# Patient Record
Sex: FEMALE | Race: WHITE | Employment: OTHER | URBAN - METROPOLITAN AREA
[De-identification: names, ages, dates, MRNs, and addresses within clinical notes are randomized per-mention and may not be internally consistent; named-entity substitution may affect disease eponyms.]

---

## 2017-08-15 LAB
HBA1C MFR BLD HPLC: 5.9 %
MICROALBUMIN UR TEST STR-MCNC: NORMAL MG/DL

## 2020-01-06 LAB
CREATININE, EXTERNAL: 0.8
LDL-C, EXTERNAL: 86

## 2020-09-18 ENCOUNTER — TELEPHONE (OUTPATIENT)
Dept: INTERNAL MEDICINE CLINIC | Age: 80
End: 2020-09-18

## 2020-09-18 NOTE — TELEPHONE ENCOUNTER
Called patient to notify her of her doppler results being negative per Dr. Bishop Wilburn. Patient verbalizes understanding.

## 2020-10-08 ENCOUNTER — TELEPHONE (OUTPATIENT)
Dept: INTERNAL MEDICINE CLINIC | Age: 80
End: 2020-10-08

## 2020-10-08 DIAGNOSIS — F41.9 ANXIETY: ICD-10-CM

## 2020-10-08 RX ORDER — ALPRAZOLAM 0.25 MG/1
0.25 TABLET ORAL
Qty: 60 TAB | Refills: 2 | Status: SHIPPED | OUTPATIENT
Start: 2020-10-08 | End: 2021-10-18 | Stop reason: SDUPTHER

## 2020-10-08 RX ORDER — CITALOPRAM 10 MG/1
10 TABLET ORAL DAILY
Qty: 30 TAB | Refills: 2 | Status: SHIPPED | OUTPATIENT
Start: 2020-10-08 | End: 2020-12-03

## 2020-10-08 NOTE — TELEPHONE ENCOUNTER
I had a great discussion about anxiety and stress. Will fill xanax and start celexa. She is to set up an appointment with me for other health maintanance issues.

## 2020-11-16 RX ORDER — METOPROLOL TARTRATE 25 MG/1
12.5 TABLET, FILM COATED ORAL 2 TIMES DAILY
Qty: 30 TAB | Refills: 0 | Status: SHIPPED | OUTPATIENT
Start: 2020-11-16 | End: 2020-11-16

## 2020-11-16 NOTE — TELEPHONE ENCOUNTER
Patient is scheduled for an appt on Nov. 23. She is completely out. Please refill until appt.   [address]

## 2020-11-20 VITALS
WEIGHT: 177 LBS | DIASTOLIC BLOOD PRESSURE: 82 MMHG | HEIGHT: 64 IN | SYSTOLIC BLOOD PRESSURE: 142 MMHG | BODY MASS INDEX: 30.22 KG/M2

## 2020-11-20 PROBLEM — J44.9 CHRONIC OBSTRUCTIVE LUNG DISEASE (HCC): Status: ACTIVE | Noted: 2020-11-20

## 2020-11-20 PROBLEM — I10 ESSENTIAL HYPERTENSION: Status: ACTIVE | Noted: 2020-11-20

## 2020-11-20 PROBLEM — E78.5 HYPERLIPIDEMIA: Status: ACTIVE | Noted: 2020-11-20

## 2020-11-20 RX ORDER — ATORVASTATIN CALCIUM 40 MG/1
TABLET, FILM COATED ORAL DAILY
COMMUNITY
End: 2021-06-14 | Stop reason: SDUPTHER

## 2020-11-20 RX ORDER — ASPIRIN 325 MG
325 TABLET ORAL DAILY
COMMUNITY
End: 2021-01-21

## 2020-11-20 RX ORDER — LOSARTAN POTASSIUM 50 MG/1
100 TABLET ORAL DAILY
COMMUNITY
End: 2021-04-19

## 2020-11-20 RX ORDER — BUDESONIDE AND FORMOTEROL FUMARATE DIHYDRATE 80; 4.5 UG/1; UG/1
2 AEROSOL RESPIRATORY (INHALATION)
COMMUNITY
End: 2021-10-18 | Stop reason: SDUPTHER

## 2020-11-20 RX ORDER — FUROSEMIDE 20 MG/1
20 TABLET ORAL DAILY
COMMUNITY
End: 2021-01-17

## 2020-12-03 ENCOUNTER — VIRTUAL VISIT (OUTPATIENT)
Dept: INTERNAL MEDICINE CLINIC | Age: 80
End: 2020-12-03
Payer: MEDICARE

## 2020-12-03 DIAGNOSIS — I10 ESSENTIAL HYPERTENSION: ICD-10-CM

## 2020-12-03 DIAGNOSIS — R53.82 CHRONIC FATIGUE: ICD-10-CM

## 2020-12-03 DIAGNOSIS — F41.9 ANXIETY: ICD-10-CM

## 2020-12-03 DIAGNOSIS — J42 CHRONIC BRONCHITIS, UNSPECIFIED CHRONIC BRONCHITIS TYPE (HCC): ICD-10-CM

## 2020-12-03 DIAGNOSIS — Z71.89 ADVANCED CARE PLANNING/COUNSELING DISCUSSION: ICD-10-CM

## 2020-12-03 DIAGNOSIS — E78.2 MIXED HYPERLIPIDEMIA: Primary | ICD-10-CM

## 2020-12-03 PROCEDURE — 99443 PR PHYS/QHP TELEPHONE EVALUATION 21-30 MIN: CPT | Performed by: INTERNAL MEDICINE

## 2020-12-03 NOTE — PROGRESS NOTES
I was at my office in [place], [place] while conducting this encounter. The patient is at home. Consent:  Patient and/or HIS/HER healthcare decision maker is aware that this patient-initiated Telehealth encounter is a billable service, with coverage as determined by patient's insurance carrier. Patient is aware that He/She may receive a bill and has provided verbal consent to proceed: Consent has been obtained within past 12 months of the date of this encounter. This virtual visit was conducted via Telephone    1. Mixed hyperlipidemia  We will check a lipid panel continue statin  - LIPID PANEL; Future    2. Essential hypertension  I am going to check a metabolic panel she has been having some periodic dizziness which may suggest her blood pressure is a little bit low or that she may be orthostatic. I would have her come by the office tomorrow were going to check some orthostatic blood pressures on her  - METABOLIC PANEL, COMPREHENSIVE; Future    3. Advanced care planning/counseling discussion  Approximately 2 minutes were spent in advance care planning. Her power of  unofficially is Beto Wilson. She is a full code    4. Chronic bronchitis, unspecified chronic bronchitis type ([address]  This is been relatively stable. She quit smoking just over a year ago and reports that she is breathing without difficulty. I congratulated her for her success    5. Anxiety  The patient has a history of anxiety and has been on alprazolam 0.25 twice daily. She asked that this be increased but I had to decline given her advanced age and the fact that she already has dizziness. I did take the time to explain to her the risks of higher benzodiazepine dosages and she agreed to continue her current dose. Have also checked the  and found no signs of abuse    6. Chronic fatigue  She has been relating this is being just age although 1 to make sure she is not anemic we will check a CBC  - CBC WITH AUTOMATED DIFF;  Future Chief Complaint   Patient presents with    Hypertension     follow up        HPI   This is a delightful 77-year-old female with a history of COPD dyslipidemia and hypertension who presents in follow-up. Her former physician was Dr. Anahi Rendon who retired several months ago. She reports she has been doing okay and at her baseline but has noticed periodic dizziness especially when going to a stand sometimes. She reports other than that it is pretty random. She has not had her blood pressure checked in many months. She is also asking to come into the office for a blood pressure check which I completely agree with. She has no headaches or vision changes she has no chest pain palpitations or shortness of breath. She does report fatigue but she relates that this is primarily because she is 78years old and she is always tired. She denies any blood in her stool or any diarrhea. We discussed advance care planning at length and she is also a full code with Yuly Olmstead being her POA. She otherwise reports she is well but has not had labs in quite some time  Current Outpatient Medications on File Prior to Visit   Medication Sig Dispense Refill    aspirin (ASPIRIN) 325 mg tablet Take 325 mg by mouth daily.  atorvastatin (LIPITOR) 40 mg tablet Take  by mouth daily.  losartan (COZAAR) 50 mg tablet Take 50 mg by mouth daily.  budesonide-formoteroL (Symbicort) 80-4.5 mcg/actuation HFAA Take 2 Puffs by inhalation.  metoprolol tartrate (LOPRESSOR) 25 mg tablet TAKE 1/2 TABLET BY MOUTH TWICE DAILY 90 Tab 1    ALPRAZolam (XANAX) 0.25 mg tablet Take 1 Tab by mouth two (2) times daily as needed for Anxiety. Max Daily Amount: 0.5 mg. 60 Tab 2    furosemide (Lasix) 20 mg tablet Take 20 mg by mouth daily. No current facility-administered medications on file prior to visit.          Patient Active Problem List   Diagnosis Code    Chronic obstructive lung disease ([facility] J44.9    Essential hypertension I10    Hyperlipidemia E78.5             Total Time Spent on this Encounter: greater than 21 mins

## 2020-12-03 NOTE — PROGRESS NOTES
Patient states she is having some depression due to Covid. States if she could get out and go places and see people she would feel better. Patient states does not need any refills on her medications. States sometimes when she stands up she is light headed. States she needs her Xanax increased. Safia Stoner presents today for   Chief Complaint   Patient presents with    Medication Refill       Depression Screening:  3 most recent PHQ Screens 12/3/2020   Little interest or pleasure in doing things Not at all   Feeling down, depressed, irritable, or hopeless Not at all   Total Score PHQ 2 0       Learning Assessment:  No flowsheet data found. Fall Risk  No flowsheet data found. ADL  No flowsheet data found. Health Maintenance reviewed and discussed and ordered per Provider. Health Maintenance Due   Topic Date Due    DTaP/Tdap/Td series (1 - Tdap) 08/30/1959    Shingrix Vaccine Age 50> (1 of 2) 08/30/1988    GLAUCOMA SCREENING Q2Y  08/30/2003    Bone Densitometry (Dexa) Screening  08/30/2003    Pneumococcal 65+ years (1 of 1 - PPSV23) 08/30/2003    Medicare Yearly Exam  03/10/2020   . Coordination of Care:  1. Have you been to the ER, urgent care clinic since your last visit? Hospitalized since your last visit? no    2. Have you seen or consulted any other health care providers outside of the [address] [place] [address] since your last visit? Include any pap smears or colon screening.  no

## 2020-12-11 ENCOUNTER — HOSPITAL ENCOUNTER (OUTPATIENT)
Dept: LAB | Age: 80
Discharge: HOME | End: 2020-12-11
Payer: MEDICARE

## 2020-12-11 DIAGNOSIS — E78.2 MIXED HYPERLIPIDEMIA: ICD-10-CM

## 2020-12-11 DIAGNOSIS — I10 ESSENTIAL HYPERTENSION: ICD-10-CM

## 2020-12-11 DIAGNOSIS — R53.82 CHRONIC FATIGUE: ICD-10-CM

## 2020-12-11 LAB
ALBUMIN SERPL-MCNC: 4.1 G/DL (ref 3.5–4.7)
ALBUMIN/GLOB SERPL: 1.1 {RATIO}
ALP SERPL-CCNC: 79 U/L (ref 38–126)
ALT SERPL-CCNC: 21 U/L (ref 3–52)
ANION GAP SERPL CALC-SCNC: 7 MMOL/L
AST SERPL W P-5'-P-CCNC: 27 U/L (ref 14–74)
BASOPHILS # BLD: 0 K/UL (ref 0–0.1)
BASOPHILS NFR BLD: 1 % (ref 0–2)
BILIRUB SERPL-MCNC: 0.4 MG/DL (ref 0.2–1)
BUN SERPL-MCNC: 17 MG/DL (ref 9–21)
BUN/CREAT SERPL: 21
CA-I BLD-MCNC: 9.4 MG/DL (ref 8.5–10.5)
CHLORIDE SERPL-SCNC: 106 MMOL/L (ref 94–111)
CO2 SERPL-SCNC: 25 MMOL/L (ref 21–33)
CREAT SERPL-MCNC: 0.8 MG/DL (ref 0.7–1.2)
EOSINOPHIL # BLD: 0.2 K/UL (ref 0–0.4)
EOSINOPHIL NFR BLD: 2 % (ref 0–5)
ERYTHROCYTE [DISTWIDTH] IN BLOOD BY AUTOMATED COUNT: 14.7 % (ref 11.6–14.5)
GLOBULIN SER CALC-MCNC: 3.7 G/DL
GLUCOSE SERPL-MCNC: 103 MG/DL (ref 70–110)
HCT VFR BLD AUTO: 37.6 % (ref 35–45)
HGB BLD-MCNC: 11.4 G/DL (ref 12–16)
IMM GRANULOCYTES # BLD AUTO: 0 K/UL
IMM GRANULOCYTES NFR BLD AUTO: 0 %
LYMPHOCYTES # BLD: 1 K/UL (ref 0.9–3.6)
LYMPHOCYTES NFR BLD: 16 % (ref 21–52)
MCH RBC QN AUTO: 26.6 PG (ref 24–34)
MCHC RBC AUTO-ENTMCNC: 30.3 G/DL (ref 31–37)
MCV RBC AUTO: 87.9 FL (ref 74–97)
MONOCYTES # BLD: 0.8 K/UL (ref 0.05–1.2)
MONOCYTES NFR BLD: 12 % (ref 3–10)
NEUTS SEG # BLD: 4.5 K/UL (ref 1.8–8)
NEUTS SEG NFR BLD: 69 % (ref 40–73)
PLATELET # BLD AUTO: 234 K/UL (ref 135–420)
PMV BLD AUTO: 11.4 FL (ref 9.2–11.8)
POTASSIUM SERPL-SCNC: 3.9 MMOL/L (ref 3.2–5.1)
PROT SERPL-MCNC: 7.8 G/DL (ref 6.1–8.4)
RBC # BLD AUTO: 4.28 M/UL (ref 4.2–5.3)
SODIUM SERPL-SCNC: 138 MMOL/L (ref 135–145)
WBC # BLD AUTO: 6.5 K/UL (ref 4.6–13.2)

## 2020-12-11 PROCEDURE — 36415 COLL VENOUS BLD VENIPUNCTURE: CPT

## 2020-12-11 PROCEDURE — 85025 COMPLETE CBC W/AUTO DIFF WBC: CPT

## 2020-12-11 PROCEDURE — 80053 COMPREHEN METABOLIC PANEL: CPT

## 2020-12-11 PROCEDURE — 80061 LIPID PANEL: CPT

## 2020-12-12 LAB
CHOLEST SERPL-MCNC: 153 MG/DL
HDLC SERPL-MCNC: 53 MG/DL
HDLC SERPL: 2.9 {RATIO} (ref 0–5)
LDLC SERPL CALC-MCNC: 75.6 MG/DL (ref 0–100)
LIPID PROFILE,FLP: NORMAL
TRIGL SERPL-MCNC: 122 MG/DL (ref ?–150)
VLDLC SERPL CALC-MCNC: 24.4 MG/DL

## 2021-01-17 ENCOUNTER — APPOINTMENT (OUTPATIENT)
Dept: CT IMAGING | Age: 81
DRG: 812 | End: 2021-01-17
Attending: EMERGENCY MEDICINE
Payer: MEDICARE

## 2021-01-17 ENCOUNTER — APPOINTMENT (OUTPATIENT)
Dept: GENERAL RADIOLOGY | Age: 81
DRG: 812 | End: 2021-01-17
Attending: EMERGENCY MEDICINE
Payer: MEDICARE

## 2021-01-17 ENCOUNTER — HOSPITAL ENCOUNTER (INPATIENT)
Age: 81
LOS: 3 days | Discharge: HOME | DRG: 812 | End: 2021-01-21
Attending: EMERGENCY MEDICINE | Admitting: INTERNAL MEDICINE
Payer: MEDICARE

## 2021-01-17 DIAGNOSIS — D64.9 SEVERE ANEMIA: Primary | ICD-10-CM

## 2021-01-17 LAB
ALBUMIN SERPL-MCNC: 3.7 G/DL (ref 3.5–4.7)
ALBUMIN/GLOB SERPL: 1.2 {RATIO}
ALP SERPL-CCNC: 68 U/L (ref 38–126)
ALT SERPL-CCNC: 16 U/L (ref 3–52)
ANION GAP SERPL CALC-SCNC: 7 MMOL/L
APPEARANCE UR: CLEAR
AST SERPL W P-5'-P-CCNC: 22 U/L (ref 14–74)
BASOPHILS # BLD: 0.1 K/UL (ref 0–0.1)
BASOPHILS NFR BLD: 1 % (ref 0–2)
BILIRUB SERPL-MCNC: 0.5 MG/DL (ref 0.2–1)
BILIRUB UR QL: NEGATIVE
BUN SERPL-MCNC: 19 MG/DL (ref 9–21)
BUN/CREAT SERPL: 24
CA-I BLD-MCNC: 8.8 MG/DL (ref 8.5–10.5)
CHLORIDE SERPL-SCNC: 107 MMOL/L (ref 94–111)
CK MB SERPL-MCNC: 1.6 NG/ML (ref 5–25)
CK SERPL-CCNC: 54 U/L (ref 26–140)
CO2 SERPL-SCNC: 24 MMOL/L (ref 21–33)
COLOR UR: NORMAL
CREAT SERPL-MCNC: 0.8 MG/DL (ref 0.7–1.2)
EOSINOPHIL # BLD: 0.1 K/UL (ref 0–0.4)
EOSINOPHIL NFR BLD: 1 % (ref 0–5)
ERYTHROCYTE [DISTWIDTH] IN BLOOD BY AUTOMATED COUNT: 16 % (ref 11.6–14.5)
FIBRINOGEN PPP-MCNC: 311 MG/DL (ref 210–451)
GLOBULIN SER CALC-MCNC: 3.1 G/DL
GLUCOSE SERPL-MCNC: 124 MG/DL (ref 70–110)
GLUCOSE UR STRIP.AUTO-MCNC: NEGATIVE MG/DL
HCT VFR BLD AUTO: 21.4 % (ref 35–45)
HEMOCCULT SP1 STL QL: NEGATIVE
HGB BLD-MCNC: 5.9 G/DL (ref 12–16)
HGB UR QL STRIP: NEGATIVE
IMM GRANULOCYTES # BLD AUTO: 0 K/UL
IMM GRANULOCYTES NFR BLD AUTO: 0 %
KETONES UR QL STRIP.AUTO: NEGATIVE MG/DL
LEUKOCYTE ESTERASE UR QL STRIP.AUTO: NEGATIVE
LYMPHOCYTES # BLD: 0.8 K/UL (ref 0.9–3.6)
LYMPHOCYTES NFR BLD: 15 % (ref 21–52)
MCH RBC QN AUTO: 23.8 PG (ref 24–34)
MCHC RBC AUTO-ENTMCNC: 27.6 G/DL (ref 31–37)
MCV RBC AUTO: 86.3 FL (ref 74–97)
MONOCYTES # BLD: 0.6 K/UL (ref 0.05–1.2)
MONOCYTES NFR BLD: 11 % (ref 3–10)
NEUTS SEG # BLD: 3.8 K/UL (ref 1.8–8)
NEUTS SEG NFR BLD: 72 % (ref 40–73)
NITRITE UR QL STRIP.AUTO: NEGATIVE
PH UR STRIP: 6.5 [PH] (ref 5–9)
PLATELET # BLD AUTO: 242 K/UL (ref 135–420)
PMV BLD AUTO: 10.7 FL (ref 9.2–11.8)
POTASSIUM SERPL-SCNC: 3.9 MMOL/L (ref 3.2–5.1)
PROT SERPL-MCNC: 6.8 G/DL (ref 6.1–8.4)
PROT UR STRIP-MCNC: NEGATIVE MG/DL
RBC # BLD AUTO: 2.48 M/UL (ref 4.2–5.3)
RBC MORPH BLD: ABNORMAL
SODIUM SERPL-SCNC: 138 MMOL/L (ref 135–145)
SP GR UR REFRACTOMETRY: 1.01 (ref 1–1.03)
TROPONIN I SERPL-MCNC: <0.02 NG/ML (ref 0.02–0.05)
TSH SERPL DL<=0.05 MIU/L-ACNC: 0.53 UIU/ML (ref 0.35–6.2)
UROBILINOGEN UR QL STRIP.AUTO: 0.2 EU/DL (ref 0.2–1)
WBC # BLD AUTO: 5.4 K/UL (ref 4.6–13.2)

## 2021-01-17 PROCEDURE — 96374 THER/PROPH/DIAG INJ IV PUSH: CPT

## 2021-01-17 PROCEDURE — 74177 CT ABD & PELVIS W/CONTRAST: CPT

## 2021-01-17 PROCEDURE — 74011000636 HC RX REV CODE- 636: Performed by: EMERGENCY MEDICINE

## 2021-01-17 PROCEDURE — 94640 AIRWAY INHALATION TREATMENT: CPT

## 2021-01-17 PROCEDURE — 99218 HC RM OBSERVATION: CPT

## 2021-01-17 PROCEDURE — 74011000250 HC RX REV CODE- 250: Performed by: EMERGENCY MEDICINE

## 2021-01-17 PROCEDURE — 80053 COMPREHEN METABOLIC PANEL: CPT

## 2021-01-17 PROCEDURE — 82607 VITAMIN B-12: CPT

## 2021-01-17 PROCEDURE — 36430 TRANSFUSION BLD/BLD COMPNT: CPT

## 2021-01-17 PROCEDURE — 71045 X-RAY EXAM CHEST 1 VIEW: CPT

## 2021-01-17 PROCEDURE — 82550 ASSAY OF CK (CPK): CPT

## 2021-01-17 PROCEDURE — 83540 ASSAY OF IRON: CPT

## 2021-01-17 PROCEDURE — 70450 CT HEAD/BRAIN W/O DYE: CPT

## 2021-01-17 PROCEDURE — 99285 EMERGENCY DEPT VISIT HI MDM: CPT

## 2021-01-17 PROCEDURE — P9016 RBC LEUKOCYTES REDUCED: HCPCS

## 2021-01-17 PROCEDURE — 86901 BLOOD TYPING SEROLOGIC RH(D): CPT

## 2021-01-17 PROCEDURE — 82553 CREATINE MB FRACTION: CPT

## 2021-01-17 PROCEDURE — 84443 ASSAY THYROID STIM HORMONE: CPT

## 2021-01-17 PROCEDURE — 85025 COMPLETE CBC W/AUTO DIFF WBC: CPT

## 2021-01-17 PROCEDURE — 81003 URINALYSIS AUTO W/O SCOPE: CPT

## 2021-01-17 PROCEDURE — 74011250637 HC RX REV CODE- 250/637: Performed by: EMERGENCY MEDICINE

## 2021-01-17 PROCEDURE — C9113 INJ PANTOPRAZOLE SODIUM, VIA: HCPCS | Performed by: EMERGENCY MEDICINE

## 2021-01-17 PROCEDURE — 74011250637 HC RX REV CODE- 250/637: Performed by: STUDENT IN AN ORGANIZED HEALTH CARE EDUCATION/TRAINING PROGRAM

## 2021-01-17 PROCEDURE — 93005 ELECTROCARDIOGRAM TRACING: CPT

## 2021-01-17 PROCEDURE — 85384 FIBRINOGEN ACTIVITY: CPT

## 2021-01-17 PROCEDURE — 82272 OCCULT BLD FECES 1-3 TESTS: CPT

## 2021-01-17 PROCEDURE — 94760 N-INVAS EAR/PLS OXIMETRY 1: CPT

## 2021-01-17 PROCEDURE — 30233N1 TRANSFUSION OF NONAUTOLOGOUS RED BLOOD CELLS INTO PERIPHERAL VEIN, PERCUTANEOUS APPROACH: ICD-10-PCS | Performed by: OBSTETRICS & GYNECOLOGY

## 2021-01-17 PROCEDURE — 74011250637 HC RX REV CODE- 250/637: Performed by: INTERNAL MEDICINE

## 2021-01-17 PROCEDURE — 74011250636 HC RX REV CODE- 250/636: Performed by: EMERGENCY MEDICINE

## 2021-01-17 PROCEDURE — 84484 ASSAY OF TROPONIN QUANT: CPT

## 2021-01-17 RX ORDER — ENOXAPARIN SODIUM 100 MG/ML
40 INJECTION SUBCUTANEOUS DAILY
Status: DISCONTINUED | OUTPATIENT
Start: 2021-01-18 | End: 2021-01-17

## 2021-01-17 RX ORDER — BUDESONIDE AND FORMOTEROL FUMARATE DIHYDRATE 80; 4.5 UG/1; UG/1
2 AEROSOL RESPIRATORY (INHALATION)
Status: DISCONTINUED | OUTPATIENT
Start: 2021-01-17 | End: 2021-01-21 | Stop reason: HOSPADM

## 2021-01-17 RX ORDER — ACETAMINOPHEN 325 MG/1
650 TABLET ORAL
Status: DISCONTINUED | OUTPATIENT
Start: 2021-01-17 | End: 2021-01-21 | Stop reason: HOSPADM

## 2021-01-17 RX ORDER — ACETAMINOPHEN 650 MG/1
650 SUPPOSITORY RECTAL
Status: DISCONTINUED | OUTPATIENT
Start: 2021-01-17 | End: 2021-01-21 | Stop reason: HOSPADM

## 2021-01-17 RX ORDER — ONDANSETRON 2 MG/ML
4 INJECTION INTRAMUSCULAR; INTRAVENOUS
Status: DISCONTINUED | OUTPATIENT
Start: 2021-01-17 | End: 2021-01-21 | Stop reason: HOSPADM

## 2021-01-17 RX ORDER — ALBUTEROL SULFATE 90 UG/1
2 AEROSOL, METERED RESPIRATORY (INHALATION)
Status: DISCONTINUED | OUTPATIENT
Start: 2021-01-17 | End: 2021-01-21 | Stop reason: HOSPADM

## 2021-01-17 RX ORDER — ATORVASTATIN CALCIUM 40 MG/1
40 TABLET, FILM COATED ORAL
Status: DISCONTINUED | OUTPATIENT
Start: 2021-01-17 | End: 2021-01-21 | Stop reason: HOSPADM

## 2021-01-17 RX ORDER — ALPRAZOLAM 0.25 MG/1
0.25 TABLET ORAL
Status: COMPLETED | OUTPATIENT
Start: 2021-01-17 | End: 2021-01-17

## 2021-01-17 RX ORDER — ALPRAZOLAM 0.25 MG/1
0.25 TABLET ORAL
Status: DISCONTINUED | OUTPATIENT
Start: 2021-01-17 | End: 2021-01-20 | Stop reason: DRUGHIGH

## 2021-01-17 RX ORDER — SODIUM CHLORIDE 0.9 % (FLUSH) 0.9 %
5-40 SYRINGE (ML) INJECTION EVERY 8 HOURS
Status: DISCONTINUED | OUTPATIENT
Start: 2021-01-17 | End: 2021-01-21 | Stop reason: HOSPADM

## 2021-01-17 RX ORDER — ALBUTEROL SULFATE 90 UG/1
2 AEROSOL, METERED RESPIRATORY (INHALATION)
COMMUNITY
End: 2021-04-21 | Stop reason: SDUPTHER

## 2021-01-17 RX ORDER — PROMETHAZINE HYDROCHLORIDE 25 MG/1
12.5 TABLET ORAL
Status: DISCONTINUED | OUTPATIENT
Start: 2021-01-17 | End: 2021-01-21 | Stop reason: HOSPADM

## 2021-01-17 RX ORDER — SODIUM CHLORIDE 9 MG/ML
250 INJECTION, SOLUTION INTRAVENOUS AS NEEDED
Status: DISCONTINUED | OUTPATIENT
Start: 2021-01-17 | End: 2021-01-21 | Stop reason: HOSPADM

## 2021-01-17 RX ORDER — METOPROLOL TARTRATE 25 MG/1
12.5 TABLET, FILM COATED ORAL 2 TIMES DAILY
Status: DISCONTINUED | OUTPATIENT
Start: 2021-01-17 | End: 2021-01-20

## 2021-01-17 RX ORDER — SODIUM CHLORIDE 0.9 % (FLUSH) 0.9 %
5-40 SYRINGE (ML) INJECTION AS NEEDED
Status: DISCONTINUED | OUTPATIENT
Start: 2021-01-17 | End: 2021-01-21 | Stop reason: HOSPADM

## 2021-01-17 RX ORDER — POLYETHYLENE GLYCOL 3350 17 G/17G
17 POWDER, FOR SOLUTION ORAL DAILY PRN
Status: DISCONTINUED | OUTPATIENT
Start: 2021-01-17 | End: 2021-01-21 | Stop reason: HOSPADM

## 2021-01-17 RX ORDER — ATORVASTATIN CALCIUM 40 MG/1
40 TABLET, FILM COATED ORAL DAILY
Status: DISCONTINUED | OUTPATIENT
Start: 2021-01-18 | End: 2021-01-17

## 2021-01-17 RX ORDER — LOSARTAN POTASSIUM 25 MG/1
100 TABLET ORAL DAILY
Status: DISCONTINUED | OUTPATIENT
Start: 2021-01-18 | End: 2021-01-21 | Stop reason: HOSPADM

## 2021-01-17 RX ADMIN — SODIUM CHLORIDE 500 ML: 9 INJECTION, SOLUTION INTRAVENOUS at 13:07

## 2021-01-17 RX ADMIN — METOPROLOL TARTRATE 12.5 MG: 25 TABLET, FILM COATED ORAL at 20:05

## 2021-01-17 RX ADMIN — ALPRAZOLAM 0.25 MG: 0.25 TABLET ORAL at 23:57

## 2021-01-17 RX ADMIN — BUDESONIDE AND FORMOTEROL FUMARATE DIHYDRATE 2 PUFF: 80; 4.5 AEROSOL RESPIRATORY (INHALATION) at 19:41

## 2021-01-17 RX ADMIN — ATORVASTATIN CALCIUM 40 MG: 40 TABLET, FILM COATED ORAL at 21:25

## 2021-01-17 RX ADMIN — PANTOPRAZOLE SODIUM 40 MG: 40 INJECTION, POWDER, FOR SOLUTION INTRAVENOUS at 14:54

## 2021-01-17 RX ADMIN — SODIUM CHLORIDE 250 ML: 9 INJECTION, SOLUTION INTRAVENOUS at 16:37

## 2021-01-17 RX ADMIN — ALPRAZOLAM 0.25 MG: 0.25 TABLET ORAL at 15:40

## 2021-01-17 RX ADMIN — IOPAMIDOL 100 ML: 755 INJECTION, SOLUTION INTRAVENOUS at 14:25

## 2021-01-17 NOTE — Clinical Note
Patient Class[de-identified] OBSERVATION [104]   Type of Bed: Telemetry [19]   Reason for Observation: Acute anemia   Admitting Diagnosis: Acute anemia [[id number]]   Admitting Physician: Carmine Adams [[id number]]   Attending Physician: Carmine Adams [[id number]]

## 2021-01-17 NOTE — ED TRIAGE NOTES
Pt. States she was sitting in her computer chair the other day when she leaned back and the chair went back and she hit her head against a brick wall. Pt. States this happened 4-5 days ago. PT.  States since then she has had decrease in her ability to walk and her vision has been blurry

## 2021-01-17 NOTE — ROUTINE PROCESS
TRANSFER - OUT REPORT: 
 
Verbal report given to RAGHU Hernandez(name) on Heaven Roland  being transferred to [address] [place] [address](unit) for routine progression of care Report consisted of patients Situation, Background, Assessment and  
Recommendations(SBAR). Information from the following report(s) SBAR, ED Summary, MAR, Recent Results and Cardiac Rhythm Sinus Arrythmia was reviewed with the receiving nurse. Lines:  
Peripheral IV 01/17/21 Right Forearm (Active) Peripheral IV 01/17/21 Left;Posterior Forearm (Active) Opportunity for questions and clarification was provided. Patient transported with: 
 Monitor Registered Nurse

## 2021-01-17 NOTE — H&P
History and Physical    Patient: Gayatri Jarquin MRN: [id number]  SSN: [id number]    YOB: 1940  Age: 78 y.o. Sex: female      Subjective:      Gayatri Jarquin is a 78 y.o.  female with past medical history of COPD, hypertension, and anxiety who presented to the ED with primary complaint of difficulty walking and blurred vision x5 days. She also reports associated dyspnea on exertion. She states that about 4-5 days ago she leaned backwards on her computer chair and fell, hitting her head against the concrete wall. She has a previous hx of tobacco use. She denies fever, chills, headache, chest pain, palpitations, cough, wheezing, N/V, abd pain, or numbess/tingling. In the ED, lab workup showed hemoglobin of 5.9. BMP unremarkable. CK and troponin within normal limits. Urinalysis normal. Stool occult negative. CT of head showed no acute intracranial process. CT of abd/pelvis showed diverticulosis without evidence of acute abdominopelvic process. Chest x-ray showed no acute cardiopulmonary process. 2 units PRBCs ordered. Past Medical History:   Diagnosis Date    Allergic rhinitis     Anxiety disorder     Chronic obstructive pulmonary disease (HCC)     Dyslipidemia     Folic acid deficiency     Hypertension     Neurologic disorder     resting tremor     History reviewed. No pertinent surgical history. Family History   Problem Relation Age of Onset    Cancer Father         stomach    Heart Disease Sister     Alcohol abuse Brother     Cancer Brother         lung     Social History     Tobacco Use    Smoking status: Former Smoker     Packs/day: 1.00     Years: 50.00     Pack years: 50.00    Smokeless tobacco: Never Used   Substance Use Topics    Alcohol use: Not Currently      Prior to Admission medications    Medication Sig Start Date End Date Taking?  Authorizing Provider   albuterol (PROVENTIL HFA, VENTOLIN HFA, PROAIR HFA) 90 mcg/actuation inhaler Take 2 Puffs by inhalation every four (4) hours as needed for Wheezing. Yes Other, MD Scooter   aspirin (ASPIRIN) 325 mg tablet Take 325 mg by mouth daily. Yes Provider, Historical   atorvastatin (LIPITOR) 40 mg tablet Take  by mouth daily. Yes Provider, Historical   losartan (COZAAR) 50 mg tablet Take 100 mg by mouth daily. Yes Provider, Historical   budesonide-formoteroL (Symbicort) 80-4.5 mcg/actuation HFAA Take 2 Puffs by inhalation. Yes Provider, Historical   metoprolol tartrate (LOPRESSOR) 25 mg tablet TAKE 1/2 TABLET BY MOUTH TWICE DAILY 11/16/20  Yes Valerio Wood MD   ALPRAZolam Reva Gomez) 0.25 mg tablet Take 1 Tab by mouth two (2) times daily as needed for Anxiety. Max Daily Amount: 0.5 mg. 10/8/20  Yes Valerio Wood MD        Allergies   Allergen Reactions    Isopropyl Alcohol Other (comments)     Weakness all over - pt states she has out grown this    Pen-Vee K Rash     Pt. States she avoids penicillin due to allergy as a child. Review of Systems:  Constitutional: No fevers, No chills  Eyes: + blurred vision  ENT: No nasal congestion, No sore throat  Respiratory: + dyspnea on exertion, No cough, No sputum, No wheezing  Cardiovascular: No chest pain, No lower extremity edema, No Palpitations   Gastrointestinal: No nausea, No vomiting, No diarrhea, No bloody stool, No abdominal pain  Genitourinary: No frequency, No dysuria, No hematuria  Integument/Breast: No rash, No skin lesions  Musculoskeletal: No arthralgias, No neck pain, No back pain  Neurological: + dizziness, No headaches, No confusion,  No seizures  Behavioral/Psychiatric: No anxiety, No depression      Objective:     Vitals:    01/17/21 1401 01/17/21 1457 01/17/21 1608 01/17/21 1640   BP: (!) 150/77 (!) 149/46 (!) 136/47 131/60   Pulse: 79 73 76 65   Resp:   16    Temp:   98.2 °F (36.8 °C)    SpO2: 99% 100% 100% 99%   Weight:       Height:            Physical Exam:  General: Well-appearing elderly  female. Alert, cooperative, no distress  Eye: conjunctivae/corneas clear. PERRL, EOM's intact. Throat and Neck: Supple neck. No pharyngeal erythema or exudates noted. No mass   Lung: clear to auscultation bilaterally without wheezing rhonchi or rales. On room air  Heart: regular rate and rhythm, normal S1/S2. Grade 2/6 systolic murmur LSB. No JVD. Abdomen: soft, non-tender, non-distended. Bowel sounds normal. No masses. : external hemorrhoid  Extremities:  able to move all extremities normal, atraumatic  Skin: No rashes or lesions. Warm, dry, intact. Normal turgor. Neurologic: AOx3. Cranial nerves 2-12 and sensation grossly intact. Psychiatric: non focal    Recent Results (from the past 24 hour(s))   CBC WITH AUTOMATED DIFF    Collection Time: 01/17/21 12:45 PM   Result Value Ref Range    WBC 5.4 4.6 - 13.2 K/uL    RBC 2.48 (L) 4.20 - 5.30 M/uL    HGB 5.9 (LL) 12.0 - 16.0 g/dL    HCT 21.4 (L) 35.0 - 45.0 %    MCV 86.3 74.0 - 97.0 FL    MCH 23.8 (L) 24.0 - 34.0 PG    MCHC 27.6 (L) 31.0 - 37.0 g/dL    RDW 16.0 (H) 11.6 - 14.5 %    PLATELET 320 637 - 919 K/uL    MPV 10.7 9.2 - 11.8 FL    NEUTROPHILS 72 40 - 73 %    LYMPHOCYTES 15 (L) 21 - 52 %    MONOCYTES 11 (H) 3 - 10 %    EOSINOPHILS 1 0 - 5 %    BASOPHILS 1 0 - 2 %    IMMATURE GRANULOCYTES 0 %    ABS. NEUTROPHILS 3.8 1.8 - 8.0 K/UL    ABS. LYMPHOCYTES 0.8 (L) 0.9 - 3.6 K/UL    ABS. MONOCYTES 0.6 0.05 - 1.2 K/UL    ABS. EOSINOPHILS 0.1 0.0 - 0.4 K/UL    ABS. BASOPHILS 0.1 0.0 - 0.1 K/UL    ABS. IMM.  GRANS. 0.0 K/UL    RBC COMMENTS Microcytosis  2+        RBC COMMENTS Hypochromia  1+        RBC COMMENTS H&H RECHECKED    METABOLIC PANEL, COMPREHENSIVE    Collection Time: 01/17/21 12:45 PM   Result Value Ref Range    Sodium 138 135 - 145 mmol/L    Potassium 3.9 3.2 - 5.1 mmol/L    Chloride 107 94 - 111 mmol/L    CO2 24 21 - 33 mmol/L    Anion gap 7 mmol/L    Glucose 124 (H) 70 - 110 mg/dL    BUN 19 9 - 21 mg/dL    Creatinine 0.80 0.70 - 1.20 mg/dL    BUN/Creatinine ratio 24      GFR est AA >60 ml/min/1.73m2    GFR est non-AA >60 ml/min/1.73m2    Calcium 8.8 8.5 - 10.5 mg/dL    Bilirubin, total 0.5 0.2 - 1.0 mg/dL    AST (SGOT) 22 14 - 74 U/L    ALT (SGPT) 16 3 - 52 U/L    Alk. phosphatase 68 38 - 126 U/L    Protein, total 6.8 6.1 - 8.4 g/dL    Albumin 3.7 3.5 - 4.7 g/dL    Globulin 3.1 g/dL    A-G Ratio 1.2     TROPONIN I    Collection Time: 01/17/21 12:45 PM   Result Value Ref Range    Troponin-I, Qt. <0.02 (L) 0.02 - 0.05 ng/mL   CK    Collection Time: 01/17/21 12:45 PM   Result Value Ref Range    CK 54 26 - 140 U/L   CK-MB,QUANT.    Collection Time: 01/17/21 12:45 PM   Result Value Ref Range    CK - MB 1.6 ng/mL   TYPE & SCREEN    Collection Time: 01/17/21 12:45 PM   Result Value Ref Range    Crossmatch Expiration 01/20/2021,2359     ABO/Rh(D) O Positive     Antibody screen Negative     Unit number [id number]     Blood component type [facility]     Unit division 00     Status of unit Issued     TRANSFUSION STATUS Ok to transfuse     Crossmatch result Compatible     Unit number [id number]     Blood component type [facility]     Unit division 00     Status of unit Allocated     TRANSFUSION STATUS Ok to transfuse     Crossmatch result Compatible    FIBRINOGEN    Collection Time: 01/17/21 12:45 PM   Result Value Ref Range    Fibrinogen 311 210 - 451 mg/dL   TSH 3RD GENERATION    Collection Time: 01/17/21 12:45 PM   Result Value Ref Range    TSH 0.53 0.35 - 6.20 uIU/mL   OCCULT BLOOD, STOOL    Collection Time: 01/17/21  1:50 PM   Result Value Ref Range    Occult Blood,day 1 Negative     URINALYSIS W/ RFLX MICROSCOPIC    Collection Time: 01/17/21  2:30 PM   Result Value Ref Range    Color Yellow/Straw      Appearance Clear Clear      Specific gravity 1.015 1.003 - 1.035      pH (UA) 6.5 5.0 - 9.0      Protein Negative Negative mg/dL    Glucose Negative Negative mg/dL    Ketone Negative Negative mg/dL    Bilirubin Negative Negative      Blood Negative Negative      Urobilinogen 0.2  0.2 - 1.0 EU/dL    Nitrites Negative Negative      Leukocyte Esterase Negative Negative         XR Results (maximum last 3): No results found for this or any previous visit. CT Results (maximum last 3): Results from [place] encounter on 01/17/21   CT ABD PELV W CONT    Narrative EXAM: CT of the abdomen and pelvis    INDICATION: Pain. COMPARISON: May 16, 2013    TECHNIQUE: Axial CT imaging of the abdomen and pelvis was performed with  intravenous contrast. Multiplanar reformats were generated. One or more dose reduction techniques were used on this CT: automated exposure  control, adjustment of the mAs and/or kVp according to patient size, and  iterative reconstruction techniques. The specific techniques used on this CT  exam have been documented in the patient's electronic medical record. Digital  Imaging and Communications in Medicine (DICOM) format image data are available  to nonaffiliated external healthcare facilities or entities on a secure, media  free, reciprocally searchable basis with patient authorization for at least a  12-month period after this study. _______________    FINDINGS:    LOWER CHEST: Small hiatal hernia. LIVER, BILIARY: Diffuse decreased liver attenuation. No biliary dilation. Gallbladder is unremarkable. PANCREAS: Normal.    SPLEEN: Normal.    ADRENALS: Stable right thickening and left adrenal gland nodule. KIDNEYS: Stable left renal cortical cyst and lower pole scarring. LYMPH NODES: No enlarged lymph nodes. GASTROINTESTINAL TRACT: No bowel dilation or wall thickening. Uncomplicated  diverticula seen throughout the colon. PELVIC ORGANS: Unremarkable. VASCULATURE: Advanced atherosclerotic vascular calcification. BONES: No acute or aggressive osseous abnormalities identified. Multilevel  spinal degenerative changes. OTHER: None.    _______________      Impression IMPRESSION:      1. No evidence for acute abdominal or pelvic process.   2. Colonic diverticulosis. 3. Hiatal hernia. CT HEAD WO CONT    Narrative EXAM: CT head    INDICATION: Dizziness. COMPARISON: 1/8/2020. [place] Ra TECHNIQUE: Axial CT imaging of the head was performed without intravenous  contrast.      One or more dose reduction techniques were used on this CT: automated exposure  control, adjustment of the mAs and/or kVp according to patient size, and  iterative reconstruction techniques. The specific techniques used on this CT  exam have been documented in the patient's electronic medical record. Digital  Imaging and Communications in Medicine (DICOM) format image data are available  to nonaffiliated external healthcare facilities or entities on a secure, media  free, reciprocally searchable basis with patient authorization for at least a  12-month period after this study. _______________    FINDINGS:    BRAIN AND POSTERIOR FOSSA:   Periventricular and deep white matter areas of decreased attenuation are  identified. Gray-white matter interfaces are preserved. No intraparenchymal hemorrhage, mass effect or midline shift. The ventricular system is midline and symmetric. Atherosclerotic vascular calcifications are identified. EXTRA-AXIAL SPACES AND MENINGES:   There is no evidence for extra-axial mass lesion or fluid collection. CALVARIUM:   Intact. SINUSES:   Left maxillary sinus mucosal thickening. OTHER:   Orbits are grossly intact. Facial soft tissue are within normal limits. _______________      Impression IMPRESSION:    1. No evidence for intracranial hemorrhage, mass effect or midline shift. 2.  Periventricular and deep white matter areas of microvascular ischemic  change. 3.  Cerebral atherosclerosis. 4.  Atrophy. If there are continued symptoms, a MRI is recommended for further evaluation. MRI Results (maximum last 3): No results found for this or any previous visit. Nuclear Medicine Results (maximum last 3):   No results found for this or any previous visit. US Results (maximum last 3): No results found for this or any previous visit. Active Problems:    Acute anemia (1/17/2021)      Severe anemia (1/17/2021)        Assessment/Plan:     1. Acute anemia  - Etiology unclear  - Hemoglobin of 5.9  - Urinalysis normal   - Stool occult negative  - Hold home ASA  - CT of abd/pelvis showed diverticulosis without evidence of acute abdominopelvic process  - CT of head showed no acute intracranial hemorrhage, mass, or shift  - Iron panel    - 2 units PRBCs ordered  - Repeat H/H  - Will likely need outpatient GI f/u for colonoscopy    2. COPD  - Chest x-ray showed no acute cardiopulmonary process  - Continue home inhalers  - Duonebs prn    3. Hypertension  - Stable  - Continue home losartan and metoprolol    4. Anxiety  - Continue home Xanax    5.  External hemorrhoids  - Supportive care    DVT Prophylaxis: SCDs to lower extremities   Code Status: Full    Total Time >55 minutes      Signed By: Alise Ocampo PA-C     January 17, 2021

## 2021-01-17 NOTE — ED PROVIDER NOTES
EMERGENCY DEPARTMENT HISTORY AND PHYSICAL EXAM      Date: 1/17/2021  Patient Name: Henry Cobos    History of Presenting Illness     Chief Complaint   Patient presents with    Blurred Vision    Difficulty Walking       History Provided By: Patient    HPI: Henry Cobos, 78 y.o. female with PMhx of anxiety, COPD, and HTN presents to the ED with complaints of difficulty walking and blurred vision. Pt states she was sitting in a computer chair 4-5 days ago when she had leaned backwards and fallen with the chair, hitting her head against the concrete wall. Pt notes she has had sxs of not feeling \"right\" when she walks or is laying down that started the day after the fall. Pt also endorses blurry vision and mild dizziness. Pt denies any headache, tinnitus, CP, abdominal pain, or numbness/tingling. Pt notes she just has  \"strange feeling\" when she walks, but denies having any visible difficulty walking. Pt denies any other sxs. There are no other complaints, changes, or physical findings at this time.     PCP: Carmen Tobias MD    Current Facility-Administered Medications   Medication Dose Route Frequency Provider Last Rate Last Admin    sodium chloride (NS) flush 5-40 mL  5-40 mL IntraVENous Q8H Ziggy Chol, PA-C        sodium chloride (NS) flush 5-40 mL  5-40 mL IntraVENous PRN Ziggy Chol, PA-C        acetaminophen (TYLENOL) tablet 650 mg  650 mg Oral Q6H PRN Ziggy Chol, PA-C        Or    acetaminophen (TYLENOL) suppository 650 mg  650 mg Rectal Q6H PRN Ziggy Chol, PA-C        polyethylene glycol (MIRALAX) packet 17 g  17 g Oral DAILY PRN Ziggy Chol, PA-C        promethazine (PHENERGAN) tablet 12.5 mg  12.5 mg Oral Q6H PRN Ziggy Chol, PA-C        Or    ondansetron [facility]) injection 4 mg  4 mg IntraVENous Q6H PRN Ziggy Chol, PA-C        [START ON 1/18/2021] enoxaparin (LOVENOX) injection 40 mg  40 mg SubCUTAneous DAILY Ziggy Chol, PA-C        0.9% sodium chloride infusion 250 mL  250 mL IntraVENous PRN Christina Garnett MD         Current Outpatient Medications   Medication Sig Dispense Refill    albuterol (PROVENTIL HFA, VENTOLIN HFA, PROAIR HFA) 90 mcg/actuation inhaler Take 2 Puffs by inhalation every four (4) hours as needed for Wheezing.  aspirin (ASPIRIN) 325 mg tablet Take 325 mg by mouth daily.  atorvastatin (LIPITOR) 40 mg tablet Take  by mouth daily.  losartan (COZAAR) 50 mg tablet Take 100 mg by mouth daily.  budesonide-formoteroL (Symbicort) 80-4.5 mcg/actuation HFAA Take 2 Puffs by inhalation.  metoprolol tartrate (LOPRESSOR) 25 mg tablet TAKE 1/2 TABLET BY MOUTH TWICE DAILY 90 Tab 1    ALPRAZolam (XANAX) 0.25 mg tablet Take 1 Tab by mouth two (2) times daily as needed for Anxiety. Max Daily Amount: 0.5 mg. 60 Tab 2       Past History     Past Medical History:  Past Medical History:   Diagnosis Date    Allergic rhinitis     Anxiety disorder     Chronic obstructive pulmonary disease (HCC)     Dyslipidemia     Folic acid deficiency     Hypertension     Neurologic disorder     resting tremor       Past Surgical History:  History reviewed. No pertinent surgical history. Family History:  Family History   Problem Relation Age of Onset    Cancer Father         stomach    Heart Disease Sister     Alcohol abuse Brother     Cancer Brother         lung       Social History:  Social History     Tobacco Use    Smoking status: Former Smoker     Packs/day: 1.00     Years: 50.00     Pack years: 50.00    Smokeless tobacco: Never Used   Substance Use Topics    Alcohol use: Not Currently    Drug use: Never       Allergies: Allergies   Allergen Reactions    Isopropyl Alcohol Other (comments)     Weakness all over - pt states she has out grown this    Pen-Vee K Rash     Pt. States she avoids penicillin due to allergy as a child.           Review of Systems   Review of Systems   HENT: Negative for tinnitus. Eyes: Positive for visual disturbance. Cardiovascular: Negative for chest pain. Gastrointestinal: Negative for abdominal pain. Neurological: Positive for dizziness. Negative for tremors, numbness and headaches. Physical Exam   Physical Exam  Vitals signs and nursing note reviewed. Exam conducted with a chaperone present. Constitutional:       General: She is awake. Appearance: Normal appearance. She is well-developed, well-groomed and normal weight. HENT:      Head: Normocephalic and atraumatic. Mouth/Throat:      Lips: Pink. Mouth: Mucous membranes are moist. No injury or lacerations. Eyes:      Extraocular Movements: Extraocular movements intact. Pupils: Pupils are equal, round, and reactive to light. Neck:      Musculoskeletal: Full passive range of motion without pain, normal range of motion and neck supple. Cardiovascular:      Rate and Rhythm: Normal rate and regular rhythm. Heart sounds: Murmur present. Systolic murmur present with a grade of 2/6. Pulmonary:      Effort: Pulmonary effort is normal.      Breath sounds: Normal breath sounds and air entry. Abdominal:      General: Abdomen is flat. Palpations: Abdomen is soft. Tenderness: There is no abdominal tenderness. Genitourinary:     Rectum: External hemorrhoid present. Comments: External hemorrhoid present at 6 o'clock position. No stool present in vault. Skin:     General: Skin is warm and dry. Neurological:      General: No focal deficit present. Mental Status: She is alert and oriented to person, place, and time. Motor: Motor function is intact. Coordination: Coordination is intact. Gait: Gait is intact. Comments: \"Get up and go\" test was unremarkable.  Straight leg raises were normal.   Psychiatric:         Attention and Perception: Attention and perception normal.         Mood and Affect: Mood and affect normal.         Speech: Speech normal. Behavior: Behavior normal. Behavior is cooperative. Thought Content: Thought content normal.         Cognition and Memory: Cognition and memory normal.         Judgment: Judgment normal.         Diagnostic Study Results     Labs -     Recent Results (from the past 12 hour(s))   CBC WITH AUTOMATED DIFF    Collection Time: 01/17/21 12:45 PM   Result Value Ref Range    WBC 5.4 4.6 - 13.2 K/uL    RBC 2.48 (L) 4.20 - 5.30 M/uL    HGB 5.9 (LL) 12.0 - 16.0 g/dL    HCT 21.4 (L) 35.0 - 45.0 %    MCV 86.3 74.0 - 97.0 FL    MCH 23.8 (L) 24.0 - 34.0 PG    MCHC 27.6 (L) 31.0 - 37.0 g/dL    RDW 16.0 (H) 11.6 - 14.5 %    PLATELET 029 221 - 479 K/uL    MPV 10.7 9.2 - 11.8 FL    NEUTROPHILS 72 40 - 73 %    LYMPHOCYTES 15 (L) 21 - 52 %    MONOCYTES 11 (H) 3 - 10 %    EOSINOPHILS 1 0 - 5 %    BASOPHILS 1 0 - 2 %    IMMATURE GRANULOCYTES 0 %    ABS. NEUTROPHILS 3.8 1.8 - 8.0 K/UL    ABS. LYMPHOCYTES 0.8 (L) 0.9 - 3.6 K/UL    ABS. MONOCYTES 0.6 0.05 - 1.2 K/UL    ABS. EOSINOPHILS 0.1 0.0 - 0.4 K/UL    ABS. BASOPHILS 0.1 0.0 - 0.1 K/UL    ABS. IMM. GRANS. 0.0 K/UL    RBC COMMENTS Microcytosis  2+        RBC COMMENTS Hypochromia  1+        RBC COMMENTS H&H RECHECKED    METABOLIC PANEL, COMPREHENSIVE    Collection Time: 01/17/21 12:45 PM   Result Value Ref Range    Sodium 138 135 - 145 mmol/L    Potassium 3.9 3.2 - 5.1 mmol/L    Chloride 107 94 - 111 mmol/L    CO2 24 21 - 33 mmol/L    Anion gap 7 mmol/L    Glucose 124 (H) 70 - 110 mg/dL    BUN 19 9 - 21 mg/dL    Creatinine 0.80 0.70 - 1.20 mg/dL    BUN/Creatinine ratio 24      GFR est AA >60 ml/min/1.73m2    GFR est non-AA >60 ml/min/1.73m2    Calcium 8.8 8.5 - 10.5 mg/dL    Bilirubin, total 0.5 0.2 - 1.0 mg/dL    AST (SGOT) 22 14 - 74 U/L    ALT (SGPT) 16 3 - 52 U/L    Alk.  phosphatase 68 38 - 126 U/L    Protein, total 6.8 6.1 - 8.4 g/dL    Albumin 3.7 3.5 - 4.7 g/dL    Globulin 3.1 g/dL    A-G Ratio 1.2     TROPONIN I    Collection Time: 01/17/21 12:45 PM   Result Value Ref Range    Troponin-I, Qt. <0.02 (L) 0.02 - 0.05 ng/mL   CK    Collection Time: 01/17/21 12:45 PM   Result Value Ref Range    CK 54 26 - 140 U/L   CK-MB,QUANT. Collection Time: 01/17/21 12:45 PM   Result Value Ref Range    CK - MB 1.6 ng/mL   TYPE & SCREEN    Collection Time: 01/17/21 12:45 PM   Result Value Ref Range    Crossmatch Expiration 01/20/2021,2359     ABO/Rh(D) O Positive     Antibody screen Negative    OCCULT BLOOD, STOOL    Collection Time: 01/17/21  1:50 PM   Result Value Ref Range    Occult Blood,day 1 Negative     URINALYSIS W/ RFLX MICROSCOPIC    Collection Time: 01/17/21  2:30 PM   Result Value Ref Range    Color Yellow/Straw      Appearance Clear Clear      Specific gravity 1.015 1.003 - 1.035      pH (UA) 6.5 5.0 - 9.0      Protein Negative Negative mg/dL    Glucose Negative Negative mg/dL    Ketone Negative Negative mg/dL    Bilirubin Negative Negative      Blood Negative Negative      Urobilinogen 0.2 0.2 - 1.0 EU/dL    Nitrites Negative Negative      Leukocyte Esterase Negative Negative         Radiologic Studies -   CT ABD PELV W CONT   Final Result   IMPRESSION:         1. No evidence for acute abdominal or pelvic process. 2. Colonic diverticulosis. 3. Hiatal hernia. CT HEAD WO CONT   Final Result   IMPRESSION:      1. No evidence for intracranial hemorrhage, mass effect or midline shift. 2.  Periventricular and deep white matter areas of microvascular ischemic   change. 3.  Cerebral atherosclerosis. 4.  Atrophy. If there are continued symptoms, a MRI is recommended for further evaluation. XR CHEST PORT    (Results Pending)     CT Results  (Last 48 hours)               01/17/21 1434  CT ABD PELV W CONT Final result    Impression:  IMPRESSION:           1. No evidence for acute abdominal or pelvic process. 2. Colonic diverticulosis. 3. Hiatal hernia. Narrative:  EXAM: CT of the abdomen and pelvis       INDICATION: Pain.        COMPARISON: May 16, 2013       TECHNIQUE: Axial CT imaging of the abdomen and pelvis was performed with   intravenous contrast. Multiplanar reformats were generated. One or more dose reduction techniques were used on this CT: automated exposure   control, adjustment of the mAs and/or kVp according to patient size, and   iterative reconstruction techniques. The specific techniques used on this CT   exam have been documented in the patient's electronic medical record. Digital   Imaging and Communications in Medicine (DICOM) format image data are available   to nonaffiliated external healthcare facilities or entities on a secure, media   free, reciprocally searchable basis with patient authorization for at least a   12-month period after this study. _______________       FINDINGS:       LOWER CHEST: Small hiatal hernia. LIVER, BILIARY: Diffuse decreased liver attenuation. No biliary dilation. Gallbladder is unremarkable. PANCREAS: Normal.       SPLEEN: Normal.       ADRENALS: Stable right thickening and left adrenal gland nodule. KIDNEYS: Stable left renal cortical cyst and lower pole scarring. LYMPH NODES: No enlarged lymph nodes. GASTROINTESTINAL TRACT: No bowel dilation or wall thickening. Uncomplicated   diverticula seen throughout the colon. PELVIC ORGANS: Unremarkable. VASCULATURE: Advanced atherosclerotic vascular calcification. BONES: No acute or aggressive osseous abnormalities identified. Multilevel   spinal degenerative changes. OTHER: None.       _______________           01/17/21 1252  CT HEAD WO CONT Final result    Impression:  IMPRESSION:       1. No evidence for intracranial hemorrhage, mass effect or midline shift. 2.  Periventricular and deep white matter areas of microvascular ischemic   change. 3.  Cerebral atherosclerosis. 4.  Atrophy. If there are continued symptoms, a MRI is recommended for further evaluation. Narrative:  EXAM: CT head       INDICATION: Dizziness. COMPARISON: 1/8/2020. Abdelrahman Ellisonodore TECHNIQUE: Axial CT imaging of the head was performed without intravenous   contrast.         One or more dose reduction techniques were used on this CT: automated exposure   control, adjustment of the mAs and/or kVp according to patient size, and   iterative reconstruction techniques. The specific techniques used on this CT   exam have been documented in the patient's electronic medical record. Digital   Imaging and Communications in Medicine (DICOM) format image data are available   to nonaffiliated external healthcare facilities or entities on a secure, media   free, reciprocally searchable basis with patient authorization for at least a   12-month period after this study. _______________       FINDINGS:       BRAIN AND POSTERIOR FOSSA:    Periventricular and deep white matter areas of decreased attenuation are   identified. Gray-white matter interfaces are preserved. No intraparenchymal hemorrhage, mass effect or midline shift. The ventricular system is midline and symmetric. Atherosclerotic vascular calcifications are identified. EXTRA-AXIAL SPACES AND MENINGES:    There is no evidence for extra-axial mass lesion or fluid collection. CALVARIUM:    Intact. SINUSES:    Left maxillary sinus mucosal thickening. OTHER:    Orbits are grossly intact. Facial soft tissue are within normal limits. _______________               CXR Results  (Last 48 hours)    None          Medical Decision Making and ED Course   I am the first provider for this patient. I reviewed the vital signs, available nursing notes, past medical history, past surgical history, family history and social history. Vital Signs-Reviewed the patient's vital signs.   Patient Vitals for the past 12 hrs:   Temp Pulse Resp BP SpO2   01/17/21 1457  73  (!) 149/46 100 %   01/17/21 1401  79  (!) 150/77 99 % 01/17/21 1300  63  (!) 129/57 100 %   01/17/21 1223  72  111/65 100 %   01/17/21 1220  67  133/61    01/17/21 1218  69  (!) 144/61    01/17/21 1205 99.2 °F (37.3 °C) 72 16 (!) 134/55 100 %       EKG interpretation: (Preliminary): performed at 1:00 pm  Rhythm: sinus arrhythmia; Rate (approx.): 66; Axis: normal; WI interval: prolonged (213); QRS interval: prolonged (136); ST/T wave: normal; Other findings: LBBB. Records Reviewed: Nursing Notes      ED Course:   Initial assessment performed. The patients presenting problems have been discussed, and they are in agreement with the care plan formulated and outlined with them. I have encouraged them to ask questions as they arise throughout their visit. Provider Notes (Medical Decision Making):   Elderly female presents with dizziness which is difficult for her to describe associated with some blurry vision. Symptoms started after a fall with head chair involving mild head injury. She had no headache except the dizziness followed with blurry vision. No focal neuro findings on exam.  She is however orthostatic in ED. CT scan of head is negative. She has no chest or abdomen pain. CBC however reveals a hemoglobin of 5.9. Prior hemoglobin. Her old medical records was December 3, 2020 and was 11.4. Hemoccult was obtained and is negative. She has no belly pain and CT of the abdomen with IV contrast reveals no acute process except colonic diverticuli with no bleeding noted. Suspect patient most likely had a small diverticular bleed. She is stable in ED. She is being admitted for transfusion of blood and further work-up of anemia. She may need a GI referral for scoping as an outpatient if she remains stable. Consultations:       Consultations:     3:27 pm:  I spoke with MIK Gee for hospitalist, Dr. Agustin Morgan for pt admission and blood transfusion. She agreed for pt admission.        Procedures:     Critical Care Time:   CRITICAL CARE NOTE :    2:46 PM    Amount of Critical Care Time: __45__    IMPENDING DETERIORATION -Cardiovascular  ASSOCIATED RISK FACTORS - Hypotension and Shock  MANAGEMENT- Bedside Assessment  INTERPRETATION -  CT Scan and laboratory evaluation  INTERVENTIONS - hemodynamic mngmt  CASE REVIEW - Hospitalist  TREATMENT RESPONSE -Stable  PERFORMED BY - Physician    NOTES   :  I have spent critical care time involved in lab review, consultations with specialist, family decision- making, bedside attention and documentation. This time excludes time spent in any separate billed procedures. During this entire length of time I was immediately available to the patient . Disposition         DISCHARGE PLAN:  1. Current Discharge Medication List      CONTINUE these medications which have NOT CHANGED    Details   aspirin (ASPIRIN) 325 mg tablet Take 325 mg by mouth daily. atorvastatin (LIPITOR) 40 mg tablet Take  by mouth daily. furosemide (Lasix) 20 mg tablet Take 20 mg by mouth daily. losartan (COZAAR) 50 mg tablet Take 50 mg by mouth daily. budesonide-formoteroL (Symbicort) 80-4.5 mcg/actuation HFAA Take 2 Puffs by inhalation. metoprolol tartrate (LOPRESSOR) 25 mg tablet TAKE 1/2 TABLET BY MOUTH TWICE DAILY  Qty: 90 Tab, Refills: 1    Comments: **Patient requests 90 days supply**      ALPRAZolam (XANAX) 0.25 mg tablet Take 1 Tab by mouth two (2) times daily as needed for Anxiety. Max Daily Amount: 0.5 mg.  Qty: 60 Tab, Refills: 2    Associated Diagnoses: Anxiety           2. Follow-up Information    None       3. Return to ED if worse     Diagnosis     Clinical Impression:   1. Severe anemia        Attestations:    By signing my name below, Audrey Ingrisceli, attest that this documentation has been prepared under the direction and in presence of Dr. Wilfred Duke on 01/17/21.  Electronically signed: Paola Hernandez, 01/17/21, 12:01 PM        Please note that this dictation was completed with [employer] computer voice recognition software. Quite often unanticipated grammatical, syntax, homophones, and other interpretive errors are inadvertently transcribed by the computer software. Please disregard these errors. Please excuse any errors that have escaped final proofreading. Thank you.

## 2021-01-18 ENCOUNTER — APPOINTMENT (OUTPATIENT)
Dept: NON INVASIVE DIAGNOSTICS | Age: 81
DRG: 812 | End: 2021-01-18
Attending: INTERNAL MEDICINE
Payer: MEDICARE

## 2021-01-18 LAB
ALBUMIN SERPL-MCNC: 3.6 G/DL (ref 3.5–4.7)
ALBUMIN/GLOB SERPL: 1.7 {RATIO}
ALP SERPL-CCNC: 60 U/L (ref 38–126)
ALT SERPL-CCNC: 30 U/L (ref 3–52)
ANION GAP SERPL CALC-SCNC: 6 MMOL/L
AST SERPL W P-5'-P-CCNC: 32 U/L (ref 14–74)
ATRIAL RATE: 65 BPM
ATRIAL RATE: 68 BPM
BASOPHILS # BLD: 0 K/UL (ref 0–0.1)
BASOPHILS NFR BLD: 1 % (ref 0–2)
BILIRUB SERPL-MCNC: 1.8 MG/DL (ref 0.2–1)
BUN SERPL-MCNC: 18 MG/DL (ref 9–21)
BUN/CREAT SERPL: 23
CA-I BLD-MCNC: 8.5 MG/DL (ref 8.5–10.5)
CALCULATED P AXIS, ECG09: -15 DEGREES
CALCULATED P AXIS, ECG09: 13 DEGREES
CALCULATED R AXIS, ECG10: 33 DEGREES
CALCULATED R AXIS, ECG10: 36 DEGREES
CALCULATED T AXIS, ECG11: 81 DEGREES
CALCULATED T AXIS, ECG11: 82 DEGREES
CHLORIDE SERPL-SCNC: 109 MMOL/L (ref 94–111)
CO2 SERPL-SCNC: 24 MMOL/L (ref 21–33)
CREAT SERPL-MCNC: 0.8 MG/DL (ref 0.7–1.2)
DIAGNOSIS, 93000: NORMAL
DIAGNOSIS, 93000: NORMAL
ECHO AO ROOT DIAM: 2.9 CM
ECHO AR MAX VEL PISA: 359 CM/S
ECHO AV AREA PEAK VELOCITY: 1.82 CM2
ECHO AV AREA PLAN/BSA: 1.04
ECHO AV AREA VTI: 1.92 CM2
ECHO AV AREA/BSA PEAK VELOCITY: 1 CM2/M2
ECHO AV AREA/BSA VTI: 1 CM2/M2
ECHO AV CUSP MM: 1.5 CM
ECHO AV MEAN GRADIENT: 12 MMHG
ECHO AV MEAN VELOCITY: 164 CM/S
ECHO AV PEAK GRADIENT: 21 MMHG
ECHO AV REGURGITANT PHT: 637 MS
ECHO AV VTI: 49.5 CM
ECHO EST RA PRESSURE: 10 MMHG
ECHO LA AREA 2C: 19.6 CM2
ECHO LA AREA 4C: 22.4 CM2
ECHO LA MAJOR AXIS: 6.34 CM
ECHO LA MINOR AXIS: 3.44 CM
ECHO LA TO AORTIC ROOT RATIO: 1.41
ECHO LV E' LATERAL VELOCITY: 7.94 CM/S
ECHO LV E' SEPTAL VELOCITY: 6.53 CM/S
ECHO LV EDV A2C: 97.3 CM3
ECHO LV EJECTION FRACTION A2C: 39 %
ECHO LV EJECTION FRACTION BIPLANE: 69.6 % (ref 55–100)
ECHO LV ESV A2C: 22 CM3
ECHO LV INTERNAL DIMENSION DIASTOLIC: 4.6 CM (ref 3.9–5.3)
ECHO LV INTERNAL DIMENSION SYSTOLIC: 2.8 CM
ECHO LV IVSD: 0.9 CM (ref 0.6–0.9)
ECHO LV MASS 2D: 148.1 G (ref 67–162)
ECHO LV MASS INDEX 2D: 80.3 G/M2 (ref 43–95)
ECHO LV POSTERIOR WALL DIASTOLIC: 1 CM (ref 0.6–0.9)
ECHO LVOT DIAM: 2 CM
ECHO LVOT PEAK GRADIENT: 7 MMHG
ECHO LVOT SV: 95 CM3
ECHO LVOT VTI: 27.4 CM
ECHO LVOT VTI: 30.2 CM
ECHO LVOT VTI: 31.6 CM
ECHO LVOT VTI: 31.7 CM
ECHO MV A VELOCITY: 132 CM/S
ECHO MV AREA PHT: 2.34 CM2
ECHO MV E DECELERATION TIME (DT): 321 MS
ECHO MV E VELOCITY: 119 CM/S
ECHO MV E/A RATIO: 0.9
ECHO MV E/E' LATERAL: 14.99
ECHO MV E/E' RATIO (AVERAGED): 16.61
ECHO MV E/E' SEPTAL: 18.22
ECHO MV PRESSURE HALF TIME (PHT): 94 MS
ECHO PV REGURGITANT MAX VELOCITY: 133 CM/S
ECHO PV REGURGITANT MAX VELOCITY: 229 CM/S
ECHO PV REGURGITANT MAX VELOCITY: 279 CM/S
ECHO RA AREA 4C: 12.8 CM2
ECHO RA MAJOR AXIS: 5.17 CM
ECHO RIGHT VENTRICULAR SYSTOLIC PRESSURE (RVSP): 41 MMHG
ECHO RV INTERNAL DIMENSION: 2.5 CM
ECHO RV TAPSE: 2.53 CM (ref 1.5–2)
ECHO TV MEAN GRADIENT: 31 MMHG
EOSINOPHIL # BLD: 0.1 K/UL (ref 0–0.4)
EOSINOPHIL NFR BLD: 1 % (ref 0–5)
ERYTHROCYTE [DISTWIDTH] IN BLOOD BY AUTOMATED COUNT: 15.3 % (ref 11.6–14.5)
FERRITIN SERPL-MCNC: 5 NG/ML (ref 8–252)
FOLATE SERPL-MCNC: 16.2 NG/ML (ref 5–21)
GLOBULIN SER CALC-MCNC: 2.1 G/DL
GLUCOSE SERPL-MCNC: 99 MG/DL (ref 70–110)
HCT VFR BLD AUTO: 25.9 % (ref 35–45)
HCT VFR BLD AUTO: 27 % (ref 35–45)
HGB BLD-MCNC: 7.9 G/DL (ref 12–16)
HGB BLD-MCNC: 8.2 G/DL (ref 12–16)
IMM GRANULOCYTES # BLD AUTO: 0 K/UL
IMM GRANULOCYTES NFR BLD AUTO: 0 %
IRON SATN MFR SERPL: 6 % (ref 20–50)
IRON SATN MFR SERPL: 60 % (ref 20–50)
IRON SERPL-MCNC: 239 UG/DL (ref 35–150)
IRON SERPL-MCNC: 25 UG/DL (ref 35–150)
LA VOL DISK BP: 65 CM3 (ref 22–52)
LDH SERPL L TO P-CCNC: 180 U/L (ref 98–192)
LVOT MG: 4 MMHG
LYMPHOCYTES # BLD: 0.9 K/UL (ref 0.9–3.6)
LYMPHOCYTES NFR BLD: 13 % (ref 21–52)
MCH RBC QN AUTO: 25.9 PG (ref 24–34)
MCHC RBC AUTO-ENTMCNC: 30.4 G/DL (ref 31–37)
MCV RBC AUTO: 85.4 FL (ref 74–97)
MONOCYTES # BLD: 0.7 K/UL (ref 0.05–1.2)
MONOCYTES NFR BLD: 11 % (ref 3–10)
MV DEC SLOPE: 3430 MM/S2
MV DEC SLOPE: 3730 MM/S2
MV DEC SLOPE: 4020 MM/S2
NEUTS SEG # BLD: 5.2 K/UL (ref 1.8–8)
NEUTS SEG NFR BLD: 74 % (ref 40–73)
P-R INTERVAL, ECG05: 180 MS
P-R INTERVAL, ECG05: 213 MS
PLATELET # BLD AUTO: 213 K/UL (ref 135–420)
PMV BLD AUTO: 10.6 FL (ref 9.2–11.8)
POTASSIUM SERPL-SCNC: 4.3 MMOL/L (ref 3.2–5.1)
PROT SERPL-MCNC: 5.7 G/DL (ref 6.1–8.4)
Q-T INTERVAL, ECG07: 438 MS
Q-T INTERVAL, ECG07: 445 MS
QRS DURATION, ECG06: 126 MS
QRS DURATION, ECG06: 136 MS
QTC CALCULATION (BEZET), ECG08: 455 MS
QTC CALCULATION (BEZET), ECG08: 467 MS
RBC # BLD AUTO: 3.16 M/UL (ref 4.2–5.3)
RETICS # AUTO: 0.08 M/UL
RETICS/RBC NFR AUTO: 2.4 %
SODIUM SERPL-SCNC: 139 MMOL/L (ref 135–145)
TIBC SERPL-MCNC: 401 UG/DL (ref 250–450)
TIBC SERPL-MCNC: 446 UG/DL (ref 250–450)
VENTRICULAR RATE, ECG03: 65 BPM
VENTRICULAR RATE, ECG03: 66 BPM
VIT B12 SERPL-MCNC: 331 PG/ML (ref 193–986)
WBC # BLD AUTO: 7 K/UL (ref 4.6–13.2)

## 2021-01-18 PROCEDURE — 93005 ELECTROCARDIOGRAM TRACING: CPT

## 2021-01-18 PROCEDURE — 85025 COMPLETE CBC W/AUTO DIFF WBC: CPT

## 2021-01-18 PROCEDURE — 74011250637 HC RX REV CODE- 250/637: Performed by: STUDENT IN AN ORGANIZED HEALTH CARE EDUCATION/TRAINING PROGRAM

## 2021-01-18 PROCEDURE — 94640 AIRWAY INHALATION TREATMENT: CPT

## 2021-01-18 PROCEDURE — 94760 N-INVAS EAR/PLS OXIMETRY 1: CPT

## 2021-01-18 PROCEDURE — 83010 ASSAY OF HAPTOGLOBIN QUANT: CPT

## 2021-01-18 PROCEDURE — 83540 ASSAY OF IRON: CPT

## 2021-01-18 PROCEDURE — 85045 AUTOMATED RETICULOCYTE COUNT: CPT

## 2021-01-18 PROCEDURE — 82784 ASSAY IGA/IGD/IGG/IGM EACH: CPT

## 2021-01-18 PROCEDURE — 65270000029 HC RM PRIVATE

## 2021-01-18 PROCEDURE — 74011250637 HC RX REV CODE- 250/637: Performed by: INTERNAL MEDICINE

## 2021-01-18 PROCEDURE — 85018 HEMOGLOBIN: CPT

## 2021-01-18 PROCEDURE — 83615 LACTATE (LD) (LDH) ENZYME: CPT

## 2021-01-18 PROCEDURE — 80053 COMPREHEN METABOLIC PANEL: CPT

## 2021-01-18 PROCEDURE — 87040 BLOOD CULTURE FOR BACTERIA: CPT

## 2021-01-18 PROCEDURE — 36415 COLL VENOUS BLD VENIPUNCTURE: CPT

## 2021-01-18 PROCEDURE — 99218 HC RM OBSERVATION: CPT

## 2021-01-18 PROCEDURE — 93306 TTE W/DOPPLER COMPLETE: CPT

## 2021-01-18 PROCEDURE — 86880 COOMBS TEST DIRECT: CPT

## 2021-01-18 PROCEDURE — 85027 COMPLETE CBC AUTOMATED: CPT

## 2021-01-18 PROCEDURE — 82728 ASSAY OF FERRITIN: CPT

## 2021-01-18 RX ADMIN — ALPRAZOLAM 0.25 MG: 0.25 TABLET ORAL at 22:03

## 2021-01-18 RX ADMIN — BUDESONIDE AND FORMOTEROL FUMARATE DIHYDRATE 2 PUFF: 80; 4.5 AEROSOL RESPIRATORY (INHALATION) at 20:44

## 2021-01-18 RX ADMIN — BUDESONIDE AND FORMOTEROL FUMARATE DIHYDRATE 2 PUFF: 80; 4.5 AEROSOL RESPIRATORY (INHALATION) at 07:29

## 2021-01-18 RX ADMIN — Medication 10 ML: at 22:01

## 2021-01-18 RX ADMIN — METOPROLOL TARTRATE 12.5 MG: 25 TABLET, FILM COATED ORAL at 17:00

## 2021-01-18 RX ADMIN — ALPRAZOLAM 0.25 MG: 0.25 TABLET ORAL at 09:14

## 2021-01-18 RX ADMIN — ACETAMINOPHEN 650 MG: 325 TABLET ORAL at 16:22

## 2021-01-18 RX ADMIN — Medication 10 ML: at 07:47

## 2021-01-18 RX ADMIN — METOPROLOL TARTRATE 12.5 MG: 25 TABLET, FILM COATED ORAL at 09:09

## 2021-01-18 RX ADMIN — Medication 10 ML: at 16:26

## 2021-01-18 RX ADMIN — LOSARTAN POTASSIUM 50 MG: 25 TABLET, FILM COATED ORAL at 09:08

## 2021-01-18 RX ADMIN — ATORVASTATIN CALCIUM 40 MG: 40 TABLET, FILM COATED ORAL at 22:00

## 2021-01-18 RX ADMIN — ACETAMINOPHEN 650 MG: 325 TABLET ORAL at 09:08

## 2021-01-18 NOTE — PROGRESS NOTES
Hospitalist Progress Note    Subjective:   Daily Progress Note: 1/18/2021 2:01 PM    Hospital Course:  Fernando Bagley is a 78 y.o.  female with past medical history of COPD, hypertension, and anxiety who presented to the ED with primary complaint of difficulty walking and blurred vision x5 days. She also reports associated dyspnea on exertion. She states that about 4-5 days ago she leaned backwards on her computer chair and fell, hitting her head against the concrete wall. She has a previous hx of tobacco use. She denies fever, chills, headache, chest pain, palpitations, cough, wheezing, N/V, abd pain, or numbess/tingling. In the ED, lab workup showed hemoglobin of 5.9. BMP unremarkable. CK and troponin within normal limits. Urinalysis normal. Stool occult negative. CT of head showed no acute intracranial process. CT of abd/pelvis showed diverticulosis without evidence of acute abdominopelvic process. Chest x-ray showed no acute cardiopulmonary process. 2 units PRBCs ordered. Subjective:    Patient seen and examined at bedside, sitting up in recliner chair. She is status post 2 units PRBCs. Hemoglobin improved to 8.2. Ferritin and reticulocyte count normal.  Iron panel pending. Patient reports a mild headache this morning. Improvement in dizziness, blurred vision, and shortness of breath. She denies chills, chest pain, palpitations, cough, nausea, vomiting, abdominal pain,/tingling of extremities, or myalgias.     Current Facility-Administered Medications   Medication Dose Route Frequency    sodium chloride (NS) flush 5-40 mL  5-40 mL IntraVENous Q8H    sodium chloride (NS) flush 5-40 mL  5-40 mL IntraVENous PRN    acetaminophen (TYLENOL) tablet 650 mg  650 mg Oral Q6H PRN    Or    acetaminophen (TYLENOL) suppository 650 mg  650 mg Rectal Q6H PRN    polyethylene glycol (MIRALAX) packet 17 g  17 g Oral DAILY PRN    promethazine (PHENERGAN) tablet 12.5 mg  12.5 mg Oral Q6H PRN    Or    ondansetron (ZOFRAN) injection 4 mg  4 mg IntraVENous Q6H PRN    0.9% sodium chloride infusion 250 mL  250 mL IntraVENous PRN    albuterol (PROVENTIL HFA, VENTOLIN HFA, PROAIR HFA) inhaler 2 Puff  2 Puff Inhalation Q4H PRN    ALPRAZolam (XANAX) tablet 0.25 mg  0.25 mg Oral BID PRN    budesonide-formoterol (SYMBICORT) 80-4.5 mcg inhaler  2 Puff Inhalation BID RT    losartan (COZAAR) tablet 100 mg  100 mg Oral DAILY    metoprolol tartrate (LOPRESSOR) tablet 12.5 mg  12.5 mg Oral BID    atorvastatin (LIPITOR) tablet 40 mg  40 mg Oral QHS        Review of Systems  Full ROS taken and is as mentioned in the HPI, otherwise negative findings. Objective:     Visit Vitals  BP (!) 124/49 (BP 1 Location: Right arm, BP Patient Position: At rest)   Pulse (!) 104   Temp 98.1 °F (36.7 °C)   Resp 18   Ht 5' 4\" (1.626 m)   Wt 78.9 kg (174 lb)   SpO2 98%   BMI 29.87 kg/m²      O2 Device: Room air    Temp (24hrs), Av.2 °F (36.8 °C), Min:97.1 °F (36.2 °C), Max:99.3 °F (37.4 °C)      No intake/output data recorded.  1901 -  0700  In: 887.4   Out: -     PHYSICAL EXAM:  General: Well-appearing elderly  female. Alert, cooperative, no distress  Eye: conjunctivae/corneas clear. PERRL, EOM's intact. Throat and Neck: Supple neck. No pharyngeal erythema or exudates noted. No mass   Lung: clear to auscultation bilaterally without wheezing rhonchi or rales. On room air  Heart: regular rate and rhythm, normal S1/S2. Grade 2/6 systolic murmur LSB. No JVD. Abdomen: soft, non-tender, non-distended. Bowel sounds normal. No masses. : external hemorrhoid  Extremities:  able to move all extremities normal, atraumatic  Skin: No rashes or lesions. Warm, dry, intact. Normal turgor. Neurologic: AOx3. Cranial nerves 2-12 and sensation grossly intact.   Psychiatric: non focal       Data Review    Recent Results (from the past 24 hour(s))   URINALYSIS W/ RFLX MICROSCOPIC    Collection Time: 21  2:30 PM   Result Value Ref Range    Color Yellow/Straw      Appearance Clear Clear      Specific gravity 1.015 1.003 - 1.035      pH (UA) 6.5 5.0 - 9.0      Protein Negative Negative mg/dL    Glucose Negative Negative mg/dL    Ketone Negative Negative mg/dL    Bilirubin Negative Negative      Blood Negative Negative      Urobilinogen 0.2 0.2 - 1.0 EU/dL    Nitrites Negative Negative      Leukocyte Esterase Negative Negative     EKG, 12 LEAD, SUBSEQUENT    Collection Time: 01/18/21 12:26 AM   Result Value Ref Range    Ventricular Rate 65 BPM    Atrial Rate 65 BPM    P-R Interval 180 ms    QRS Duration 126 ms    Q-T Interval 438 ms    QTC Calculation (Bezet) 455 ms    Calculated P Axis 13 degrees    Calculated R Axis 36 degrees    Calculated T Axis 81 degrees    Diagnosis       Normal sinus rhythm with sinus arrhythmia  Left bundle branch block  Abnormal ECG  No previous ECGs available  Confirmed by RAFFAELE Plata ([id number]) on 1/18/2021 6:02:56 PM     METABOLIC PANEL, COMPREHENSIVE    Collection Time: 01/18/21  2:30 AM   Result Value Ref Range    Sodium 139 135 - 145 mmol/L    Potassium 4.3 3.2 - 5.1 mmol/L    Chloride 109 94 - 111 mmol/L    CO2 24 21 - 33 mmol/L    Anion gap 6 mmol/L    Glucose 99 70 - 110 mg/dL    BUN 18 9 - 21 mg/dL    Creatinine 0.80 0.70 - 1.20 mg/dL    BUN/Creatinine ratio 23      GFR est AA >60 ml/min/1.73m2    GFR est non-AA >60 ml/min/1.73m2    Calcium 8.5 8.5 - 10.5 mg/dL    Bilirubin, total 1.8 (H) 0.2 - 1.0 mg/dL    AST (SGOT) 32 14 - 74 U/L    ALT (SGPT) 30 3 - 52 U/L    Alk.  phosphatase 60 38 - 126 U/L    Protein, total 5.7 (L) 6.1 - 8.4 g/dL    Albumin 3.6 3.5 - 4.7 g/dL    Globulin 2.1 g/dL    A-G Ratio 1.7     CBC WITH AUTOMATED DIFF    Collection Time: 01/18/21  2:30 AM   Result Value Ref Range    WBC 7.0 4.6 - 13.2 K/uL    RBC 3.16 (L) 4.20 - 5.30 M/uL    HGB 8.2 (L) 12.0 - 16.0 g/dL    HCT 27.0 (L) 35.0 - 45.0 %    MCV 85.4 74.0 - 97.0 FL    MCH 25.9 24.0 - 34.0 PG    MCHC 30.4 (L) 31.0 - 37.0 g/dL RDW 15.3 (H) 11.6 - 14.5 %    PLATELET 492 955 - 351 K/uL    MPV 10.6 9.2 - 11.8 FL    NEUTROPHILS 74 (H) 40 - 73 %    LYMPHOCYTES 13 (L) 21 - 52 %    MONOCYTES 11 (H) 3 - 10 %    EOSINOPHILS 1 0 - 5 %    BASOPHILS 1 0 - 2 %    IMMATURE GRANULOCYTES 0 %    ABS. NEUTROPHILS 5.2 1.8 - 8.0 K/UL    ABS. LYMPHOCYTES 0.9 0.9 - 3.6 K/UL    ABS. MONOCYTES 0.7 0.05 - 1.2 K/UL    ABS. EOSINOPHILS 0.1 0.0 - 0.4 K/UL    ABS. BASOPHILS 0.0 0.0 - 0.1 K/UL    ABS. IMM.  GRANS. 0.0 K/UL   RETICULOCYTE COUNT    Collection Time: 01/18/21  2:30 AM   Result Value Ref Range    Reticulocyte count 2.4 %    Absolute Retic Cnt. 0.0758 M/ul   ECHO ADULT COMPLETE    Collection Time: 01/18/21 10:31 AM   Result Value Ref Range    Left Atrium Minor Axis 3.44 cm    Left Atrium Major Axis 6.34 cm    LA Area 2C 19.60 cm2    LA Area 4C 22.40 cm2    LA Volume DISK BP 65.00 22 - 52 cm3    Left Atrium to Aortic Root Ratio 1.41     Right Atrial Area 4C 12.80 cm2    Right Atrium Major Axis 5.17 cm    Est. RA Pressure 10.00 mmHg    Aortic Regurgitant Pressure Half-time 637.00 ms    AR Max [place] 359.00 cm/s    Pulmonic Regurgitant End Max Velocity 229.00 cm/s    AoV PG 21.00 mmHg    AV Cusp 1.50 cm    Aortic Valve Systolic Mean Gradient 30.44 mmHg    AoV VTI 49.50 cm    Aortic valve mean velocity 164.00 cm/s    Aortic Valve Area by Continuity of VTI 1.92 cm2    Aortic Valve Area by Continuity of Peak Velocity 1.82 cm2    MOHSEN/BSA 1.04     Ao Root D 2.90 cm    IVSd 0.90 0.6 - 0.9 cm    LVIDd 4.60 3.9 - 5.3 cm    LVIDs 2.80 cm    LVOT d 2.00 cm    Left Ventricular Outflow Tract Mean Gradient 4.00 mmHg    LVOT VTI 30.20 cm    LVOT VTI 27.40 cm    LVOT VTI 31.70 cm    LVOT VTI 31.60 cm    Pulmonic Regurgitant End Max Velocity 133.00 cm/s    LVOT Peak Gradient 7.00 mmHg    LVPWd 1.00 (A) 0.6 - 0.9 cm    LV E' Lateral Velocity 7.94 cm/s    LV E' Septal Velocity 6.53 cm/s    LV ED Vol A2C 97.30 cm3    BP EF 69.6 55 - 100 %    LV ES Vol A2C 22.00 cm3    E/E' lateral 14.99     E/E' septal 18.22     LV Ejection Fraction MOD 2C 39 %    LVOT SV 95.0 cm3    Mitral Valve Deceleration [place] 3,430.00 mm/s2    Mitral Valve Deceleration [place] 4,020.00 mm/s2    Mitral Valve Deceleration [place] 3,730.00 mm/s2    Mitral Valve E Wave Deceleration Time 321.00 ms    Mitral Valve Pressure Half-time 94.00 ms    MV A [place] 132.00 cm/s    MV E [place] 119.00 cm/s    MVA (PHT) 2.34 cm2    MV E/A 0.90     RVIDd 2.50 cm    RVSP 41.00 mmHg    Tapse 2.53 (A) 1.5 - 2.0 cm    Pulmonic Regurgitant End Max Velocity 279.00 cm/s    TV MG 31.00 mmHg    LV Mass .1 67 - 162 g    LV Mass AL Index 80.3 43 - 95 g/m2    E/E' ratio (averaged) 16.61     MOHSEN/BSA Pk [place] 1.0 cm2/m2    MOHSEN/BSA VTI 1.0 cm2/m2       CT ABD PELV W CONT   Final Result   IMPRESSION:         1. No evidence for acute abdominal or pelvic process. 2. Colonic diverticulosis. 3. Hiatal hernia. CT HEAD WO CONT   Final Result   IMPRESSION:      1. No evidence for intracranial hemorrhage, mass effect or midline shift. 2.  Periventricular and deep white matter areas of microvascular ischemic   change. 3.  Cerebral atherosclerosis. 4.  Atrophy. If there are continued symptoms, a MRI is recommended for further evaluation. XR CHEST PORT    (Results Pending)       Principal Problem:    Acute anemia (1/17/2021)    Active Problems:    Severe anemia (1/17/2021)        Assessment/Plan:     1. Acute anemia  - Etiology unclear  - Initial hemoglobin of 5.9  - Urinalysis normal   - Stool occult negative  - Hold home ASA  - CT of abd/pelvis showed diverticulosis without evidence of acute abdominopelvic process  - CT of head showed no acute intracranial hemorrhage, mass, or shift  - Ferritin and reticulocyte count normal  - Iron panel pending  - S/p 2 units PRBCs  - Repeat hemoglobin 8.2  - Will repeat Hemoglobin this afternoon  - Will likely need outpatient GI f/u for colonoscopy     2.  COPD  - Chest x-ray showed no acute cardiopulmonary process  - Continue home inhalers  - Duonebs prn     3. Hypertension  - Stable  - Continue home losartan and metoprolol     4. Anxiety  - Continue home Xanax     5. External hemorrhoids  - Supportive care    6. Systolic Murmur  - [facility]  - Echocardiogram showed normal EF, grade I diastolic dysfunction, moderate MR, mild pulmonary HTN. It also showed moderately elevated IVC central venous pressure, large IVC diameter collapsible >50% with respiration.  - Cardiology consulted. DVT Prophylaxis: SCDs to lower extremities   Code Status: Full    Total time spent with patient: >35 minutes.

## 2021-01-18 NOTE — PROGRESS NOTES
Hourly rounding performed on patient. Pt was assessed for: pain, potty, position change, meal, etc.  Whiteboard is current and up to date. Call bell always within reach of patient. Fall precautions remain in place for patient:    Bed in low & locked position. Side rails up x 2. Fall mats in place. Frequently used items within reach. Non skid socks when out of bed. Room clutter free. Call bell in reach. Pt resting in bed with nad noted. Easy work of breathing noted.

## 2021-01-18 NOTE — PROGRESS NOTES
Comprehensive Nutrition Assessment    Type and Reason for Visit: Initial    Nutrition Recommendations/Plan: continue regular diet     Nutrition Assessment:  79 yo female PMH: COPD, HTN, anxiety   Pt admitted due to symptoms of anemia pt had Hgb of 5.9. Pt denies any dark stools or loss of appetite. Per tests and MD notes etiology of anemia is unclear at this time. Pt received 2 units of PRBC's and Hgb is improving. Pt may need to see GI for outpatient colonoscopy    Recent Results (from the past 24 hour(s))   EKG, 12 LEAD, SUBSEQUENT    Collection Time: 01/18/21 12:26 AM   Result Value Ref Range    Ventricular Rate 65 BPM    Atrial Rate 65 BPM    P-R Interval 180 ms    QRS Duration 126 ms    Q-T Interval 438 ms    QTC Calculation (Bezet) 455 ms    Calculated P Axis 13 degrees    Calculated R Axis 36 degrees    Calculated T Axis 81 degrees    Diagnosis       Normal sinus rhythm with sinus arrhythmia  Left bundle branch block  Abnormal ECG  No previous ECGs available  Confirmed by RAFFAELE Gonzalez ([id number]) on 1/18/2021 2:21:44 PM     METABOLIC PANEL, COMPREHENSIVE    Collection Time: 01/18/21  2:30 AM   Result Value Ref Range    Sodium 139 135 - 145 mmol/L    Potassium 4.3 3.2 - 5.1 mmol/L    Chloride 109 94 - 111 mmol/L    CO2 24 21 - 33 mmol/L    Anion gap 6 mmol/L    Glucose 99 70 - 110 mg/dL    BUN 18 9 - 21 mg/dL    Creatinine 0.80 0.70 - 1.20 mg/dL    BUN/Creatinine ratio 23      GFR est AA >60 ml/min/1.73m2    GFR est non-AA >60 ml/min/1.73m2    Calcium 8.5 8.5 - 10.5 mg/dL    Bilirubin, total 1.8 (H) 0.2 - 1.0 mg/dL    AST (SGOT) 32 14 - 74 U/L    ALT (SGPT) 30 3 - 52 U/L    Alk.  phosphatase 60 38 - 126 U/L    Protein, total 5.7 (L) 6.1 - 8.4 g/dL    Albumin 3.6 3.5 - 4.7 g/dL    Globulin 2.1 g/dL    A-G Ratio 1.7     CBC WITH AUTOMATED DIFF    Collection Time: 01/18/21  2:30 AM   Result Value Ref Range    WBC 7.0 4.6 - 13.2 K/uL    RBC 3.16 (L) 4.20 - 5.30 M/uL    HGB 8.2 (L) 12.0 - 16.0 g/dL    HCT 27.0 (L) 35.0 - 45.0 %    MCV 85.4 74.0 - 97.0 FL    MCH 25.9 24.0 - 34.0 PG    MCHC 30.4 (L) 31.0 - 37.0 g/dL    RDW 15.3 (H) 11.6 - 14.5 %    PLATELET 652 684 - 439 K/uL    MPV 10.6 9.2 - 11.8 FL    NEUTROPHILS 74 (H) 40 - 73 %    LYMPHOCYTES 13 (L) 21 - 52 %    MONOCYTES 11 (H) 3 - 10 %    EOSINOPHILS 1 0 - 5 %    BASOPHILS 1 0 - 2 %    IMMATURE GRANULOCYTES 0 %    ABS. NEUTROPHILS 5.2 1.8 - 8.0 K/UL    ABS. LYMPHOCYTES 0.9 0.9 - 3.6 K/UL    ABS. MONOCYTES 0.7 0.05 - 1.2 K/UL    ABS. EOSINOPHILS 0.1 0.0 - 0.4 K/UL    ABS. BASOPHILS 0.0 0.0 - 0.1 K/UL    ABS. IMM.  GRANS. 0.0 K/UL   RETICULOCYTE COUNT    Collection Time: 01/18/21  2:30 AM   Result Value Ref Range    Reticulocyte count 2.4 %    Absolute Retic Cnt. 0.0758 M/ul   ECHO ADULT COMPLETE    Collection Time: 01/18/21 10:31 AM   Result Value Ref Range    Left Atrium Minor Axis 3.44 cm    Left Atrium Major Axis 6.34 cm    LA Area 2C 19.60 cm2    LA Area 4C 22.40 cm2    LA Volume DISK BP 65.00 22 - 52 cm3    Left Atrium to Aortic Root Ratio 1.41     Right Atrial Area 4C 12.80 cm2    Right Atrium Major Axis 5.17 cm    Est. RA Pressure 10.00 mmHg    Aortic Regurgitant Pressure Half-time 637.00 ms    AR Max [place] 359.00 cm/s    Pulmonic Regurgitant End Max Velocity 229.00 cm/s    AoV PG 21.00 mmHg    AV Cusp 1.50 cm    Aortic Valve Systolic Mean Gradient 52.14 mmHg    AoV VTI 49.50 cm    Aortic valve mean velocity 164.00 cm/s    Aortic Valve Area by Continuity of VTI 1.92 cm2    Aortic Valve Area by Continuity of Peak Velocity 1.82 cm2    MOHSEN/BSA 1.04     Ao Root D 2.90 cm    IVSd 0.90 0.6 - 0.9 cm    LVIDd 4.60 3.9 - 5.3 cm    LVIDs 2.80 cm    LVOT d 2.00 cm    Left Ventricular Outflow Tract Mean Gradient 4.00 mmHg    LVOT VTI 30.20 cm    LVOT VTI 27.40 cm    LVOT VTI 31.70 cm    LVOT VTI 31.60 cm    Pulmonic Regurgitant End Max Velocity 133.00 cm/s    LVOT Peak Gradient 7.00 mmHg    LVPWd 1.00 (A) 0.6 - 0.9 cm    LV E' Lateral Velocity 7.94 cm/s    LV E' Septal Velocity 6.53 cm/s    LV ED Vol A2C 97.30 cm3    BP EF 69.6 55 - 100 %    LV ES Vol A2C 22.00 cm3    E/E' lateral 14.99     E/E' septal 18.22     LV Ejection Fraction MOD 2C 39 %    LVOT SV 95.0 cm3    Mitral Valve Deceleration [place] 3,430.00 mm/s2    Mitral Valve Deceleration [place] 4,020.00 mm/s2    Mitral Valve Deceleration [place] 3,730.00 mm/s2    Mitral Valve E Wave Deceleration Time 321.00 ms    Mitral Valve Pressure Half-time 94.00 ms    MV A [place] 132.00 cm/s    MV E [place] 119.00 cm/s    MVA (PHT) 2.34 cm2    MV E/A 0.90     RVIDd 2.50 cm    RVSP 41.00 mmHg    Tapse 2.53 (A) 1.5 - 2.0 cm    Pulmonic Regurgitant End Max Velocity 279.00 cm/s    TV MG 31.00 mmHg    LV Mass .1 67 - 162 g    LV Mass AL Index 80.3 43 - 95 g/m2    E/E' ratio (averaged) 16.61     MOHSEN/BSA Pk [place] 1.0 cm2/m2    MOHSEN/BSA VTI 1.0 cm2/m2   HGB & HCT    Collection Time: 01/18/21  3:00 PM   Result Value Ref Range    HGB 7.9 (L) 12.0 - 16.0 g/dL    HCT 25.9 (L) 35.0 - 45.0 %       Malnutrition Assessment:  Malnutrition Status:  No malnutrition    Context:        Findings of the 6 clinical characteristics of malnutrition:   Energy Intake:     Weight Loss: Body Fat Loss:   ,     Muscle Mass Loss:   ,    Fluid Accumulation:   ,     Strength:            Estimated Daily Nutrient Needs:  Energy (kcal): 7681-6782 kcal/day; Weight Used for Energy Requirements: Admission(79 kg)  Protein (g): 63-79 g/day; Weight Used for Protein Requirements: Admission(0.8-1 g/kg)  Fluid (ml/day): 3468-4036 mL/day; Method Used for Fluid Requirements: 1 ml/kcal      Nutrition Related Findings:  Pt with hgb of 5.9 pt denies any dark stools or loss of appetite. Pt actually reports a 20 lbs wt gain since pandemic has started due to eating more at home bored. etiology of anemia is still unclear per MD notes.       Wounds:    None       Current Nutrition Therapies:  DIET REGULAR    Anthropometric Measures:  · Height:  5' 4\" (162.6 cm)  · Current Body Wt:  78.9 kg (174 lb) · Admission Body Wt:  174 lb    · Usual Body Wt:        · Ideal Body Wt:  120 lbs:  145 %   · Adjusted Body Weight:   ; Weight Adjustment for: No adjustment   · Adjusted BMI:       · BMI Category: Overweight (BMI 25.0-29. 9)       Nutrition Diagnosis:   · Altered GI function related to altered GI function as evidenced by lab values(anemia)      Nutrition Interventions:   Food and/or Nutrient Delivery: Continue current diet  Nutrition Education and Counseling: Education not indicated  Coordination of Nutrition Care: Continue to monitor while inpatient    Goals:  Hgb 11.2-15. 2.  Pt to eat > 75% of meals, BMI 25-29 for adults > 73 yo, BM q 1-3 days       Nutrition Monitoring and Evaluation:   Behavioral-Environmental Outcomes:    Food/Nutrient Intake Outcomes: Food and nutrient intake  Physical Signs/Symptoms Outcomes: Biochemical data, Meal time behavior, Weight     F/U: 1/22/2021    Discharge Planning:    Continue current diet     Electronically signed by Manolo David on 1/18/2021 at 3:28 PM    Contact: [facility] [phone]

## 2021-01-18 NOTE — PROGRESS NOTES
Pt sitting upright in bedside chair, watching tv. Electrodes changed out. Pt updated on poc. She denies any further needs at this time. Call bell in reach.

## 2021-01-18 NOTE — PROGRESS NOTES
Change of shift report received from Diamante England RN previous shift. Will now assume care of pt.

## 2021-01-18 NOTE — PROGRESS NOTES
Checked patients vital at [facility] for blood transfusion. VSS. Patient assessment completed. Assisted the patient with putting on slippers.  Left patient resting in bed cbwr bed in lowest position

## 2021-01-18 NOTE — PROGRESS NOTES
conducted an initial consultation and Spiritual Assessment for Bhavesh Interiano, who is a 78 y.o.,female. Patients Primary Language is: [place]. According to the patients EMR Rastafarian Affiliation is: [facility].     The reason the Patient came to the hospital is:   Patient Active Problem List    Diagnosis Date Noted    Acute anemia 01/17/2021    Severe anemia 01/17/2021    Chronic obstructive lung disease ([address] 11/20/2020    Essential hypertension 11/20/2020    Hyperlipidemia 11/20/2020        The  provided the following Interventions:  Initiated a relationship of care and support. Explored issues of beau, spirituality and/or Yazidi needs while hospitalized. Listened empathically. Provided chaplaincy education. Provided information about Spiritual Care Services. Offered prayer and assurance of continued prayers on patient's behalf. Chart reviewed. The following outcomes were achieved:  Patient shared some information about their medical narrative and spiritual journey/beliefs. Patient processed feeling about current hospitalization. Patient expressed gratitude for the 's visit. Assessment:  Patient did not indicate any spiritual or Yazidi issues which require Spiritual Care Services interventions at this time. Patient does not have any Yazidi/cultural needs that will affect patients preferences in health care. Plan:  Chaplains will continue to follow and will provide pastoral care on an as needed or requested basis.  recommends bedside caregivers page  on duty if patient shows signs of acute spiritual or emotional distress. Per patient, feeling a little tire. No pain, awaiting results from previous test. Very hopeful. No concerns and no defeating behavior. Calm and expecting a full recovery.     [address] [facility]   Staff [address] [facility]   [phone]

## 2021-01-18 NOTE — PROGRESS NOTES
Pt a/o x 4 with nad noted, easy work of breathing. Pt reports headache, medicated with PRN tylenol. Meds admin per mar order, pt refused to take 100mg of Losartan and reports that she only takes 50mg at home. Will advise provider. Pt currently has echo being performed at bedside at this time. Pt updated on poc. Whiteboard updated. Fall precautions remain in place for patient:    Bed in low & locked position. Side rails up x 2. Fall mats in place. Frequently used items within reach. Non skid socks when out of bed. Room clutter free. Call bell in reach.

## 2021-01-18 NOTE — PROGRESS NOTES
Reason for Admission:   Chart reviewed and noted that Pt presented to ED for blurred vision and difficulty walking x 5 days. PMH COPD< HTN, anxiety. Dx; Acute hgb 5.9, COPD, HTN,     Care Management following for needs as pt becomes medically stable for dc. Pt has been upgraded to inpatient status at this time. CM has spoken with pt who does not anticipate any needs. Pt is IADL's and has children in the area to assist if needed. RUR Score:     10%                Plan for utilizing home health:     NA     PCP: First and Last name:  Joanne   Name of Practice:    Are you a current patient: Yes/No:    Approximate date of last visit:    Can you participate in a virtual visit with your PCP:                     Current Advanced Directive/Advance Care Plan: np ACP                         Transition of Care Plan:      Home when stable      Care Management Interventions  PCP Verified by CM:  Yes  Transition of Care Consult (CM Consult): Discharge Planning  Current Support Network: [facility]

## 2021-01-18 NOTE — PROGRESS NOTES
Verbal shift change report given to Esa Hu Rn (oncoming nurse) Donald Wynn Lpn (offgoing nurse). Report included the following information SBAR, Kardex, Intake/Output and Recent Results.

## 2021-01-19 LAB
ANION GAP SERPL CALC-SCNC: 7 MMOL/L
BASOPHILS # BLD: 0 K/UL
BASOPHILS NFR BLD: 0 %
BLASTS NFR BLD MANUAL: 0 %
BLOOD GROUP ANTIBODIES SERPL: NEGATIVE
BUN SERPL-MCNC: 16 MG/DL (ref 9–21)
BUN/CREAT SERPL: 20
CA-I BLD-MCNC: 9 MG/DL (ref 8.5–10.5)
CHLORIDE SERPL-SCNC: 107 MMOL/L (ref 94–111)
CO2 SERPL-SCNC: 26 MMOL/L (ref 21–33)
CREAT SERPL-MCNC: 0.8 MG/DL (ref 0.7–1.2)
DAT POLY-SP REAG RBC QL: NEGATIVE
DIFFERENTIAL METHOD BLD: ABNORMAL
EOSINOPHIL # BLD: 0.2 K/UL
EOSINOPHIL NFR BLD: 3 %
ERYTHROCYTE [DISTWIDTH] IN BLOOD BY AUTOMATED COUNT: 15.9 % (ref 11.6–14.5)
ERYTHROCYTE [DISTWIDTH] IN BLOOD BY AUTOMATED COUNT: 16 % (ref 11.6–14.5)
GLUCOSE SERPL-MCNC: 92 MG/DL (ref 70–110)
HAPTOGLOB SERPL-MCNC: 137 MG/DL (ref 30–200)
HCT VFR BLD AUTO: 27 % (ref 35–45)
HCT VFR BLD AUTO: 27.2 % (ref 35–45)
HGB BLD-MCNC: 8.1 G/DL (ref 12–16)
HGB BLD-MCNC: 8.2 G/DL (ref 12–16)
IGM SERPL-MCNC: <21 MG/DL (ref 40–230)
IMM GRANULOCYTES # BLD AUTO: 0 K/UL
IMM GRANULOCYTES NFR BLD AUTO: 0 %
LYMPHOCYTES # BLD: 0.8 K/UL
LYMPHOCYTES NFR BLD: 15 %
MANUAL DIFFERENTIAL PERFORMED BLD QL: ABNORMAL
MCH RBC QN AUTO: 25.6 PG (ref 24–34)
MCH RBC QN AUTO: 26 PG (ref 24–34)
MCHC RBC AUTO-ENTMCNC: 29.8 G/DL (ref 31–37)
MCHC RBC AUTO-ENTMCNC: 30.4 G/DL (ref 31–37)
MCV RBC AUTO: 85.7 FL (ref 74–97)
MCV RBC AUTO: 85.8 FL (ref 74–97)
METAMYELOCYTES NFR BLD MANUAL: 0 %
MONOCYTES # BLD: 0.7 K/UL
MONOCYTES NFR BLD: 14 %
MYELOCYTES NFR BLD MANUAL: 0 %
NEUTS BAND NFR BLD MANUAL: 2 %
NEUTS SEG # BLD: 3.6 K/UL
NEUTS SEG NFR BLD: 66 %
NRBC # BLD: 0.05 K/UL
NRBC BLD MANUAL-RTO: 1 PER 100 WBC
OTHER CELLS NFR BLD MANUAL: 0 %
PLATELET # BLD AUTO: 220 K/UL (ref 135–420)
PLATELET # BLD AUTO: 221 K/UL (ref 135–420)
PLATELET COMMENTS,PCOM: ABNORMAL
PMV BLD AUTO: 10.9 FL (ref 9.2–11.8)
PMV BLD AUTO: 11.2 FL (ref 9.2–11.8)
POTASSIUM SERPL-SCNC: 4.3 MMOL/L (ref 3.2–5.1)
PROMYELOCYTES NFR BLD MANUAL: 0 %
RBC # BLD AUTO: 3.15 M/UL (ref 4.2–5.3)
RBC # BLD AUTO: 3.17 M/UL (ref 4.2–5.3)
RBC MORPH BLD: ABNORMAL
SODIUM SERPL-SCNC: 140 MMOL/L (ref 135–145)
WBC # BLD AUTO: 5.3 K/UL (ref 4.6–13.2)
WBC # BLD AUTO: 5.6 K/UL (ref 4.6–13.2)

## 2021-01-19 PROCEDURE — 65270000029 HC RM PRIVATE

## 2021-01-19 PROCEDURE — 74011000258 HC RX REV CODE- 258: Performed by: INTERNAL MEDICINE

## 2021-01-19 PROCEDURE — 74011250636 HC RX REV CODE- 250/636: Performed by: NURSE PRACTITIONER

## 2021-01-19 PROCEDURE — 36415 COLL VENOUS BLD VENIPUNCTURE: CPT

## 2021-01-19 PROCEDURE — 74011250636 HC RX REV CODE- 250/636: Performed by: INTERNAL MEDICINE

## 2021-01-19 PROCEDURE — 94640 AIRWAY INHALATION TREATMENT: CPT

## 2021-01-19 PROCEDURE — 80048 BASIC METABOLIC PNL TOTAL CA: CPT

## 2021-01-19 PROCEDURE — 85027 COMPLETE CBC AUTOMATED: CPT

## 2021-01-19 PROCEDURE — 74011250637 HC RX REV CODE- 250/637: Performed by: INTERNAL MEDICINE

## 2021-01-19 PROCEDURE — 94760 N-INVAS EAR/PLS OXIMETRY 1: CPT

## 2021-01-19 PROCEDURE — 97161 PT EVAL LOW COMPLEX 20 MIN: CPT

## 2021-01-19 PROCEDURE — 74011250637 HC RX REV CODE- 250/637: Performed by: STUDENT IN AN ORGANIZED HEALTH CARE EDUCATION/TRAINING PROGRAM

## 2021-01-19 RX ORDER — LANOLIN ALCOHOL/MO/W.PET/CERES
1 CREAM (GRAM) TOPICAL 2 TIMES DAILY
Status: DISCONTINUED | OUTPATIENT
Start: 2021-01-19 | End: 2021-01-21 | Stop reason: HOSPADM

## 2021-01-19 RX ORDER — DIPHENHYDRAMINE HYDROCHLORIDE 50 MG/ML
25 INJECTION, SOLUTION INTRAMUSCULAR; INTRAVENOUS
Status: ACTIVE | OUTPATIENT
Start: 2021-01-19 | End: 2021-01-20

## 2021-01-19 RX ORDER — FUROSEMIDE 10 MG/ML
40 INJECTION INTRAMUSCULAR; INTRAVENOUS ONCE
Status: COMPLETED | OUTPATIENT
Start: 2021-01-19 | End: 2021-01-19

## 2021-01-19 RX ADMIN — FUROSEMIDE 40 MG: 10 INJECTION, SOLUTION INTRAMUSCULAR; INTRAVENOUS at 14:09

## 2021-01-19 RX ADMIN — BUDESONIDE AND FORMOTEROL FUMARATE DIHYDRATE 2 PUFF: 80; 4.5 AEROSOL RESPIRATORY (INHALATION) at 20:33

## 2021-01-19 RX ADMIN — Medication 10 ML: at 09:05

## 2021-01-19 RX ADMIN — BUDESONIDE AND FORMOTEROL FUMARATE DIHYDRATE 2 PUFF: 80; 4.5 AEROSOL RESPIRATORY (INHALATION) at 08:10

## 2021-01-19 RX ADMIN — ALPRAZOLAM 0.25 MG: 0.25 TABLET ORAL at 18:41

## 2021-01-19 RX ADMIN — SODIUM CHLORIDE 125 MG: 9 INJECTION, SOLUTION INTRAVENOUS at 10:42

## 2021-01-19 RX ADMIN — LOSARTAN POTASSIUM 50 MG: 25 TABLET, FILM COATED ORAL at 09:06

## 2021-01-19 RX ADMIN — FERROUS SULFATE TAB 325 MG (65 MG ELEMENTAL FE) 325 MG: 325 (65 FE) TAB at 21:38

## 2021-01-19 RX ADMIN — ALPRAZOLAM 0.25 MG: 0.25 TABLET ORAL at 09:05

## 2021-01-19 RX ADMIN — Medication 10 ML: at 22:10

## 2021-01-19 RX ADMIN — Medication 10 ML: at 14:00

## 2021-01-19 RX ADMIN — Medication 10 ML: at 14:11

## 2021-01-19 RX ADMIN — ATORVASTATIN CALCIUM 40 MG: 40 TABLET, FILM COATED ORAL at 21:38

## 2021-01-19 RX ADMIN — METOPROLOL TARTRATE 12.5 MG: 25 TABLET, FILM COATED ORAL at 09:06

## 2021-01-19 RX ADMIN — METOPROLOL TARTRATE 12.5 MG: 25 TABLET, FILM COATED ORAL at 18:35

## 2021-01-19 NOTE — PROGRESS NOTES
Problem: Mobility Impaired (Adult and Pediatric)  Goal: *Acute Goals and Plan of Care (Insert Text)  Description: Pt able to ambulate (I) in the hallway. She does not have any weakness or c/o dizziness.    PLOF: Pt was a community ambulator who did not use an AD and who was (I) with ADLs.  Outcome: Resolved/Met     Problem: Patient Education: Go to Patient Education Activity  Goal: Patient/Family Education  Outcome: Resolved/Met   PHYSICAL THERAPY EVALUATION AND DISCHARGE    Patient: Renetta Gar (80 y.o. female)  Date: 1/19/2021  Primary Diagnosis: Acute anemia [D64.9]  Severe anemia [D64.9]        Precautions:   Other (comment)(Monitor for dizziness)    ASSESSMENT :  Based on the objective data described below, the patient presents with anemia. She is able to ambulate without difficulty and did not have any losses of balance or c/o dizziness. She demonstrates normal strength in all extremities. She can return home once medically stable.    Patient does not require further skilled intervention at this level of care.        PLAN :  Recommendations and Planned Interventions:   No formal PT needs identified at this time.  Discharge Recommendations: N/A  Further Equipment Recommendations for Discharge: N/A     SUBJECTIVE:   Patient stated “i'm ready to go home.”    OBJECTIVE DATA SUMMARY:     Past Medical History:   Diagnosis Date    Allergic rhinitis     Anxiety disorder     Chronic obstructive pulmonary disease (HCC)     Dyslipidemia     Folic acid deficiency     Hypertension     Neurologic disorder     resting tremor   History reviewed. No pertinent surgical history.  Barriers to Learning/Limitations: None  Compensate with: N/A  Home Situation:   Home Situation  Home Environment: Private residence  # Steps to Enter: 0  One/Two Story Residence: One story  Living Alone: No  Support Systems: Family member(s)  Patient Expects to be Discharged to:: Private residence  Current DME Used/Available at Home:  None  Critical Behavior:  Neurologic State: Alert  Orientation Level: Oriented X4    Strength:    Strength: Within functional limits     RUE:5/5  LUE:5/5  RLE:5/5  LLE:5/5  Tone & Sensation:      Sensation: Intact     Range Of Motion:   AROM: Within functional limits  Posture:  Posture (WDL): Within defined limits      Functional Mobility:  Bed Mobility:  Rolling: Other (comment)(Pt sitting in recliner upon entry)     Transfers:  Sit to Stand: Independent  Stand to Sit: Independent    Balance:   Sitting: Intact  Standing: Intact  Ambulation/Gait Training:  Distance (ft): 100 Feet (ft)  Assistive Device: Other (comment)(Pt does not require an AD)  Ambulation - Level of Assistance: Independent     Gait Description (WDL): Exceptions to WDL    Pain:  Pain level pre-treatment: 0/10   Pain level post-treatment: 0/10  Pain Location: N/A    Activity Tolerance:   Excellent  Please refer to the flowsheet for vital signs taken during this treatment. After treatment:   []         Patient left in no apparent distress sitting up in chair  [x]         Patient left in no apparent distress in bed  [x]         Call bell left within reach  []         Nursing notified  []         Caregiver present  []         Bed alarm activated  []         SCDs applied    COMMUNICATION/EDUCATION:   [x]         Role of Physical Therapy in the acute care setting. []         Fall prevention education was provided and the patient/caregiver indicated understanding. []         Patient/family have participated as able in goal setting and plan of care. []         Patient/family agree to work toward stated goals and plan of care. []         Patient understands intent and goals of therapy, but is neutral about his/her participation. []         Patient is unable to participate in goal setting/plan of care: ongoing with therapy staff.  []         Other:     Thank you for this referral.  Evette Painting, PT, DPT   Time Calculation: 15 mins

## 2021-01-19 NOTE — CONSULTS
Noted consult for assitance in mangement of severe iron def:   Noted anemia with Hb around 8, no CKD  Severe low iron, ferritin and iron sat  Check stool to r/o GI bleed and consider GI w/u    Recommend to supplement iron with ferrlecit 125 mg IV daily for 5 days.    Add feso4 325 mg po bid at discharge  monitor iron studies and Hbs as outpatient by PMD  No need for a full consultation at this time, will monitor Hbs      Addendum: discussed with Dr. Siddharth Strickland regarding discharge plan  Will give 076 mcg of ferrlicit tomorrow and repeat 027 mcg of ferrlicet in a week as outpatient  Add feso4 325 bid

## 2021-01-19 NOTE — PROGRESS NOTES
Pt a/o x 4 with nad noted. Pt has no c/o pain, easy work of breathing noted. Meds admin per mar order. Pt updated on poc. Therapeutic listening employed as pt has concerns over echo results. Pt is anxious to go home. Pt has a good support system with family. Room tidied up, table wiped, trash removed. Pt denies any other needs, currently sitting in bedside chair with wheels locked. Call bell in reach.

## 2021-01-19 NOTE — PROGRESS NOTES
Patient is s/p 2 units PRBCs. Hgb 7.9. Spoke with patient's PCP, Dr. Marquis Bone, who would like to investigate for HUS. Will order for peripheral smear, LDH, and haptoglobin. Will also get immunoglobulin IgM and CONI test.    If Hgb continues to drop, continue GI nuclear medicine scan to evaluate for small bleed.

## 2021-01-19 NOTE — PROGRESS NOTES
Pt has been updated on discharge delay. Pt is not happy although teaching has been provided. Provider has reported he will come see pt to discuss. Pt is also refusing any future diuretics (lasix was given earlier today) as she states \"I'm going to the bathroom every 5 min and if you bring more in here, I won't take it\". Medication teaching provided but pt is not very receptive. Dinner tray has arrived and tray set up for pt. Call bell in reach.

## 2021-01-19 NOTE — PROGRESS NOTES
Pt currently in bed eating lunch with nad noted. She has no c/o pain or shortness of breath at this time. Continuous cardiac monitoring in place. Pt denies any needs. Call bell in reach. Bed in low & locked position.

## 2021-01-19 NOTE — PROGRESS NOTES
Cardiology in with pt at this time updating pt on poc and recent echo results. Right FA PIV infiltrated. Site discontinued. Med rotated to [facility] site, currently infusing with no difficulty. Pt in good spirits. She denies any needs. Call bell in reach.

## 2021-01-19 NOTE — CONSULTS
[facility] CARDIOLOGY  CARDIOLOGY CONSULTATION    REASON FOR CONSULT: Abnormal echocardiogram    REQUESTING PROVIDER: Yovany Chong PA-C    CHIEF COMPLAINT:  Weakness, blurred vision, and lightheadedness    HISTORY OF PRESENT ILLNESS:  Priti Granger is a 78y.o. year-old female with past medical history significant for anxiety, COPD, and HTN who was seen today for evaluation of abnormal echocardiogram.  The patient presented to the [facility] ER on 1/17/21 for evaluation of weakness, blurred vision, and lightheadedness. She reports having symptoms for about a week prior to admission. She reports associated shortness of breath and fatigue. No chest pain. Her hemoglobin was 5.9 on presentation. She was given a 250 ml saline bolus and 2 units of PRBCs on admission. She reports improving symptoms, but reports an episode of shortness of breath overnight that lasted about 5 minutes. An echocardiogram was completed yesterday showing valvular abnormalities. Records from hospital admission course thus far reviewed. Telemetry reviewed. No events overnight. The patient is in sinus rhythm. INPATIENT MEDICATIONS:  Home medications reviewed.     Current Facility-Administered Medications:     diphenhydrAMINE (BENADRYL) injection 25 mg, 25 mg, IntraVENous, ONCE PRN, Yg Dill MD    famotidine (PF) (PEPCID) 20 mg in 0.9% sodium chloride 10 mL injection, 20 mg, IntraVENous, ONCE PRN, Lonnie Mcdonald MD    methylPREDNISolone (PF) (Solu-MEDROL) injection 125 mg, 125 mg, IntraVENous, ONCE PRN, Yg Dill MD    furosemide (LASIX) injection 40 mg, 40 mg, IntraVENous, ONCE, Lana Dwyer, NP    sodium chloride (NS) flush 5-40 mL, 5-40 mL, IntraVENous, Q8H, Ambrose Bridges PA-C, 10 mL at 01/19/21 0905    sodium chloride (NS) flush 5-40 mL, 5-40 mL, IntraVENous, PRN, Troy Fernandez PA-C    acetaminophen (TYLENOL) tablet 650 mg, 650 mg, Oral, Q6H PRN, 650 mg at 01/18/21 1622 **OR** acetaminophen (TYLENOL) suppository 650 mg, 650 mg, Rectal, Q6H PRN, Di Baars, PA-C    polyethylene glycol (MIRALAX) packet 17 g, 17 g, Oral, DAILY PRN, Di Baars, PA-C    promethazine (PHENERGAN) tablet 12.5 mg, 12.5 mg, Oral, Q6H PRN **OR** ondansetron (ZOFRAN) injection 4 mg, 4 mg, IntraVENous, Q6H PRN, Di Baars, PA-C    0.9% sodium chloride infusion 250 mL, 250 mL, IntraVENous, PRN, Christina Garnett MD, Last Rate: 15 mL/hr at 01/17/21 1637, 250 mL at 01/17/21 1637    albuterol (PROVENTIL HFA, VENTOLIN HFA, PROAIR HFA) inhaler 2 Puff, 2 Puff, Inhalation, Q4H PRN, Di Baars, PA-C, Stopped at 01/18/21 0030    ALPRAZolam Brooksie Kuster) tablet 0.25 mg, 0.25 mg, Oral, BID PRN, Di Baars, PA-C, 0.25 mg at 01/19/21 0905    budesonide-formoterol (SYMBICORT) 80-4.5 mcg inhaler, 2 Puff, Inhalation, BID RT, Di Baars, PA-C, 2 Puff at 01/19/21 0810    losartan (COZAAR) tablet 100 mg, 100 mg, Oral, DAILY, Di Baars, PA-C, 50 mg at 01/19/21 0461    metoprolol tartrate (LOPRESSOR) tablet 12.5 mg, 12.5 mg, Oral, BID, Di Baars, PA-C, 12.5 mg at 01/19/21 2662    atorvastatin (LIPITOR) tablet 40 mg, 40 mg, Oral, QHS, Rakel August MD, 40 mg at 01/18/21 2200     ALLERGIES:  Allergies reviewed with the patient,  Allergies   Allergen Reactions    Isopropyl Alcohol Other (comments)     Weakness all over - pt states she has out grown this    Pen-Vee K Rash     Pt. States she avoids penicillin due to allergy as a child. More Turcios FAMILY HISTORY:  Family history reviewed. SOCIAL HISTORY:  Notable for former tobacco use, no alcohol use, and no illicit drug use. REVIEW OF SYSTEMS:  Complete review of systems performed, pertinents noted above, all other systems are negative. PHYSICAL EXAMINATION:  Vital sign assessment reveal a blood pressure of 134/45 and pulse rate of 57.    Cardiovascular exam has a heart with a regular rate and rhythm, normal S1 and S2.  Soft systolic murmur present.  There are no rubs or gallops.  Good peripheral pulses.  No jugular venous distension.  Respiratory exam reveals clear lung fields, no rales or rhonchi.   Lymphatic exam reveals no edema.  Neurologic exam is nonfocal.      Recent labs results and imaging reviewed.  Notable findings include:   Recent Results (from the past 24 hour(s))   HGB & HCT    Collection Time: 01/18/21  3:00 PM   Result Value Ref Range    HGB 7.9 (L) 12.0 - 16.0 g/dL    HCT 25.9 (L) 35.0 - 45.0 %   LD    Collection Time: 01/18/21  9:45 PM   Result Value Ref Range     98 - 192 U/L   DIRECT & INDIRECT CONI    Collection Time: 01/18/21  9:45 PM   Result Value Ref Range    MONTY Poly Negative     Antibody screen Negative    CBC WITH MANUAL DIFF    Collection Time: 01/18/21  9:45 PM   Result Value Ref Range    WBC 5.3 4.6 - 13.2 K/uL    RBC 3.17 (L) 4.20 - 5.30 M/uL    HGB 8.1 (L) 12.0 - 16.0 g/dL    HCT 27.2 (L) 35.0 - 45.0 %    MCV 85.8 74.0 - 97.0 FL    MCH 25.6 24.0 - 34.0 PG    MCHC 29.8 (L) 31.0 - 37.0 g/dL    RDW 15.9 (H) 11.6 - 14.5 %    PLATELET 221 135 - 420 K/uL    MPV 11.2 9.2 - 11.8 FL    NEUTROPHILS 66 %    BAND NEUTROPHILS 2 %    LYMPHOCYTES 15 %    MONOCYTES 14 %    EOSINOPHILS 3 %    BASOPHILS 0 %    METAMYELOCYTES 0 %    MYELOCYTES 0 %    PROMYELOCYTES 0 %    BLASTS 0 %    NRBC 1.0  WBC    OTHER CELL 0 %    IMMATURE GRANULOCYTES 0 %    ABS. NEUTROPHILS 3.6 K/UL    ABS. LYMPHOCYTES 0.8 K/UL    ABS. MONOCYTES 0.7 K/UL    ABS. EOSINOPHILS 0.2 K/UL    ABS. BASOPHILS 0.0 K/UL    ABSOLUTE NRBC 0.05 K/uL    ABS. IMM. GRANS. 0.0 K/UL    DF Manual      PLATELET COMMENTS LARGE PLATELETS NOTED     RBC COMMENTS Anisocytosis  1+        RBC COMMENTS Polychromasia  1+        RBC COMMENTS Hypochromia  1+        DIFFERENTIAL Manual Differenctial Ordered     METABOLIC PANEL, BASIC    Collection Time: 01/19/21  3:57 AM   Result Value Ref Range    Sodium 140 135 - 145 mmol/L    Potassium  4. 3 3.2 - 5.1 mmol/L    Chloride 107 94 - 111 mmol/L    CO2 26 21 - 33 mmol/L    Anion gap 7 mmol/L    Glucose 92 70 - 110 mg/dL    BUN 16 9 - 21 mg/dL    Creatinine 0.80 0.70 - 1.20 mg/dL    BUN/Creatinine ratio 20      GFR est AA >60 ml/min/1.73m2    GFR est non-AA >60 ml/min/1.73m2    Calcium 9.0 8.5 - 10.5 mg/dL   CBC W/O DIFF    Collection Time: 01/19/21  3:57 AM   Result Value Ref Range    WBC 5.6 4.6 - 13.2 K/uL    RBC 3.15 (L) 4.20 - 5.30 M/uL    HGB 8.2 (L) 12.0 - 16.0 g/dL    HCT 27.0 (L) 35.0 - 45.0 %    MCV 85.7 74.0 - 97.0 FL    MCH 26.0 24.0 - 34.0 PG    MCHC 30.4 (L) 31.0 - 37.0 g/dL    RDW 16.0 (H) 11.6 - 14.5 %    PLATELET 988 750 - 084 K/uL    MPV 10.9 9.2 - 11.8 FL       Discussed case with Dr. Johnnie Liz, and our impression and recommendations are as follows:  1. Shortness of breath: Likely multifactorial. Shortness of breath likely related to anemia, but may also be an element of volume overload, given findings of mild-moderate mitral valve regurgitation and dilated IVC on echocardiogram completed yesterday. Will give Lasix 40 mg IV x 1 dose. Monitor volume status and symptoms closely. Recommend daily weights and strict I&Os. Continue treatment for anemia as per primary and nephrology. 2. Aortic stenosis: Noted as mild on echocardiogram completed yesterday. Recommend to avoid vasodilators and diurese carefully. She will need serial monitoring of echocardiograms as outpatient. 3. Diastolic dysfunction: Noted as mild on echocardiogram completed yesterday. Discussed the importance of good blood pressure control. Thank you for involving us in the care of this patient. Please do not hesitate to call me or Dr. Johnnie Liz if additional questions arise.

## 2021-01-19 NOTE — PROGRESS NOTES
Verbal shift change report given to Naina Olivia Rn (oncoming nurse) by Sanjay Galvez Rn(offgoing nurse). Report included the following information SBAR, Kardex, Intake/Output and Recent Results.

## 2021-01-20 LAB
ABO + RH BLD: NORMAL
ANION GAP SERPL CALC-SCNC: 7 MMOL/L
ATRIAL RATE: 147 BPM
BLD PROD TYP BPU: NORMAL
BLD PROD TYP BPU: NORMAL
BLOOD GROUP ANTIBODIES SERPL: NEGATIVE
BPU ID: NORMAL
BPU ID: NORMAL
BUN SERPL-MCNC: 19 MG/DL (ref 9–21)
BUN/CREAT SERPL: 21
CA-I BLD-MCNC: 9.2 MG/DL (ref 8.5–10.5)
CALCULATED R AXIS, ECG10: 22 DEGREES
CALCULATED T AXIS, ECG11: 133 DEGREES
CHLORIDE SERPL-SCNC: 104 MMOL/L (ref 94–111)
CO2 SERPL-SCNC: 28 MMOL/L (ref 21–33)
CREAT SERPL-MCNC: 0.9 MG/DL (ref 0.7–1.2)
CROSSMATCH RESULT,%XM: NORMAL
CROSSMATCH RESULT,%XM: NORMAL
DIAGNOSIS, 93000: NORMAL
ERYTHROCYTE [DISTWIDTH] IN BLOOD BY AUTOMATED COUNT: 16.4 % (ref 11.6–14.5)
GLUCOSE SERPL-MCNC: 91 MG/DL (ref 70–110)
HCT VFR BLD AUTO: 29.6 % (ref 35–45)
HGB BLD-MCNC: 8.8 G/DL (ref 12–16)
MAGNESIUM SERPL-MCNC: 2.1 MG/DL (ref 1.7–2.8)
MCH RBC QN AUTO: 25.8 PG (ref 24–34)
MCHC RBC AUTO-ENTMCNC: 29.7 G/DL (ref 31–37)
MCV RBC AUTO: 86.8 FL (ref 74–97)
PLATELET # BLD AUTO: 245 K/UL (ref 135–420)
PMV BLD AUTO: 10.9 FL (ref 9.2–11.8)
POTASSIUM SERPL-SCNC: 4.1 MMOL/L (ref 3.2–5.1)
Q-T INTERVAL, ECG07: 334 MS
QRS DURATION, ECG06: 120 MS
QTC CALCULATION (BEZET), ECG08: 498 MS
RBC # BLD AUTO: 3.41 M/UL (ref 4.2–5.3)
SODIUM SERPL-SCNC: 139 MMOL/L (ref 135–145)
SPECIMEN EXP DATE BLD: NORMAL
STATUS OF UNIT,%ST: NORMAL
STATUS OF UNIT,%ST: NORMAL
TRANSFUSION STATUS PATIENT QL: NORMAL
TRANSFUSION STATUS PATIENT QL: NORMAL
TROPONIN I SERPL-MCNC: 0.03 NG/ML (ref 0.02–0.05)
TROPONIN I SERPL-MCNC: 0.07 NG/ML (ref 0.02–0.05)
TROPONIN I SERPL-MCNC: <0.02 NG/ML (ref 0.02–0.05)
UNIT DIVISION, %UDIV: 0
UNIT DIVISION, %UDIV: 0
VENTRICULAR RATE, ECG03: 134 BPM
WBC # BLD AUTO: 6.4 K/UL (ref 4.6–13.2)

## 2021-01-20 PROCEDURE — 80048 BASIC METABOLIC PNL TOTAL CA: CPT

## 2021-01-20 PROCEDURE — 94640 AIRWAY INHALATION TREATMENT: CPT

## 2021-01-20 PROCEDURE — 84484 ASSAY OF TROPONIN QUANT: CPT

## 2021-01-20 PROCEDURE — 74011250636 HC RX REV CODE- 250/636: Performed by: INTERNAL MEDICINE

## 2021-01-20 PROCEDURE — 74011250637 HC RX REV CODE- 250/637: Performed by: INTERNAL MEDICINE

## 2021-01-20 PROCEDURE — 85027 COMPLETE CBC AUTOMATED: CPT

## 2021-01-20 PROCEDURE — 36415 COLL VENOUS BLD VENIPUNCTURE: CPT

## 2021-01-20 PROCEDURE — 74011000250 HC RX REV CODE- 250: Performed by: INTERNAL MEDICINE

## 2021-01-20 PROCEDURE — 65270000029 HC RM PRIVATE

## 2021-01-20 PROCEDURE — 93005 ELECTROCARDIOGRAM TRACING: CPT

## 2021-01-20 PROCEDURE — 94760 N-INVAS EAR/PLS OXIMETRY 1: CPT

## 2021-01-20 PROCEDURE — 74011250637 HC RX REV CODE- 250/637: Performed by: STUDENT IN AN ORGANIZED HEALTH CARE EDUCATION/TRAINING PROGRAM

## 2021-01-20 PROCEDURE — 83735 ASSAY OF MAGNESIUM: CPT

## 2021-01-20 RX ORDER — METOPROLOL TARTRATE 25 MG/1
12.5 TABLET, FILM COATED ORAL ONCE
Status: DISCONTINUED | OUTPATIENT
Start: 2021-01-20 | End: 2021-01-20

## 2021-01-20 RX ORDER — METOPROLOL TARTRATE 25 MG/1
25 TABLET, FILM COATED ORAL 2 TIMES DAILY
Status: DISCONTINUED | OUTPATIENT
Start: 2021-01-20 | End: 2021-01-21 | Stop reason: HOSPADM

## 2021-01-20 RX ORDER — ALPRAZOLAM 0.5 MG/1
0.5 TABLET ORAL 3 TIMES DAILY
Status: DISCONTINUED | OUTPATIENT
Start: 2021-01-20 | End: 2021-01-21 | Stop reason: HOSPADM

## 2021-01-20 RX ORDER — DILTIAZEM HYDROCHLORIDE 5 MG/ML
10 INJECTION INTRAVENOUS ONCE
Status: COMPLETED | OUTPATIENT
Start: 2021-01-20 | End: 2021-01-20

## 2021-01-20 RX ADMIN — BUDESONIDE AND FORMOTEROL FUMARATE DIHYDRATE 2 PUFF: 80; 4.5 AEROSOL RESPIRATORY (INHALATION) at 09:05

## 2021-01-20 RX ADMIN — ALPRAZOLAM 0.25 MG: 0.25 TABLET ORAL at 08:46

## 2021-01-20 RX ADMIN — ALPRAZOLAM 0.5 MG: 0.5 TABLET ORAL at 13:57

## 2021-01-20 RX ADMIN — DILTIAZEM HYDROCHLORIDE 10 MG: 5 INJECTION INTRAVENOUS at 08:49

## 2021-01-20 RX ADMIN — SODIUM CHLORIDE 250 MG: 9 INJECTION, SOLUTION INTRAVENOUS at 08:47

## 2021-01-20 RX ADMIN — METOPROLOL TARTRATE 12.5 MG: 25 TABLET, FILM COATED ORAL at 08:46

## 2021-01-20 RX ADMIN — FERROUS SULFATE TAB 325 MG (65 MG ELEMENTAL FE) 325 MG: 325 (65 FE) TAB at 08:46

## 2021-01-20 RX ADMIN — ALPRAZOLAM 0.5 MG: 0.5 TABLET ORAL at 22:02

## 2021-01-20 RX ADMIN — Medication 10 ML: at 05:26

## 2021-01-20 RX ADMIN — Medication 10 ML: at 22:03

## 2021-01-20 RX ADMIN — ATORVASTATIN CALCIUM 40 MG: 40 TABLET, FILM COATED ORAL at 22:01

## 2021-01-20 RX ADMIN — BUDESONIDE AND FORMOTEROL FUMARATE DIHYDRATE 2 PUFF: 80; 4.5 AEROSOL RESPIRATORY (INHALATION) at 20:20

## 2021-01-20 RX ADMIN — FERROUS SULFATE TAB 325 MG (65 MG ELEMENTAL FE) 325 MG: 325 (65 FE) TAB at 22:01

## 2021-01-20 RX ADMIN — Medication 10 ML: at 13:57

## 2021-01-20 NOTE — PROGRESS NOTES
Provider made aware of pt increasing anxiety over new Afib diagnosis; pt unable to sleep, restless. VO given for xanax dose adjustment. See mar.

## 2021-01-20 NOTE — PROGRESS NOTES
Pt is resting in bed, mildly anxious, but a/o x 4. Cardizem has been admin via IVP, pre and post vitals recorded. Pt reports mild shortness of breath. Easy work of breathing noted at this time. RT has been in and evaluated pt.

## 2021-01-20 NOTE — PROGRESS NOTES
Updated pt daughter via phone. Daughter voiced that pt wants daughter to be medical POA. Will find out process to advise daughter.

## 2021-01-20 NOTE — PROGRESS NOTES
Pt is sitting upright in bed, eating lunch. Vitals slowly normalizing. Pt is more at ease, denies pain. Pt denies any other needs. Call bell in reach. Bed in low & locked position.

## 2021-01-20 NOTE — PROGRESS NOTES
Pt hr noted to be elevated on telemetry monitor. Hr 130's-150's sustained for greater than 30 min. Pt denies any pain but reports shortness of breath. Provider made aware and verbal order for EKG and troponin given.

## 2021-01-20 NOTE — PROGRESS NOTES
Pt vitals checked, bp noted to be low. Provider at nurses station and made aware. Advised to hold the metoprolol. BP cycling q 15 min. Pt has no c/o pain. Pt is anxious, concerned about new onset Afib. Therapeutic listening and teaching employed. Pt verbalized understanding. Continuous cardiac monitoring in place.

## 2021-01-20 NOTE — PROGRESS NOTES
Pt provided with breakfast tray and assisted with set up. Pt hr still noted to be elevated at 143bpm, mild shortness of breath. Pt is sitting in bed with hob elevated 30-45 degrees. She reports \"a little cramp earlier\" and points to left breast.  She reports \"I have those often\". RT here at this time to perform EKG.

## 2021-01-20 NOTE — PROGRESS NOTES
Pt has slept at long intervals this shift. Tele remains NSR with BBB. Pt has been complaint free this shift.  Call bell in reach and no bed in lowest position,

## 2021-01-20 NOTE — PROGRESS NOTES
CARDIOLOGY PROGRESS NOTE    Patient seen and examined. This is a patient who was initially followed for abnormal echocardiogram. This morning she developed new onset atrial fibrillation with RVR with rate in the 160s. She was treated with diltiazem 10 mg IV push. She states that she had shortness of breath during the episode, but no lightheadedness or palpitations. She reports feeling a \"cramp\" under her left breast during the episode. No other complaints reported. Telemetry reviewed. She developed atrial fibrillation up to the 160s around 0630. Rate is currrently in the 110s. Pertinent review of systems items noted above, all other systems are negative. Current medications reviewed. PHYSICAL EXAMINATION:  Vital sign assessment reveal a blood pressure of 122/59 and pulse rate of 112. Cardiovascular exam has a heart with an irregularly irregular rate and rhythm. Soft systolic murmur present. There are no rubs or gallops. Good peripheral pulses. No jugular venous distension. Respiratory exam reveals clear lung fields, no rales or rhonchi. Lymphatic exam reveals no edema. Neurologic exam is nonfocal.  She is notably anxious. Recent labs results and imaging reviewed.   Notable findings include:   Recent Results (from the past 24 hour(s))   METABOLIC PANEL, BASIC    Collection Time: 01/20/21  3:33 AM   Result Value Ref Range    Sodium 139 135 - 145 mmol/L    Potassium 4.1 3.2 - 5.1 mmol/L    Chloride 104 94 - 111 mmol/L    CO2 28 21 - 33 mmol/L    Anion gap 7 mmol/L    Glucose 91 70 - 110 mg/dL    BUN 19 9 - 21 mg/dL    Creatinine 0.90 0.70 - 1.20 mg/dL    BUN/Creatinine ratio 21      GFR est AA >60 ml/min/1.73m2    GFR est non-AA 60 ml/min/1.73m2    Calcium 9.2 8.5 - 10.5 mg/dL   CBC W/O DIFF    Collection Time: 01/20/21  3:33 AM   Result Value Ref Range    WBC 6.4 4.6 - 13.2 K/uL    RBC 3.41 (L) 4.20 - 5.30 M/uL    HGB 8.8 (L) 12.0 - 16.0 g/dL    HCT 29.6 (L) 35.0 - 45.0 %    MCV 86.8 74.0 - 97.0 FL    MCH 25.8 24.0 - 34.0 PG    MCHC 29.7 (L) 31.0 - 37.0 g/dL    RDW 16.4 (H) 11.6 - 14.5 %    PLATELET 652 682 - 869 K/uL    MPV 10.9 9.2 - 11.8 FL   TROPONIN I    Collection Time: 01/20/21  7:35 AM   Result Value Ref Range    Troponin-I, Qt. 0.03 0.02 - 0.05 ng/mL   MAGNESIUM    Collection Time: 01/20/21  7:54 AM   Result Value Ref Range    Magnesium 2.1 1.7 - 2.8 mg/dL   EKG, 12 LEAD, SUBSEQUENT    Collection Time: 01/20/21  8:01 AM   Result Value Ref Range    Ventricular Rate 134 BPM    Atrial Rate 147 BPM    QRS Duration 120 ms    Q-T Interval 334 ms    QTC Calculation (Bezet) 498 ms    Calculated R Axis 22 degrees    Calculated T Axis 133 degrees    Diagnosis       Atrial fibrillation with rapid ventricular response  Incomplete left bundle branch block  Marked ST abnormality, possible inferior subendocardial injury  Abnormal ECG  When compared with ECG of 18-JAN-2021 00:26,  Atrial fibrillation has replaced Sinus rhythm  Vent. rate has increased BY  69 BPM  ST now depressed in Inferior leads  ST now depressed in Lateral leads     TROPONIN I    Collection Time: 01/20/21 10:15 AM   Result Value Ref Range    Troponin-I, Qt. <0.02 (L) 0.02 - 0.05 ng/mL       Discussed case with Dr. Tono Vela, and our impression and recommendations are as follows:  1. Shortness of breath: Likely multifactorial. Shortness of breath likely related to anemia, and acute episode developed this morning during atrial fibrillation with RVR. She received a dose of Lasix 40 mg IV push yesterday with no significant change in symptoms. Monitor volume status and symptoms closely. Continue daily weights and strict I&Os. Continue treatment for anemia as per primary and nephrology. 2. Aortic stenosis: Noted as mild on echocardiogram completed during this admission. Recommend to avoid vasodilators and diurese carefully. She will need serial monitoring of echocardiograms as outpatient.    3. Diastolic dysfunction: Noted as mild on echocardiogram completed yesterday. Discussed the importance of good blood pressure control. 4. Atrial fibrillation with RVR: New onset during episode this morning, with rate up to the 160s. Her rate decreased to the 110s after receiving diltiazem 10 mg IV push. BP has been labile this morning since receiving diltiazem. Will plan to increase metoprolol to 25 mg BID starting with this evening's dose. Her CHADS2-VASc score is 4. Unable to use oral anticoagulation or aspirin at this time due to severe anemia. She will need GI/hematology clearance for anticoagulation. Labs reviewed; TSH normal, potassium and magnesium are normal. Hgb has improved to 8.8 today. She is being treated for iron deficiency anemia, which was the likely culprit of this episode. Recommend to continue telemetry monitoring. Keep serum potassium between 4-5 and serum magnesium > 2. Thank you for involving us in the care of this patient. Please do not hesitate to call me or Dr. Lizzeth Blackwell if additional questions arise.

## 2021-01-20 NOTE — PROGRESS NOTES
Received care of pt sitting up in bed watching TV.  Pt is alert and oriented x4. Routine assessment and vs completed.  Pt denies any pain or discomfort. Bed in lowest position and call bell in reach.

## 2021-01-20 NOTE — PROGRESS NOTES
Pt bp improving, noted to be 108/61. Hr still noted to be elevated at 126bpm.  Pt anxiety has decreased. Denies pain. Easy work of breathing. Denies shortness of breath at this time. Pt LFA PIV became infiltrated and leaking. PIV removed and new access initiated in RFA. Pt tolerated well. Continuous cardiac monitoring remains in place. Call bell in reach.

## 2021-01-20 NOTE — PROGRESS NOTES
Progress Note  Date:1/19/2021       Room:[facility]  Patient Viridiana Sanders     YOB: 1940     Age:80 y.o. Subjective    Gayatri Jarquin is a 78 y.o.  female with past medical history of COPD, hypertension, and anxiety who presented to the ED with primary complaint of difficulty walking and blurred vision x5 days. She also reports associated dyspnea on exertion. She states that about 4-5 days ago she leaned backwards on her computer chair and fell, hitting her head against the concrete wall. She has a previous hx of tobacco use. She denies fever, chills, headache, chest pain, palpitations, cough, wheezing, N/V, abd pain, or numbess/tingling. In the ED, lab workup showed hemoglobin of 5.9. BMP unremarkable. CK and troponin within normal limits. Urinalysis normal. Stool occult negative. CT of head showed no acute intracranial process. CT of abd/pelvis showed diverticulosis without evidence of acute abdominopelvic process. Chest x-ray showed no acute cardiopulmonary process. 2 units PRBCs ordered. Patient evaluated a sitting up in bed stating that she feels much better no acute complaints are discussed iron levels and need to have him raised. Allergy consultation pending evaluate deficiency and possible ongoing plan. Patient may need to remain 1 additional day. Objective           Vitals Last 24 Hours:  Patient Vitals for the past 24 hrs:   Temp Pulse Resp BP SpO2   01/19/21 2039     96 %   01/19/21 1944 98 °F (36.7 °C) 65 18 (!) 147/51 97 %   01/19/21 0810     98 %   01/19/21 0705 98.2 °F (36.8 °C) 64 18 (!) 149/64 96 %   01/19/21 0421 98.4 °F (36.9 °C) (!) 57 18 (!) 134/45 97 %   01/19/21 0033 97.6 °F (36.4 °C) 63 18 (!) 129/45 97 %        I/O (24Hr): No intake or output data in the 24 hours ending 01/19/21 2113    Physical Exam:  General Appearance:  Comfortable, well-appearing. No acute distress.     HEENT: NCAT, PERRL, No deformities or discharge, Neck supple with trachea midline. Respiratory: Normal respiratory rate. Breath sounds clear to auscultation. Heart: S1 normal and S2 normal.  No tachycardia  Abdomen: Soft and flat. Bowel sounds are normal. No rebound or guarding. No organomegaly. Extremities: Normal range of motion. No acute deformities. No peripheral edema. Pulses: Distal pulses are intact. Neurological: Alert and oriented to person, place and time. Grossly intact. Skin:  Warm and dry. No acute lesions.     Medications           Current Facility-Administered Medications   Medication Dose Route Frequency    diphenhydrAMINE (BENADRYL) injection 25 mg  25 mg IntraVENous ONCE PRN    famotidine (PF) (PEPCID) 20 mg in 0.9% sodium chloride 10 mL injection  20 mg IntraVENous ONCE PRN    methylPREDNISolone (PF) (Solu-MEDROL) injection 125 mg  125 mg IntraVENous ONCE PRN    ferrous sulfate tablet 325 mg  1 Tab Oral BID    [START ON 1/20/2021] ferric gluconate (FERRLECIT) 250 mg in 0.9% sodium chloride 250 mL IVPB  250 mg IntraVENous ONCE    sodium chloride (NS) flush 5-40 mL  5-40 mL IntraVENous Q8H    sodium chloride (NS) flush 5-40 mL  5-40 mL IntraVENous PRN    acetaminophen (TYLENOL) tablet 650 mg  650 mg Oral Q6H PRN    Or    acetaminophen (TYLENOL) suppository 650 mg  650 mg Rectal Q6H PRN    polyethylene glycol (MIRALAX) packet 17 g  17 g Oral DAILY PRN    promethazine (PHENERGAN) tablet 12.5 mg  12.5 mg Oral Q6H PRN    Or    ondansetron (ZOFRAN) injection 4 mg  4 mg IntraVENous Q6H PRN    0.9% sodium chloride infusion 250 mL  250 mL IntraVENous PRN    albuterol (PROVENTIL HFA, VENTOLIN HFA, PROAIR HFA) inhaler 2 Puff  2 Puff Inhalation Q4H PRN    ALPRAZolam (XANAX) tablet 0.25 mg  0.25 mg Oral BID PRN    budesonide-formoterol (SYMBICORT) 80-4.5 mcg inhaler  2 Puff Inhalation BID RT    losartan (COZAAR) tablet 100 mg  100 mg Oral DAILY    metoprolol tartrate (LOPRESSOR) tablet 12.5 mg  12.5 mg Oral BID    atorvastatin (LIPITOR) tablet 40 mg  40 mg Oral QHS         Allergies         Isopropyl alcohol and Pen-vee k       Labs/Imaging/Diagnostics      Labs:  Recent Results (from the past 24 hour(s))   HAPTOGLOBIN    Collection Time: 01/18/21  9:45 PM   Result Value Ref Range    Haptoglobin 137 30 - 200 mg/dL   LD    Collection Time: 01/18/21  9:45 PM   Result Value Ref Range     98 - 192 U/L   DIRECT & INDIRECT CONI    Collection Time: 01/18/21  9:45 PM   Result Value Ref Range    MONTY Poly Negative     Antibody screen Negative    IMMUNOGLOBULIN, QT, IGM    Collection Time: 01/18/21  9:45 PM   Result Value Ref Range    Immunoglobulin M <21 (L) 40 - 230 mg/dL   CBC WITH MANUAL DIFF    Collection Time: 01/18/21  9:45 PM   Result Value Ref Range    WBC 5.3 4.6 - 13.2 K/uL    RBC 3.17 (L) 4.20 - 5.30 M/uL    HGB 8.1 (L) 12.0 - 16.0 g/dL    HCT 27.2 (L) 35.0 - 45.0 %    MCV 85.8 74.0 - 97.0 FL    MCH 25.6 24.0 - 34.0 PG    MCHC 29.8 (L) 31.0 - 37.0 g/dL    RDW 15.9 (H) 11.6 - 14.5 %    PLATELET 830 547 - 555 K/uL    MPV 11.2 9.2 - 11.8 FL    NEUTROPHILS 66 %    BAND NEUTROPHILS 2 %    LYMPHOCYTES 15 %    MONOCYTES 14 %    EOSINOPHILS 3 %    BASOPHILS 0 %    METAMYELOCYTES 0 %    MYELOCYTES 0 %    PROMYELOCYTES 0 %    BLASTS 0 %    NRBC 1.0  WBC    OTHER CELL 0 %    IMMATURE GRANULOCYTES 0 %    ABS. NEUTROPHILS 3.6 K/UL    ABS. LYMPHOCYTES 0.8 K/UL    ABS. MONOCYTES 0.7 K/UL    ABS. EOSINOPHILS 0.2 K/UL    ABS. BASOPHILS 0.0 K/UL    ABSOLUTE NRBC 0.05 K/uL    ABS. IMM.  GRANS. 0.0 K/UL    DF Manual      PLATELET COMMENTS LARGE PLATELETS NOTED     RBC COMMENTS Anisocytosis  1+        RBC COMMENTS Polychromasia  1+        RBC COMMENTS Hypochromia  1+        DIFFERENTIAL Manual Differenctial Ordered     METABOLIC PANEL, BASIC    Collection Time: 01/19/21  3:57 AM   Result Value Ref Range    Sodium 140 135 - 145 mmol/L    Potassium 4.3 3.2 - 5.1 mmol/L    Chloride 107 94 - 111 mmol/L    CO2 26 21 - 33 mmol/L    Anion gap 7 mmol/L Glucose 92 70 - 110 mg/dL    BUN 16 9 - 21 mg/dL    Creatinine 0.80 0.70 - 1.20 mg/dL    BUN/Creatinine ratio 20      GFR est AA >60 ml/min/1.73m2    GFR est non-AA >60 ml/min/1.73m2    Calcium 9.0 8.5 - 10.5 mg/dL   CBC W/O DIFF    Collection Time: 01/19/21  3:57 AM   Result Value Ref Range    WBC 5.6 4.6 - 13.2 K/uL    RBC 3.15 (L) 4.20 - 5.30 M/uL    HGB 8.2 (L) 12.0 - 16.0 g/dL    HCT 27.0 (L) 35.0 - 45.0 %    MCV 85.7 74.0 - 97.0 FL    MCH 26.0 24.0 - 34.0 PG    MCHC 30.4 (L) 31.0 - 37.0 g/dL    RDW 16.0 (H) 11.6 - 14.5 %    PLATELET 790 603 - 146 K/uL    MPV 10.9 9.2 - 11.8 FL        Trended key labs include:  Recent Labs     01/19/21  0357 01/18/21  2145 01/18/21  1500 01/18/21  0230   WBC 5.6 5.3  --  7.0   HGB 8.2* 8.1* 7.9* 8.2*   HCT 27.0* 27.2* 25.9* 27.0*    221  --  213     Recent Labs     01/19/21  0357 01/18/21  0230 01/17/21  1245    139 138   K 4.3 4.3 3.9    109 107   CO2 26 24 24   GLU 92 99 124*   BUN 16 18 19   CREA 0.80 0.80 0.80   CA 9.0 8.5 8.8   ALB  --  3.6 3.7   TBILI  --  1.8* 0.5   ALT  --  30 16       Imaging Last 24 Hours:  No results found. Assessment//Plan           Patient Active Problem List    Diagnosis Date Noted    Acute anemia 01/17/2021    Severe anemia 01/17/2021    Chronic obstructive lung disease ([facility] 11/20/2020    Essential hypertension 11/20/2020    Hyperlipidemia 11/20/2020        No problem-specific Assessment & Plan notes found for this encounter.     Acute anemia  - Etiology unclear but likely deficiency in nature  - Hemoglobin of 8.2 after 2 units PRBCs  - Urinalysis normal   - Stool occult negative  - Hold home ASA  - CT of abd/pelvis showed diverticulosis without evidence of acute abdominopelvic process  - CT of head showed no acute intracranial hemorrhage, mass, or shift  - Iron panel    -Seen by nephrology today where initial IV iron ordered with 2 additional treatments tomorrow prior to discharge  -We will iron therapy also initiated  - Will likely need outpatient GI f/u for colonoscopy     COPD  - Chest x-ray showed no acute cardiopulmonary process  - Continue home inhalers  - Duonebs prn     Hypertension  - Stable  - Continue home losartan and metoprolol     Anxiety  - Continue home Xanax     External hemorrhoids  - Supportive care      Code status: Full   Prophylaxis: SCD and Lovenox    Clinical time 50 minutes with >50% of visit spent in counseling and coordination of care      Electronically signed by Jo Spaulding, PhD, PA-C on 1/19/2021 at 9:13 PM

## 2021-01-20 NOTE — PROGRESS NOTES
Progress Note    Patient: Michelle Hunt MRN: [id number]  SSN: [id number]    YOB: 1940  Age: 78 y.o. Sex: female      Admit Date: 1/17/2021    LOS: 2 days     Subjective:      78-year-old female with past medical history of COPD, and, and anxiety who was admitted into the hospital 1/17/2021 with complaints of difficulty,, weakness and shortness of breath on exertion. ED work-up revealed anemia (hemoglobin of 5.9), status post 2 unit PRBC. Admitted for SOB. Was found with A. fib RVR with rates up to 160s this a.m., received Cardizem 10 mg IV x1. Cardiology is following. Patient was seen and examined at bedside. Hemoglobin is stable at 8.8. Occult stool is negative. Iron panel suggestive of iron deficiency anemia. Received iron infusion. No overnight events reported. Patient denies chest pain, chest pressure, palpitations, shortness of breath, cough, fever, chills, abdominal pain or discomfort. No urinary symptoms. Objective:     Vitals:    01/20/21 0906 01/20/21 0955 01/20/21 1042 01/20/21 1143   BP:  (!) 90/41 108/61 106/64   Pulse:  (!) 115 (!) 126 71   Resp:  20 18 18   Temp:  98 °F (36.7 °C) 98 °F (36.7 °C) 98.5 °F (36.9 °C)   SpO2: 97% 96% 100% 100%   Weight:       Height:            Intake and Output:  Current Shift: No intake/output data recorded. Last three shifts: No intake/output data recorded. Physical Exam:   General: Well-appearing elderly  female. Alert, awake, oriented x4. Cooperative, no acute distress. On RA. Eye: conjunctivae/corneas clear. PERRL, EOM's intact. Throat and Neck: Supple neck. No pharyngeal erythema or exudates noted. No mass   Lung: clear to auscultation bilaterally without wheezing rhonchi or rales. On room air  Heart: regular rate and rhythm, normal S1/S2. No murmurs, rubs, or gallops. No JVD. No cyanosis. Capillary refill less than 3 seconds bilaterally. Abdomen: soft, non-tender, non-distended.  Bowel sounds normal. No masses. Extremities:  able to move all extremities normal, atraumatic, no peripheral edema. Skin: No rashes or lesions. Warm, dry, intact. Normal turgor. Neurologic: AOx3. Cranial nerves 2-12 and sensation grossly intact. Psychiatric: Normal mood and affect. CN  II-XII grossly intact.       Lab/Data Review:  Recent Results (from the past 24 hour(s))   METABOLIC PANEL, BASIC    Collection Time: 01/20/21  3:33 AM   Result Value Ref Range    Sodium 139 135 - 145 mmol/L    Potassium 4.1 3.2 - 5.1 mmol/L    Chloride 104 94 - 111 mmol/L    CO2 28 21 - 33 mmol/L    Anion gap 7 mmol/L    Glucose 91 70 - 110 mg/dL    BUN 19 9 - 21 mg/dL    Creatinine 0.90 0.70 - 1.20 mg/dL    BUN/Creatinine ratio 21      GFR est AA >60 ml/min/1.73m2    GFR est non-AA 60 ml/min/1.73m2    Calcium 9.2 8.5 - 10.5 mg/dL   CBC W/O DIFF    Collection Time: 01/20/21  3:33 AM   Result Value Ref Range    WBC 6.4 4.6 - 13.2 K/uL    RBC 3.41 (L) 4.20 - 5.30 M/uL    HGB 8.8 (L) 12.0 - 16.0 g/dL    HCT 29.6 (L) 35.0 - 45.0 %    MCV 86.8 74.0 - 97.0 FL    MCH 25.8 24.0 - 34.0 PG    MCHC 29.7 (L) 31.0 - 37.0 g/dL    RDW 16.4 (H) 11.6 - 14.5 %    PLATELET 252 753 - 562 K/uL    MPV 10.9 9.2 - 11.8 FL   TROPONIN I    Collection Time: 01/20/21  7:35 AM   Result Value Ref Range    Troponin-I, Qt. 0.03 0.02 - 0.05 ng/mL   MAGNESIUM    Collection Time: 01/20/21  7:54 AM   Result Value Ref Range    Magnesium 2.1 1.7 - 2.8 mg/dL   EKG, 12 LEAD, SUBSEQUENT    Collection Time: 01/20/21  8:01 AM   Result Value Ref Range    Ventricular Rate 134 BPM    Atrial Rate 147 BPM    QRS Duration 120 ms    Q-T Interval 334 ms    QTC Calculation (Bezet) 498 ms    Calculated R Axis 22 degrees    Calculated T Axis 133 degrees    Diagnosis       Atrial fibrillation with rapid ventricular response  Incomplete left bundle branch block  Marked ST abnormality, possible inferior subendocardial injury  Abnormal ECG  When compared with ECG of 18-JAN-2021 00:26,  Atrial fibrillation has replaced Sinus rhythm  Vent.  rate has increased BY  69 BPM  ST now depressed in Inferior leads  ST now depressed in Lateral leads  Confirmed by RAFFAELE Boswell ([id number]) on 1/20/2021 1:10:33 PM     TROPONIN I    Collection Time: 01/20/21 10:15 AM   Result Value Ref Range    Troponin-I, Qt. <0.02 (L) 0.02 - 0.05 ng/mL       Current Facility-Administered Medications:     metoprolol tartrate (LOPRESSOR) tablet 25 mg, 25 mg, Oral, BID, Lana Dwyer NP    ALPRAZolam (XANAX) tablet 0.5 mg, 0.5 mg, Oral, TID, Leonel Aleman MD, 0.5 mg at 01/20/21 1357    ferrous sulfate tablet 325 mg, 1 Tab, Oral, BID, Candelario Valdivia MD, 325 mg at 01/20/21 0846    sodium chloride (NS) flush 5-40 mL, 5-40 mL, IntraVENous, Q8H, Ambrose Bridges PA-C, 10 mL at 01/20/21 1357    sodium chloride (NS) flush 5-40 mL, 5-40 mL, IntraVENous, PRN, Mana Chacon, PA-C    acetaminophen (TYLENOL) tablet 650 mg, 650 mg, Oral, Q6H PRN, 650 mg at 01/18/21 1622 **OR** acetaminophen (TYLENOL) suppository 650 mg, 650 mg, Rectal, Q6H PRN, Elizebeth Oswaldo, PA-C    polyethylene glycol (MIRALAX) packet 17 g, 17 g, Oral, DAILY PRN, Elizebelilian Chacon, PA-C    promethazine (PHENERGAN) tablet 12.5 mg, 12.5 mg, Oral, Q6H PRN **OR** ondansetron (ZOFRAN) injection 4 mg, 4 mg, IntraVENous, Q6H PRN, Héctorbelilian Chacon, PA-C    0.9% sodium chloride infusion 250 mL, 250 mL, IntraVENous, PRN, Christina Garnett MD, Last Rate: 15 mL/hr at 01/17/21 1637, 250 mL at 01/17/21 1637    albuterol (PROVENTIL HFA, VENTOLIN HFA, PROAIR HFA) inhaler 2 Puff, 2 Puff, Inhalation, Q4H PRN, Mana Chacon PA-C, Stopped at 01/18/21 0030    budesonide-formoterol (SYMBICORT) 80-4.5 mcg inhaler, 2 Puff, Inhalation, BID RT, Mana Chacon PA-C, 2 Puff at 01/20/21 0905    losartan (COZAAR) tablet 100 mg, 100 mg, Oral, DAILY, Mana Chacon PA-C, Stopped at 01/20/21 0900    atorvastatin (LIPITOR) tablet 40 mg, 40 mg, Oral, QHS, Leonel Aleman, MD, 40 mg at 01/19/21 2138    Assessment:     Principal Problem:    Acute anemia (1/17/2021)    Active Problems:    Severe anemia (1/17/2021)        Plan:     #1: Anemia:  -Presents with Hg 5.9 on admit, today Hg improved to 8.8. No s/s bleeding.  -Occult stool negative. -Iron panel, suggestive of iron deficiency anemia.  -She is status post 2 units PRBC, and IV iron infusion.  -Continue iron supplements, continue to hold aspirin.  -Recommend further outpatient hematology eval.    #2: Atrial fibrillation/RVR:  -New onset, reportedly today. s/p cardizem x 1 dose. -Seen and evaluated by cardiology.   -Continue metoprolol 25 mg twice daily.  -Echo shows an EF of 70%, mild left ventricular diastolic dysfunction, mild aortic valve stenosis. -Continue management by cardio. #3: Hypertension:  -Chronic condition, controlled. -Continue losartan and metoprolol. #4: External hemorrhoids:  -Chronic condition, supportive care. #5: Anxiety:  -Chronic condition, Xanax as needed. VTE prophylaxis: SCDs. CODE STATUS: Full code. Total time spent: 30 minutes.     Signed By: Fadia Little NP     January 20, 2021

## 2021-01-20 NOTE — PROGRESS NOTES
Problem: Falls - Risk of  Goal: *Absence of Falls  Description: Document Keli Led Fall Risk and appropriate interventions in the flowsheet.   Outcome: Progressing Towards Goal  Note: Fall Risk Interventions:            Medication Interventions: Patient to call before getting OOB                   Problem: Patient Education: Go to Patient Education Activity  Goal: Patient/Family Education  Outcome: Progressing Towards Goal     Problem: Anxiety  Goal: *Alleviation of anxiety  Outcome: Progressing Towards Goal  Goal: *Alleviation of anxiety (Palliative Care)  Outcome: Progressing Towards Goal     Problem: Patient Education: Go to Patient Education Activity  Goal: Patient/Family Education  Outcome: Progressing Towards Goal     Problem: Chronic Obstructive Pulmonary Disease (COPD)  Goal: *Oxygen saturation during activity within specified parameters  Outcome: Progressing Towards Goal  Goal: *Able to remain out of bed as prescribed  Outcome: Progressing Towards Goal  Goal: *Absence of hypoxia  Outcome: Progressing Towards Goal  Goal: *Optimize nutritional status  Outcome: Progressing Towards Goal     Problem: Patient Education: Go to Patient Education Activity  Goal: Patient/Family Education  Outcome: Progressing Towards Goal     Problem: Hypertension  Goal: *Blood pressure within specified parameters  Outcome: Progressing Towards Goal  Goal: *Fluid volume balance  Outcome: Progressing Towards Goal  Goal: *Labs within defined limits  Outcome: Progressing Towards Goal     Problem: Patient Education: Go to Patient Education Activity  Goal: Patient/Family Education  Outcome: Progressing Towards Goal     Problem: Nutrition Deficit  Goal: *Optimize nutritional status  Outcome: Progressing Towards Goal

## 2021-01-21 VITALS
SYSTOLIC BLOOD PRESSURE: 171 MMHG | TEMPERATURE: 97.4 F | HEART RATE: 85 BPM | RESPIRATION RATE: 20 BRPM | WEIGHT: 171.52 LBS | BODY MASS INDEX: 29.28 KG/M2 | DIASTOLIC BLOOD PRESSURE: 74 MMHG | HEIGHT: 64 IN | OXYGEN SATURATION: 97 %

## 2021-01-21 LAB
ANION GAP SERPL CALC-SCNC: 6 MMOL/L
BUN SERPL-MCNC: 19 MG/DL (ref 9–21)
BUN/CREAT SERPL: 21
CA-I BLD-MCNC: 9.2 MG/DL (ref 8.5–10.5)
CHLORIDE SERPL-SCNC: 106 MMOL/L (ref 94–111)
CO2 SERPL-SCNC: 27 MMOL/L (ref 21–33)
CREAT SERPL-MCNC: 0.9 MG/DL (ref 0.7–1.2)
ERYTHROCYTE [DISTWIDTH] IN BLOOD BY AUTOMATED COUNT: 17.4 % (ref 11.6–14.5)
GLUCOSE SERPL-MCNC: 99 MG/DL (ref 70–110)
HCT VFR BLD AUTO: 31.7 % (ref 35–45)
HGB BLD-MCNC: 9.2 G/DL (ref 12–16)
MAGNESIUM SERPL-MCNC: 2.3 MG/DL (ref 1.7–2.8)
MCH RBC QN AUTO: 25.6 PG (ref 24–34)
MCHC RBC AUTO-ENTMCNC: 29 G/DL (ref 31–37)
MCV RBC AUTO: 88.3 FL (ref 74–97)
PLATELET # BLD AUTO: 258 K/UL (ref 135–420)
PMV BLD AUTO: 10.8 FL (ref 9.2–11.8)
POTASSIUM SERPL-SCNC: 4.2 MMOL/L (ref 3.2–5.1)
RBC # BLD AUTO: 3.59 M/UL (ref 4.2–5.3)
SODIUM SERPL-SCNC: 139 MMOL/L (ref 135–145)
TROPONIN I SERPL-MCNC: 0.05 NG/ML (ref 0.02–0.05)
WBC # BLD AUTO: 7.8 K/UL (ref 4.6–13.2)

## 2021-01-21 PROCEDURE — 74011250637 HC RX REV CODE- 250/637: Performed by: NURSE PRACTITIONER

## 2021-01-21 PROCEDURE — 74011250637 HC RX REV CODE- 250/637: Performed by: STUDENT IN AN ORGANIZED HEALTH CARE EDUCATION/TRAINING PROGRAM

## 2021-01-21 PROCEDURE — 94760 N-INVAS EAR/PLS OXIMETRY 1: CPT

## 2021-01-21 PROCEDURE — 36415 COLL VENOUS BLD VENIPUNCTURE: CPT

## 2021-01-21 PROCEDURE — 74011250637 HC RX REV CODE- 250/637: Performed by: INTERNAL MEDICINE

## 2021-01-21 PROCEDURE — 94640 AIRWAY INHALATION TREATMENT: CPT

## 2021-01-21 PROCEDURE — 80048 BASIC METABOLIC PNL TOTAL CA: CPT

## 2021-01-21 PROCEDURE — 85027 COMPLETE CBC AUTOMATED: CPT

## 2021-01-21 PROCEDURE — 83735 ASSAY OF MAGNESIUM: CPT

## 2021-01-21 RX ORDER — LANOLIN ALCOHOL/MO/W.PET/CERES
325 CREAM (GRAM) TOPICAL
Qty: 30 TAB | Refills: 0 | Status: SHIPPED | OUTPATIENT
Start: 2021-01-21 | End: 2021-02-17 | Stop reason: SDUPTHER

## 2021-01-21 RX ADMIN — ALPRAZOLAM 0.5 MG: 0.5 TABLET ORAL at 12:13

## 2021-01-21 RX ADMIN — BUDESONIDE AND FORMOTEROL FUMARATE DIHYDRATE 2 PUFF: 80; 4.5 AEROSOL RESPIRATORY (INHALATION) at 07:33

## 2021-01-21 RX ADMIN — FERROUS SULFATE TAB 325 MG (65 MG ELEMENTAL FE) 325 MG: 325 (65 FE) TAB at 12:13

## 2021-01-21 RX ADMIN — METOPROLOL TARTRATE 25 MG: 25 TABLET, FILM COATED ORAL at 12:13

## 2021-01-21 NOTE — DISCHARGE SUMMARY
Discharge Summary       PATIENT ID: Monique Richmond  MRN: [id number]   YOB: 1940    DATE OF ADMISSION: 1/17/2021 11:59 AM    DATE OF DISCHARGE: 01/21/21    PRIMARY CARE PROVIDER: Kaia Mosqueda MD     ATTENDING PHYSICIAN: Meghana Chan MD  DISCHARGING PROVIDER: Meghana Chan MD        CONSULTATIONS: IP CONSULT TO CARDIOLOGY  IP CONSULT TO CARDIOLOGY  IP CONSULT TO NEPHROLOGY    PROCEDURES/SURGERIES: * No surgery found *    ADMITTING [address] [place] [address] COURSE:   Monique Richmond is a 78 y.o.  female with past medical history of COPD, hypertension, and anxiety who presented to the ED with primary complaint of difficulty walking and blurred vision x5 days. She also reports associated dyspnea on exertion. She states that about 4-5 days ago she leaned backwards on her computer chair and fell, hitting her head against the concrete wall. She has a previous hx of tobacco use. She denies fever, chills, headache, chest pain, palpitations, cough, wheezing, N/V, abd pain, or numbess/tingling. In the ED, lab workup showed hemoglobin of 5.9. BMP unremarkable. CK and troponin within normal limits. Urinalysis normal. Stool occult negative. CT of head showed no acute intracranial process. CT of abd/pelvis showed diverticulosis without evidence of acute abdominopelvic process. Chest x-ray showed no acute cardiopulmonary process. 2 units PRBCs ordered. DISCHARGE DIAGNOSES / PLAN:      #1: Anemia:  -Presents with Hg 5.9 on admit, today Hg improved to 8.8. No s/s bleeding.  -Occult stool negative. -Iron panel, suggestive of iron deficiency anemia.  -She is status post 2 units PRBC, and IV iron infusion.  -Continue iron supplements, continue to hold aspirin.  -Recommend further outpatient hematology eval.     #2: Atrial fibrillation/RVR:  -New onset, reportedly today. s/p cardizem x 1 dose.   -Seen and evaluated by cardiology.   -Continue metoprolol 25 mg twice daily.  -Echo shows an EF of 70%, mild left ventricular diastolic dysfunction, mild aortic valve stenosis. -Continue management by cardio.     #3: Hypertension:  -Chronic condition, controlled. -Continue losartan and metoprolol.     #4: External hemorrhoids:  -Chronic condition, supportive care.     #5: Anxiety:  -Chronic condition, Xanax as needed.     VTE prophylaxis: SCDs. CODE STATUS: Full code. Pt will be a candiate for NOAC is her anemia is corrected, and concerns for bleeding addressed. Total dc time 35 mins    FOLLOW UP APPOINTMENTS:    Follow-up Information     Follow up With Specialties Details Why Contact Info    Josephina Meckel., MD Internal Medicine  As needed, If symptoms worsen [address] [facility] Cardiology  Schedule an appointment as soon as possible for a visit in 2 weeks  [address] [facility]  [phone]             DISCHARGE MEDICATIONS:  Current Discharge Medication List      START taking these medications    Details   ferrous sulfate (Iron) 325 mg (65 mg iron) tablet Take 1 Tab by mouth Daily (before breakfast). Qty: 30 Tab, Refills: 0         CONTINUE these medications which have NOT CHANGED    Details   albuterol (PROVENTIL HFA, VENTOLIN HFA, PROAIR HFA) 90 mcg/actuation inhaler Take 2 Puffs by inhalation every four (4) hours as needed for Wheezing. atorvastatin (LIPITOR) 40 mg tablet Take  by mouth daily. losartan (COZAAR) 50 mg tablet Take 100 mg by mouth daily. budesonide-formoteroL (Symbicort) 80-4.5 mcg/actuation HFAA Take 2 Puffs by inhalation. metoprolol tartrate (LOPRESSOR) 25 mg tablet TAKE 1/2 TABLET BY MOUTH TWICE DAILY  Qty: 90 Tab, Refills: 1    Comments: **Patient requests 90 days supply**      ALPRAZolam (XANAX) 0.25 mg tablet Take 1 Tab by mouth two (2) times daily as needed for Anxiety.  Max Daily Amount: 0.5 mg.  Qty: 60 Tab, Refills: 2    Associated Diagnoses: Anxiety         STOP taking these medications       aspirin (ASPIRIN) 325 mg tablet Comments:   Reason for Stopping:                 NOTIFY YOUR PHYSICIAN FOR ANY OF THE FOLLOWING:   Fever over 101 degrees for 24 hours. Chest pain, shortness of breath, fever, chills, nausea, vomiting, diarrhea, change in mentation, falling, weakness, bleeding. Severe pain or pain not relieved by medications. Or, any other signs or symptoms that you may have questions about. PHYSICAL EXAMINATION AT DISCHARGE:  General:          Alert, cooperative, no distress, appears stated age. HEENT:           Atraumatic, anicteric sclerae, pink conjunctivae                          No oral ulcers, mucosa moist, throat clear, dentition fair  Neck:               Supple, symmetrical  Lungs:             Clear to auscultation bilaterally. No Wheezing or Rhonchi. No rales. Chest wall:      No tenderness  No Accessory muscle use. Heart:              Regular  rhythm,  No  murmur   No edema  Abdomen:        Soft, non-tender. Not distended. Bowel sounds normal  Extremities:     No cyanosis. No clubbing,                            Skin turgor normal, Capillary refill normal  Skin:                Not pale. Not Jaundiced  No rashes   Psych:             Not anxious or agitated.   Neurologic:      Alert, moves all extremities, answers questions appropriately and responds to commands       CHRONIC MEDICAL DIAGNOSES:  Problem List as of 1/21/2021 Date Reviewed: 12/3/2020          Codes Class Noted - Resolved    * (Principal) Acute anemia ICD-10-CM: D64.9  ICD-9-CM: 285.9  1/17/2021 - Present        Severe anemia ICD-10-CM: D64.9  ICD-9-CM: 285.9  1/17/2021 - Present        [facility] ICD-10-CM: J44.9  ICD-9-CM: 351  11/20/2020 - Present        Essential hypertension ICD-10-CM: I10  ICD-9-CM: 401.9  11/20/2020 - Present        Hyperlipidemia ICD-10-CM: E78.5  ICD-9-CM: 272.4  11/20/2020 - Present       Greater than 35 minutes were spent with the patient on counseling and coordination of care    Signed:   Alfred Johnson MD  1/21/2021  8:38 AM

## 2021-01-21 NOTE — PROGRESS NOTES
CARDIOLOGY PROGRESS NOTE    Patient seen and examined. This is a patient who is followed for abnormal echocardiogram and new onset paroxysmal atrial fibrillation. She converted to sinus rhythm yesterday around 12 noon and has remained in sinus rhythm. She denies any further shortness of breath or chest pain. She denies lightheadedness or palpitations. No other complaints reported. Telemetry reviewed. She converted to sinus rhythm around noon yesterday. Rate is in the 60s-70s. Pertinent review of systems items noted above, all other systems are negative. Current medications reviewed. PHYSICAL EXAMINATION:  Vital sign assessment reveal a blood pressure of 101/68 and pulse rate of 90. Cardiovascular exam has a heart with a regular rate and rhythm. Soft systolic murmur present. There are no rubs or gallops. Good peripheral pulses. No jugular venous distension. Respiratory exam reveals clear lung fields, no rales or rhonchi. Lymphatic exam reveals no edema. Neurologic exam is nonfocal.      Recent labs results and imaging reviewed.   Notable findings include:   Recent Results (from the past 24 hour(s))   TROPONIN I    Collection Time: 01/20/21 10:37 PM   Result Value Ref Range    Troponin-I, Qt. 0.05 0.02 - 4.58 ng/mL   METABOLIC PANEL, BASIC    Collection Time: 01/21/21  3:45 AM   Result Value Ref Range    Sodium 139 135 - 145 mmol/L    Potassium 4.2 3.2 - 5.1 mmol/L    Chloride 106 94 - 111 mmol/L    CO2 27 21 - 33 mmol/L    Anion gap 6 mmol/L    Glucose 99 70 - 110 mg/dL    BUN 19 9 - 21 mg/dL    Creatinine 0.90 0.70 - 1.20 mg/dL    BUN/Creatinine ratio 21      GFR est AA >60 ml/min/1.73m2    GFR est non-AA 60 ml/min/1.73m2    Calcium 9.2 8.5 - 10.5 mg/dL   CBC W/O DIFF    Collection Time: 01/21/21  3:45 AM   Result Value Ref Range    WBC 7.8 4.6 - 13.2 K/uL    RBC 3.59 (L) 4.20 - 5.30 M/uL    HGB 9.2 (L) 12.0 - 16.0 g/dL    HCT 31.7 (L) 35.0 - 45.0 %    MCV 88.3 74.0 - 97.0 FL    MCH 25.6 24.0 - 34.0 PG    MCHC 29.0 (L) 31.0 - 37.0 g/dL    RDW 17.4 (H) 11.6 - 14.5 %    PLATELET 846 807 - 620 K/uL    MPV 10.8 9.2 - 11.8 FL   MAGNESIUM    Collection Time: 01/21/21  3:45 AM   Result Value Ref Range    Magnesium 2.3 1.7 - 2.8 mg/dL       Discussed case with Dr. Mila Lake, and our impression and recommendations are as follows:  1. Shortness of breath: Improved. Likely multifactorial from anemia and paroxysmal atrial fibrillation with RVR. Continue treatment for anemia as per primary and nephrology. 2. Aortic stenosis: Noted as mild on echocardiogram completed during this admission. Recommend to avoid vasodilators and diurese carefully. She will need serial monitoring of echocardiograms as outpatient. 3. Diastolic dysfunction: Noted as mild on echocardiogram completed during this admission. Discussed the importance of good blood pressure control. 4. Paroxysmal atrial fibrillation with RVR: No recurrence since yesterday. Continue metoprolol 25 mg BID. Her CHADS2-VASc score is 4. Unable to use oral anticoagulation or aspirin at this time due to severe anemia. She will need GI/hematology clearance for anticoagulation. Labs reviewed; TSH normal, potassium and magnesium are normal. Hgb has improved to 9.2 today. She is being treated for iron deficiency anemia, which was the likely culprit of her episode. Recommend to continue telemetry monitoring. Keep serum potassium between 4-5 and serum magnesium > 2. She will need follow up in 2 weeks at [facility] at discharge. Thank you for involving us in the care of this patient. Please do not hesitate to call me or Dr. Mila Lake if additional questions arise.

## 2021-01-21 NOTE — DISCHARGE INSTRUCTIONS
Patient Education        Anemia: Care Instructions  Your Care Instructions     Anemia is a low level of red blood cells, which carry oxygen throughout your body. Many things can cause anemia. Lack of iron is one of the most common causes. Your body needs iron to make hemoglobin, a substance in red blood cells that carries oxygen from the lungs to your body's cells. Without enough iron, the body produces fewer and smaller red blood cells. As a result, your body's cells do not get enough oxygen, and you feel tired and weak. And you may have trouble concentrating. Bleeding is the most common cause of a lack of iron. You may have heavy menstrual bleeding or bleeding caused by conditions such as ulcers, hemorrhoids, or cancer. Regular use of aspirin or other anti-inflammatory medicines (such as ibuprofen) also can cause bleeding in some people. A lack of iron in your diet also can cause anemia, especially at times when the body needs more iron, such as during pregnancy, infancy, and the teen years. Your doctor may have prescribed iron pills. It may take several months of treatment for your iron levels to return to normal. Your doctor also may suggest that you eat foods that are rich in iron, such as meat and beans. There are many other causes of anemia. It is not always due to a lack of iron. Finding the specific cause of your anemia will help your doctor find the right treatment for you. Follow-up care is a key part of your treatment and safety. Be sure to make and go to all appointments, and call your doctor if you are having problems. It's also a good idea to know your test results and keep a list of the medicines you take. How can you care for yourself at home? · Take your medicines exactly as prescribed. Call your doctor if you think you are having a problem with your medicine.   · If your doctor recommends iron pills, take them as directed:  ? Try to take the pills on an empty stomach about 1 hour before or 2 hours after meals. But you may need to take iron with food to avoid an upset stomach. ? Do not take antacids or drink milk or caffeine drinks (such as coffee, tea, or cola) at the same time or within 2 hours of the time that you take your iron. They can make it hard for your body to absorb the iron. ? Vitamin C (from food or supplements) helps your body absorb iron. Try taking iron pills with a glass of orange juice or some other food that is high in vitamin C, such as citrus fruits. ? Iron pills may cause stomach problems, such as heartburn, nausea, diarrhea, constipation, and cramps. Be sure to drink plenty of fluids, and include fruits, vegetables, and fiber in your diet each day. Iron pills often make your bowel movements dark or green. ? If you forget to take an iron pill, do not take a double dose of iron the next time you take a pill. ? Keep iron pills out of the reach of small children. An overdose of iron can be very dangerous. · Follow your doctor's advice about eating iron-rich foods. These include red meat, shellfish, poultry, eggs, beans, raisins, whole-grain bread, and leafy green vegetables. · Steam vegetables to help them keep their iron content. When should you call for help? Call 911 anytime you think you may need emergency care. For example, call if:    · You have symptoms of a heart attack. These may include:  ? Chest pain or pressure, or a strange feeling in the chest.  ? Sweating. ? Shortness of breath. ? Nausea or vomiting. ? Pain, pressure, or a strange feeling in the back, neck, jaw, or upper belly or in one or both shoulders or arms. ? Lightheadedness or sudden weakness. ? A fast or irregular heartbeat. After you call 911, the  may tell you to chew 1 adult-strength or 2 to 4 low-dose aspirin. Wait for an ambulance. Do not try to drive yourself.     · You passed out (lost consciousness).    Call your doctor now or seek immediate medical care if:    · You have new or increased shortness of breath.     · You are dizzy or lightheaded, or you feel like you may faint.     · Your fatigue and weakness continue or get worse.     · You have any abnormal bleeding, such as:  ? Nosebleeds. ? Vaginal bleeding that is different (heavier, more frequent, at a different time of the month) than what you are used to.  ? Bloody or black stools, or rectal bleeding. ? Bloody or pink urine. Watch closely for changes in your health, and be sure to contact your doctor if:    · You do not get better as expected. Where can you learn more? Go to http://www.fuller.com/  Enter R301 in the search box to learn more about \"Anemia: Care Instructions. \"  Current as of: November 8, 2019               Content Version: 12.6  © 2355-4494 [employer]. Care instructions adapted under license by [employer] (which disclaims liability or warranty for this information). If you have questions about a medical condition or this instruction, always ask your healthcare professional. [place] [address] any warranty or liability for your use of this information. Learning About Blood Transfusions  What is a blood transfusion? Blood transfusion is a medical treatment to replace the blood or parts of blood that your body has lost. The blood goes through a tube from a bag to an intravenous (IV) catheter and into your vein. You may need a blood transfusion after losing blood from an injury, a major surgery, an illness that causes bleeding, or an illness that destroys blood cells. Transfusions are also used to give you the parts of blood--such as platelets, plasma, or substances that cause clotting--that your body needs to fight an illness or stop bleeding. How is a blood transfusion done? Before you receive a blood transfusion, your blood is tested to find out what your blood type is.  Blood or blood parts that are a match with your blood type are ordered by your doctor. Blood is typed as A, B, AB, or O. It is also typed as Rh-positive or Rh-negative. Your blood is also screened to look for antibodies that might react with the blood that is given to you. The blood you are getting is checked and rechecked to make sure that it's the right type for you. A sample of your blood is mixed with a sample of the blood you will receive to check for problems. Before actually giving you the transfusion, a doctor and nurses will look at the label on the package of blood and compare it to your hospital ID bracelet and medical records. The transfusion begins only when all agree that this is the correct blood and that you are the correct person to receive it. To receive the transfusion, you will have an intravenous (IV) catheter inserted into a vein. A tube connects the catheter to the bag containing the blood, which is placed higher than your body. The blood then flows slowly into your vein. A doctor or nurse will check you several times during the transfusion to watch for a reaction or other problems. What are the possible risks? Blood transfusions have many benefits and are often life-saving. But they also have a few risks. Possible risks include:  · Your body's reaction to receiving new blood. This may include:  ? Fever. ? Allergic reactions. ? Breathing problems. · An infection from the blood. This risk is small because of the strict rules placed on handling and storing blood. Getting a viral infection, such as HIV or hepatitis B or C, through blood transfusions has become very rare. The U.S. Food and Drug Administration (FDA) enforces strict guidelines on the collection, testing, storage, and use of blood. · Getting the wrong blood type by accident. Severe reactions, which can be life-threatening, are very rare. How can you care for yourself at home?   To prevent infection at the transfusion site  · Wash the area daily with warm, soapy water, and pat it dry. Don't use hydrogen peroxide or alcohol, which can slow healing. You may cover the area with a gauze bandage if it weeps or rubs against clothing. Change the bandage every day. · Keep the area clean and dry. When should you call for help? Call 911 anytime you think you may need emergency care. For example, call if:  · You have severe trouble breathing. Call your doctor now or seek immediate medical care if:  · You have a fever. · You feel weaker or more tired than usual.  · You have a yellow tint to your skin or the whites of your eyes. Watch closely for changes in your health, and be sure to contact your doctor if you have any problems. Follow-up care is a key part of your treatment and safety. Be sure to make and go to all appointments, and call your doctor if you are having problems. It's also a good idea to know your test results and keep a list of the medicines you take. Where can you learn more? Go to http://www.gray.com/  Enter V588 in the search box to learn more about \"Learning About Blood Transfusions. \"  Current as of: November 8, 2019               Content Version: 12.6  © 0678-6829 [employer]. Care instructions adapted under license by [employer] (which disclaims liability or warranty for this information). If you have questions about a medical condition or this instruction, always ask your healthcare professional. [place] [address] any warranty or liability for your use of this information. Patient Education        Anemia: Care Instructions  Your Care Instructions     Anemia is a low level of red blood cells, which carry oxygen throughout your body. Many things can cause anemia. Lack of iron is one of the most common causes. Your body needs iron to make hemoglobin, a substance in red blood cells that carries oxygen from the lungs to your body's cells.  Without enough iron, the body produces fewer and smaller red blood cells. As a result, your body's cells do not get enough oxygen, and you feel tired and weak. And you may have trouble concentrating. Bleeding is the most common cause of a lack of iron. You may have heavy menstrual bleeding or bleeding caused by conditions such as ulcers, hemorrhoids, or cancer. Regular use of aspirin or other anti-inflammatory medicines (such as ibuprofen) also can cause bleeding in some people. A lack of iron in your diet also can cause anemia, especially at times when the body needs more iron, such as during pregnancy, infancy, and the teen years. Your doctor may have prescribed iron pills. It may take several months of treatment for your iron levels to return to normal. Your doctor also may suggest that you eat foods that are rich in iron, such as meat and beans. There are many other causes of anemia. It is not always due to a lack of iron. Finding the specific cause of your anemia will help your doctor find the right treatment for you. Follow-up care is a key part of your treatment and safety. Be sure to make and go to all appointments, and call your doctor if you are having problems. It's also a good idea to know your test results and keep a list of the medicines you take. How can you care for yourself at home? · Take your medicines exactly as prescribed. Call your doctor if you think you are having a problem with your medicine. · If your doctor recommends iron pills, take them as directed:  ? Try to take the pills on an empty stomach about 1 hour before or 2 hours after meals. But you may need to take iron with food to avoid an upset stomach. ? Do not take antacids or drink milk or caffeine drinks (such as coffee, tea, or cola) at the same time or within 2 hours of the time that you take your iron. They can make it hard for your body to absorb the iron. ? Vitamin C (from food or supplements) helps your body absorb iron.  Try taking iron pills with a glass of orange juice or some other food that is high in vitamin C, such as citrus fruits. ? Iron pills may cause stomach problems, such as heartburn, nausea, diarrhea, constipation, and cramps. Be sure to drink plenty of fluids, and include fruits, vegetables, and fiber in your diet each day. Iron pills often make your bowel movements dark or green. ? If you forget to take an iron pill, do not take a double dose of iron the next time you take a pill. ? Keep iron pills out of the reach of small children. An overdose of iron can be very dangerous. · Follow your doctor's advice about eating iron-rich foods. These include red meat, shellfish, poultry, eggs, beans, raisins, whole-grain bread, and leafy green vegetables. · Steam vegetables to help them keep their iron content. When should you call for help? Call 911 anytime you think you may need emergency care. For example, call if:    · You have symptoms of a heart attack. These may include:  ? Chest pain or pressure, or a strange feeling in the chest.  ? Sweating. ? Shortness of breath. ? Nausea or vomiting. ? Pain, pressure, or a strange feeling in the back, neck, jaw, or upper belly or in one or both shoulders or arms. ? Lightheadedness or sudden weakness. ? A fast or irregular heartbeat. After you call 911, the  may tell you to chew 1 adult-strength or 2 to 4 low-dose aspirin. Wait for an ambulance. Do not try to drive yourself.     · You passed out (lost consciousness). Call your doctor now or seek immediate medical care if:    · You have new or increased shortness of breath.     · You are dizzy or lightheaded, or you feel like you may faint.     · Your fatigue and weakness continue or get worse.     · You have any abnormal bleeding, such as:  ? Nosebleeds. ? Vaginal bleeding that is different (heavier, more frequent, at a different time of the month) than what you are used to.  ? Bloody or black stools, or rectal bleeding. ? Bloody or pink urine.    Watch closely for changes in your health, and be sure to contact your doctor if:    · You do not get better as expected. Where can you learn more? Go to http://www.[employer].com/  Enter R301 in the search box to learn more about \"Anemia: Care Instructions. \"  Current as of: November 8, 2019               Content Version: 12.6  © 2313-8688 [employer]. Care instructions adapted under license by [employer] (which disclaims liability or warranty for this information). If you have questions about a medical condition or this instruction, always ask your healthcare professional. [address] any warranty or liability for your use of this information.

## 2021-01-21 NOTE — PROGRESS NOTES
Follow-up appointments:   Jennifer Camara: REMY Burrell   03/01/2021 at 2:00pm    PCP: Dr. Paulette Zhu   1/28/2021 at 56    GI: Florentin Orosco   03/12/2021 at 10:00sm

## 2021-01-21 NOTE — PROGRESS NOTES
Discharge instructions given, IV removed, telemetry [address], patient taken downstairs via wheelchair, patient discharged home.

## 2021-01-28 ENCOUNTER — VIRTUAL VISIT (OUTPATIENT)
Dept: INTERNAL MEDICINE CLINIC | Age: 81
End: 2021-01-28
Payer: MEDICARE

## 2021-01-28 DIAGNOSIS — Z09 HOSPITAL DISCHARGE FOLLOW-UP: ICD-10-CM

## 2021-01-28 DIAGNOSIS — D64.9 ANEMIA, UNSPECIFIED TYPE: Primary | ICD-10-CM

## 2021-01-28 DIAGNOSIS — I44.7 LEFT BUNDLE BRANCH BLOCK: ICD-10-CM

## 2021-01-28 LAB
BACTERIA SPEC CULT: NORMAL
SPECIAL REQUESTS,SREQ: NORMAL

## 2021-01-28 PROCEDURE — 1111F DSCHRG MED/CURRENT MED MERGE: CPT | Performed by: INTERNAL MEDICINE

## 2021-01-28 PROCEDURE — 99443 PR PHYS/QHP TELEPHONE EVALUATION 21-30 MIN: CPT | Performed by: INTERNAL MEDICINE

## 2021-01-28 NOTE — PROGRESS NOTES
I was at my office in [place], [place] while conducting this encounter. The patient is at home. Consent:  Patient and/or HIS/HER healthcare decision maker is aware that this patient-initiated Telehealth encounter is a billable service, with coverage as determined by patient's insurance carrier. Patient is aware that He/She may receive a bill and has provided verbal consent to proceed: Consent has been obtained within past 12 months of the date of this encounter. This virtual visit was conducted via Telephone    1. Anemia, unspecified type  The etiology of Mrs. Valdovinos anemia was never definitively obtained. Labs looking at hemolysis were obtained after she got her blood transfusion. We will check a CBC to see exactly where her hemoglobin is now. I am also going to send out cold agglutinins Coni haptoglobin and LDH. She also received an iron transfusion and will check an iron level  - CBC WITH AUTOMATED DIFF  - HAPTOGLOBIN  - DIRECT CONI  - COLD AGGLUTININS  - LD  - IRON PROFILE    2. Left bundle branch block  The patient does have a history of a left bundle branch block. She did not have any chest pain or shortness of breath. She had been taking a full dose aspirin prior to her admission. I instructed her to cut this back to an 81 mg aspirin. We will obtain a nuclear cardiac stress test given her left bundle branch block. She has a follow-up with cardiology in March. - NUCLEAR CARDIAC STRESS TEST; Future       Chief Complaint   Patient presents with   Major Hospital Follow Up        HPI   This is a delightful 64-year-old female who presents today after being hospitalized for profound anemia with a hemoglobin in the 5-6 range. At that time she was worked up but there is no definitive etiology for her anemia. Her hemolytic work-up while taken after her blood transfusion was negative for any hemolysis. Additionally fecal occult was negative as well.   She received blood transfusion and subsequently did well.  During the end of her hospitalization she refused received an iron transfusion as well. She currently reports she feels good and reports no real change in her energy. She has some slight postnasal drip but other than that feels good. She denies any headache chest pain palpitations or shortness of breath. She denies any new swelling in her legs. She notified me that she has been taking a full dose aspirin up until her hospitalization and wanted to know when she could restart it. I explained to her that she should be taking a full dose aspirin. Given her negative fecal occult I do not see any reason why she can take 81 mg. The patient also has a follow-up appointment with GI in approximately 1 month. She also has a follow-up appointment with cardiology in 1 month. Current Outpatient Medications on File Prior to Visit   Medication Sig Dispense Refill    ferrous sulfate (Iron) 325 mg (65 mg iron) tablet Take 1 Tab by mouth Daily (before breakfast). 30 Tab 0    albuterol (PROVENTIL HFA, VENTOLIN HFA, PROAIR HFA) 90 mcg/actuation inhaler Take 2 Puffs by inhalation every four (4) hours as needed for Wheezing.  atorvastatin (LIPITOR) 40 mg tablet Take  by mouth daily.  losartan (COZAAR) 50 mg tablet Take 100 mg by mouth daily.  budesonide-formoteroL (Symbicort) 80-4.5 mcg/actuation HFAA Take 2 Puffs by inhalation.  metoprolol tartrate (LOPRESSOR) 25 mg tablet TAKE 1/2 TABLET BY MOUTH TWICE DAILY 90 Tab 1    ALPRAZolam (XANAX) 0.25 mg tablet Take 1 Tab by mouth two (2) times daily as needed for Anxiety. Max Daily Amount: 0.5 mg. 60 Tab 2     No current facility-administered medications on file prior to visit.          Patient Active Problem List   Diagnosis Code    [facility] J44.9    Essential hypertension I10    Hyperlipidemia E78.5    Acute anemia D64.9    Severe anemia D64.9             Total Time Spent on this Encounter: greater than 21 minutes spent

## 2021-01-28 NOTE — PROGRESS NOTES
Patient was discharged from hospital on 1/21/2021 for anemia, had blood transfusion. Mukesh Feliciano presents today for   Chief Complaint   Patient presents with   [facility] Follow Up       Depression Screening:  3 most recent PHQ Screens 1/28/2021   Little interest or pleasure in doing things Not at all   Feeling down, depressed, irritable, or hopeless Several days   Total Score PHQ 2 1       Learning Assessment:  Learning Assessment 12/3/2020   PRIMARY LEARNER Patient   HIGHEST LEVEL OF EDUCATION - PRIMARY LEARNER  DID NOT GRADUATE HIGH SCHOOL   BARRIERS PRIMARY LEARNER NONE   PRIMARY LANGUAGE ENGLISH   LEARNER PREFERENCE PRIMARY READING   ANSWERED BY patient   RELATIONSHIP SELF       Fall Risk  Fall Risk Assessment, last 12 mths 1/28/2021   Able to walk? Yes   Fall in past 12 months? 0   Do you feel unsteady? 0   Are you worried about falling 0       ADL  ADL Assessment 1/28/2021   Feeding yourself No Help Needed   Getting from bed to chair No Help Needed   Getting dressed No Help Needed   Bathing or showering No Help Needed   Walk across the room (includes cane/walker) No Help Needed   Using the telphone No Help Needed   Taking your medications No Help Needed   Preparing meals No Help Needed   Managing money (expenses/bills) No Help Needed   Moderately strenuous housework (laundry) No Help Needed   Shopping for personal items (toiletries/medicines) No Help Needed   Shopping for groceries No Help Needed   Driving No Help Needed   Climbing a flight of stairs No Help Needed   Getting to places beyond walking distances No Help Needed       Health Maintenance reviewed and discussed and ordered per Provider.     Health Maintenance Due   Topic Date Due    DTaP/Tdap/Td series (1 - Tdap) 08/30/1959    Shingrix Vaccine Age 50> (1 of 2) 08/30/1988    GLAUCOMA SCREENING Q2Y  08/30/2003    Bone Densitometry (Dexa) Screening  08/30/2003    Pneumococcal 65+ years (1 of 1 - PPSV23) 08/30/2003    Medicare Yearly Exam  03/10/2020   . Coordination of Care:  1. Have you been to the ER, urgent care clinic since your last visit? Hospitalized since your last visit? yes    2. Have you seen or consulted any other health care providers outside of the [address] [place] [address] since your last visit? Include any pap smears or colon screening.  no

## 2021-02-01 ENCOUNTER — HOSPITAL ENCOUNTER (OUTPATIENT)
Dept: LAB | Age: 81
Discharge: HOME | End: 2021-02-01
Payer: MEDICARE

## 2021-02-01 LAB
BASOPHILS # BLD: 0.1 K/UL (ref 0–0.1)
BASOPHILS NFR BLD: 1 % (ref 0–2)
DAT POLY-SP REAG RBC QL: NEGATIVE
EOSINOPHIL # BLD: 0.1 K/UL (ref 0–0.4)
EOSINOPHIL NFR BLD: 1 % (ref 0–5)
ERYTHROCYTE [DISTWIDTH] IN BLOOD BY AUTOMATED COUNT: 19.7 % (ref 11.6–14.5)
HCT VFR BLD AUTO: 36.7 % (ref 35–45)
HGB BLD-MCNC: 10.7 G/DL (ref 12–16)
IMM GRANULOCYTES # BLD AUTO: 0 K/UL
IMM GRANULOCYTES NFR BLD AUTO: 0 %
LDH SERPL L TO P-CCNC: 160 U/L (ref 98–192)
LYMPHOCYTES # BLD: 0.6 K/UL (ref 0.9–3.6)
LYMPHOCYTES NFR BLD: 12 % (ref 21–52)
MCH RBC QN AUTO: 26.5 PG (ref 24–34)
MCHC RBC AUTO-ENTMCNC: 29.2 G/DL (ref 31–37)
MCV RBC AUTO: 90.8 FL (ref 74–97)
MONOCYTES # BLD: 0.5 K/UL (ref 0.05–1.2)
MONOCYTES NFR BLD: 10 % (ref 3–10)
NEUTS SEG # BLD: 3.8 K/UL (ref 1.8–8)
NEUTS SEG NFR BLD: 76 % (ref 40–73)
PLATELET # BLD AUTO: 271 K/UL (ref 135–420)
PMV BLD AUTO: 10.9 FL (ref 9.2–11.8)
RBC # BLD AUTO: 4.04 M/UL (ref 4.2–5.3)
WBC # BLD AUTO: 5 K/UL (ref 4.6–13.2)

## 2021-02-01 PROCEDURE — 86880 COOMBS TEST DIRECT: CPT

## 2021-02-01 PROCEDURE — 83540 ASSAY OF IRON: CPT

## 2021-02-01 PROCEDURE — 83615 LACTATE (LD) (LDH) ENZYME: CPT

## 2021-02-01 PROCEDURE — 36415 COLL VENOUS BLD VENIPUNCTURE: CPT

## 2021-02-01 PROCEDURE — 85025 COMPLETE CBC W/AUTO DIFF WBC: CPT

## 2021-02-01 PROCEDURE — 83010 ASSAY OF HAPTOGLOBIN QUANT: CPT

## 2021-02-02 LAB
HAPTOGLOB SERPL-MCNC: 223 MG/DL (ref 30–200)
IRON SATN MFR SERPL: 37 % (ref 20–50)
IRON SERPL-MCNC: 129 UG/DL (ref 35–150)
TIBC SERPL-MCNC: 346 UG/DL (ref 250–450)

## 2021-02-17 ENCOUNTER — OFFICE VISIT (OUTPATIENT)
Dept: FAMILY MEDICINE CLINIC | Age: 81
End: 2021-02-17
Payer: MEDICARE

## 2021-02-17 VITALS
BODY MASS INDEX: 28.85 KG/M2 | HEART RATE: 57 BPM | TEMPERATURE: 98.3 F | HEIGHT: 64 IN | DIASTOLIC BLOOD PRESSURE: 80 MMHG | RESPIRATION RATE: 19 BRPM | OXYGEN SATURATION: 100 % | SYSTOLIC BLOOD PRESSURE: 151 MMHG | WEIGHT: 169 LBS

## 2021-02-17 DIAGNOSIS — F41.9 ANXIETY: Primary | ICD-10-CM

## 2021-02-17 PROCEDURE — 1111F DSCHRG MED/CURRENT MED MERGE: CPT | Performed by: FAMILY MEDICINE

## 2021-02-17 PROCEDURE — G8536 NO DOC ELDER MAL SCRN: HCPCS | Performed by: FAMILY MEDICINE

## 2021-02-17 PROCEDURE — G8417 CALC BMI ABV UP PARAM F/U: HCPCS | Performed by: FAMILY MEDICINE

## 2021-02-17 PROCEDURE — G8427 DOCREV CUR MEDS BY ELIG CLIN: HCPCS | Performed by: FAMILY MEDICINE

## 2021-02-17 PROCEDURE — G8754 DIAS BP LESS 90: HCPCS | Performed by: FAMILY MEDICINE

## 2021-02-17 PROCEDURE — G8753 SYS BP > OR = 140: HCPCS | Performed by: FAMILY MEDICINE

## 2021-02-17 PROCEDURE — 1101F PT FALLS ASSESS-DOCD LE1/YR: CPT | Performed by: FAMILY MEDICINE

## 2021-02-17 PROCEDURE — G8432 DEP SCR NOT DOC, RNG: HCPCS | Performed by: FAMILY MEDICINE

## 2021-02-17 PROCEDURE — 1090F PRES/ABSN URINE INCON ASSESS: CPT | Performed by: FAMILY MEDICINE

## 2021-02-17 PROCEDURE — 99213 OFFICE O/P EST LOW 20 MIN: CPT | Performed by: FAMILY MEDICINE

## 2021-02-17 PROCEDURE — G8400 PT W/DXA NO RESULTS DOC: HCPCS | Performed by: FAMILY MEDICINE

## 2021-02-17 RX ORDER — LANOLIN ALCOHOL/MO/W.PET/CERES
325 CREAM (GRAM) TOPICAL
Qty: 30 TAB | Refills: 0 | Status: SHIPPED | OUTPATIENT
Start: 2021-02-17 | End: 2021-03-22

## 2021-02-17 RX ORDER — ALPRAZOLAM 0.5 MG/1
0.5 TABLET ORAL 2 TIMES DAILY
Qty: 60 TAB | Refills: 1 | Status: SHIPPED | OUTPATIENT
Start: 2021-02-17 | End: 2021-05-05

## 2021-02-17 NOTE — PROGRESS NOTES
Subjective:   Aishwarya Givens is a 78 y.o. female who was seen for Abdominal Pain (right lower quad pain)    HPI patient is an 23-year-old female who is seen today. She was admitted to the hospital about a month ago and had a low hemoglobin it was 4.5 or something like that. She has had a work-up her iron studies are now normal with the IBC slightly elevated. It does not look like it was anything else she had a CAT scan done it was unremarkable. She has diverticulosis she denies any bleeding she has had black stools but she is taking iron therapy 1 a day she has had no vomiting or diarrhea she has had a Covid shot she has had no falls or injuries she is sleeping and eating appropriately. Nobody at home has been sick she has had no rashes. She feels appropriately except she has some right lower quadrant pain in her scan she had some constipation she has been using some prune juice and it seems to be helping the CAT scan did not show any pelvic issues or other issues her bowel movements have been good with the prune juice she does not think there is anything left    Home Medications    Medication Sig Start Date End Date Taking? Authorizing Provider   ferrous sulfate (Iron) 325 mg (65 mg iron) tablet Take 1 Tab by mouth Daily (before breakfast). 1/21/21  Yes Ricardo Cesar MD   albuterol (PROVENTIL HFA, VENTOLIN HFA, PROAIR HFA) 90 mcg/actuation inhaler Take 2 Puffs by inhalation every four (4) hours as needed for Wheezing. Yes Other, MD Scooter   atorvastatin (LIPITOR) 40 mg tablet Take  by mouth daily. Yes Provider, Historical   losartan (COZAAR) 50 mg tablet Take 100 mg by mouth daily. Yes Provider, Historical   budesonide-formoteroL (Symbicort) 80-4.5 mcg/actuation HFAA Take 2 Puffs by inhalation.    Yes Provider, Historical   metoprolol tartrate (LOPRESSOR) 25 mg tablet TAKE 1/2 TABLET BY MOUTH TWICE DAILY 11/16/20  Yes Valerio Wood MD   ALPRAZolam Reva Gomez) 0.25 mg tablet Take 1 Tab by mouth two (2) times daily as needed for Anxiety. Max Daily Amount: 0.5 mg. 10/8/20  Yes Linda Brooke MD      Allergies   Allergen Reactions    Isopropyl Alcohol Other (comments)     Weakness all over - pt states she has out grown this    Pen-Vee K Rash     Pt. States she avoids penicillin due to allergy as a child. Social History     Tobacco Use    Smoking status: Former Smoker     Packs/day: 1.00     Years: 50.00     Pack years: 50.00    Smokeless tobacco: Never Used   Substance Use Topics    Alcohol use: Not Currently    Drug use: Never            Review of Systems   Constitutional: Negative. HENT: Negative. Eyes: Negative. Respiratory: Negative. Cardiovascular: Negative. Gastrointestinal: Positive for abdominal pain. Endocrine: Negative. Genitourinary: Negative. Musculoskeletal: Negative. Neurological: Negative. Hematological: Negative. Psychiatric/Behavioral: Negative. Physical Exam   Objective:     Visit Vitals  BP (!) 151/80 (BP 1 Location: Left upper arm, BP Patient Position: Sitting, BP Cuff Size: Adult)   Pulse (!) 57   Temp 98.3 °F (36.8 °C) (Temporal)   Resp 19   Ht 5' 4\" (1.626 m)   Wt 169 lb (76.7 kg)   SpO2 100%   BMI 29.01 kg/m²      General: alert, cooperative, no distress   Mental  status: normal mood, behavior, speech, dress, motor activity, and thought processes, able to follow commands   HENT: NCAT   Neck: no visualized mass   Resp: no respiratory distress   Neuro: no gross deficits   Skin: no discoloration or lesions of concern on visible areas   Psychiatric: normal affect, consistent with stated mood, no evidence of hallucinations   It her blood pressure it was 140/68. Her pulse was appropriate. I could not really find any pinpoint right lower quadrant tenderness.     Assessment & Plan:     Melisa uncertain etiology, right lower quadrant pain I am going to try to see if we can clean her out more aggressively I cannot find a focal abdominal abnormality I wonder if it is pelvis related. We may want to get an x-ray of the pelvis I will talk with her daughter. She could try the MiraLAX to see if that helps she has a colonoscopy evaluation set up next week this was a 25-minute visit with face-to-face communication. The patient was in our office        35 20 43    Additional exam findings: We discussed the expected course, resolution and complications of the diagnosis(es) in detail. Medication risks, benefits, costs, interactions, and alternatives were discussed as indicated. I advised her to contact the office if her condition worsens, changes or fails to improve as anticipated. She expressed understanding with the diagnosis(es) and plan.

## 2021-02-17 NOTE — PROGRESS NOTES
Heaven Roland presents today for   Chief Complaint   Patient presents with    Abdominal Pain     right lower quad pain       Is someone accompanying this pt? No    Is the patient using any DME equipment during OV? No    Depression Screening:  3 most recent PHQ Screens 2/17/2021   Little interest or pleasure in doing things Not at all   Feeling down, depressed, irritable, or hopeless Nearly every day   Total Score PHQ 2 3   Trouble falling or staying asleep, or sleeping too much Several days   Feeling tired or having little energy Several days   Poor appetite, weight loss, or overeating Not at all   Feeling bad about yourself - or that you are a failure or have let yourself or your family down Not at all   Trouble concentrating on things such as school, work, reading, or watching TV Not at all   Moving or speaking so slowly that other people could have noticed; or the opposite being so fidgety that others notice Not at all   Thoughts of being better off dead, or hurting yourself in some way Not at all   PHQ 9 Score 5   How difficult have these problems made it for you to do your work, take care of your home and get along with others Not difficult at all       Learning Assessment:  Learning Assessment 12/3/2020   PRIMARY LEARNER Patient   HIGHEST LEVEL OF EDUCATION - PRIMARY LEARNER  DID NOT GRADUATE HIGH SCHOOL   BARRIERS PRIMARY LEARNER NONE   PRIMARY LANGUAGE ENGLISH   LEARNER PREFERENCE PRIMARY READING   ANSWERED BY patient   RELATIONSHIP SELF       Fall Risk  Fall Risk Assessment, last 12 mths 2/17/2021   Able to walk? Yes   Fall in past 12 months? 1   Do you feel unsteady? 1   Are you worried about falling 0   Is the gait abnormal? 0   Number of falls in past 12 months 1   Fall with injury? 1       Health Maintenance reviewed and discussed and ordered per Provider.     Health Maintenance Due   Topic Date Due    DTaP/Tdap/Td series (1 - Tdap) 08/30/1959    Shingrix Vaccine Age 50> (1 of 2) 08/30/1988    GLAUCOMA SCREENING Q2Y  08/30/2003    Bone Densitometry (Dexa) Screening  08/30/2003    Pneumococcal 65+ years (1 of 1 - PPSV23) 08/30/2003    Medicare Yearly Exam  03/10/2020   . Coordination of Care:  1. Have you been to the ER, urgent care clinic since your last visit? Hospitalized since your last visit? Yes, was in the hospital on 1/17/21.    2. Have you seen or consulted any other health care providers outside of the [address] [place] [address] since your last visit? Include any pap smears or colon screening.  No

## 2021-03-12 LAB — CREATININE, EXTERNAL: 0.8

## 2021-03-22 DIAGNOSIS — F41.9 ANXIETY: ICD-10-CM

## 2021-03-22 RX ORDER — FERROUS SULFATE TAB 325 MG (65 MG ELEMENTAL FE) 325 (65 FE) MG
TAB ORAL
Qty: 30 TAB | Refills: 0 | Status: SHIPPED | OUTPATIENT
Start: 2021-03-22 | End: 2021-08-31

## 2021-03-22 RX ORDER — LANOLIN ALCOHOL/MO/W.PET/CERES
325 CREAM (GRAM) TOPICAL
Qty: 30 TAB | Refills: 0 | Status: SHIPPED | OUTPATIENT
Start: 2021-03-22 | End: 2021-08-31

## 2021-03-24 ENCOUNTER — VIRTUAL VISIT (OUTPATIENT)
Dept: FAMILY MEDICINE CLINIC | Age: 81
End: 2021-03-24
Payer: MEDICARE

## 2021-03-24 DIAGNOSIS — R89.9 ABNORMAL LABORATORY TEST: Primary | ICD-10-CM

## 2021-03-24 DIAGNOSIS — I10 ESSENTIAL HYPERTENSION: ICD-10-CM

## 2021-03-24 DIAGNOSIS — D64.9 SEVERE ANEMIA: ICD-10-CM

## 2021-03-24 PROCEDURE — 99442 PR PHYS/QHP TELEPHONE EVALUATION 11-20 MIN: CPT | Performed by: FAMILY MEDICINE

## 2021-03-24 NOTE — PROGRESS NOTES
Ashvin Cabral is a 78 y.o. female, evaluated via audio-only technology on 3/24/2021 for Follow-up  . Assessment & Plan: Normal lab work, severe anemia, hypertension, hyperlipidemia: The patient went to gastroenterology they did her lab work her hemoglobin is actually increased to 12.4. Her LDH was 245 with normal being 98-1 92 the lab made the patient very anxious about it. She has had no falls or injuries no rashes. Been eating relatively well. She is feeling much better. I am going to recheck it in 1 month GI recommended that as well we will look at the causes for that they are usually cardiac which the patient does not have or they can be related to anemia and I am suspecting that is going to be the problem with this lady. The 15-minute visit with a voice to voice communication the patient was at her home I was in my office       12  Subjective: Patient is an 64-year-old female who is seen by telephone to telephone interaction. No nausea vomiting or diarrhea. No cough or cold. No chest pain or shortness of breath. No rashes no syncope or loss of consciousness. She was admitted recently with a hemoglobin of 4.5 she received blood transfusions and vitamin therapy. And by follow-up with Dr. Erick Salmeron. She was also seen by GI and they found an elevated LDH to 245 normal goes up to 192 they requested follow-up by her primary care when you review all the issues associated with it looks like it could be anemia related more than anything else that we checked her lab work her hemoglobin was 12.4. She has had no falls or injuries no rash no syncope no loss of consciousness. No chest congestion or cough. She has been feeling better. She has been active more alert with more energy. Prior to Admission medications    Medication Sig Start Date End Date Taking?  Authorizing Provider   FeroSul 325 mg (65 mg iron) tablet TAKE 1 TABLET BY MOUTH DAILY BEFORE BREAKFAST 3/22/21  Yes Naomi Molina MD ferrous sulfate (Iron) 325 mg (65 mg iron) tablet Take 1 Tab by mouth Daily (before breakfast). 3/22/21  Yes MD Priyanka Ramos) 0.5 mg tablet Take 1 Tab by mouth two (2) times a day. Max Daily Amount: 1 mg. 2/17/21  Yes Edwin Causey MD   albuterol (PROVENTIL HFA, VENTOLIN HFA, PROAIR HFA) 90 mcg/actuation inhaler Take 2 Puffs by inhalation every four (4) hours as needed for Wheezing. Yes Other, MD Scooter   atorvastatin (LIPITOR) 40 mg tablet Take  by mouth daily. Yes Provider, Historical   losartan (COZAAR) 50 mg tablet Take 100 mg by mouth daily. Yes Provider, Historical   budesonide-formoteroL (Symbicort) 80-4.5 mcg/actuation HFAA Take 2 Puffs by inhalation. Yes Provider, Historical   metoprolol tartrate (LOPRESSOR) 25 mg tablet TAKE 1/2 TABLET BY MOUTH TWICE DAILY 11/16/20  Yes MD Priyanka Deshpande) 0.25 mg tablet Take 1 Tab by mouth two (2) times daily as needed for Anxiety. Max Daily Amount: 0.5 mg. 10/8/20  Yes Silvia Jean MD     Patient Active Problem List   Diagnosis Code    Chronic obstructive lung disease ([facility] J44.9    Essential hypertension I10    Hyperlipidemia E78.5    Acute anemia D64.9    Severe anemia D64.9    Abnormal laboratory test R89.9       Review of Systems   Constitutional: Negative. HENT: Negative. Eyes: Negative. Respiratory: Negative. Cardiovascular: Negative. Gastrointestinal: Negative. Genitourinary: Negative. Musculoskeletal: Negative. Skin: Negative. Endo/Heme/Allergies: Negative. Psychiatric/Behavioral: Negative.         Patient-Reported Vitals 3/24/2021   Patient-Reported Weight 166   Patient-Reported Systolic  458   Patient-Reported Diastolic [address], who was evaluated through a patient-initiated, synchronous (real-time) audio only encounter, and/or her healthcare decision maker, is aware that it is a billable service, with coverage as determined by her insurance carrier. She provided verbal consent to proceed: n/a- consent obtained within past 12 months. She has not had a related appointment within my department in the past 7 days or scheduled within the next 24 hours.       Total Time: minutes: 11-20 minutes    Bessy Carver MD

## 2021-03-24 NOTE — PROGRESS NOTES
Aliya Valente presents today for   Chief Complaint   Patient presents with    Follow-up         Depression Screening:  3 most recent PHQ Screens 3/24/2021   Little interest or pleasure in doing things Not at all   Feeling down, depressed, irritable, or hopeless Several days   Total Score PHQ 2 1   Trouble falling or staying asleep, or sleeping too much -   Feeling tired or having little energy -   Poor appetite, weight loss, or overeating -   Feeling bad about yourself - or that you are a failure or have let yourself or your family down -   Trouble concentrating on things such as school, work, reading, or watching TV -   Moving or speaking so slowly that other people could have noticed; or the opposite being so fidgety that others notice -   Thoughts of being better off dead, or hurting yourself in some way -   PHQ 9 Score -   How difficult have these problems made it for you to do your work, take care of your home and get along with others -       Learning Assessment:  Learning Assessment 12/3/2020   PRIMARY LEARNER Patient   HIGHEST LEVEL OF EDUCATION - PRIMARY LEARNER  DID NOT GRADUATE HIGH SCHOOL   BARRIERS PRIMARY LEARNER NONE   PRIMARY LANGUAGE ENGLISH   LEARNER PREFERENCE PRIMARY READING   ANSWERED BY patient   RELATIONSHIP SELF       Fall Risk  Fall Risk Assessment, last 12 mths 3/24/2021   Able to walk? Yes   Fall in past 12 months? 1   Do you feel unsteady? 0   Are you worried about falling 0   Is the gait abnormal? -   Number of falls in past 12 months 1   Fall with injury? 0       Health Maintenance reviewed and discussed and ordered per Provider. Health Maintenance Due   Topic Date Due    DTaP/Tdap/Td series (1 - Tdap) Never done    Shingrix Vaccine Age 50> (1 of 2) Never done    Bone Densitometry (Dexa) Screening  Never done    Pneumococcal 65+ years (1 of 1 - PPSV23) Never done    Medicare Yearly Exam  Never done    COVID-19 Vaccine (2 - Moderna 2-dose series) 02/23/2021   . Coordination of Care:  1. Have you been to the ER, urgent care clinic since your last visit? Hospitalized since your last visit? No    2. Have you seen or consulted any other health care providers outside of the [address] [place] [address] since your last visit? Include any pap smears or colon screening.  No

## 2021-04-10 ENCOUNTER — TELEPHONE (OUTPATIENT)
Dept: FAMILY MEDICINE CLINIC | Age: 81
End: 2021-04-10

## 2021-04-19 ENCOUNTER — TELEPHONE (OUTPATIENT)
Dept: FAMILY MEDICINE CLINIC | Age: 81
End: 2021-04-19

## 2021-04-19 DIAGNOSIS — I10 ESSENTIAL HYPERTENSION: Primary | ICD-10-CM

## 2021-04-19 RX ORDER — LOSARTAN POTASSIUM 100 MG/1
100 TABLET ORAL DAILY
Qty: 90 TAB | Refills: 1 | Status: SHIPPED | OUTPATIENT
Start: 2021-04-19 | End: 2021-10-15 | Stop reason: SDUPTHER

## 2021-04-21 ENCOUNTER — OFFICE VISIT (OUTPATIENT)
Dept: FAMILY MEDICINE CLINIC | Age: 81
End: 2021-04-21
Payer: MEDICARE

## 2021-04-21 VITALS
OXYGEN SATURATION: 98 % | SYSTOLIC BLOOD PRESSURE: 130 MMHG | TEMPERATURE: 98 F | BODY MASS INDEX: 31.96 KG/M2 | DIASTOLIC BLOOD PRESSURE: 67 MMHG | WEIGHT: 169.3 LBS | HEART RATE: 64 BPM | HEIGHT: 61 IN

## 2021-04-21 DIAGNOSIS — R53.83 FATIGUE, UNSPECIFIED TYPE: ICD-10-CM

## 2021-04-21 DIAGNOSIS — J44.9 CHRONIC OBSTRUCTIVE PULMONARY DISEASE, UNSPECIFIED COPD TYPE (HCC): ICD-10-CM

## 2021-04-21 DIAGNOSIS — R89.9 ABNORMAL LABORATORY TEST: ICD-10-CM

## 2021-04-21 DIAGNOSIS — B02.9 HERPES ZOSTER WITHOUT COMPLICATION: Primary | ICD-10-CM

## 2021-04-21 DIAGNOSIS — I10 ESSENTIAL HYPERTENSION: ICD-10-CM

## 2021-04-21 PROCEDURE — G8432 DEP SCR NOT DOC, RNG: HCPCS | Performed by: FAMILY MEDICINE

## 2021-04-21 PROCEDURE — G8400 PT W/DXA NO RESULTS DOC: HCPCS | Performed by: FAMILY MEDICINE

## 2021-04-21 PROCEDURE — G8536 NO DOC ELDER MAL SCRN: HCPCS | Performed by: FAMILY MEDICINE

## 2021-04-21 PROCEDURE — 1090F PRES/ABSN URINE INCON ASSESS: CPT | Performed by: FAMILY MEDICINE

## 2021-04-21 PROCEDURE — G8752 SYS BP LESS 140: HCPCS | Performed by: FAMILY MEDICINE

## 2021-04-21 PROCEDURE — 99213 OFFICE O/P EST LOW 20 MIN: CPT | Performed by: FAMILY MEDICINE

## 2021-04-21 PROCEDURE — G8754 DIAS BP LESS 90: HCPCS | Performed by: FAMILY MEDICINE

## 2021-04-21 PROCEDURE — G8427 DOCREV CUR MEDS BY ELIG CLIN: HCPCS | Performed by: FAMILY MEDICINE

## 2021-04-21 PROCEDURE — G8417 CALC BMI ABV UP PARAM F/U: HCPCS | Performed by: FAMILY MEDICINE

## 2021-04-21 PROCEDURE — 1101F PT FALLS ASSESS-DOCD LE1/YR: CPT | Performed by: FAMILY MEDICINE

## 2021-04-21 RX ORDER — ALBUTEROL SULFATE 90 UG/1
2 AEROSOL, METERED RESPIRATORY (INHALATION)
Qty: 1 INHALER | Refills: 1 | Status: SHIPPED | OUTPATIENT
Start: 2021-04-21 | End: 2022-05-03 | Stop reason: SDUPTHER

## 2021-04-21 RX ORDER — VALACYCLOVIR HYDROCHLORIDE 1 G/1
1000 TABLET, FILM COATED ORAL 2 TIMES DAILY
Qty: 10 TAB | Refills: 0 | Status: SHIPPED | OUTPATIENT
Start: 2021-04-21 | End: 2021-11-01

## 2021-04-21 NOTE — PROGRESS NOTES
Estrella Randolph presents today for   Chief Complaint   Patient presents with   [facility] Shingles     has a rash on her left side going around to her back        Is someone accompanying this pt? no    Is the patient using any DME equipment during [address]? no    Depression Screening:  3 most recent PHQ Screens 4/21/2021   Little interest or pleasure in doing things Not at all   Feeling down, depressed, irritable, or hopeless Nearly every day   Total Score PHQ 2 3   Trouble falling or staying asleep, or sleeping too much Nearly every day   Feeling tired or having little energy Nearly every day   Poor appetite, weight loss, or overeating Not at all   Feeling bad about yourself - or that you are a failure or have let yourself or your family down Not at all   Trouble concentrating on things such as school, work, reading, or watching TV Not at all   Moving or speaking so slowly that other people could have noticed; or the opposite being so fidgety that others notice Not at all   Thoughts of being better off dead, or hurting yourself in some way Not at all   PHQ 9 Score 9   How difficult have these problems made it for you to do your work, take care of your home and get along with others Somewhat difficult       Learning Assessment:  Learning Assessment 12/3/2020   PRIMARY LEARNER Patient   HIGHEST LEVEL OF EDUCATION - PRIMARY LEARNER  DID NOT GRADUATE HIGH SCHOOL   BARRIERS PRIMARY LEARNER NONE   PRIMARY LANGUAGE ENGLISH   LEARNER PREFERENCE PRIMARY READING   ANSWERED BY patient   RELATIONSHIP SELF       Fall Risk  Fall Risk Assessment, last 12 mths 4/21/2021   Able to walk? Yes   Fall in past 12 months? 0   Do you feel unsteady?  1   Are you worried about falling 1   Is the gait abnormal? -   Number of falls in past 12 months -   Fall with injury? -       ADL  ADL Assessment 1/28/2021   Feeding yourself No Help Needed   Getting from bed to chair No Help Needed   Getting dressed No Help Needed   Bathing or showering No Help Needed Walk across the room (includes cane/walker) No Help Needed   Using the telphone No Help Needed   Taking your medications No Help Needed   Preparing meals No Help Needed   Managing money (expenses/bills) No Help Needed   Moderately strenuous housework (laundry) No Help Needed   Shopping for personal items (toiletries/medicines) No Help Needed   Shopping for groceries No Help Needed   Driving No Help Needed   Climbing a flight of stairs No Help Needed   Getting to places beyond walking distances No Help Needed       Travel Screening:    Travel Screening     Question   Response    In the last month, have you been in contact with someone who was confirmed or suspected to have Coronavirus / COVID-19? No / Unsure    Have you had a COVID-19 viral test in the last 14 days? No    Do you have any of the following new or worsening symptoms? None of these    Have you traveled internationally or domestically in the last month? No      Travel History   Travel since 03/21/21     No documented travel since 03/21/21          Health Maintenance reviewed and discussed and ordered per Provider. Health Maintenance Due   Topic Date Due    DTaP/Tdap/Td series (1 - Tdap) Never done    Shingrix Vaccine Age 50> (1 of 2) Never done    Bone Densitometry (Dexa) Screening  Never done    Pneumococcal 65+ years (1 of 1 - PPSV23) Never done    Medicare Yearly Exam  Never done    COVID-19 Vaccine (2 - Moderna 2-dose series) 02/23/2021   . Coordination of Care:  1. Have you been to the ER, urgent care clinic since your last visit? Hospitalized since your last visit? no    2. Have you seen or consulted any other health care providers outside of the [address] [place] [address] since your last visit? Include any pap smears or colon screening.  no

## 2021-04-22 ENCOUNTER — HOSPITAL ENCOUNTER (OUTPATIENT)
Dept: LAB | Age: 81
Discharge: HOME | End: 2021-04-22
Payer: MEDICARE

## 2021-04-22 LAB
ALBUMIN SERPL-MCNC: 4.1 G/DL (ref 3.5–4.7)
ALBUMIN/GLOB SERPL: 1.3 {RATIO}
ALP SERPL-CCNC: 66 U/L (ref 38–126)
ALT SERPL-CCNC: 30 U/L (ref 3–52)
ANION GAP SERPL CALC-SCNC: 9 MMOL/L
AST SERPL W P-5'-P-CCNC: 28 U/L (ref 14–74)
BASOPHILS # BLD: 0 K/UL (ref 0–0.1)
BASOPHILS NFR BLD: 1 % (ref 0–2)
BILIRUB SERPL-MCNC: 0.8 MG/DL (ref 0.2–1)
BUN SERPL-MCNC: 20 MG/DL (ref 9–21)
BUN/CREAT SERPL: 22
CA-I BLD-MCNC: 9.5 MG/DL (ref 8.5–10.5)
CHLORIDE SERPL-SCNC: 104 MMOL/L (ref 94–111)
CO2 SERPL-SCNC: 25 MMOL/L (ref 21–33)
CREAT SERPL-MCNC: 0.9 MG/DL (ref 0.7–1.2)
EOSINOPHIL # BLD: 0.1 K/UL (ref 0–0.4)
EOSINOPHIL NFR BLD: 2 % (ref 0–5)
ERYTHROCYTE [DISTWIDTH] IN BLOOD BY AUTOMATED COUNT: 15.1 % (ref 11.6–14.5)
GLOBULIN SER CALC-MCNC: 3.1 G/DL
GLUCOSE SERPL-MCNC: 114 MG/DL (ref 70–110)
HCT VFR BLD AUTO: 43.1 % (ref 35–45)
HGB BLD-MCNC: 13.5 G/DL (ref 12–16)
IMM GRANULOCYTES # BLD AUTO: 0 K/UL
IMM GRANULOCYTES NFR BLD AUTO: 0 %
LDH SERPL L TO P-CCNC: 176 U/L (ref 98–192)
LYMPHOCYTES # BLD: 0.6 K/UL (ref 0.9–3.6)
LYMPHOCYTES NFR BLD: 12 % (ref 21–52)
MCH RBC QN AUTO: 29 PG (ref 24–34)
MCHC RBC AUTO-ENTMCNC: 31.3 G/DL (ref 31–37)
MCV RBC AUTO: 92.7 FL (ref 74–97)
MONOCYTES # BLD: 0.7 K/UL (ref 0.05–1.2)
MONOCYTES NFR BLD: 13 % (ref 3–10)
NEUTS SEG # BLD: 3.8 K/UL (ref 1.8–8)
NEUTS SEG NFR BLD: 72 % (ref 40–73)
PLATELET # BLD AUTO: 140 K/UL (ref 135–420)
PMV BLD AUTO: 11.1 FL (ref 9.2–11.8)
POTASSIUM SERPL-SCNC: 4.5 MMOL/L (ref 3.2–5.1)
PROT SERPL-MCNC: 7.2 G/DL (ref 6.1–8.4)
RBC # BLD AUTO: 4.65 M/UL (ref 4.2–5.3)
SODIUM SERPL-SCNC: 138 MMOL/L (ref 135–145)
TSH SERPL DL<=0.05 MIU/L-ACNC: 0.91 UIU/ML (ref 0.35–6.2)
WBC # BLD AUTO: 5.2 K/UL (ref 4.6–13.2)

## 2021-04-22 PROCEDURE — 84443 ASSAY THYROID STIM HORMONE: CPT

## 2021-04-22 PROCEDURE — 85025 COMPLETE CBC W/AUTO DIFF WBC: CPT

## 2021-04-22 PROCEDURE — 36415 COLL VENOUS BLD VENIPUNCTURE: CPT

## 2021-04-22 PROCEDURE — 80053 COMPREHEN METABOLIC PANEL: CPT

## 2021-04-22 PROCEDURE — 83615 LACTATE (LD) (LDH) ENZYME: CPT

## 2021-04-23 ENCOUNTER — TELEPHONE (OUTPATIENT)
Dept: FAMILY MEDICINE CLINIC | Age: 81
End: 2021-04-23

## 2021-04-23 NOTE — PROGRESS NOTES
Subjective:   Isabell Jung is a 78 y.o. female who was seen for Shingles (has a rash on her left side going around to her back )    HPI has a 3-day history of a rash on the left side. It is uncomfortable she has been using Tylenol. She has had no falls or injuries. She has never had it before. She has not had a shingles vaccine. No nausea vomiting or diarrhea. No cough or cold. No chest pain or shortness of breath. Has been eating relatively well. Nobody at home has been sick no chest congestion. The Tylenol helps her discomfort slightly. She has had no falls or injuries. Home Medications    Medication Sig Start Date End Date Taking? Authorizing Provider   albuterol (PROVENTIL HFA, VENTOLIN HFA, PROAIR HFA) 90 mcg/actuation inhaler Take 2 Puffs by inhalation every four (4) hours as needed for Wheezing. 4/21/21  Yes Yan Cuellar MD   valACYclovir (VALTREX) 1 gram tablet Take 1 Tab by mouth two (2) times a day. 4/21/21  Yes Yan Cuellar MD   losartan (COZAAR) 100 mg tablet Take 1 Tab by mouth daily. 4/19/21  Yes Yan Cuellar MD   FeroSul 325 mg (65 mg iron) tablet TAKE 1 TABLET BY MOUTH DAILY BEFORE BREAKFAST 3/22/21  Yes Yan Cuellar MD   ALPRAZolam Lorri Bears) 0.5 mg tablet Take 1 Tab by mouth two (2) times a day. Max Daily Amount: 1 mg. 2/17/21  Yes Yan Cuellar MD   atorvastatin (LIPITOR) 40 mg tablet Take  by mouth daily. Yes Provider, Historical   budesonide-formoteroL (Symbicort) 80-4.5 mcg/actuation HFAA Take 2 Puffs by inhalation. Yes Provider, Historical   metoprolol tartrate (LOPRESSOR) 25 mg tablet TAKE 1/2 TABLET BY MOUTH TWICE DAILY 11/16/20  Yes Cheryl Soni MD   ferrous sulfate (Iron) 325 mg (65 mg iron) tablet Take 1 Tab by mouth Daily (before breakfast). 3/22/21   Yan Cuellar MD   ALPRAZolam Lorri Bears) 0.25 mg tablet Take 1 Tab by mouth two (2) times daily as needed for Anxiety.  Max Daily Amount: 0.5 mg. 10/8/20   Cheryl Soni MD Allergies   Allergen Reactions    Isopropyl Alcohol Other (comments)     Weakness all over - pt states she has out grown this    Pen-Vee K Rash     Pt. States she avoids penicillin due to allergy as a child. Social History     Tobacco Use    Smoking status: Former Smoker     Packs/day: 1.00     Years: 50.00     Pack years: 50.00    Smokeless tobacco: Never Used   Substance Use Topics    Alcohol use: Not Currently    Drug use: Never            Review of Systems   Constitutional: Negative. HENT: Negative. Respiratory: Negative. Cardiovascular: Negative. Genitourinary: Negative. Musculoskeletal: Negative. Skin:        Patient on the left side of her body   Hematological: Negative. Psychiatric/Behavioral: Negative. Physical Exam   Objective:     Visit Vitals  /67 (BP 1 Location: Left upper arm, BP Patient Position: Sitting)   Pulse 64   Temp 98 °F (36.7 °C)   Ht 5' 1\" (1.549 m)   Wt 169 lb 4.8 oz (76.8 kg)   SpO2 98%   BMI 31.99 kg/m²      General: alert, cooperative, no distress   Mental  status: normal mood, behavior, speech, dress, motor activity, and thought processes, able to follow commands   HENT: NCAT   Neck: no visualized mass   Resp: no respiratory distress   Neuro: no gross deficits   Skin: no discoloration or lesions of concern on visible areas   Psychiatric: normal affect, consistent with stated mood, no evidence of hallucinations   Are clear she has a regular rate and rhythm no murmurs gallops or rubs the abdomen is benign the extremities are clear she is pleasant and cooperative in no distress at all. She has a classic left-sided shingles. Not infected    Assessment & Plan:     Shingles: I am going to start with Valtrex. Tylenol seems to be helping the pain she may need something else she will call me if any other issues have asked her to continue to watch her blood pressure on a regular basis.   Call me if any other changes her DNR was taken care of today as well        35 20 43    Additional exam findings: We discussed the expected course, resolution and complications of the diagnosis(es) in detail. Medication risks, benefits, costs, interactions, and alternatives were discussed as indicated. I advised her to contact the office if her condition worsens, changes or fails to improve as anticipated. She expressed understanding with the diagnosis(es) and plan.

## 2021-05-03 ENCOUNTER — OFFICE VISIT (OUTPATIENT)
Dept: FAMILY MEDICINE CLINIC | Age: 81
End: 2021-05-03
Payer: MEDICARE

## 2021-05-03 DIAGNOSIS — B02.9 HERPES ZOSTER WITHOUT COMPLICATION: Primary | ICD-10-CM

## 2021-05-03 PROCEDURE — G8427 DOCREV CUR MEDS BY ELIG CLIN: HCPCS | Performed by: FAMILY MEDICINE

## 2021-05-03 PROCEDURE — G8431 POS CLIN DEPRES SCRN F/U DOC: HCPCS | Performed by: FAMILY MEDICINE

## 2021-05-03 PROCEDURE — G8417 CALC BMI ABV UP PARAM F/U: HCPCS | Performed by: FAMILY MEDICINE

## 2021-05-03 PROCEDURE — 1101F PT FALLS ASSESS-DOCD LE1/YR: CPT | Performed by: FAMILY MEDICINE

## 2021-05-03 PROCEDURE — G8753 SYS BP > OR = 140: HCPCS | Performed by: FAMILY MEDICINE

## 2021-05-03 PROCEDURE — G8536 NO DOC ELDER MAL SCRN: HCPCS | Performed by: FAMILY MEDICINE

## 2021-05-03 PROCEDURE — G8754 DIAS BP LESS 90: HCPCS | Performed by: FAMILY MEDICINE

## 2021-05-03 PROCEDURE — 1090F PRES/ABSN URINE INCON ASSESS: CPT | Performed by: FAMILY MEDICINE

## 2021-05-03 PROCEDURE — G8400 PT W/DXA NO RESULTS DOC: HCPCS | Performed by: FAMILY MEDICINE

## 2021-05-03 PROCEDURE — 99213 OFFICE O/P EST LOW 20 MIN: CPT | Performed by: FAMILY MEDICINE

## 2021-05-03 NOTE — PROGRESS NOTES
Ibeth Rides presents today for   Chief Complaint   Patient presents with    Follow-up     follow up on shingles        Is someone accompanying this pt? no    Is the patient using any DME equipment during [address]? no    Depression Screening:  3 most recent PHQ Screens 5/3/2021   Little interest or pleasure in doing things Nearly every day   Feeling down, depressed, irritable, or hopeless Nearly every day   Total Score PHQ 2 6   Trouble falling or staying asleep, or sleeping too much Several days   Feeling tired or having little energy Nearly every day   Poor appetite, weight loss, or overeating Not at all   Feeling bad about yourself - or that you are a failure or have let yourself or your family down Not at all   Trouble concentrating on things such as school, work, reading, or watching TV Not at all   Moving or speaking so slowly that other people could have noticed; or the opposite being so fidgety that others notice Not at all   Thoughts of being better off dead, or hurting yourself in some way Not at all   PHQ 9 Score 10   How difficult have these problems made it for you to do your work, take care of your home and get along with others Not difficult at all       Learning Assessment:  Learning Assessment 12/3/2020   PRIMARY LEARNER Patient   HIGHEST LEVEL OF EDUCATION - PRIMARY LEARNER  DID NOT GRADUATE HIGH SCHOOL   BARRIERS PRIMARY LEARNER NONE   PRIMARY LANGUAGE ENGLISH   LEARNER PREFERENCE PRIMARY READING   ANSWERED BY patient   RELATIONSHIP SELF       Fall Risk  Fall Risk Assessment, last 12 mths 5/3/2021   Able to walk? Yes   Fall in past 12 months? 0   Do you feel unsteady?  0   Are you worried about falling 1   Is the gait abnormal? -   Number of falls in past 12 months -   Fall with injury? -       ADL  ADL Assessment 1/28/2021   Feeding yourself No Help Needed   Getting from bed to chair No Help Needed   Getting dressed No Help Needed   Bathing or showering No Help Needed   Walk across the room (includes cane/walker) No Help Needed   Using the telphone No Help Needed   Taking your medications No Help Needed   Preparing meals No Help Needed   Managing money (expenses/bills) No Help Needed   Moderately strenuous housework (laundry) No Help Needed   Shopping for personal items (toiletries/medicines) No Help Needed   Shopping for groceries No Help Needed   Driving No Help Needed   Climbing a flight of stairs No Help Needed   Getting to places beyond walking distances No Help Needed       Travel Screening:    Travel Screening     Question   Response    In the last month, have you been in contact with someone who was confirmed or suspected to have Coronavirus / COVID-19? No / Unsure    Have you had a COVID-19 viral test in the last 14 days? No    Do you have any of the following new or worsening symptoms? None of these    Have you traveled internationally or domestically in the last month? No      Travel History   Travel since 04/03/21     No documented travel since 04/03/21          Health Maintenance reviewed and discussed and ordered per Provider. Health Maintenance Due   Topic Date Due    DTaP/Tdap/Td series (1 - Tdap) Never done    Shingrix Vaccine Age 50> (1 of 2) Never done    Bone Densitometry (Dexa) Screening  Never done    Pneumococcal 65+ years (1 of 1 - PPSV23) Never done    Medicare Yearly Exam  Never done    COVID-19 Vaccine (2 - Moderna 2-dose series) 02/23/2021   . Coordination of Care:  1. Have you been to the ER, urgent care clinic since your last visit? Hospitalized since your last visit? no    2. Have you seen or consulted any other health care providers outside of the [address] [place] [address] since your last visit? Include any pap smears or colon screening.  no

## 2021-05-04 VITALS
HEART RATE: 59 BPM | OXYGEN SATURATION: 98 % | TEMPERATURE: 97.3 F | BODY MASS INDEX: 31.91 KG/M2 | SYSTOLIC BLOOD PRESSURE: 138 MMHG | DIASTOLIC BLOOD PRESSURE: 82 MMHG | WEIGHT: 168.9 LBS

## 2021-05-04 RX ORDER — TRAMADOL HYDROCHLORIDE 50 MG/1
50 TABLET ORAL
Qty: 20 TAB | Refills: 0 | Status: SHIPPED | OUTPATIENT
Start: 2021-05-04 | End: 2021-05-07

## 2021-05-04 NOTE — PROGRESS NOTES
Subjective:   Radha Hoffmann is a 78 y.o. female who was seen for Follow-up (follow up on shingles )    PRATEEK is a 47-year-old female who is still having pain in the left side of her shingles. She has had no nausea vomiting or diarrhea. No cough or cold. She has been eating relatively well. Nobody at home is been sick no chest pain or shortness of breath. No rash no syncope or loss of consciousness that is changed there is tenderness. She is only been taking Tylenol she cannot take Advil. She has had no falls or injuries. Bowel movements have been excellent. No chest pain or shortness of breath. Home Medications    Medication Sig Start Date End Date Taking? Authorizing Provider   traMADoL (ULTRAM) 50 mg tablet Take 1 Tab by mouth every six (6) hours as needed for Pain for up to 3 days. Max Daily Amount: 200 mg. 5/4/21 5/7/21 Yes Danica Fontenot MD   albuterol (PROVENTIL HFA, VENTOLIN HFA, PROAIR HFA) 90 mcg/actuation inhaler Take 2 Puffs by inhalation every four (4) hours as needed for Wheezing. 4/21/21  Yes Danica Fontenot MD   valACYclovir (VALTREX) 1 gram tablet Take 1 Tab by mouth two (2) times a day. 4/21/21  Yes Danica Fontenot MD   losartan (COZAAR) 100 mg tablet Take 1 Tab by mouth daily. 4/19/21  Yes Danica Fontenot MD   FeroSul 325 mg (65 mg iron) tablet TAKE 1 TABLET BY MOUTH DAILY BEFORE BREAKFAST 3/22/21  Yes Danica Fontenot MD   ALPRAZolam Rainell Lowers) 0.5 mg tablet Take 1 Tab by mouth two (2) times a day. Max Daily Amount: 1 mg. 2/17/21  Yes Danica Fontenot MD   atorvastatin (LIPITOR) 40 mg tablet Take  by mouth daily. Yes Provider, Historical   budesonide-formoteroL (Symbicort) 80-4.5 mcg/actuation HFAA Take 2 Puffs by inhalation. Yes Provider, Historical   metoprolol tartrate (LOPRESSOR) 25 mg tablet TAKE 1/2 TABLET BY MOUTH TWICE DAILY 11/16/20  Yes Cee Espinosa MD   ferrous sulfate (Iron) 325 mg (65 mg iron) tablet Take 1 Tab by mouth Daily (before breakfast). 3/22/21   Toro Pugh MD   ALPRAZolam Enrike Devyn) 0.25 mg tablet Take 1 Tab by mouth two (2) times daily as needed for Anxiety. Max Daily Amount: 0.5 mg. 10/8/20   Roni Posey MD      Allergies   Allergen Reactions    Isopropyl Alcohol Other (comments)     Weakness all over - pt states she has out grown this    Pen-Vee K Rash     Pt. States she avoids penicillin due to allergy as a child. Social History     Tobacco Use    Smoking status: Former Smoker     Packs/day: 1.00     Years: 50.00     Pack years: 50.00    Smokeless tobacco: Never Used   Substance Use Topics    Alcohol use: Not Currently    Drug use: Never            Review of Systems   Constitutional: Negative. HENT: Negative. Eyes: Negative. Respiratory: Negative. Cardiovascular: Negative. Gastrointestinal: Negative. Endocrine: Negative. Genitourinary: Negative. Musculoskeletal: Negative. Skin: Negative. Allergic/Immunologic: Negative. Neurological: Negative. Hematological: Negative. Psychiatric/Behavioral: Negative. Physical Exam   Objective:     Visit Vitals  /82   Pulse (!) 59   Temp 97.3 °F (36.3 °C)   Wt 168 lb 14.4 oz (76.6 kg)   SpO2 98%   BMI 31.91 kg/m²      General: alert, cooperative, no distress   Mental  status: normal mood, behavior, speech, dress, motor activity, and thought processes, able to follow commands   HENT: NCAT   Neck: no visualized mass   Resp: no respiratory distress   Neuro: no gross deficits   Skin: no discoloration or lesions of concern on visible areas   Psychiatric: normal affect, consistent with stated mood, no evidence of hallucinations   Is really is drying him quite well it is still somewhat erythematous. The lungs are clear she has a regular rate and rhythm with no murmurs gallops or rubs.   The abdomen is benign the extremities are clear no significant changes    Assessment & Plan:     Shingles: I am going to start tramadol 2 or 3 times a day for the pain Tylenol does not seem to be helping she is going to call me and let me know I do not think she needs more Valtrex I do not think she needs steroids we will follow-up if any other issues arise. 712    Additional exam findings: We discussed the expected course, resolution and complications of the diagnosis(es) in detail. Medication risks, benefits, costs, interactions, and alternatives were discussed as indicated. I advised her to contact the office if her condition worsens, changes or fails to improve as anticipated. She expressed understanding with the diagnosis(es) and plan.

## 2021-05-05 DIAGNOSIS — F41.9 ANXIETY: ICD-10-CM

## 2021-05-05 RX ORDER — ALPRAZOLAM 0.5 MG/1
TABLET ORAL
Qty: 60 TAB | Refills: 2 | Status: SHIPPED | OUTPATIENT
Start: 2021-05-05 | End: 2021-06-14 | Stop reason: SDUPTHER

## 2021-05-07 RX ORDER — METOPROLOL TARTRATE 25 MG/1
TABLET, FILM COATED ORAL
Qty: 90 TAB | Refills: 1 | Status: SHIPPED | OUTPATIENT
Start: 2021-05-07 | End: 2021-10-28 | Stop reason: SDUPTHER

## 2021-05-21 ENCOUNTER — TELEPHONE (OUTPATIENT)
Dept: FAMILY MEDICINE CLINIC | Age: 81
End: 2021-05-21

## 2021-05-21 ENCOUNTER — VIRTUAL VISIT (OUTPATIENT)
Dept: FAMILY MEDICINE CLINIC | Age: 81
End: 2021-05-21
Payer: MEDICARE

## 2021-05-21 DIAGNOSIS — B02.29 POST HERPETIC NEURALGIA: Primary | ICD-10-CM

## 2021-05-21 DIAGNOSIS — D64.9 SEVERE ANEMIA: Primary | ICD-10-CM

## 2021-05-21 DIAGNOSIS — B02.29 POST HERPETIC NEURALGIA: ICD-10-CM

## 2021-05-21 DIAGNOSIS — I10 ESSENTIAL HYPERTENSION: ICD-10-CM

## 2021-05-21 PROCEDURE — 99442 PR PHYS/QHP TELEPHONE EVALUATION 11-20 MIN: CPT | Performed by: FAMILY MEDICINE

## 2021-05-21 RX ORDER — GABAPENTIN 100 MG/1
200 CAPSULE ORAL 3 TIMES DAILY
Qty: 84 CAPSULE | Refills: 0 | Status: SHIPPED | OUTPATIENT
Start: 2021-05-21 | End: 2021-11-01

## 2021-05-21 NOTE — PROGRESS NOTES
Griffin Smith is a 78 y.o. female, evaluated via audio-only technology on 5/21/2021 for No chief complaint on file. .    Assessment & Plan: In the first place postherpetic neuralgia, hypertension: Try the patient on Neurontin 200 mg 3 times daily for her postherpetic neuralgia. I am going to stop her from taking iron I do not think she needs the iron therapy we will follow her up with CBCs occasionally she seems to be medically stable except for the pain. This was a 15-minute visit with telephone to telephone technology the patient was in her home I was in my office. I had already talked to her daughter who is a nurse about some of her symptoms already       15  Subjective: Patient is an 33-year-old female who is seen today for evaluation. This is a telephone evaluation. She has had no falls or injuries. No chest pain or shortness of breath. Bowel movements have been appropriate. She has had some postherpetic pain. We have not had a long anything specifically for it. She states is getting worse and her daughter who is a nurse corroborates that she has no other rash. She has had no vomiting or diarrhea she is eating appropriately she has iron deficiency anemia and hypertension. We are going to ask her to stop her iron. She has had no falls or injuries no rash no syncope or loss of consciousness. Bowel movements have been appropriate. Nobody at home has been sick       Prior to Admission medications    Medication Sig Start Date End Date Taking? Authorizing Provider   gabapentin (NEURONTIN) 100 mg capsule Take 2 Capsules by mouth three (3) times daily. Max Daily Amount: 600 mg. 5/21/21   Megan Chow MD   metoprolol tartrate (LOPRESSOR) 25 mg tablet TAKE 1/2 TABLET BY MOUTH TWICE DAILY 5/7/21   Megan Chow MD   ALPRAZolam [facility]) 0.5 mg tablet TAKE 1 TABLET BY MOUTH TWICE DAILY.  MAX DAILY AMOUNT: 1 MG 5/5/21   Megan Chow MD   albuterol (PROVENTIL HFA, VENTOLIN HFA, PROAIR HFA) 90 mcg/actuation inhaler Take 2 Puffs by inhalation every four (4) hours as needed for Wheezing. 4/21/21   Sofi Goodman MD   valACYclovir (VALTREX) 1 gram tablet Take 1 Tab by mouth two (2) times a day. 4/21/21   Sofi Goodman MD   losartan (COZAAR) 100 mg tablet Take 1 Tab by mouth daily. 4/19/21   Sofi Goodman MD   FeroSul 325 mg (65 mg iron) tablet TAKE 1 TABLET BY MOUTH DAILY BEFORE BREAKFAST 3/22/21   Sofi Goodman MD   ferrous sulfate (Iron) 325 mg (65 mg iron) tablet Take 1 Tab by mouth Daily (before breakfast). 3/22/21   Sofi Goodman MD   atorvastatin (LIPITOR) 40 mg tablet Take  by mouth daily. Provider, Historical   budesonide-formoteroL (Symbicort) 80-4.5 mcg/actuation HFAA Take 2 Puffs by inhalation. Provider, Historical   ALPRAZolam (XANAX) 0.25 mg tablet Take 1 Tab by mouth two (2) times daily as needed for Anxiety. Max Daily Amount: 0.5 mg. 10/8/20   Naomi Vences MD     Patient Active Problem List   Diagnosis Code    [facility] J44.9    Essential hypertension I10    Hyperlipidemia E78.5    Acute anemia D64.9    Severe anemia D64.9    Abnormal laboratory test R89.9    Herpes zoster without complication H59.6    Fatigue R53.83    Post herpetic neuralgia B02.29       Review of Systems   Constitutional: Negative. HENT: Negative. Eyes: Negative. Cardiovascular: Negative. Gastrointestinal: Negative. Genitourinary: Negative. Skin: Negative. Neurological: Negative. Endo/Heme/Allergies: Negative. Psychiatric/Behavioral: Negative. Patient-Reported Vitals 3/24/2021   Patient-Reported Weight 166   Patient-Reported Systolic  392   Patient-Reported Diastolic [address], who was evaluated through a patient-initiated, synchronous (real-time) audio only encounter, and/or her healthcare decision maker, is aware that it is a billable service, with coverage as determined by her insurance carrier.  She provided verbal consent to proceed: n/a- consent obtained within past 12 months. She has not had a related appointment within my department in the past 7 days or scheduled within the next 24 hours.       Total Time: minutes: 11-20 minutes    Sergey Gregg MD

## 2021-06-14 ENCOUNTER — TELEPHONE (OUTPATIENT)
Dept: FAMILY MEDICINE CLINIC | Age: 81
End: 2021-06-14

## 2021-06-14 DIAGNOSIS — F41.9 ANXIETY: ICD-10-CM

## 2021-06-14 DIAGNOSIS — D64.9 SEVERE ANEMIA: Primary | ICD-10-CM

## 2021-06-14 RX ORDER — ALPRAZOLAM 0.5 MG/1
TABLET ORAL
COMMUNITY
End: 2021-10-21

## 2021-06-14 RX ORDER — ALPRAZOLAM 0.5 MG/1
0.5 TABLET ORAL 2 TIMES DAILY
Qty: 60 TABLET | Refills: 2 | Status: SHIPPED | OUTPATIENT
Start: 2021-06-14 | End: 2021-10-21

## 2021-06-14 RX ORDER — ATORVASTATIN CALCIUM 40 MG/1
40 TABLET, FILM COATED ORAL DAILY
Qty: 90 TABLET | Refills: 1 | Status: SHIPPED | OUTPATIENT
Start: 2021-06-14 | End: 2021-12-18

## 2021-06-14 NOTE — TELEPHONE ENCOUNTER
Patient needs a lab order to have blood drawn since stopping her iron. She [facility].

## 2021-06-21 ENCOUNTER — HOSPITAL ENCOUNTER (OUTPATIENT)
Dept: LAB | Age: 81
Discharge: HOME | End: 2021-06-21
Payer: MEDICARE

## 2021-06-21 PROCEDURE — 36415 COLL VENOUS BLD VENIPUNCTURE: CPT

## 2021-06-21 PROCEDURE — 83540 ASSAY OF IRON: CPT

## 2021-06-22 LAB
IRON SATN MFR SERPL: 29 % (ref 20–50)
IRON SERPL-MCNC: 90 UG/DL (ref 35–150)
TIBC SERPL-MCNC: 312 UG/DL (ref 250–450)

## 2021-06-24 ENCOUNTER — TELEPHONE (OUTPATIENT)
Dept: FAMILY MEDICINE CLINIC | Age: 81
End: 2021-06-24

## 2021-08-26 ENCOUNTER — ANESTHESIA (OUTPATIENT)
Dept: ENDOSCOPY | Age: 81
DRG: 368 | End: 2021-08-26
Payer: MEDICARE

## 2021-08-26 ENCOUNTER — HOSPITAL ENCOUNTER (INPATIENT)
Age: 81
LOS: 1 days | Discharge: HOME | DRG: 368 | End: 2021-08-27
Attending: FAMILY MEDICINE | Admitting: INTERNAL MEDICINE
Payer: MEDICARE

## 2021-08-26 ENCOUNTER — ANESTHESIA EVENT (OUTPATIENT)
Dept: ENDOSCOPY | Age: 81
DRG: 368 | End: 2021-08-26
Payer: MEDICARE

## 2021-08-26 DIAGNOSIS — K92.0 HEMATEMESIS WITHOUT NAUSEA: Primary | ICD-10-CM

## 2021-08-26 DIAGNOSIS — K62.5 RECTAL BLEEDING: ICD-10-CM

## 2021-08-26 PROBLEM — K92.2 GI BLEED: Status: ACTIVE | Noted: 2021-08-26

## 2021-08-26 LAB
ALBUMIN SERPL-MCNC: 3.6 G/DL (ref 3.5–4.7)
ALBUMIN/GLOB SERPL: 1.3 {RATIO}
ALP SERPL-CCNC: 59 U/L (ref 38–126)
ALT SERPL-CCNC: 20 U/L (ref 3–52)
ANION GAP SERPL CALC-SCNC: 11 MMOL/L
AST SERPL W P-5'-P-CCNC: 25 U/L (ref 14–74)
BASOPHILS # BLD: 0.1 K/UL (ref 0–0.1)
BASOPHILS NFR BLD: 1 % (ref 0–2)
BILIRUB SERPL-MCNC: 0.7 MG/DL (ref 0.2–1)
BUN SERPL-MCNC: 31 MG/DL (ref 9–21)
BUN/CREAT SERPL: 31
CA-I BLD-MCNC: 9.2 MG/DL (ref 8.5–10.5)
CHLORIDE SERPL-SCNC: 104 MMOL/L (ref 94–111)
CO2 SERPL-SCNC: 25 MMOL/L (ref 21–33)
COLLECT DATE STL: NORMAL
COVID-19 RAPID TEST, COVR: NOT DETECTED
CREAT SERPL-MCNC: 1 MG/DL (ref 0.7–1.2)
DIFFERENTIAL METHOD BLD: ABNORMAL
EOSINOPHIL # BLD: 0.1 K/UL (ref 0–0.4)
EOSINOPHIL NFR BLD: 2 % (ref 0–5)
ERYTHROCYTE [DISTWIDTH] IN BLOOD BY AUTOMATED COUNT: 12 % (ref 11.6–14.5)
GLOBULIN SER CALC-MCNC: 2.8 G/DL
GLUCOSE SERPL-MCNC: 184 MG/DL (ref 70–110)
HCT VFR BLD AUTO: 32.6 % (ref 35–45)
HEMOCCULT SP1 STL QL: POSITIVE
HGB BLD-MCNC: 10.8 G/DL (ref 12–16)
IMM GRANULOCYTES # BLD AUTO: 0 K/UL (ref 0–0.04)
IMM GRANULOCYTES NFR BLD AUTO: 0 % (ref 0–0.5)
INR PPP: 1.2 (ref 0.8–1.2)
LYMPHOCYTES # BLD: 0.8 K/UL (ref 0.9–3.6)
LYMPHOCYTES NFR BLD: 12 % (ref 21–52)
MCH RBC QN AUTO: 31.5 PG (ref 24–34)
MCHC RBC AUTO-ENTMCNC: 33.1 G/DL (ref 31–37)
MCV RBC AUTO: 95 FL (ref 78–100)
MONOCYTES # BLD: 0.2 K/UL (ref 0.05–1.2)
MONOCYTES NFR BLD: 4 % (ref 3–10)
NEUTS SEG # BLD: 5.2 K/UL (ref 1.8–8)
NEUTS SEG NFR BLD: 81 % (ref 40–73)
NRBC # BLD: 0 K/UL (ref 0–0.01)
NRBC BLD-RTO: 0 PER 100 WBC
PLATELET # BLD AUTO: 200 K/UL (ref 135–420)
PMV BLD AUTO: 11.3 FL (ref 9.2–11.8)
POTASSIUM SERPL-SCNC: 4.1 MMOL/L (ref 3.2–5.1)
PROT SERPL-MCNC: 6.4 G/DL (ref 6.1–8.4)
PROTHROMBIN TIME: 14.3 SEC (ref 11.5–15.2)
RBC # BLD AUTO: 3.43 M/UL (ref 4.2–5.3)
SODIUM SERPL-SCNC: 140 MMOL/L (ref 135–145)
SPECIMEN SOURCE: NORMAL
WBC # BLD AUTO: 6.5 K/UL (ref 4.6–13.2)

## 2021-08-26 PROCEDURE — 74011250636 HC RX REV CODE- 250/636: Performed by: NURSE PRACTITIONER

## 2021-08-26 PROCEDURE — 85610 PROTHROMBIN TIME: CPT

## 2021-08-26 PROCEDURE — 77030037186 HC VLV ENDOSC STRL DEFENDO DISP MVAT -A: Performed by: INTERNAL MEDICINE

## 2021-08-26 PROCEDURE — 43239 EGD BIOPSY SINGLE/MULTIPLE: CPT | Performed by: INTERNAL MEDICINE

## 2021-08-26 PROCEDURE — 2709999900 HC NON-CHARGEABLE SUPPLY: Performed by: INTERNAL MEDICINE

## 2021-08-26 PROCEDURE — 88342 IMHCHEM/IMCYTCHM 1ST ANTB: CPT

## 2021-08-26 PROCEDURE — 77030021593 HC FCPS BIOP ENDOSC BSC -A: Performed by: INTERNAL MEDICINE

## 2021-08-26 PROCEDURE — 82272 OCCULT BLD FECES 1-3 TESTS: CPT

## 2021-08-26 PROCEDURE — 74011250637 HC RX REV CODE- 250/637: Performed by: NURSE PRACTITIONER

## 2021-08-26 PROCEDURE — 99285 EMERGENCY DEPT VISIT HI MDM: CPT

## 2021-08-26 PROCEDURE — 87635 SARS-COV-2 COVID-19 AMP PRB: CPT

## 2021-08-26 PROCEDURE — 3E0G8GC INTRODUCTION OF OTHER THERAPEUTIC SUBSTANCE INTO UPPER GI, VIA NATURAL OR ARTIFICIAL OPENING ENDOSCOPIC: ICD-10-PCS | Performed by: INTERNAL MEDICINE

## 2021-08-26 PROCEDURE — 96375 TX/PRO/DX INJ NEW DRUG ADDON: CPT

## 2021-08-26 PROCEDURE — 88305 TISSUE EXAM BY PATHOLOGIST: CPT

## 2021-08-26 PROCEDURE — 77030003657 HC NDL SCLER BSC -B: Performed by: INTERNAL MEDICINE

## 2021-08-26 PROCEDURE — 85025 COMPLETE CBC W/AUTO DIFF WBC: CPT

## 2021-08-26 PROCEDURE — 94640 AIRWAY INHALATION TREATMENT: CPT

## 2021-08-26 PROCEDURE — 74011000258 HC RX REV CODE- 258: Performed by: FAMILY MEDICINE

## 2021-08-26 PROCEDURE — 65610000006 HC RM INTENSIVE CARE

## 2021-08-26 PROCEDURE — 76060000033 HC ANESTHESIA 1 TO 1.5 HR: Performed by: INTERNAL MEDICINE

## 2021-08-26 PROCEDURE — 43236 UPPR GI SCOPE W/SUBMUC INJ: CPT | Performed by: INTERNAL MEDICINE

## 2021-08-26 PROCEDURE — 74011250636 HC RX REV CODE- 250/636: Performed by: FAMILY MEDICINE

## 2021-08-26 PROCEDURE — 0DC68ZZ EXTIRPATION OF MATTER FROM STOMACH, VIA NATURAL OR ARTIFICIAL OPENING ENDOSCOPIC: ICD-10-PCS | Performed by: INTERNAL MEDICINE

## 2021-08-26 PROCEDURE — 0DB78ZX EXCISION OF STOMACH, PYLORUS, VIA NATURAL OR ARTIFICIAL OPENING ENDOSCOPIC, DIAGNOSTIC: ICD-10-PCS | Performed by: INTERNAL MEDICINE

## 2021-08-26 PROCEDURE — 99222 1ST HOSP IP/OBS MODERATE 55: CPT | Performed by: INTERNAL MEDICINE

## 2021-08-26 PROCEDURE — 94761 N-INVAS EAR/PLS OXIMETRY MLT: CPT

## 2021-08-26 PROCEDURE — 76040000008: Performed by: INTERNAL MEDICINE

## 2021-08-26 PROCEDURE — C9113 INJ PANTOPRAZOLE SODIUM, VIA: HCPCS | Performed by: FAMILY MEDICINE

## 2021-08-26 PROCEDURE — 77030018831 HC SOL IRR H20 BAXT -A: Performed by: INTERNAL MEDICINE

## 2021-08-26 PROCEDURE — 74011250636 HC RX REV CODE- 250/636: Performed by: INTERNAL MEDICINE

## 2021-08-26 PROCEDURE — 80053 COMPREHEN METABOLIC PANEL: CPT

## 2021-08-26 PROCEDURE — 77030042138 HC TBNG SPECIAL -A: Performed by: INTERNAL MEDICINE

## 2021-08-26 PROCEDURE — 88312 SPECIAL STAINS GROUP 1: CPT

## 2021-08-26 PROCEDURE — 0DB68ZX EXCISION OF STOMACH, VIA NATURAL OR ARTIFICIAL OPENING ENDOSCOPIC, DIAGNOSTIC: ICD-10-PCS | Performed by: INTERNAL MEDICINE

## 2021-08-26 PROCEDURE — 74011250636 HC RX REV CODE- 250/636: Performed by: NURSE ANESTHETIST, CERTIFIED REGISTERED

## 2021-08-26 PROCEDURE — 96374 THER/PROPH/DIAG INJ IV PUSH: CPT

## 2021-08-26 RX ORDER — SODIUM CHLORIDE, SODIUM LACTATE, POTASSIUM CHLORIDE, CALCIUM CHLORIDE 600; 310; 30; 20 MG/100ML; MG/100ML; MG/100ML; MG/100ML
25 INJECTION, SOLUTION INTRAVENOUS CONTINUOUS
Status: CANCELLED | OUTPATIENT
Start: 2021-08-26 | End: 2021-08-27

## 2021-08-26 RX ORDER — ONDANSETRON 2 MG/ML
4 INJECTION INTRAMUSCULAR; INTRAVENOUS ONCE
Status: COMPLETED | OUTPATIENT
Start: 2021-08-26 | End: 2021-08-26

## 2021-08-26 RX ORDER — PROPOFOL 10 MG/ML
INJECTION, EMULSION INTRAVENOUS AS NEEDED
Status: DISCONTINUED | OUTPATIENT
Start: 2021-08-26 | End: 2021-08-27 | Stop reason: HOSPADM

## 2021-08-26 RX ORDER — GABAPENTIN 100 MG/1
200 CAPSULE ORAL 3 TIMES DAILY
Status: DISCONTINUED | OUTPATIENT
Start: 2021-08-26 | End: 2021-08-27 | Stop reason: HOSPADM

## 2021-08-26 RX ORDER — ALPRAZOLAM 0.5 MG/1
0.5 TABLET ORAL 2 TIMES DAILY
Status: DISCONTINUED | OUTPATIENT
Start: 2021-08-26 | End: 2021-08-27 | Stop reason: HOSPADM

## 2021-08-26 RX ORDER — ATORVASTATIN CALCIUM 40 MG/1
40 TABLET, FILM COATED ORAL DAILY
Status: DISCONTINUED | OUTPATIENT
Start: 2021-08-27 | End: 2021-08-27 | Stop reason: HOSPADM

## 2021-08-26 RX ORDER — SODIUM CHLORIDE 0.9 % (FLUSH) 0.9 %
5-40 SYRINGE (ML) INJECTION AS NEEDED
Status: CANCELLED | OUTPATIENT
Start: 2021-08-26

## 2021-08-26 RX ORDER — SODIUM CHLORIDE 9 MG/ML
75 INJECTION, SOLUTION INTRAVENOUS CONTINUOUS
Status: DISCONTINUED | OUTPATIENT
Start: 2021-08-26 | End: 2021-08-27

## 2021-08-26 RX ORDER — ACETAMINOPHEN 325 MG/1
650 TABLET ORAL
Status: DISCONTINUED | OUTPATIENT
Start: 2021-08-26 | End: 2021-08-27 | Stop reason: HOSPADM

## 2021-08-26 RX ORDER — ONDANSETRON 2 MG/ML
4 INJECTION INTRAMUSCULAR; INTRAVENOUS
Status: DISCONTINUED | OUTPATIENT
Start: 2021-08-26 | End: 2021-08-27 | Stop reason: HOSPADM

## 2021-08-26 RX ORDER — BUDESONIDE AND FORMOTEROL FUMARATE DIHYDRATE 80; 4.5 UG/1; UG/1
2 AEROSOL RESPIRATORY (INHALATION)
Status: DISCONTINUED | OUTPATIENT
Start: 2021-08-26 | End: 2021-08-27 | Stop reason: HOSPADM

## 2021-08-26 RX ORDER — ALBUTEROL SULFATE 90 UG/1
2 AEROSOL, METERED RESPIRATORY (INHALATION)
Status: DISCONTINUED | OUTPATIENT
Start: 2021-08-26 | End: 2021-08-27 | Stop reason: HOSPADM

## 2021-08-26 RX ORDER — SODIUM CHLORIDE 0.9 % (FLUSH) 0.9 %
5-40 SYRINGE (ML) INJECTION EVERY 8 HOURS
Status: CANCELLED | OUTPATIENT
Start: 2021-08-26

## 2021-08-26 RX ORDER — EPINEPHRINE 1 MG/ML
INJECTION INTRAMUSCULAR; INTRAVENOUS; SUBCUTANEOUS AS NEEDED
Status: DISCONTINUED | OUTPATIENT
Start: 2021-08-26 | End: 2021-08-27 | Stop reason: HOSPADM

## 2021-08-26 RX ORDER — ALBUTEROL SULFATE 90 UG/1
2 AEROSOL, METERED RESPIRATORY (INHALATION)
Status: DISCONTINUED | OUTPATIENT
Start: 2021-08-26 | End: 2021-08-26

## 2021-08-26 RX ADMIN — PROPOFOL 25 MG: 10 INJECTION, EMULSION INTRAVENOUS at 15:02

## 2021-08-26 RX ADMIN — ALPRAZOLAM 0.5 MG: 0.5 TABLET ORAL at 18:10

## 2021-08-26 RX ADMIN — ONDANSETRON 4 MG: 2 INJECTION INTRAMUSCULAR; INTRAVENOUS at 09:35

## 2021-08-26 RX ADMIN — PROPOFOL 50 MG: 10 INJECTION, EMULSION INTRAVENOUS at 15:00

## 2021-08-26 RX ADMIN — PROPOFOL 50 MG: 10 INJECTION, EMULSION INTRAVENOUS at 15:08

## 2021-08-26 RX ADMIN — PROPOFOL 50 MG: 10 INJECTION, EMULSION INTRAVENOUS at 15:04

## 2021-08-26 RX ADMIN — PROPOFOL 100 MG: 10 INJECTION, EMULSION INTRAVENOUS at 14:53

## 2021-08-26 RX ADMIN — PROPOFOL 50 MG: 10 INJECTION, EMULSION INTRAVENOUS at 15:53

## 2021-08-26 RX ADMIN — PANTOPRAZOLE SODIUM 40 MG: 40 INJECTION, POWDER, FOR SOLUTION INTRAVENOUS at 09:34

## 2021-08-26 RX ADMIN — PROPOFOL 50 MG: 10 INJECTION, EMULSION INTRAVENOUS at 15:30

## 2021-08-26 RX ADMIN — PROPOFOL 50 MG: 10 INJECTION, EMULSION INTRAVENOUS at 15:11

## 2021-08-26 RX ADMIN — BUDESONIDE AND FORMOTEROL FUMARATE DIHYDRATE 2 PUFF: 80; 4.5 AEROSOL RESPIRATORY (INHALATION) at 20:07

## 2021-08-26 RX ADMIN — PROPOFOL 50 MG: 10 INJECTION, EMULSION INTRAVENOUS at 15:47

## 2021-08-26 RX ADMIN — PROPOFOL 50 MG: 10 INJECTION, EMULSION INTRAVENOUS at 15:41

## 2021-08-26 RX ADMIN — ONDANSETRON 4 MG: 2 INJECTION INTRAMUSCULAR; INTRAVENOUS at 16:40

## 2021-08-26 RX ADMIN — SODIUM CHLORIDE 8 MG/HR: 900 INJECTION INTRAVENOUS at 16:40

## 2021-08-26 RX ADMIN — SODIUM CHLORIDE: 9 INJECTION, SOLUTION INTRAVENOUS at 14:46

## 2021-08-26 RX ADMIN — PROPOFOL 50 MG: 10 INJECTION, EMULSION INTRAVENOUS at 14:57

## 2021-08-26 RX ADMIN — PROPOFOL 25 MG: 10 INJECTION, EMULSION INTRAVENOUS at 15:06

## 2021-08-26 RX ADMIN — PROPOFOL 50 MG: 10 INJECTION, EMULSION INTRAVENOUS at 15:14

## 2021-08-26 RX ADMIN — SODIUM CHLORIDE 8 MG/HR: 900 INJECTION INTRAVENOUS at 12:54

## 2021-08-26 RX ADMIN — PROPOFOL 50 MG: 10 INJECTION, EMULSION INTRAVENOUS at 15:20

## 2021-08-26 NOTE — PROGRESS NOTES
Pt resting in bed with no distress, pt is anxious and previously stated she was too nausesated to take PO meds, now wanting to try her normal dose of xanax, given and tolerated at this time, call bell in reach.

## 2021-08-26 NOTE — ED NOTES
Emergency contact:   Omar Najera  [phone]                                      Tamica Barrera     [phone]

## 2021-08-26 NOTE — PROCEDURES
EGD procedure note    Date of procedure: 08/26/2021    Indication for procedure: GI bleed    Type of procedure: Upper endoscopy and epinephrine injection therapy and biopsies    Anesthesia classification: ASA class 2    Patient history and physical has been accomplished and documented. Patient is assessed and determined to be an appropriate candidate for planned procedure and sedation. The patient was assessed immediately prior to sedation. Sedation plan: MAC per anesthesia. Airway assessment: Range of motion: ormal, mouth opening: Normal, visual obstruction: No.    PREOP EXAM:  Unchanged. VS: Reviewed  Gen: WDWN in NAD  CV: RRR, no murmur  Resp: CTAB  Abd: Soft, NTND, +BS  Extrem: No cyanosis or edema  Neuro: Awake and alert      Informed consent obtained: Yes. The indications, risks, including but not limited to bleeding, perforation, infection, death, potential for failure to visualize or diagnose neoplasia, alternatives, benefits, discussed in detail with the patient prior to the procedure. Patient understands and agrees to the procedure. Patient identity and procedure were verified, consent was obtained, and is consistent with the consent form in the patient's records. INSTRUMENT: Olympus upper endoscope    PROCEDURE FINDINGS:    OROPHARYNX: Normal    ESOPHAGUS:  Esophageal mucosa normal with no masses noted in the proximal, mid, and distal esophagus. The Z-line was at 38 cm. There was erosive esophagitis, grade 2-3 in this area. One area was oozing blood around the erosion. A 2-3 cm hiatal hernia was noted distally. No biopsies were done because of the active bleeding. STOMACH: Stomach was then evaluated including the cardia and fundus on retroflexion view. Evaluation of the gastric body, antrum, and pylorus revealed the following. There is a very large clot in the body and fundus of the stomach.   This was removed by using either suction, washing with water, transporting the clot downstream, or removal with the use of the endoscope or a Purcell net. Eventually, the clot was removed. There was no active bleeding seen. There was one small ulcer in the upper body of the stomach along the greater curvature. There were several small benign-appearing polyps in the body and lower fundus. These were oozing blood. No discrete AVMs or vascular lesions or major ulcers or tumors were noted. There was diffuse friability and easy bleeding in any area that was washed or or biopsies were done. A total of 10 cc of 1:10,000 epinephrine was injected into the obvious bleeding areas including several polyps, small gastric ulcer in the body, and several friable areas. Also, 0.5 cc was injected into the bleeding esophageal erosion. The gastric mucosa did appear to be somewhat nodular raising the possibility of atrophic gastritis. Biopsies were obtained in the gastric body and antrum to exclude H. Pylori and malignancy. Retroflexion view of the cardia and fundus revealed the above but was otherwise normal except for friability of the gastric mucosa. DUODENUM:  The duodenal bulb and descending duodenum were then evaluated and found to be normal including no masses, ulcers, or or mucosal abnormalities. SPECIMENS: 1. Biopsies of gastric body and antrum to exclude H. pylori and malignancy. ESTIMATED BLOOD LOSS: Minimal.  There was obvious bleeding prior to the procedure from the above. COMPLICATIONS:  None     COMMENTS: none    Impression:  1. Upper GI bleeding from erosive esophagitis, several small gastric polyps, small gastric ulcer and very friable mucosa. The findings, although they do explain the patient's acute bleeding, are somewhat underwhelming for this amount of significant bleeding. This makes me wonder if she has mucosal instability such as lienitis plastica or atrophic gastritis with underlying malignancy involved or sometimes a clotting abnormality.   I also wonder if this is due to her taking her baby aspirin at night causing the above appearance and results. Recommendations:    1. Continue IV Protonix until the a.m. She can then start on Protonix 40 mg p.o. twice a day for the next 12 weeks and then once daily. 2.  No aspirin or nonsteroidal anti-inflammatory drugs including no baby aspirin. 3.  Check pathology. Will notify patient with results. 4.  Okay to start a full liquid diet in the a.m. and advance over the next 24 to 48 hours as tolerated. 5.  Check her CBC this evening again in the a.m. I would anticipate she can be discharged tomorrow based on the relatively unimpressive findings given the above. 4.  Follow up with me in the office in 2 weeks time. Future considerations include repeating her upper endoscopy in a nonacute setting as an outpatient at time of her colonoscopy which will also be scheduled at her office visit. This would allow further biopsy and evaluation under a nonacute setting without bleed. .  5.  Further recommendations pending clinical course.        Caity Gomez MD

## 2021-08-26 NOTE — H&P (VIEW-ONLY)
Referring Physician:  Lisbeth Palencia MD     Chief Complaint: GI bleed    Date of service: 08/26/21     Subjective:     History of Present Illness:  Patient is a female [employer] 78 y.o.  who is seen for evaluation for GI bleeding. The patient has GI complaints of hematemesis and melena starting this morning. The patient states she awoke this morning and had nausea. She then had a bowel movement and passed black tarry stool with some red blood. She subsequently vomited bright red blood and some coffee-ground material x1. She was brought to the emergency room was found to be mildly anemic with hemoglobin/hematocrit 11 and 35. BUN is elevated at 31 with normal creatinine. She has a history of anemia and has been on iron replacement with normalization of her hemoglobin hematocrit in May. Her primary care doctor took her off the iron because her anemia resolved. She has not been taking iron since May. She denies weight loss, change in appetite, change in her bowel movements, dysphagia or heartburn. She takes a baby aspirin but no NSAIDs or anticoagulation. She has had no syncope, lightheadedness or dizziness. She has a history of anemia been hospitalized with anemia 1/2021. She is gastroenterology in 3/2021 and EGD and colonoscopy was recommended but she did not follow through to have it done. The patient denies fever, chills, abdominal pain, reflux, dysphagia, change in bowel habits, diarrhea, constipation, weight loss. Last colonoscopyhas never had 1. Family history for GI disease is significant for no GI or liver disease. The patient denies liver related risk factors. Past medical history is significant for iron deficiency anemia, anxiety, and COPD.       PMH:  Past Medical History:   Diagnosis Date    Allergic rhinitis     Anemia     Anxiety disorder     Chronic obstructive pulmonary disease ([facility] [address]     Dyslipidemia     Folic acid deficiency     Hypertension     Neurologic disorder     resting tremor        PSH:  History reviewed. No pertinent surgical history. Allergies: Allergies   Allergen Reactions    Isopropyl Alcohol Other (comments)     Weakness all over - pt states she has out grown this    Pen-Vee K Rash     Pt. States she avoids penicillin due to allergy as a child. Home Medications:  Prior to Admission Medications   Prescriptions Last Dose Informant Patient Reported? Taking? ALPRAZolam (XANAX) 0.25 mg tablet   No No   Sig: Take 1 Tab by mouth two (2) times daily as needed for Anxiety. Max Daily Amount: 0.5 mg. ALPRAZolam (XANAX) 0.5 mg tablet   Yes No   Sig: Take  by mouth. ALPRAZolam (XANAX) 0.5 mg tablet   No No   Sig: Take 1 Tablet by mouth two (2) times a day. Max Daily Amount: 1 mg. FeroSul 325 mg (65 mg iron) tablet   No No   Sig: TAKE 1 TABLET BY MOUTH DAILY BEFORE BREAKFAST   albuterol (PROVENTIL HFA, VENTOLIN HFA, PROAIR HFA) 90 mcg/actuation inhaler   No No   Sig: Take 2 Puffs by inhalation every four (4) hours as needed for Wheezing. atorvastatin (LIPITOR) 40 mg tablet   No No   Sig: Take 1 Tablet by mouth daily. budesonide-formoteroL (Symbicort) 80-4.5 mcg/actuation HFAA   Yes No   Sig: Take 2 Puffs by inhalation. ferrous sulfate (Iron) 325 mg (65 mg iron) tablet   No No   Sig: Take 1 Tab by mouth Daily (before breakfast). gabapentin (NEURONTIN) 100 mg capsule   No No   Sig: Take 2 Capsules by mouth three (3) times daily. Max Daily Amount: 600 mg.   losartan (COZAAR) 100 mg tablet   No No   Sig: Take 1 Tab by mouth daily. metoprolol tartrate (LOPRESSOR) 25 mg tablet   No No   Sig: TAKE 1/2 TABLET BY MOUTH TWICE DAILY   valACYclovir (VALTREX) 1 gram tablet   No No   Sig: Take 1 Tab by mouth two (2) times a day.       Facility-Administered Medications: None        Hospital Medications:  Current Facility-Administered Medications   Medication Dose Route Frequency    pantoprazole (PROTONIX) 40 mg in 0.9% sodium chloride (MBP/ADV) 50 mL MBP  8 mg/hr IntraVENous CONTINUOUS     Current Outpatient Medications   Medication Sig    ALPRAZolam (XANAX) 0.5 mg tablet Take  by mouth.  ALPRAZolam (XANAX) 0.5 mg tablet Take 1 Tablet by mouth two (2) times a day. Max Daily Amount: 1 mg.  atorvastatin (LIPITOR) 40 mg tablet Take 1 Tablet by mouth daily.  gabapentin (NEURONTIN) 100 mg capsule Take 2 Capsules by mouth three (3) times daily. Max Daily Amount: 600 mg.    metoprolol tartrate (LOPRESSOR) 25 mg tablet TAKE 1/2 TABLET BY MOUTH TWICE DAILY    albuterol (PROVENTIL HFA, VENTOLIN HFA, PROAIR HFA) 90 mcg/actuation inhaler Take 2 Puffs by inhalation every four (4) hours as needed for Wheezing.  valACYclovir (VALTREX) 1 gram tablet Take 1 Tab by mouth two (2) times a day.  losartan (COZAAR) 100 mg tablet Take 1 Tab by mouth daily.  FeroSul 325 mg (65 mg iron) tablet TAKE 1 TABLET BY MOUTH DAILY BEFORE BREAKFAST    ferrous sulfate (Iron) 325 mg (65 mg iron) tablet Take 1 Tab by mouth Daily (before breakfast).  budesonide-formoteroL (Symbicort) 80-4.5 mcg/actuation HFAA Take 2 Puffs by inhalation.  ALPRAZolam (XANAX) 0.25 mg tablet Take 1 Tab by mouth two (2) times daily as needed for Anxiety. Max Daily Amount: 0.5 mg. Social History:  Social History     Tobacco Use    Smoking status: Former Smoker     Packs/day: 1.00     Years: 50.00     Pack years: 50.00    Smokeless tobacco: Never Used   Substance Use Topics    Alcohol use: Not Currently        Pt denies any history of IV drug use, blood transfusions. Family History:  Family History   Problem Relation Age of Onset    Cancer Father         stomach    Heart Disease Sister     Alcohol abuse Brother     Cancer Brother         lung        Review of Systems:  A detailed 10 system ROS is obtained, with pertinent positives as listed above. All others are negative. Constitutional denies fever chills, headache, or weight loss. Skin- denies lesions or rashes.   HEENT denies any vision or hearing problems, epistaxis, sore throat, or dental problems. Lungs- no shortness of breath or chest pain reported, no dyspnea on exertion. Cardiac- no palpitations or chest pain reported, including at rest or on exertion. GIno abdominal pain, melena, rectal bleeding, reflux, dysphagia, jaundice, change in stool or urine color, constipation or diarrhea. Genitourinary no dysuria or hematuria. Musculoskeletalno muscle weakness or disuse or atrophy. Neurologicno numbness, tingling, gait disturbance, or other abnormalities. Rheumatologic- patient denies any immune or rheumatologic diseases or symptoms. Endocrine patient denies any endocrine abnormalities including thyroid disease or diabetes. Psychologicpatient denies depression, anxiety or emotional issues. No reported memory issues. Objective:     Physical Exam:  Vitals: BP (!) 112/53   Pulse 63   Temp 99.1 °F (37.3 °C)   Resp 16   Ht 5' 1\" (1.549 m)   Wt 76.2 kg (168 lb)   SpO2 98%   BMI 31.74 kg/m²    Gen:  Pt is alert, cooperative, no acute distress  Skin:  Extremities and face reveal no rashes. No recinos erythema. No telangiectasias on the chest wall. Her skin is somewhat pale. HEENT: Sclerae anicteric. Extra-occular muscles are intact. No oral ulcers. No abnormal pigmentation of the lips. The neck is supple. Cardiovascular: Regular rate and rhythm. No murmurs, gallops, or rubs. Respiratory:  Comfortable breathing with no accessory muscle use. Clear breath sounds anteriorly with no wheezes, rales, or rhonchi. GI:  Abdomen nondistended, soft, and nontender. Normal active bowel sounds. No enlargement of the liver or spleen. No masses palpable. Rectal:  Deferred  Musculoskeletal:   No costovertebral tenderness. No localized muscle weakness, or decreased range of motion. Extremities:  No palpable cords or pitting edema of the lower legs. Extremities have good range of motion.    Neurological:  Gross memory appears intact. Patient is alert and oriented. Psychiatric:  Mood appears appropriate with judgement intact. Lymphatic:  No cervical or supraclavicular adenopathy.     Laboratory:        Lab Results   Component Value Date     08/26/2021    K 4.1 08/26/2021     08/26/2021    CO2 25 08/26/2021    AGAP 11 08/26/2021     (H) 08/26/2021    BUN 31 (H) 08/26/2021    CREA 1.00 08/26/2021    BUCR 31 08/26/2021    GFRAA >60 08/26/2021    GFRNA 53 08/26/2021    CA 9.2 08/26/2021    TBILI 0.7 08/26/2021    AST 25 08/26/2021    ALT 20 08/26/2021    AP 59 08/26/2021    TP 6.4 08/26/2021    ALB 3.6 08/26/2021    GLOB 2.8 08/26/2021    AGRAT 1.3 08/26/2021    WBC 6.5 08/26/2021    RBC 3.43 (L) 08/26/2021    HGB 10.8 (L) 08/26/2021    HCT 32.6 (L) 08/26/2021    MCV 95.0 08/26/2021    MCH 31.5 08/26/2021    MCHC 33.1 08/26/2021    RDW 12.0 08/26/2021     08/26/2021    MPLV 11.3 08/26/2021    GRANS 81 (H) 08/26/2021    LYMPH 12 (L) 08/26/2021    MONOS 4 08/26/2021    EOS 2 08/26/2021    BASOS 1 08/26/2021    IG 0 08/26/2021    ANEU 5.2 08/26/2021    ABL 0.8 (L) 08/26/2021    ABM 0.2 08/26/2021    CORINE 0.1 08/26/2021    ABB 0.1 08/26/2021    AIG 0.0 08/26/2021   results  Lab Results   Component Value Date     08/26/2021    K 4.1 08/26/2021     08/26/2021    CO2 25 08/26/2021    AGAP 11 08/26/2021     (H) 08/26/2021    BUN 31 (H) 08/26/2021    CREA 1.00 08/26/2021    BUCR 31 08/26/2021    GFRAA >60 08/26/2021    GFRNA 53 08/26/2021    CA 9.2 08/26/2021    TBILI 0.7 08/26/2021    AST 25 08/26/2021    ALT 20 08/26/2021    AP 59 08/26/2021    TP 6.4 08/26/2021    ALB 3.6 08/26/2021    GLOB 2.8 08/26/2021    AGRAT 1.3 08/26/2021    WBC 6.5 08/26/2021    RBC 3.43 (L) 08/26/2021    HGB 10.8 (L) 08/26/2021    HCT 32.6 (L) 08/26/2021    MCV 95.0 08/26/2021    MCH 31.5 08/26/2021    MCHC 33.1 08/26/2021    RDW 12.0 08/26/2021     08/26/2021    MPLV 11.3 08/26/2021    GRANS 81 (H) 08/26/2021    LYMPH 12 (L) 08/26/2021    MONOS 4 08/26/2021    EOS 2 08/26/2021    BASOS 1 08/26/2021    IG 0 08/26/2021    ANEU 5.2 08/26/2021    ABL 0.8 (L) 08/26/2021    ABM 0.2 08/26/2021    CORINE 0.1 08/26/2021    ABB 0.1 08/26/2021    AIG 0.0 08/26/2021        CT scan of abdomen and pelvis -  CT Results (most recent):  Results from [place] encounter on 01/17/21    CT ABD PELV W CONT    Narrative  EXAM: CT of the abdomen and pelvis    INDICATION: Pain. COMPARISON: May 16, 2013    TECHNIQUE: Axial CT imaging of the abdomen and pelvis was performed with  intravenous contrast. Multiplanar reformats were generated. One or more dose reduction techniques were used on this CT: automated exposure  control, adjustment of the mAs and/or kVp according to patient size, and  iterative reconstruction techniques. The specific techniques used on this CT  exam have been documented in the patient's electronic medical record. Digital  Imaging and Communications in Medicine (DICOM) format image data are available  to nonaffiliated external healthcare facilities or entities on a secure, media  free, reciprocally searchable basis with patient authorization for at least a  12-month period after this study. _______________    FINDINGS:    LOWER CHEST: Small hiatal hernia. LIVER, BILIARY: Diffuse decreased liver attenuation. No biliary dilation. Gallbladder is unremarkable. PANCREAS: Normal.    SPLEEN: Normal.    ADRENALS: Stable right thickening and left adrenal gland nodule. KIDNEYS: Stable left renal cortical cyst and lower pole scarring. LYMPH NODES: No enlarged lymph nodes. GASTROINTESTINAL TRACT: No bowel dilation or wall thickening. Uncomplicated  diverticula seen throughout the colon. PELVIC ORGANS: Unremarkable. VASCULATURE: Advanced atherosclerotic vascular calcification. BONES: No acute or aggressive osseous abnormalities identified.  Multilevel  spinal degenerative changes. OTHER: None.    _______________    Impression  IMPRESSION:      1. No evidence for acute abdominal or pelvic process. 2. Colonic diverticulosis. 3. Hiatal hernia. Abdominal US- No results  US Results (most recent):  No results found for this or any previous visit. MRI and MRCP- No results  MRI Results (most recent):  No results found for this or any previous visit. Assessment:     1. GI bleeding. I think this patient has had an acute on top of a chronic GI bleed. This is suspected to be an upper GI bleed given the elevated BUN and presentation. She has prior iron deficiency anemia and it was recommended that she have a upper endoscopy and colonoscopy but she did not follow through to have them done. Possibilities include ulcers, malignancy, gastritis, esophagitis, vascular malformations, or Joana-Bianchi tear, other. She is relatively hemodynamically stable but her hemoglobin hematocrit have decreased from normal in May, 2021. She also had some melena. Therefore upper endoscopy is indicated to exclude pathology. 2. Iron deficiency anemia. Again, I feel she has chronic anemia and she did have some rectal bleeding which she has spots of blood in the past she states. Therefore she does need colonoscopy. However this can be deferred till she is an outpatient and can undergo adequate bowel preparation. 3. COPD. This is controlled with inhalers. 4. Anxiety. She appears to be doing well from the standpoint. Plan:     1. EGD with MAC. I discussed the techniques involved with the procedure as well as the risks, benefits, and alternatives including but not limited to bleeding, infection, perforation requiring emergent surgery, missing lesions, death, and anesthesia related complications with the patient. All questions and concerns were answered. The patient voiced understanding and agrees to proceed. 2. Continue n.p.o. status.   3. No nonsteroidal anti-inflammatory drugs or anticoagulation at this time. 4. IV hydration and IV Protonix until endoscopy is done. 5. Plan for outpatient colonoscopy with bowel preparation at some point after she follows up with me in the office upon discharge. This is provided some type of pathology that would cause her present bleeding is found on endoscopy today. 6. Further recommendations pending her clinical course. Thank you very much for this consultation.     Jean Islas MD

## 2021-08-26 NOTE — CONSULTS
Referring Physician:  Geneva Councilman, MD     Chief Complaint: GI bleed    Date of service: 08/26/21     Subjective:     History of Present Illness:  Patient is a female [address] [facility] [place] 78 y.o.  who is seen for evaluation for GI bleeding. The patient has GI complaints of hematemesis and melena starting this morning. The patient states she awoke this morning and had nausea. She then had a bowel movement and passed black tarry stool with some red blood. She subsequently vomited bright red blood and some coffee-ground material x1. She was brought to the emergency room was found to be mildly anemic with hemoglobin/hematocrit 11 and 35. BUN is elevated at 31 with normal creatinine. She has a history of anemia and has been on iron replacement with normalization of her hemoglobin hematocrit in May. Her primary care doctor took her off the iron because her anemia resolved. She has not been taking iron since May. She denies weight loss, change in appetite, change in her bowel movements, dysphagia or heartburn. She takes a baby aspirin but no NSAIDs or anticoagulation. She has had no syncope, lightheadedness or dizziness. She has a history of anemia been hospitalized with anemia 1/2021. She is gastroenterology in 3/2021 and EGD and colonoscopy was recommended but she did not follow through to have it done. The patient denies fever, chills, abdominal pain, reflux, dysphagia, change in bowel habits, diarrhea, constipation, weight loss. Last colonoscopyhas never had 1. Family history for GI disease is significant for no GI or liver disease. The patient denies liver related risk factors. Past medical history is significant for iron deficiency anemia, anxiety, and COPD.       PMH:  Past Medical History:   Diagnosis Date    Allergic rhinitis     Anemia     Anxiety disorder     Chronic obstructive pulmonary disease ([facility] [address]     Dyslipidemia     Folic acid deficiency     Hypertension     Neurologic disorder     resting tremor        PSH:  History reviewed. No pertinent surgical history. Allergies: Allergies   Allergen Reactions    Isopropyl Alcohol Other (comments)     Weakness all over - pt states she has out grown this    Pen-Vee K Rash     Pt. States she avoids penicillin due to allergy as a child. Home Medications:  Prior to Admission Medications   Prescriptions Last Dose Informant Patient Reported? Taking? ALPRAZolam (XANAX) 0.25 mg tablet   No No   Sig: Take 1 Tab by mouth two (2) times daily as needed for Anxiety. Max Daily Amount: 0.5 mg. ALPRAZolam (XANAX) 0.5 mg tablet   Yes No   Sig: Take  by mouth. ALPRAZolam (XANAX) 0.5 mg tablet   No No   Sig: Take 1 Tablet by mouth two (2) times a day. Max Daily Amount: 1 mg. FeroSul 325 mg (65 mg iron) tablet   No No   Sig: TAKE 1 TABLET BY MOUTH DAILY BEFORE BREAKFAST   albuterol (PROVENTIL HFA, VENTOLIN HFA, PROAIR HFA) 90 mcg/actuation inhaler   No No   Sig: Take 2 Puffs by inhalation every four (4) hours as needed for Wheezing. atorvastatin (LIPITOR) 40 mg tablet   No No   Sig: Take 1 Tablet by mouth daily. budesonide-formoteroL (Symbicort) 80-4.5 mcg/actuation HFAA   Yes No   Sig: Take 2 Puffs by inhalation. ferrous sulfate (Iron) 325 mg (65 mg iron) tablet   No No   Sig: Take 1 Tab by mouth Daily (before breakfast). gabapentin (NEURONTIN) 100 mg capsule   No No   Sig: Take 2 Capsules by mouth three (3) times daily. Max Daily Amount: 600 mg.   losartan (COZAAR) 100 mg tablet   No No   Sig: Take 1 Tab by mouth daily. metoprolol tartrate (LOPRESSOR) 25 mg tablet   No No   Sig: TAKE 1/2 TABLET BY MOUTH TWICE DAILY   valACYclovir (VALTREX) 1 gram tablet   No No   Sig: Take 1 Tab by mouth two (2) times a day.       Facility-Administered Medications: None        Hospital Medications:  Current Facility-Administered Medications   Medication Dose Route Frequency    pantoprazole (PROTONIX) 40 mg in 0.9% sodium chloride (MBP/ADV) 50 mL MBP  8 mg/hr IntraVENous CONTINUOUS     Current Outpatient Medications   Medication Sig    ALPRAZolam (XANAX) 0.5 mg tablet Take  by mouth.  ALPRAZolam (XANAX) 0.5 mg tablet Take 1 Tablet by mouth two (2) times a day. Max Daily Amount: 1 mg.  atorvastatin (LIPITOR) 40 mg tablet Take 1 Tablet by mouth daily.  gabapentin (NEURONTIN) 100 mg capsule Take 2 Capsules by mouth three (3) times daily. Max Daily Amount: 600 mg.    metoprolol tartrate (LOPRESSOR) 25 mg tablet TAKE 1/2 TABLET BY MOUTH TWICE DAILY    albuterol (PROVENTIL HFA, VENTOLIN HFA, PROAIR HFA) 90 mcg/actuation inhaler Take 2 Puffs by inhalation every four (4) hours as needed for Wheezing.  valACYclovir (VALTREX) 1 gram tablet Take 1 Tab by mouth two (2) times a day.  losartan (COZAAR) 100 mg tablet Take 1 Tab by mouth daily.  FeroSul 325 mg (65 mg iron) tablet TAKE 1 TABLET BY MOUTH DAILY BEFORE BREAKFAST    ferrous sulfate (Iron) 325 mg (65 mg iron) tablet Take 1 Tab by mouth Daily (before breakfast).  budesonide-formoteroL (Symbicort) 80-4.5 mcg/actuation HFAA Take 2 Puffs by inhalation.  ALPRAZolam (XANAX) 0.25 mg tablet Take 1 Tab by mouth two (2) times daily as needed for Anxiety. Max Daily Amount: 0.5 mg. Social History:  Social History     Tobacco Use    Smoking status: Former Smoker     Packs/day: 1.00     Years: 50.00     Pack years: 50.00    Smokeless tobacco: Never Used   Substance Use Topics    Alcohol use: Not Currently        Pt denies any history of IV drug use, blood transfusions. Family History:  Family History   Problem Relation Age of Onset    Cancer Father         stomach    Heart Disease Sister     Alcohol abuse Brother     Cancer Brother         lung        Review of Systems:  A detailed 10 system ROS is obtained, with pertinent positives as listed above. All others are negative. Constitutional denies fever chills, headache, or weight loss. Skin- denies lesions or rashes.   HEENT denies any vision or hearing problems, epistaxis, sore throat, or dental problems. Lungs- no shortness of breath or chest pain reported, no dyspnea on exertion. Cardiac- no palpitations or chest pain reported, including at rest or on exertion. GIno abdominal pain, melena, rectal bleeding, reflux, dysphagia, jaundice, change in stool or urine color, constipation or diarrhea. Genitourinary no dysuria or hematuria. Musculoskeletalno muscle weakness or disuse or atrophy. Neurologicno numbness, tingling, gait disturbance, or other abnormalities. Rheumatologic- patient denies any immune or rheumatologic diseases or symptoms. Endocrine patient denies any endocrine abnormalities including thyroid disease or diabetes. Psychologicpatient denies depression, anxiety or emotional issues. No reported memory issues. Objective:     Physical Exam:  Vitals: BP (!) 112/53   Pulse 63   Temp 99.1 °F (37.3 °C)   Resp 16   Ht 5' 1\" (1.549 m)   Wt 76.2 kg (168 lb)   SpO2 98%   BMI 31.74 kg/m²    Gen:  Pt is alert, cooperative, no acute distress  Skin:  Extremities and face reveal no rashes. No recinos erythema. No telangiectasias on the chest wall. Her skin is somewhat pale. HEENT: Sclerae anicteric. Extra-occular muscles are intact. No oral ulcers. No abnormal pigmentation of the lips. The neck is supple. Cardiovascular: Regular rate and rhythm. No murmurs, gallops, or rubs. Respiratory:  Comfortable breathing with no accessory muscle use. Clear breath sounds anteriorly with no wheezes, rales, or rhonchi. GI:  Abdomen nondistended, soft, and nontender. Normal active bowel sounds. No enlargement of the liver or spleen. No masses palpable. Rectal:  Deferred  Musculoskeletal:   No costovertebral tenderness. No localized muscle weakness, or decreased range of motion. Extremities:  No palpable cords or pitting edema of the lower legs. Extremities have good range of motion.    Neurological:  Gross memory appears intact. Patient is alert and oriented. Psychiatric:  Mood appears appropriate with judgement intact. Lymphatic:  No cervical or supraclavicular adenopathy.     Laboratory:        Lab Results   Component Value Date     08/26/2021    K 4.1 08/26/2021     08/26/2021    CO2 25 08/26/2021    AGAP 11 08/26/2021     (H) 08/26/2021    BUN 31 (H) 08/26/2021    CREA 1.00 08/26/2021    BUCR 31 08/26/2021    GFRAA >60 08/26/2021    GFRNA 53 08/26/2021    CA 9.2 08/26/2021    TBILI 0.7 08/26/2021    AST 25 08/26/2021    ALT 20 08/26/2021    AP 59 08/26/2021    TP 6.4 08/26/2021    ALB 3.6 08/26/2021    GLOB 2.8 08/26/2021    AGRAT 1.3 08/26/2021    WBC 6.5 08/26/2021    RBC 3.43 (L) 08/26/2021    HGB 10.8 (L) 08/26/2021    HCT 32.6 (L) 08/26/2021    MCV 95.0 08/26/2021    MCH 31.5 08/26/2021    MCHC 33.1 08/26/2021    RDW 12.0 08/26/2021     08/26/2021    MPLV 11.3 08/26/2021    GRANS 81 (H) 08/26/2021    LYMPH 12 (L) 08/26/2021    MONOS 4 08/26/2021    EOS 2 08/26/2021    BASOS 1 08/26/2021    IG 0 08/26/2021    ANEU 5.2 08/26/2021    ABL 0.8 (L) 08/26/2021    ABM 0.2 08/26/2021    CORINE 0.1 08/26/2021    ABB 0.1 08/26/2021    AIG 0.0 08/26/2021   results  Lab Results   Component Value Date     08/26/2021    K 4.1 08/26/2021     08/26/2021    CO2 25 08/26/2021    AGAP 11 08/26/2021     (H) 08/26/2021    BUN 31 (H) 08/26/2021    CREA 1.00 08/26/2021    BUCR 31 08/26/2021    GFRAA >60 08/26/2021    GFRNA 53 08/26/2021    CA 9.2 08/26/2021    TBILI 0.7 08/26/2021    AST 25 08/26/2021    ALT 20 08/26/2021    AP 59 08/26/2021    TP 6.4 08/26/2021    ALB 3.6 08/26/2021    GLOB 2.8 08/26/2021    AGRAT 1.3 08/26/2021    WBC 6.5 08/26/2021    RBC 3.43 (L) 08/26/2021    HGB 10.8 (L) 08/26/2021    HCT 32.6 (L) 08/26/2021    MCV 95.0 08/26/2021    MCH 31.5 08/26/2021    MCHC 33.1 08/26/2021    RDW 12.0 08/26/2021     08/26/2021    MPLV 11.3 08/26/2021    GRANS 81 (H) 08/26/2021    LYMPH 12 (L) 08/26/2021    MONOS 4 08/26/2021    EOS 2 08/26/2021    BASOS 1 08/26/2021    IG 0 08/26/2021    ANEU 5.2 08/26/2021    ABL 0.8 (L) 08/26/2021    ABM 0.2 08/26/2021    CORINE 0.1 08/26/2021    ABB 0.1 08/26/2021    AIG 0.0 08/26/2021        CT scan of abdomen and pelvis -  CT Results (most recent):  Results from [place] encounter on 01/17/21    CT ABD PELV W CONT    Narrative  EXAM: CT of the abdomen and pelvis    INDICATION: Pain. COMPARISON: May 16, 2013    TECHNIQUE: Axial CT imaging of the abdomen and pelvis was performed with  intravenous contrast. Multiplanar reformats were generated. One or more dose reduction techniques were used on this CT: automated exposure  control, adjustment of the mAs and/or kVp according to patient size, and  iterative reconstruction techniques. The specific techniques used on this CT  exam have been documented in the patient's electronic medical record. Digital  Imaging and Communications in Medicine (DICOM) format image data are available  to nonaffiliated external healthcare facilities or entities on a secure, media  free, reciprocally searchable basis with patient authorization for at least a  12-month period after this study. _______________    FINDINGS:    LOWER CHEST: Small hiatal hernia. LIVER, BILIARY: Diffuse decreased liver attenuation. No biliary dilation. Gallbladder is unremarkable. PANCREAS: Normal.    SPLEEN: Normal.    ADRENALS: Stable right thickening and left adrenal gland nodule. KIDNEYS: Stable left renal cortical cyst and lower pole scarring. LYMPH NODES: No enlarged lymph nodes. GASTROINTESTINAL TRACT: No bowel dilation or wall thickening. Uncomplicated  diverticula seen throughout the colon. PELVIC ORGANS: Unremarkable. VASCULATURE: Advanced atherosclerotic vascular calcification. BONES: No acute or aggressive osseous abnormalities identified.  Multilevel  spinal degenerative changes. OTHER: None.    _______________    Impression  IMPRESSION:      1. No evidence for acute abdominal or pelvic process. 2. Colonic diverticulosis. 3. Hiatal hernia. Abdominal US- No results  US Results (most recent):  No results found for this or any previous visit. MRI and MRCP- No results  MRI Results (most recent):  No results found for this or any previous visit. Assessment:     1. GI bleeding. I think this patient has had an acute on top of a chronic GI bleed. This is suspected to be an upper GI bleed given the elevated BUN and presentation. She has prior iron deficiency anemia and it was recommended that she have a upper endoscopy and colonoscopy but she did not follow through to have them done. Possibilities include ulcers, malignancy, gastritis, esophagitis, vascular malformations, or Joana-Bianchi tear, other. She is relatively hemodynamically stable but her hemoglobin hematocrit have decreased from normal in May, 2021. She also had some melena. Therefore upper endoscopy is indicated to exclude pathology. 2. Iron deficiency anemia. Again, I feel she has chronic anemia and she did have some rectal bleeding which she has spots of blood in the past she states. Therefore she does need colonoscopy. However this can be deferred till she is an outpatient and can undergo adequate bowel preparation. 3. COPD. This is controlled with inhalers. 4. Anxiety. She appears to be doing well from the standpoint. Plan:     1. EGD with MAC. I discussed the techniques involved with the procedure as well as the risks, benefits, and alternatives including but not limited to bleeding, infection, perforation requiring emergent surgery, missing lesions, death, and anesthesia related complications with the patient. All questions and concerns were answered. The patient voiced understanding and agrees to proceed. 2. Continue n.p.o. status.   3. No nonsteroidal anti-inflammatory drugs or anticoagulation at this time. 4. IV hydration and IV Protonix until endoscopy is done. 5. Plan for outpatient colonoscopy with bowel preparation at some point after she follows up with me in the office upon discharge. This is provided some type of pathology that would cause her present bleeding is found on endoscopy today. 6. Further recommendations pending her clinical course. Thank you very much for this consultation.     Dmitry Lomax MD

## 2021-08-26 NOTE — ANESTHESIA PREPROCEDURE EVALUATION
Relevant Problems   No relevant active problems       Anesthetic History   No history of anesthetic complications            Review of Systems / Medical History  Patient summary reviewed, nursing notes reviewed and pertinent labs reviewed    Pulmonary    COPD               Neuro/Psych   Within defined limits           Cardiovascular    Hypertension          CAD and hyperlipidemia    Exercise tolerance: >4 METS     GI/Hepatic/Renal  Within defined limits              Endo/Other        Anemia     Other Findings   Comments: Anxiety  Upper GI bleed           Physical Exam    Airway  Mallampati: II  TM Distance: 4 - 6 cm  Neck ROM: normal range of motion   Mouth opening: Normal     Cardiovascular  Regular rate and rhythm,  S1 and S2 normal,  no murmur, click, rub, or gallop  Rhythm: regular  Rate: normal         Dental  No notable dental hx       Pulmonary  Breath sounds clear to auscultation               Abdominal  GI exam deferred       Other Findings            Anesthetic Plan    ASA: 3, emergent  Anesthesia type: general          Induction: Intravenous  Anesthetic plan and risks discussed with: Patient

## 2021-08-26 NOTE — ED NOTES
TRANSFER - OUT REPORT:    Verbal report given to mark rn(name) on Magaly Salcedo  being transferred to icu(unit) for routine progression of care       Report consisted of patients Situation, Background, Assessment and   Recommendations(SBAR). Information from the following report(s) ED Summary was reviewed with the receiving nurse. Lines:   Peripheral IV 08/26/21 Right Hand (Active)        Opportunity for questions and clarification was provided. Patient transported with:   Monitor.  Protonix gtt

## 2021-08-26 NOTE — H&P
History and Physical    Subjective:     Marie Barrera is a 78 y.o. female  with a past medical history significant for HTN, HLP, COPD (not O2 dependent), and Anemia who presents to the ED with complaints of bright red blood in stool x 2 episodes today. She also complained of bright red blood in vomit this a.m. Onset was today. She denies chest pain, shortness of breath, dizziness, palpitations or fatigue. She denies any abdominal pain or tenderness. She denies use of anticoagulants. Per chart records, was last hospitalized for anemia, and transfused with blood in jan 2021, however not scoped at that time. Her PCP later lowered her full dose aspirin to 81 mg daily, and in May 2021, upon blood work, her ferrous sulfate was dcd by her pcp. No other acute complaints verbalized. Hospitalist was asked to admit for GI bleed. Dr. Nikki Gaxiola spoke with GI, Dr. Sherly Quiles who has planned an EGD procedure for today. Past Medical History:   Diagnosis Date    Allergic rhinitis     Anemia     Anxiety disorder     Chronic obstructive pulmonary disease (HCC)     Dyslipidemia     Folic acid deficiency     Hypertension     Neurologic disorder     resting tremor      History reviewed. No pertinent surgical history. Family History   Problem Relation Age of Onset    Cancer Father         stomach    Heart Disease Sister     Alcohol abuse Brother     Cancer Brother         lung      Social History     Tobacco Use    Smoking status: Former Smoker     Packs/day: 1.00     Years: 50.00     Pack years: 50.00    Smokeless tobacco: Never Used   Substance Use Topics    Alcohol use: Not Currently       Prior to Admission medications    Medication Sig Start Date End Date Taking? Authorizing Provider   ALPRAZolam Salvatore Mix) 0.5 mg tablet Take  by mouth. Provider, Historical   ALPRAZolam (XANAX) 0.5 mg tablet Take 1 Tablet by mouth two (2) times a day.  Max Daily Amount: 1 mg. 6/14/21   Chio Bañuelos MD   atorvastatin (LIPITOR) 40 mg tablet Take 1 Tablet by mouth daily. 6/14/21   Anastacio Lunsford MD   gabapentin (NEURONTIN) 100 mg capsule Take 2 Capsules by mouth three (3) times daily. Max Daily Amount: 600 mg. 5/21/21   Anastacio Lunsford MD   metoprolol tartrate (LOPRESSOR) 25 mg tablet TAKE 1/2 TABLET BY MOUTH TWICE DAILY 5/7/21   Anastacio Lunsford MD   albuterol (PROVENTIL HFA, VENTOLIN HFA, PROAIR HFA) 90 mcg/actuation inhaler Take 2 Puffs by inhalation every four (4) hours as needed for Wheezing. 4/21/21   Anastacio Lunsford MD   valACYclovir (VALTREX) 1 gram tablet Take 1 Tab by mouth two (2) times a day. 4/21/21   Anastacio Lunsford MD   losartan (COZAAR) 100 mg tablet Take 1 Tab by mouth daily. 4/19/21   Anastacio Lunsford MD   FeroSul 325 mg (65 mg iron) tablet TAKE 1 TABLET BY MOUTH DAILY BEFORE BREAKFAST 3/22/21   Anastacio Lunsford MD   ferrous sulfate (Iron) 325 mg (65 mg iron) tablet Take 1 Tab by mouth Daily (before breakfast). 3/22/21   Anastacio Lunsford MD   budesonide-formoteroL (Symbicort) 80-4.5 mcg/actuation HFAA Take 2 Puffs by inhalation. Provider, Historical   ALPRAZolam (XANAX) 0.25 mg tablet Take 1 Tab by mouth two (2) times daily as needed for Anxiety. Max Daily Amount: 0.5 mg. 10/8/20   Zion Maldonado MD     Allergies   Allergen Reactions    Isopropyl Alcohol Other (comments)     Weakness all over - pt states she has out grown this    Pen-Vee K Rash     Pt. States she avoids penicillin due to allergy as a child. Review of Systems:  14 point ROS reviewed with patient, reported negative except as mentioned in HPI. Objective:     Vitals:  Visit Vitals  BP (!) 112/53   Pulse 63   Temp 99.1 °F (37.3 °C)   Resp 16   Ht 5' 1\" (1.549 m)   Wt 76.2 kg (168 lb)   SpO2 98%   BMI 31.74 kg/m²       Physical Exam:  General: appears stated age, well developed, well groomed, pleasant, awake, alert, oriented x 4, cooperative, no acute distress. Head:  Normocephalic, atraumatic.   Eyes:  Conjunctivae pink, No scleral icterus. PERRL. EOMI. Lungs:  Clear to auscultation bilaterally, no wheezes, no crackles. Chest wall: No tenderness or deformity. Heart:  Regular rate and rhythm, S1, S2 normal, 2/6 systolic murmur, no rubs/gallop. Abdomen:  Soft, non-tender, non-distended. Bowel sounds present. Extremities: Extremities atraumatic, no cyanosis or peripheral edema. Pulses: 2+ and symmetric all extremities. Skin: appears pale. Not jaundiced. No pallor or rubor, turgor normal.   Lymph nodes: Cervical nodes normal.  Neurologic: Awake and oriented, x 4. No focal neurological deficit. Labs:  Recent Results (from the past 24 hour(s))   CBC WITH AUTOMATED DIFF    Collection Time: 08/26/21  9:05 AM   Result Value Ref Range    WBC 6.5 4.6 - 13.2 K/uL    RBC 3.43 (L) 4.20 - 5.30 M/uL    HGB 10.8 (L) 12.0 - 16.0 g/dL    HCT 32.6 (L) 35.0 - 45.0 %    MCV 95.0 78.0 - 100.0 FL    MCH 31.5 24.0 - 34.0 PG    MCHC 33.1 31.0 - 37.0 g/dL    RDW 12.0 11.6 - 14.5 %    PLATELET 162 536 - 867 K/uL    MPV 11.3 9.2 - 11.8 FL    NRBC 0.0 0.0  WBC    ABSOLUTE NRBC 0.00 0.00 - 0.01 K/uL    NEUTROPHILS 81 (H) 40 - 73 %    LYMPHOCYTES 12 (L) 21 - 52 %    MONOCYTES 4 3 - 10 %    EOSINOPHILS 2 0 - 5 %    BASOPHILS 1 0 - 2 %    IMMATURE GRANULOCYTES 0 0 - 0.5 %    ABS. NEUTROPHILS 5.2 1.8 - 8.0 K/UL    ABS. LYMPHOCYTES 0.8 (L) 0.9 - 3.6 K/UL    ABS. MONOCYTES 0.2 0.05 - 1.2 K/UL    ABS. EOSINOPHILS 0.1 0.0 - 0.4 K/UL    ABS. BASOPHILS 0.1 0.0 - 0.1 K/UL    ABS. IMM.  GRANS. 0.0 0.00 - 0.04 K/UL    DF AUTOMATED     METABOLIC PANEL, COMPREHENSIVE    Collection Time: 08/26/21  9:05 AM   Result Value Ref Range    Sodium 140 135 - 145 mmol/L    Potassium 4.1 3.2 - 5.1 mmol/L    Chloride 104 94 - 111 mmol/L    CO2 25 21 - 33 mmol/L    Anion gap 11 mmol/L    Glucose 184 (H) 70 - 110 mg/dL    BUN 31 (H) 9 - 21 mg/dL    Creatinine 1.00 0.70 - 1.20 mg/dL    BUN/Creatinine ratio 31      GFR est AA >60 ml/min/1.73m2    GFR est non-AA 53 ml/min/1.73m2    Calcium 9.2 8.5 - 10.5 mg/dL    Bilirubin, total 0.7 0.2 - 1.0 mg/dL    AST (SGOT) 25 14 - 74 U/L    ALT (SGPT) 20 3 - 52 U/L    Alk. phosphatase 59 38 - 126 U/L    Protein, total 6.4 6.1 - 8.4 g/dL    Albumin 3.6 3.5 - 4.7 g/dL    Globulin 2.8 g/dL    A-G Ratio 1.3     PROTHROMBIN TIME + INR    Collection Time: 08/26/21  9:05 AM   Result Value Ref Range    Prothrombin time 14.3 11.5 - 15.2 sec    INR 1.2 0.8 - 1.2     OCCULT BLOOD, STOOL    Collection Time: 08/26/21  9:06 AM   Result Value Ref Range    Occult Blood,day 1 Positive      Day 1 date: 8,262,021         Imaging:  No results found. Assessment & Plan:      #1: Acute GI bleed:  -admit to inpatient ICU.  -+ve hemoccult in ED. -start protonix drip, keep NPO. -Gentle IVF with [email or web address], avoid NSAIDs and anticoagulation.  -Plan for EGD per Dr. Clarisa Eid. Further recs per GI.  -CBC in a.m.   -per chart records, her ferrous sulfate was d/cd in May 2021 by her pcp, upon review of iron panel. #2: Hypertension: chronic issue, fairly stable. Hold BP meds for now, as she is on the lower end of normal. Hydrate gently. #3: Hyperlipidemia: chronic issue, on atorvastatin. #4: Anxiety: chronic issue, continue xanax per home dose. #5: COPD: chronic issue, not in acute exac. Continue symbicort per home dose. Albuterol prn. #6: Shingles: with postherpertic neuralgia, recently tx with valtrex, continue gabapentin. VTE Prophylaxis: None, 2/2 GI bleed. Code status: Full code. Total time spent: 45 minutes. Plan of care discussed with patient and daughter at bedside.

## 2021-08-26 NOTE — PROGRESS NOTES
26  Pt arrived to ICU at this time from the ED, pt to go to OR with the hour, pt assessed, denies pain or nausea at this time, VSS, call bell in reach, pt oriented to room.

## 2021-08-26 NOTE — INTERVAL H&P NOTE
Update History & Physical    The Patient's History and Physical of August 25,   The procedure was reviewed with the patient and I examined the patient. There was no change. The surgical site was confirmed by the patient and me. Plan:  The risk, benefits, expected outcome, and alternative to the recommended procedure have been discussed with the patient. Patient understands and wants to proceed with the procedure.     Electronically signed by Anai Paredes MD on 8/26/2021 at 1:46 PM

## 2021-08-27 VITALS
WEIGHT: 168 LBS | OXYGEN SATURATION: 96 % | HEIGHT: 61 IN | DIASTOLIC BLOOD PRESSURE: 51 MMHG | RESPIRATION RATE: 16 BRPM | HEART RATE: 74 BPM | TEMPERATURE: 98.2 F | BODY MASS INDEX: 31.72 KG/M2 | SYSTOLIC BLOOD PRESSURE: 124 MMHG

## 2021-08-27 LAB
BASOPHILS # BLD: 0 K/UL (ref 0–0.1)
BASOPHILS NFR BLD: 0 % (ref 0–2)
DIFFERENTIAL METHOD BLD: ABNORMAL
EOSINOPHIL # BLD: 0 K/UL (ref 0–0.4)
EOSINOPHIL NFR BLD: 0 % (ref 0–5)
ERYTHROCYTE [DISTWIDTH] IN BLOOD BY AUTOMATED COUNT: 12.1 % (ref 11.6–14.5)
HCT VFR BLD AUTO: 27.7 % (ref 35–45)
HGB BLD-MCNC: 9.1 G/DL (ref 12–16)
IMM GRANULOCYTES # BLD AUTO: 0 K/UL (ref 0–0.04)
IMM GRANULOCYTES NFR BLD AUTO: 0 % (ref 0–0.5)
LYMPHOCYTES # BLD: 0.9 K/UL (ref 0.9–3.6)
LYMPHOCYTES NFR BLD: 11 % (ref 21–52)
MCH RBC QN AUTO: 31.1 PG (ref 24–34)
MCHC RBC AUTO-ENTMCNC: 32.9 G/DL (ref 31–37)
MCV RBC AUTO: 94.5 FL (ref 78–100)
MONOCYTES # BLD: 0.7 K/UL (ref 0.05–1.2)
MONOCYTES NFR BLD: 8 % (ref 3–10)
NEUTS SEG # BLD: 6.5 K/UL (ref 1.8–8)
NEUTS SEG NFR BLD: 81 % (ref 40–73)
NRBC # BLD: 0 K/UL (ref 0–0.01)
NRBC BLD-RTO: 0 PER 100 WBC
PLATELET # BLD AUTO: 152 K/UL (ref 135–420)
PMV BLD AUTO: 11.4 FL (ref 9.2–11.8)
RBC # BLD AUTO: 2.93 M/UL (ref 4.2–5.3)
WBC # BLD AUTO: 8.1 K/UL (ref 4.6–13.2)

## 2021-08-27 PROCEDURE — 74011000258 HC RX REV CODE- 258: Performed by: FAMILY MEDICINE

## 2021-08-27 PROCEDURE — 85576 BLOOD PLATELET AGGREGATION: CPT

## 2021-08-27 PROCEDURE — 74011250636 HC RX REV CODE- 250/636: Performed by: FAMILY MEDICINE

## 2021-08-27 PROCEDURE — 74011250636 HC RX REV CODE- 250/636: Performed by: NURSE PRACTITIONER

## 2021-08-27 PROCEDURE — C9113 INJ PANTOPRAZOLE SODIUM, VIA: HCPCS | Performed by: FAMILY MEDICINE

## 2021-08-27 PROCEDURE — 94761 N-INVAS EAR/PLS OXIMETRY MLT: CPT

## 2021-08-27 PROCEDURE — 74011250637 HC RX REV CODE- 250/637: Performed by: NURSE PRACTITIONER

## 2021-08-27 PROCEDURE — 94640 AIRWAY INHALATION TREATMENT: CPT

## 2021-08-27 PROCEDURE — 36415 COLL VENOUS BLD VENIPUNCTURE: CPT

## 2021-08-27 PROCEDURE — 74011250637 HC RX REV CODE- 250/637: Performed by: INTERNAL MEDICINE

## 2021-08-27 RX ORDER — PANTOPRAZOLE SODIUM 40 MG/1
40 TABLET, DELAYED RELEASE ORAL
Status: DISCONTINUED | OUTPATIENT
Start: 2021-08-27 | End: 2021-08-27 | Stop reason: HOSPADM

## 2021-08-27 RX ORDER — PANTOPRAZOLE SODIUM 40 MG/1
40 TABLET, DELAYED RELEASE ORAL
Qty: 60 TABLET | Refills: 1 | Status: SHIPPED | OUTPATIENT
Start: 2021-08-27 | End: 2021-10-14 | Stop reason: SDUPTHER

## 2021-08-27 RX ADMIN — BUDESONIDE AND FORMOTEROL FUMARATE DIHYDRATE 2 PUFF: 80; 4.5 AEROSOL RESPIRATORY (INHALATION) at 07:37

## 2021-08-27 RX ADMIN — GABAPENTIN 100 MG: 100 CAPSULE ORAL at 10:04

## 2021-08-27 RX ADMIN — GABAPENTIN 200 MG: 100 CAPSULE ORAL at 09:53

## 2021-08-27 RX ADMIN — ATORVASTATIN CALCIUM 40 MG: 40 TABLET, FILM COATED ORAL at 09:53

## 2021-08-27 RX ADMIN — ALPRAZOLAM 0.5 MG: 0.5 TABLET ORAL at 09:53

## 2021-08-27 RX ADMIN — SODIUM CHLORIDE 8 MG/HR: 900 INJECTION INTRAVENOUS at 02:20

## 2021-08-27 RX ADMIN — SODIUM CHLORIDE 75 ML/HR: 9 INJECTION, SOLUTION INTRAVENOUS at 06:16

## 2021-08-27 RX ADMIN — PANTOPRAZOLE SODIUM 40 MG: 40 TABLET, DELAYED RELEASE ORAL at 09:53

## 2021-08-27 NOTE — PROGRESS NOTES
Reason for Admission: Chart reviewed and noted patient presented with C/O bright red blood in stool x 2 episodes. She also C/O bright red blood in vomit. Dx: Acute GI Bleed    PMH: HTN, HLP, COPD, Anemia                       RUR Score: 10%                    Plan for utilizing home health: TBD         PCP: First and Last name:  Neo Marcus MD     Name of Practice:    Are you a current patient: Yes/No:    Approximate date of last visit:    Can you participate in a virtual visit with your PCP:                     Current Advanced Directive/Advance Care Plan: Full Code      Healthcare Decision Maker: Chelsy Alvarenga  Click here to [address] including selection of the Healthcare Decision Maker Relationship (ie \"Primary\")             Primary Decision MakerSharvesta Bret - Child - [phone]    Secondary Decision Maker: Morgan Palmar - Daughter - [phone]                  Transition of Care Plan:  TBD    Care Management Interventions  PCP Verified by CM: Yes  Transition of Care Consult (CM Consult:  Discharge Planning. Current Support Network: Daughter- Chelsy Alvarenga- [phone] and Daughter- Ritesh Hidalgo- [phone]. Patient lives at home with her son. She doesn't use any DME and plans to return back home at discharge. Pt's daughter will be picking her up at discharge.

## 2021-08-27 NOTE — DISCHARGE SUMMARY
Discharge Summary       PATIENT ID: Vickey Snell  MRN: [id number]   YOB: 1940    DATE OF ADMISSION: 8/26/2021  8:53 AM    DATE OF DISCHARGE: 08/27/21    PRIMARY CARE PROVIDER: Juan Raya MD     ATTENDING PHYSICIAN: Cheyanne Corrales MD  DISCHARGING PROVIDER: Cheyanne Corrales MD        CONSULTATIONS: None    PROCEDURES/SURGERIES: Procedure(s):  EGD with BX and Control of bleeing    ADMITTING [address] [place] [address] COURSE:   Vickey Snell is a 78 y.o. female  with a past medical history significant for HTN, HLP, COPD (not O2 dependent), and Anemia who presents to the ED with complaints of bright red blood in stool x 2 episodes today. She also complained of bright red blood in vomit this a.m. Onset was today. She denies chest pain, shortness of breath, dizziness, palpitations or fatigue. She denies any abdominal pain or tenderness. She denies use of anticoagulants. Per chart records, was last hospitalized for anemia, and transfused with blood in jan 2021, however not scoped at that time. Her PCP later lowered her full dose aspirin to 81 mg daily, and in May 2021, upon blood work, her ferrous sulfate was dcd by her pcp. No other acute complaints verbalized. Hospitalist was asked to admit for GI bleed. Dr. Tony Foote spoke with GI, Dr. Eleni Gates who has planned an EGD procedure for today. DISCHARGE DIAGNOSES / PLAN:      Assessment & Plan:       #1: Acute GI bleed:  -admit to inpatient ICU.  -+ve hemoccult in ED. -start protonix drip, keep NPO. -Gentle IVF with [email or web address], avoid NSAIDs and anticoagulation.  -Plan for EGD per Dr. Eleni Gates. Further recs per GI.  -CBC in a.m.   -per chart records, her ferrous sulfate was d/cd in May 2021 by her pcp, upon review of iron panel.      #2: Hypertension: chronic issue, fairly stable. Hold BP meds for now, as she is on the lower end of normal. Hydrate gently. #3: Hyperlipidemia: chronic issue, on atorvastatin.   #4: Anxiety: chronic issue, continue xanax per home dose. #5: COPD: chronic issue, not in acute exac. Continue symbicort per home dose. Albuterol prn. #6: Shingles: with postherpertic neuralgia, recently tx with valtrex, continue gabapentin. PER GI    Impression:  1. Upper GI bleeding from erosive esophagitis, several small gastric polyps, small gastric ulcer and very friable mucosa. The findings, although they do explain the patient's acute bleeding, are somewhat underwhelming for this amount of significant bleeding. This makes me wonder if she has mucosal instability such as lienitis plastica or atrophic gastritis with underlying malignancy involved or sometimes a clotting abnormality. I also wonder if this is due to her taking her baby aspirin at night causing the above appearance and results.        Recommendations:    1. Continue IV Protonix until the a.m. She can then start on Protonix 40 mg p.o. twice a day for the next 12 weeks and then once daily. 2.  No aspirin or nonsteroidal anti-inflammatory drugs including no baby aspirin. 3.  Check pathology. Will notify patient with results. 4.  Okay to start a full liquid diet in the a.m. and advance over the next 24 to 48 hours as tolerated. 5.  Check her CBC this evening again in the a.m. I would anticipate she can be discharged tomorrow based on the relatively unimpressive findings given the above. 4.  Follow up with me in the office in 2 weeks time. Future considerations include repeating her upper endoscopy in a nonacute setting as an outpatient at time of her colonoscopy which will also be scheduled at her office visit. This would allow further biopsy and evaluation under a nonacute setting without bleed. .  5.  Further recommendations pending clinical course. Pt feels well day of dc, asked to wait till after lunch for dc, pt declines and wants to leave this am.  She is advised to avoid nsaids, no aspirin. Fu with GI and pcp as outpt, take ppi.       FOLLOW UP APPOINTMENTS:    Follow-up Information     Follow up With Specialties Details Why Ragini Malin MD Family Medicine In 1 week  [address]  [phone]      Julio Richard MD Gastroenterology In 2 weeks  [address] [facility]  [phone]               DIET: regular      DISCHARGE MEDICATIONS:  Current Discharge Medication List      START taking these medications    Details   pantoprazole (PROTONIX) 40 mg tablet Take 1 Tablet by mouth Before breakfast and dinner. Qty: 60 Tablet, Refills: 1  Start date: 8/27/2021         CONTINUE these medications which have NOT CHANGED    Details   !! ALPRAZolam (XANAX) 0.5 mg tablet Take  by mouth. !! ALPRAZolam (XANAX) 0.5 mg tablet Take 1 Tablet by mouth two (2) times a day. Max Daily Amount: 1 mg. Qty: 60 Tablet, Refills: 2    Associated Diagnoses: Anxiety      atorvastatin (LIPITOR) 40 mg tablet Take 1 Tablet by mouth daily. Qty: 90 Tablet, Refills: 1      metoprolol tartrate (LOPRESSOR) 25 mg tablet TAKE 1/2 TABLET BY MOUTH TWICE DAILY  Qty: 90 Tab, Refills: 1    Comments: **Patient requests 90 days supply**      losartan (COZAAR) 100 mg tablet Take 1 Tab by mouth daily. Qty: 90 Tab, Refills: 1    Comments: Needs refill has taken the previous rx wrong  Associated Diagnoses: Essential hypertension      !! ferrous sulfate (Iron) 325 mg (65 mg iron) tablet Take 1 Tab by mouth Daily (before breakfast). Qty: 30 Tab, Refills: 0    Associated Diagnoses: Anxiety      !! ALPRAZolam (XANAX) 0.25 mg tablet Take 1 Tab by mouth two (2) times daily as needed for Anxiety. Max Daily Amount: 0.5 mg.  Qty: 60 Tab, Refills: 2    Associated Diagnoses: Anxiety      gabapentin (NEURONTIN) 100 mg capsule Take 2 Capsules by mouth three (3) times daily. Max Daily Amount: 600 mg.   Qty: 84 Capsule, Refills: 0    Associated Diagnoses: Post herpetic neuralgia      albuterol (PROVENTIL HFA, VENTOLIN HFA, PROAIR HFA) 90 mcg/actuation inhaler Take 2 Puffs by inhalation every four (4) hours as needed for Wheezing. Qty: 1 Inhaler, Refills: 1    Associated Diagnoses: Chronic obstructive pulmonary disease, unspecified COPD type (HCC)      valACYclovir (VALTREX) 1 gram tablet Take 1 Tab by mouth two (2) times a day. Qty: 10 Tab, Refills: 0    Associated Diagnoses: Herpes zoster without complication      !! FeroSul 325 mg (65 mg iron) tablet TAKE 1 TABLET BY MOUTH DAILY BEFORE BREAKFAST  Qty: 30 Tab, Refills: 0    Associated Diagnoses: Anxiety      budesonide-formoteroL (Symbicort) 80-4.5 mcg/actuation HFAA Take 2 Puffs by inhalation. !! - Potential duplicate medications found. Please discuss with provider. NOTIFY YOUR PHYSICIAN FOR ANY OF THE FOLLOWING:   Fever over 101 degrees for 24 hours. Chest pain, shortness of breath, fever, chills, nausea, vomiting, diarrhea, change in mentation, falling, weakness, bleeding. Severe pain or pain not relieved by medications. Or, any other signs or symptoms that you may have questions about. PHYSICAL EXAMINATION AT DISCHARGE:  General:          Alert, cooperative, no distress, appears stated age. HEENT:           Atraumatic, anicteric sclerae, pink conjunctivae                          No oral ulcers, mucosa moist, throat clear, dentition fair  Neck:               Supple, symmetrical  Lungs:             Clear to auscultation bilaterally. No Wheezing or Rhonchi. No rales. Chest wall:      No tenderness  No Accessory muscle use. Heart:              Regular  rhythm,  No  murmur   No edema  Abdomen:        Soft, non-tender. Not distended. Bowel sounds normal  Extremities:     No cyanosis. No clubbing,                            Skin turgor normal, Capillary refill normal  Skin:                Not pale. Not Jaundiced  No rashes   Psych:             Not anxious or agitated.   Neurologic:      Alert, moves all extremities, answers questions appropriately and responds to commands       CHRONIC MEDICAL DIAGNOSES:  Problem List as of 8/27/2021 Date Reviewed: 5/21/2021        Codes Class Noted - Resolved    GI bleed ICD-10-CM: K92.2  ICD-9-CM: 578.9  8/26/2021 - Present        Post herpetic neuralgia ICD-10-CM: B02.29  ICD-9-CM: 053.19  5/21/2021 - Present        Herpes zoster without complication [facility]-53-JM: X59.8  ICD-9-CM: 053.9  4/21/2021 - Present        Fatigue ICD-10-CM: R53.83  ICD-9-CM: 780.79  4/21/2021 - Present        Abnormal laboratory test ICD-10-CM: R89.9  ICD-9-CM: 796.4  3/24/2021 - Present        Acute anemia ICD-10-CM: D64.9  ICD-9-CM: 285.9  1/17/2021 - Present        Severe anemia ICD-10-CM: D64.9  ICD-9-CM: 285.9  1/17/2021 - Present        Chronic obstructive lung disease ([facility] ICD-10-CM: J44.9  ICD-9-CM: 949  11/20/2020 - Present        Essential hypertension ICD-10-CM: I10  ICD-9-CM: 401.9  11/20/2020 - Present        Hyperlipidemia ICD-10-CM: E78.5  ICD-9-CM: 272.4  11/20/2020 - Present              Greater than 35 minutes were spent with the patient on counseling and coordination of care    Signed:   Yaron Michaels MD  8/27/2021  9:12 AM

## 2021-08-27 NOTE — DISCHARGE INSTRUCTIONS
Patient Education        Upper GI Endoscopy in Children: What to Expect at [address] child had an upper GI endoscopy. Your doctor used a thin, lighted tube that bends to look at the inside of your child's esophagus, stomach, and the first part of the small intestine, called the duodenum. Porter Angel probably be able to take your child home after his or her medicine wears off. This takes 1 to 2 hours. Your child may have a sore throat for a day or two after the test.  This care sheet gives you a general idea about how long it will take for your child to recover. But each child recovers at a different pace. Follow the steps below to help your child get better as quickly as possible. How can you care for your child at home? Activity    · Help your child rest as much as needed after going home.     · Your child should be able to go back to his or her usual activities the day after the test.   Diet    · Follow your doctor's directions for eating.     · Be sure that your child drinks plenty of fluids (unless your doctor has said not to). Medicines    · Your doctor will tell you if and when your child can restart his or her medicines. The doctor will also give you instructions about your child taking any new medicines.     · Ask your doctor if you can give your child acetaminophen (Tylenol), a throat spray, or a throat lozenge if your child has a sore throat. Read and follow all instructions on the label. Do not give aspirin to anyone younger than 20. It has been linked to Reye syndrome, a serious illness. Follow-up care is a key part of your child's treatment and safety. Be sure to make and go to all appointments, and call your doctor if your child is having problems. It's also a good idea to know your child's test results and keep a list of the medicines your child takes. When should you call for help? Call 911 anytime you think your child may need emergency care.  For example, call if:    · Your child passes out (loses consciousness).     · Your child has trouble breathing.     · Your child passes maroon or bloody stools. Call your doctor now or seek immediate medical care if:    · Your child has pain that does not get better after taking pan medicine.     · Your child has new or worse belly pain.     · Your child has blood in his or her stools.     · Your child cannot pass stools or gas.     · Your child has a fever.     · Your child is sick to his or her stomach and cannot hold down fluids. Watch closely for changes in your child's health, and be sure to contact your doctor if:    · Your child's throat still hurts after a day or two. Where can you learn more? Go to http://www.gray.com/  Enter D276 in the search box to learn more about \"Upper GI Endoscopy in Children: What to Expect at Home. \"  Current as of: April 15, 2020               Content Version: 12.8  © 2006-2021 [employer]. Care instructions adapted under license by [employer] (which disclaims liability or warranty for this information). If you have questions about a medical condition or this instruction, always ask your healthcare professional. [address] any warranty or liability for your use of this information.

## 2021-08-27 NOTE — PROGRESS NOTES
Discharge instructions given, IV removed, telemetry discontinued, patient wheel downstairs, patient discharged home.

## 2021-08-27 NOTE — ROUTINE PROCESS
Bedside shift change report given to BRENDA Perry RN (oncoming nurse) by JESSICA Adams (offgoing nurse). Report included the following information SBAR, Recent Results and Quality Measures      1920  Shift assessment completed. See assessment flow sheet. Protonix gtt infusing at 10ml/hr and Normal saline infusing at 75ml/hr. Pt OOB to the [facility] without difficulty to urinate. No blood noted. Pt repositioned self in bed and positioned self for comfort. CBWR.     2200  Pt refused the 2200 dose of Gabapentin stating she does not take it. Reviewed pt medications with pt and updated medication list.     0111  2400 VSS. Pt OOB to the [facility] to urinate with no evidence of blood. Pt denies pain or needs. CBWR.     0330  Pt easily awoken for blood draw for AM labs. Pt tolerated well.     0500  Pt lying in bed with eyes closed. No sign of discomfort or distress. CBWR.     0645  Bedside shift change report given to LAURYN Draper RN (oncoming nurse) by BRENDA Perry RN (offgoing nurse). Report included the following information SBAR, Kardex, Intake/Output, Recent Results, Med Rec Status and Quality Measures.

## 2021-08-27 NOTE — ANESTHESIA POSTPROCEDURE EVALUATION
Procedure(s):  EGD with BX and Control of bleeing. general    Anesthesia Post Evaluation      Multimodal analgesia: multimodal analgesia used between 6 hours prior to anesthesia start to PACU discharge  Patient location during evaluation: bedside  Patient participation: complete - patient participated  Level of consciousness: awake and alert  Pain score: 0  Pain management: adequate  Airway patency: patent  Anesthetic complications: no  Cardiovascular status: acceptable and stable  Respiratory status: acceptable and room air  Hydration status: acceptable  Comments: Ok to discharge when post op criteria met.    Post anesthesia nausea and vomiting:  none  Final Post Anesthesia Temperature Assessment:  Normothermia (36.0-37.5 degrees C)      INITIAL Post-op Vital signs:   Vitals Value Taken Time   /40 08/27/21 0436   Temp 36.8 °C (98.2 °F) 08/27/21 0436   Pulse 67 08/27/21 0436   Resp 24 08/27/21 0436   SpO2 99 % 08/27/21 0742

## 2021-08-31 ENCOUNTER — OFFICE VISIT (OUTPATIENT)
Dept: FAMILY MEDICINE CLINIC | Age: 81
End: 2021-08-31
Payer: MEDICARE

## 2021-08-31 VITALS — HEART RATE: 57 BPM | DIASTOLIC BLOOD PRESSURE: 86 MMHG | OXYGEN SATURATION: 100 % | SYSTOLIC BLOOD PRESSURE: 138 MMHG

## 2021-08-31 DIAGNOSIS — K25.4 GASTROINTESTINAL HEMORRHAGE ASSOCIATED WITH GASTRIC ULCER: ICD-10-CM

## 2021-08-31 DIAGNOSIS — K31.7 GASTRIC POLYP: ICD-10-CM

## 2021-08-31 DIAGNOSIS — I10 ESSENTIAL HYPERTENSION: ICD-10-CM

## 2021-08-31 DIAGNOSIS — K20.91 ESOPHAGITIS, UNSPECIFIED WITH BLEEDING: Primary | ICD-10-CM

## 2021-08-31 DIAGNOSIS — D64.9 SEVERE ANEMIA: ICD-10-CM

## 2021-08-31 PROCEDURE — 1101F PT FALLS ASSESS-DOCD LE1/YR: CPT | Performed by: FAMILY MEDICINE

## 2021-08-31 PROCEDURE — G8427 DOCREV CUR MEDS BY ELIG CLIN: HCPCS | Performed by: FAMILY MEDICINE

## 2021-08-31 PROCEDURE — G8754 DIAS BP LESS 90: HCPCS | Performed by: FAMILY MEDICINE

## 2021-08-31 PROCEDURE — G8752 SYS BP LESS 140: HCPCS | Performed by: FAMILY MEDICINE

## 2021-08-31 PROCEDURE — 1090F PRES/ABSN URINE INCON ASSESS: CPT | Performed by: FAMILY MEDICINE

## 2021-08-31 PROCEDURE — G8417 CALC BMI ABV UP PARAM F/U: HCPCS | Performed by: FAMILY MEDICINE

## 2021-08-31 PROCEDURE — G8400 PT W/DXA NO RESULTS DOC: HCPCS | Performed by: FAMILY MEDICINE

## 2021-08-31 PROCEDURE — G8510 SCR DEP NEG, NO PLAN REQD: HCPCS | Performed by: FAMILY MEDICINE

## 2021-08-31 PROCEDURE — 1111F DSCHRG MED/CURRENT MED MERGE: CPT | Performed by: FAMILY MEDICINE

## 2021-08-31 PROCEDURE — G8536 NO DOC ELDER MAL SCRN: HCPCS | Performed by: FAMILY MEDICINE

## 2021-08-31 PROCEDURE — 99213 OFFICE O/P EST LOW 20 MIN: CPT | Performed by: FAMILY MEDICINE

## 2021-08-31 RX ORDER — LANOLIN ALCOHOL/MO/W.PET/CERES
325 CREAM (GRAM) TOPICAL
Qty: 90 TABLET | Refills: 2 | Status: SHIPPED | OUTPATIENT
Start: 2021-08-31 | End: 2021-11-08

## 2021-08-31 RX ORDER — FUROSEMIDE 20 MG/1
TABLET ORAL
Qty: 30 TABLET | Refills: 1 | Status: ON HOLD | OUTPATIENT
Start: 2021-08-31 | End: 2022-01-17

## 2021-08-31 NOTE — PROGRESS NOTES
Frank Arambula presents today for   Chief Complaint   Patient presents with   [facility] Follow Up     hospital follow up was discharged 8/27/21 also they stopped her iron but hgb is 9        Is someone accompanying this pt? No      Is the patient using any DME equipment during OV? No      Depression Screening:  3 most recent PHQ Screens 8/31/2021   Little interest or pleasure in doing things Several days   Feeling down, depressed, irritable, or hopeless Several days   Total Score PHQ 2 2   Trouble falling or staying asleep, or sleeping too much -   Feeling tired or having little energy -   Poor appetite, weight loss, or overeating -   Feeling bad about yourself - or that you are a failure or have let yourself or your family down -   Trouble concentrating on things such as school, work, reading, or watching TV -   Moving or speaking so slowly that other people could have noticed; or the opposite being so fidgety that others notice -   Thoughts of being better off dead, or hurting yourself in some way -   PHQ 9 Score -   How difficult have these problems made it for you to do your work, take care of your home and get along with others -       Learning Assessment:  Learning Assessment 12/3/2020   PRIMARY LEARNER Patient   HIGHEST LEVEL OF EDUCATION - PRIMARY LEARNER  DID NOT GRADUATE HIGH SCHOOL   BARRIERS PRIMARY LEARNER NONE   PRIMARY LANGUAGE ENGLISH   LEARNER PREFERENCE PRIMARY READING   ANSWERED BY patient   RELATIONSHIP SELF       Fall Risk  Fall Risk Assessment, last 12 mths 8/31/2021   Able to walk? Yes   Fall in past 12 months? 1   Do you feel unsteady? 1   Are you worried about falling 1   Is the gait abnormal? -   Number of falls in past 12 months 1   Fall with injury?  1       ADL  ADL Assessment 1/28/2021   Feeding yourself No Help Needed   Getting from bed to chair No Help Needed   Getting dressed No Help Needed   Bathing or showering No Help Needed   Walk across the room (includes cane/walker) No Help Needed   Using the telphone No Help Needed   Taking your medications No Help Needed   Preparing meals No Help Needed   Managing money (expenses/bills) No Help Needed   Moderately strenuous housework (laundry) No Help Needed   Shopping for personal items (toiletries/medicines) No Help Needed   Shopping for groceries No Help Needed   Driving No Help Needed   Climbing a flight of stairs No Help Needed   Getting to places beyond walking distances No Help Needed       Travel Screening:    Travel Screening     Question   Response    In the last month, have you been in contact with someone who was confirmed or suspected to have Coronavirus / COVID-19? No / Unsure    Have you had a COVID-19 viral test in the last 14 days? No    Do you have any of the following new or worsening symptoms? None of these    Have you traveled internationally or domestically in the last month? No      Travel History   Travel since 07/31/21     No documented travel since 07/31/21          Health Maintenance reviewed and discussed and ordered per Provider. Health Maintenance Due   Topic Date Due    DTaP/Tdap/Td series (1 - Tdap) Never done    Shingrix Vaccine Age 50> (1 of 2) Never done    Bone Densitometry (Dexa) Screening  Never done    Pneumococcal 65+ years (1 of 1 - PPSV23) Never done    Medicare Yearly Exam  Never done    COVID-19 Vaccine (2 - Moderna 2-dose series) 02/23/2021   . Coordination of Care:  1. Have you been to the ER, urgent care clinic since your last visit? Hospitalized since your last visit? Yes      2. Have you seen or consulted any other health care providers outside of the [address] [place] [address] since your last visit? Include any pap smears or colon screening.  No

## 2021-08-31 NOTE — PROGRESS NOTES
Subjective:   Marie Barrera is a 78 y.o. female who was seen for Hospital Follow Up (hospital follow up was discharged 8/27/21 also they stopped her iron but hgb is 9 )    HPI the patient is an 66-year-old female who had an upper GI bleed. She had a scoping by the gastroenterologist in the hospital and it revealed a gastric ulcer and a gastric polyp. She has had no fever or chills no chest pain or shortness of breath. Has been eating relatively well. Nobody at home has been sick no falls or injuries no rash no syncope no loss of consciousness. Bowel movements have been appropriate. She tells me she is just feeling a little weak when I reviewed her last lab work her hemoglobin did decrease from 14.5-9.1 they did not restart her iron and were going to do that. She has had no falls or injuries she is beginning to eat softer food. Nobody at home has been sick    Home Medications    Medication Sig Start Date End Date Taking? Authorizing Provider   pantoprazole (PROTONIX) 40 mg tablet Take 1 Tablet by mouth Before breakfast and dinner. 8/27/21  Yes Chris Bahena MD   ALPRAZolam Salvatore Mix) 0.5 mg tablet Take  by mouth. Yes Provider, Historical   ALPRAZolam (XANAX) 0.5 mg tablet Take 1 Tablet by mouth two (2) times a day. Max Daily Amount: 1 mg. 6/14/21  Yes Chio Bañuelos MD   atorvastatin (LIPITOR) 40 mg tablet Take 1 Tablet by mouth daily. 6/14/21  Yes Chio Bañuelos MD   metoprolol tartrate (LOPRESSOR) 25 mg tablet TAKE 1/2 TABLET BY MOUTH TWICE DAILY 5/7/21  Yes Chio Bañuelos MD   albuterol (PROVENTIL HFA, VENTOLIN HFA, PROAIR HFA) 90 mcg/actuation inhaler Take 2 Puffs by inhalation every four (4) hours as needed for Wheezing. 4/21/21  Yes Chio Bañuelos MD   losartan (COZAAR) 100 mg tablet Take 1 Tab by mouth daily. 4/19/21  Yes Chio Bañuelos MD   budesonide-formoteroL (Symbicort) 80-4.5 mcg/actuation HFAA Take 2 Puffs by inhalation.    Yes Provider, Historical   ALPRAZolam (XANAX) 0.25 mg tablet Take 1 Tab by mouth two (2) times daily as needed for Anxiety. Max Daily Amount: 0.5 mg. 10/8/20  Yes Ami Brito MD   gabapentin (NEURONTIN) 100 mg capsule Take 2 Capsules by mouth three (3) times daily. Max Daily Amount: 600 mg. Patient not taking: Reported on 8/26/2021 5/21/21   Juan Raya MD   valACYclovir (VALTREX) 1 gram tablet Take 1 Tab by mouth two (2) times a day. 4/21/21   Juan Raya MD   FeroSul 325 mg (65 mg iron) tablet TAKE 1 TABLET BY MOUTH DAILY BEFORE BREAKFAST 3/22/21   Juan Raya MD   ferrous sulfate (Iron) 325 mg (65 mg iron) tablet Take 1 Tab by mouth Daily (before breakfast). 3/22/21   Juan Raya MD      Allergies   Allergen Reactions    Isopropyl Alcohol Other (comments)     Weakness all over - pt states she has out grown this    Pen-Vee K Rash     Pt. States she avoids penicillin due to allergy as a child. Social History     Tobacco Use    Smoking status: Former Smoker     Packs/day: 1.00     Years: 50.00     Pack years: 50.00    Smokeless tobacco: Never Used   Vaping Use    Vaping Use: Never used   Substance Use Topics    Alcohol use: Not Currently    Drug use: Never            Review of Systems   Constitutional: Negative. HENT: Negative. Eyes: Negative. Respiratory: Negative. Cardiovascular: Negative. Gastrointestinal: Positive for blood in stool. Genitourinary: Negative. Musculoskeletal: Negative. Skin: Negative. Allergic/Immunologic: Negative. Neurological: Negative. Hematological: Negative. Psychiatric/Behavioral: Negative.          Physical Exam   Objective:     Visit Vitals  /86   Pulse (!) 57   SpO2 100%      General: alert, cooperative, no distress   Mental  status: normal mood, behavior, speech, dress, motor activity, and thought processes, able to follow commands   HENT: NCAT   Neck: no visualized mass   Resp: no respiratory distress   Neuro: no gross deficits   Skin: no discoloration or lesions of concern on visible areas   Psychiatric: normal affect, consistent with stated mood, no evidence of hallucinations   Afebrile. Vital signs are stable. She has a few purpura on her arms I suspect they are IV related the lungs seem to be clear the abdomen is soft but protuberant the extremities are clear there is no edema no rashes are seen. She is pleasant and cooperative in no significant distress    Assessment & Plan:     Upper GI bleed, gastric polyp, gastric ulcer, iron deficiency, hypertension: I am restarting her iron and will recheck in 3 weeks she is eating better she is going to be seen very soon for colonoscopy. 712    Additional exam findings: We discussed the expected course, resolution and complications of the diagnosis(es) in detail. Medication risks, benefits, costs, interactions, and alternatives were discussed as indicated. I advised her to contact the office if her condition worsens, changes or fails to improve as anticipated. She expressed understanding with the diagnosis(es) and plan.

## 2021-09-16 ENCOUNTER — HOSPITAL ENCOUNTER (OUTPATIENT)
Dept: LAB | Age: 81
Discharge: HOME | End: 2021-09-16
Payer: MEDICARE

## 2021-09-16 ENCOUNTER — OFFICE VISIT (OUTPATIENT)
Dept: GASTROENTEROLOGY | Age: 81
End: 2021-09-16
Payer: MEDICARE

## 2021-09-16 VITALS
HEART RATE: 51 BPM | WEIGHT: 168 LBS | SYSTOLIC BLOOD PRESSURE: 134 MMHG | DIASTOLIC BLOOD PRESSURE: 59 MMHG | BODY MASS INDEX: 31.74 KG/M2

## 2021-09-16 DIAGNOSIS — J44.9 CHRONIC OBSTRUCTIVE PULMONARY DISEASE, UNSPECIFIED COPD TYPE (HCC): ICD-10-CM

## 2021-09-16 DIAGNOSIS — D50.0 IRON DEFICIENCY ANEMIA DUE TO CHRONIC BLOOD LOSS: ICD-10-CM

## 2021-09-16 DIAGNOSIS — K29.91 GASTROINTESTINAL HEMORRHAGE ASSOCIATED WITH GASTRODUODENITIS: Primary | ICD-10-CM

## 2021-09-16 DIAGNOSIS — F41.9 ANXIETY: ICD-10-CM

## 2021-09-16 LAB
BASOPHILS # BLD: 0.1 K/UL (ref 0–0.1)
BASOPHILS NFR BLD: 1 % (ref 0–2)
DIFFERENTIAL METHOD BLD: ABNORMAL
EOSINOPHIL # BLD: 0.3 K/UL (ref 0–0.4)
EOSINOPHIL NFR BLD: 5 % (ref 0–5)
ERYTHROCYTE [DISTWIDTH] IN BLOOD BY AUTOMATED COUNT: 13.3 % (ref 11.6–14.5)
HCT VFR BLD AUTO: 34.6 % (ref 35–45)
HGB BLD-MCNC: 11 G/DL (ref 12–16)
IMM GRANULOCYTES # BLD AUTO: 0 K/UL (ref 0–0.04)
IMM GRANULOCYTES NFR BLD AUTO: 0 % (ref 0–0.5)
LYMPHOCYTES # BLD: 1 K/UL (ref 0.9–3.6)
LYMPHOCYTES NFR BLD: 18 % (ref 21–52)
MCH RBC QN AUTO: 31.3 PG (ref 24–34)
MCHC RBC AUTO-ENTMCNC: 31.8 G/DL (ref 31–37)
MCV RBC AUTO: 98.6 FL (ref 78–100)
MONOCYTES # BLD: 0.6 K/UL (ref 0.05–1.2)
MONOCYTES NFR BLD: 11 % (ref 3–10)
NEUTS SEG # BLD: 3.6 K/UL (ref 1.8–8)
NEUTS SEG NFR BLD: 65 % (ref 40–73)
NRBC # BLD: 0 K/UL (ref 0–0.01)
NRBC BLD-RTO: 0 PER 100 WBC
PLATELET # BLD AUTO: 185 K/UL (ref 135–420)
PMV BLD AUTO: 10.7 FL (ref 9.2–11.8)
RBC # BLD AUTO: 3.51 M/UL (ref 4.2–5.3)
WBC # BLD AUTO: 5.4 K/UL (ref 4.6–13.2)

## 2021-09-16 PROCEDURE — G8417 CALC BMI ABV UP PARAM F/U: HCPCS | Performed by: INTERNAL MEDICINE

## 2021-09-16 PROCEDURE — G8754 DIAS BP LESS 90: HCPCS | Performed by: INTERNAL MEDICINE

## 2021-09-16 PROCEDURE — 36415 COLL VENOUS BLD VENIPUNCTURE: CPT

## 2021-09-16 PROCEDURE — G8510 SCR DEP NEG, NO PLAN REQD: HCPCS | Performed by: INTERNAL MEDICINE

## 2021-09-16 PROCEDURE — 99214 OFFICE O/P EST MOD 30 MIN: CPT | Performed by: INTERNAL MEDICINE

## 2021-09-16 PROCEDURE — G8427 DOCREV CUR MEDS BY ELIG CLIN: HCPCS | Performed by: INTERNAL MEDICINE

## 2021-09-16 PROCEDURE — 1111F DSCHRG MED/CURRENT MED MERGE: CPT | Performed by: INTERNAL MEDICINE

## 2021-09-16 PROCEDURE — 85025 COMPLETE CBC W/AUTO DIFF WBC: CPT

## 2021-09-16 PROCEDURE — G8536 NO DOC ELDER MAL SCRN: HCPCS | Performed by: INTERNAL MEDICINE

## 2021-09-16 PROCEDURE — 1101F PT FALLS ASSESS-DOCD LE1/YR: CPT | Performed by: INTERNAL MEDICINE

## 2021-09-16 PROCEDURE — G8400 PT W/DXA NO RESULTS DOC: HCPCS | Performed by: INTERNAL MEDICINE

## 2021-09-16 PROCEDURE — 1090F PRES/ABSN URINE INCON ASSESS: CPT | Performed by: INTERNAL MEDICINE

## 2021-09-16 PROCEDURE — G8752 SYS BP LESS 140: HCPCS | Performed by: INTERNAL MEDICINE

## 2021-09-16 NOTE — PROGRESS NOTES
Gastroenterology Progress Note         Referring Physician: Segundo Turcios MD     Chief Complaint: GI bleed and anemia    Date of service: 09/16/21     Subjective:     History of Present Illness:  Patient is a female [address] [facility] 78 y.o.  who is seen for evaluation for GI bleeding. The patient has GI complaints of hematemesis and melena starting this morning. The patient states she awoke this morning and had nausea. She then had a bowel movement and passed black tarry stool with some red blood. She subsequently vomited bright red blood and some coffee-ground material x1. She was brought to the emergency room was found to be mildly anemic with hemoglobin/hematocrit 11 and 35. BUN is elevated at 31 with normal creatinine. She has a history of anemia and has been on iron replacement with normalization of her hemoglobin hematocrit in May. Her primary care doctor took her off the iron because her anemia resolved. She has not been taking iron since May. She denies weight loss, change in appetite, change in her bowel movements, dysphagia or heartburn. She takes a baby aspirin but no NSAIDs or anticoagulation. She has had no syncope, lightheadedness or dizziness. She has a history of anemia been hospitalized with anemia 1/2021. She is gastroenterology in 3/2021 and EGD and colonoscopy was recommended but she did not follow through to have it done. The patient denies fever, chills, abdominal pain, reflux, dysphagia, change in bowel habits, diarrhea, constipation, weight loss. Last colonoscopyhas never had 1. Family history for GI disease is significant for no GI or liver disease. The patient denies liver related risk factors.     Past medical history is significant for iron deficiency anemia, anxiety, and COPD.    09/16/2021 visit: The patient returns today in follow-up from her recent hospitalization for acute GI bleeding. EGD done at that time revealed the following:   Impression:  1.   Upper GI bleeding from erosive esophagitis, several small gastric polyps, small gastric ulcer and very friable mucosa. The findings, although they do explain the patient's acute bleeding, are somewhat underwhelming for this amount of significant bleeding. This makes me wonder if she has mucosal instability such as linitus plastica or atrophic gastritis with underlying malignancy involved or sometimes a clotting abnormality. I also wonder if this is due to her taking her baby aspirin at night causing the above appearance and results. There was diffuse friability and easy bleeding in any area that was washed or or biopsies were done. A total of 10 cc of 1:10,000 epinephrine was injected into the obvious bleeding areas including several polyps, small gastric ulcer in the body, and several friable areas. Also, 0.5 cc was injected into the bleeding esophageal erosion. Biopsies were negative for H. Pylori. Her last hemoglobin and hematocrit prior to discharge were 9.1 and 27 respectively. The patient was discharged on Protonix 40 mg twice a day to be taken for 12 weeks then decrease to once daily. I also recommended at that time because she appeared to have acute on chronic anemia, that she have colonoscopy to be done as an outpatient. She is doing well at this time except for some fatigue and having no further bleeding. Stool is black from iron once daily. She overall feels better. PMH:  Past Medical History:   Diagnosis Date    Allergic rhinitis     Anemia     Anxiety disorder     Chronic obstructive pulmonary disease ([address]     Dyslipidemia     Folic acid deficiency     Hypertension     Neurologic disorder     resting tremor        PSH:  History reviewed. No pertinent surgical history. Allergies: Allergies   Allergen Reactions    Isopropyl Alcohol Other (comments)     Weakness all over - pt states she has out grown this    Pen-Vee K Rash     Pt.  States she avoids penicillin due to allergy as a child.         Home Medications:  Prior to Admission medications    Medication Sig Start Date End Date Taking? Authorizing Provider   ferrous sulfate (FeosoL) 325 mg (65 mg iron) tablet Take 1 Tablet by mouth Daily (before breakfast). 8/31/21  Yes Russel Tan MD   furosemide (LASIX) 20 mg tablet One po qod 8/31/21  Yes Russel Tan MD   pantoprazole (PROTONIX) 40 mg tablet Take 1 Tablet by mouth Before breakfast and dinner. 8/27/21  Yes Bishnu Dong MD   atorvastatin (LIPITOR) 40 mg tablet Take 1 Tablet by mouth daily. 6/14/21  Yes Russel Tan MD   metoprolol tartrate (LOPRESSOR) 25 mg tablet TAKE 1/2 TABLET BY MOUTH TWICE DAILY 5/7/21  Yes Russel Tan MD   albuterol (PROVENTIL HFA, VENTOLIN HFA, PROAIR HFA) 90 mcg/actuation inhaler Take 2 Puffs by inhalation every four (4) hours as needed for Wheezing. 4/21/21  Yes Russel Tan MD   valACYclovir (VALTREX) 1 gram tablet Take 1 Tab by mouth two (2) times a day. 4/21/21  Yes Russel Tan MD   losartan (COZAAR) 100 mg tablet Take 1 Tab by mouth daily. 4/19/21  Yes Russel Tan MD   budesonide-formoteroL (Symbicort) 80-4.5 mcg/actuation HFAA Take 2 Puffs by inhalation. Yes Provider, Historical   ALPRAZolam (XANAX) 0.25 mg tablet Take 1 Tab by mouth two (2) times daily as needed for Anxiety. Max Daily Amount: 0.5 mg. 10/8/20  Yes Rosmery Perdue MD   ALPRAZolam Thurmon Dice) 0.5 mg tablet Take  by mouth. Patient not taking: Reported on 9/16/2021    Provider, Historical   ALPRAZolam Thurmon Dice) 0.5 mg tablet Take 1 Tablet by mouth two (2) times a day. Max Daily Amount: 1 mg. Patient not taking: Reported on 9/16/2021 6/14/21   Russel Tan MD   gabapentin (NEURONTIN) 100 mg capsule Take 2 Capsules by mouth three (3) times daily. Max Daily Amount: 600 mg.   Patient not taking: Reported on 8/26/2021 5/21/21   Russel Tan MD        [facility] Medications:  Current Outpatient Medications   Medication Sig    ferrous sulfate (FeosoL) 325 mg (65 mg iron) tablet Take 1 Tablet by mouth Daily (before breakfast).  furosemide (LASIX) 20 mg tablet One po qod    pantoprazole (PROTONIX) 40 mg tablet Take 1 Tablet by mouth Before breakfast and dinner.  atorvastatin (LIPITOR) 40 mg tablet Take 1 Tablet by mouth daily.  metoprolol tartrate (LOPRESSOR) 25 mg tablet TAKE 1/2 TABLET BY MOUTH TWICE DAILY    albuterol (PROVENTIL HFA, VENTOLIN HFA, PROAIR HFA) 90 mcg/actuation inhaler Take 2 Puffs by inhalation every four (4) hours as needed for Wheezing.  valACYclovir (VALTREX) 1 gram tablet Take 1 Tab by mouth two (2) times a day.  losartan (COZAAR) 100 mg tablet Take 1 Tab by mouth daily.  budesonide-formoteroL (Symbicort) 80-4.5 mcg/actuation HFAA Take 2 Puffs by inhalation.  ALPRAZolam (XANAX) 0.25 mg tablet Take 1 Tab by mouth two (2) times daily as needed for Anxiety. Max Daily Amount: 0.5 mg.    ALPRAZolam (XANAX) 0.5 mg tablet Take  by mouth. (Patient not taking: Reported on 9/16/2021)    ALPRAZolam (XANAX) 0.5 mg tablet Take 1 Tablet by mouth two (2) times a day. Max Daily Amount: 1 mg. (Patient not taking: Reported on 9/16/2021)    gabapentin (NEURONTIN) 100 mg capsule Take 2 Capsules by mouth three (3) times daily. Max Daily Amount: 600 mg. (Patient not taking: Reported on 8/26/2021)     No current facility-administered medications for this visit. Social History:  Social History     Tobacco Use    Smoking status: Former Smoker     Packs/day: 1.00     Years: 50.00     Pack years: 50.00    Smokeless tobacco: Never Used   Substance Use Topics    Alcohol use: Not Currently        Pt denies any history of IV drug use, blood transfusions. Family History:  Family History   Problem Relation Age of Onset    Cancer Father         stomach    Heart Disease Sister     Alcohol abuse Brother     Cancer Brother         lung        Review of Systems:  A detailed 10 system ROS is obtained, with pertinent positives as listed above. All others are negative. Objective:     Physical Exam:  Vitals:  BP (!) 134/59 (BP 1 Location: Left arm, BP Patient Position: Sitting)   Pulse (!) 51   Wt 76.2 kg (168 lb)   BMI 31.74 kg/m²    Gen:  Pt is alert, cooperative, no acute distress  Skin:  Extremities and face reveal no rashes. No recinos erythema. No telangiectasias on the chest wall. HEENT: Sclerae anicteric. Extra-occular muscles are intact. No oral ulcers. No abnormal pigmentation of the lips. The neck is supple. Cardiovascular: Regular rate and rhythm. No murmurs, gallops, or rubs. Respiratory:  Comfortable breathing with no accessory muscle use. GI:  Abdomen nondistended, soft, and nontender. Normal active bowel sounds. No enlargement of the liver or spleen. No masses palpable. Rectal:  Deferred  Musculoskeletal:  No costovertebral tenderness. Good muscle strength with no focal weakness. Extremities:  No pitting edema of the lower legs. Extremities have good range of motion. Neurological:  Gross memory appears intact. Patient is alert and oriented. Balance overall good. Psychiatric:  Mood appears appropriate with judgement intact. Lymphatic:  No obvious cervical or supraclavicular adenopathy.     Laboratory:      Lab Results   Component Value Date     08/26/2021    K 4.1 08/26/2021     08/26/2021    CO2 25 08/26/2021    AGAP 11 08/26/2021     (H) 08/26/2021    BUN 31 (H) 08/26/2021    CREA 1.00 08/26/2021    BUCR 31 08/26/2021    GFRAA >60 08/26/2021    GFRNA 53 08/26/2021    CA 9.2 08/26/2021    TBILI 0.7 08/26/2021    AST 25 08/26/2021    ALT 20 08/26/2021    AP 59 08/26/2021    TP 6.4 08/26/2021    ALB 3.6 08/26/2021    GLOB 2.8 08/26/2021    AGRAT 1.3 08/26/2021    WBC 5.4 09/16/2021    RBC 3.51 (L) 09/16/2021    HGB 11.0 (L) 09/16/2021    HCT 34.6 (L) 09/16/2021    MCV 98.6 09/16/2021    MCH 31.3 09/16/2021    MCHC 31.8 09/16/2021    RDW 13.3 09/16/2021     09/16/2021    MPLV 10.7 09/16/2021 GRANS 65 09/16/2021    LYMPH 18 (L) 09/16/2021    MONOS 11 (H) 09/16/2021    EOS 5 09/16/2021    BASOS 1 09/16/2021    IG 0 09/16/2021    ANEU 3.6 09/16/2021    ABL 1.0 09/16/2021    ABM 0.6 09/16/2021    CORINE 0.3 09/16/2021    ABB 0.1 09/16/2021    AIG 0.0 09/16/2021         CT scan of abdomen and pelvis-  CT Results (most recent):  Results from Hospital Encounter encounter on 01/17/21    CT ABD PELV W CONT    Narrative  EXAM: CT of the abdomen and pelvis    INDICATION: Pain. COMPARISON: May 16, 2013    TECHNIQUE: Axial CT imaging of the abdomen and pelvis was performed with  intravenous contrast. Multiplanar reformats were generated. One or more dose reduction techniques were used on this CT: automated exposure  control, adjustment of the mAs and/or kVp according to patient size, and  iterative reconstruction techniques. The specific techniques used on this CT  exam have been documented in the patient's electronic medical record. Digital  Imaging and Communications in Medicine (DICOM) format image data are available  to nonaffiliated external healthcare facilities or entities on a secure, media  free, reciprocally searchable basis with patient authorization for at least a  12-month period after this study. _______________    FINDINGS:    LOWER CHEST: Small hiatal hernia. LIVER, BILIARY: Diffuse decreased liver attenuation. No biliary dilation. Gallbladder is unremarkable. PANCREAS: Normal.    SPLEEN: Normal.    ADRENALS: Stable right thickening and left adrenal gland nodule. KIDNEYS: Stable left renal cortical cyst and lower pole scarring. LYMPH NODES: No enlarged lymph nodes. GASTROINTESTINAL TRACT: No bowel dilation or wall thickening. Uncomplicated  diverticula seen throughout the colon. PELVIC ORGANS: Unremarkable. VASCULATURE: Advanced atherosclerotic vascular calcification. BONES: No acute or aggressive osseous abnormalities identified.  Multilevel  spinal degenerative changes. OTHER: None.    _______________    Impression  IMPRESSION:      1. No evidence for acute abdominal or pelvic process. 2. Colonic diverticulosis. 3. Hiatal hernia. US abdomen- no results US Results (most recent):  No results found for this or any previous visit. MRI and MRCP- no results  MRI Results (most recent):  No results found for this or any previous visit. Assessment:     1. GI bleeding. This is multifactorial in nature. The upper endoscopy showed erosive esophagitis, small gastric ulcer, gastritis, bleeding gastric polyps, and in particular, very friable mucosa that bled anywhere areas were washed or biopsied. She is on a PPI twice a day at this time. She will require repeat endoscopy at some point to assess healing. 2. Iron deficiency anemia. Again, I feel she has chronic anemia and she did have some rectal bleeding which she has spots of blood in the past she states. As I feel this was acute on chronic anemia, she should have colonoscopy. Taking iron daily. 3. COPD. This is controlled with inhalers. 4. Anxiety. Stable. Plan:     1. EGD with MAC. I discussed the techniques involved with the procedure as well as the risks, benefits, and alternatives including but not limited to bleeding, infection, perforation requiring emergent surgery, missing lesions, death, and anesthesia related complications with the patient. All questions and concerns were answered. The patient voiced understanding and agrees to proceed. To be scheduled in 6 to 8 weeks to allow adequate healing. 2.  Colonoscopy with MAC. Bowel prep magnesium citrate. I discussed the techniques involved with the procedure as well as the risks, benefits, and alternatives including but not limited to bleeding, infection, perforation requiring emergent surgery, missing lesions, death, and anesthesia related complications with the patient. All questions and concerns were answered.   The patient voiced understanding and agrees to proceed. Can be done concurrently with the EGD if she prefers. 3.   Continue pantoprazole 40 mg twice a day for total of 12 weeks then decrease to once daily. 4.   Hold iron 7 days prior to the procedures. 5.   No aspirin or nonsteroidal anti-inflammatory drugs. 6.   Further recommendations pending her clinical course. 7.   She will follow up with me in 3 months time. I will order a CBC to be checked prior to that visit.     Mee Arenas MD

## 2021-09-16 NOTE — PROGRESS NOTES
Wilfredo Caldera presents today for   Chief Complaint   Patient presents with    Follow-up     Patient stated she has seen anymore blood in her stool        Is someone accompanying this pt? no    Is the patient using any DME equipment during [address]? no    Depression Screening:  3 most recent PHQ Screens 9/16/2021   Little interest or pleasure in doing things Not at all   Feeling down, depressed, irritable, or hopeless Not at all   Total Score PHQ 2 0   Trouble falling or staying asleep, or sleeping too much -   Feeling tired or having little energy -   Poor appetite, weight loss, or overeating -   Feeling bad about yourself - or that you are a failure or have let yourself or your family down -   Trouble concentrating on things such as school, work, reading, or watching TV -   Moving or speaking so slowly that other people could have noticed; or the opposite being so fidgety that others notice -   Thoughts of being better off dead, or hurting yourself in some way -   PHQ 9 Score -   How difficult have these problems made it for you to do your work, take care of your home and get along with others -       Learning Assessment:  Learning Assessment 12/3/2020   PRIMARY LEARNER Patient   HIGHEST LEVEL OF EDUCATION - PRIMARY LEARNER  DID NOT GRADUATE HIGH SCHOOL   BARRIERS PRIMARY LEARNER NONE   PRIMARY LANGUAGE ENGLISH   LEARNER PREFERENCE PRIMARY READING   ANSWERED BY patient   RELATIONSHIP SELF       Fall Risk  Fall Risk Assessment, last 12 mths 9/16/2021   Able to walk? Yes   Fall in past 12 months? 0   Do you feel unsteady? 0   Are you worried about falling 0   Is the gait abnormal? -   Number of falls in past 12 months -   Fall with injury? -       Coordination of Care:  1. Have you been to the ER, urgent care clinic since your last visit? Hospitalized since your last visit? no    2. Have you seen or consulted any other health care providers outside of the [address] [place] [address] [place] since your last visit?  Include any pap smears or colon screening.  Yes, PCP [place]

## 2021-09-19 ENCOUNTER — TELEPHONE (OUTPATIENT)
Dept: FAMILY MEDICINE CLINIC | Age: 81
End: 2021-09-19

## 2021-10-14 RX ORDER — PANTOPRAZOLE SODIUM 40 MG/1
40 TABLET, DELAYED RELEASE ORAL
Qty: 60 TABLET | Refills: 1 | Status: SHIPPED | OUTPATIENT
Start: 2021-10-14 | End: 2021-11-01

## 2021-10-14 NOTE — TELEPHONE ENCOUNTER
Pt needs refill of Pantoprazole sent to [facility]. She said that Dr. Susan Dorman prescribed it, but I see Dr. Libby Boyer.

## 2021-10-15 DIAGNOSIS — I10 ESSENTIAL HYPERTENSION: ICD-10-CM

## 2021-10-15 RX ORDER — LOSARTAN POTASSIUM 100 MG/1
100 TABLET ORAL DAILY
Qty: 90 TABLET | Refills: 1 | Status: SHIPPED | OUTPATIENT
Start: 2021-10-15 | End: 2022-04-19 | Stop reason: SDUPTHER

## 2021-10-15 NOTE — TELEPHONE ENCOUNTER
Patient needs Losartan sent to [place]. She was last seen on 8/31/21 by Dr. Darryl Singletary.

## 2021-10-18 ENCOUNTER — ANESTHESIA EVENT (OUTPATIENT)
Dept: ENDOSCOPY | Age: 81
End: 2021-10-18
Payer: MEDICARE

## 2021-10-18 DIAGNOSIS — J44.9 CHRONIC OBSTRUCTIVE PULMONARY DISEASE, UNSPECIFIED COPD TYPE (HCC): Primary | ICD-10-CM

## 2021-10-18 DIAGNOSIS — F41.9 ANXIETY: ICD-10-CM

## 2021-10-18 RX ORDER — SODIUM CHLORIDE 0.9 % (FLUSH) 0.9 %
5-40 SYRINGE (ML) INJECTION EVERY 8 HOURS
Status: CANCELLED | OUTPATIENT
Start: 2021-10-18

## 2021-10-18 RX ORDER — BUDESONIDE AND FORMOTEROL FUMARATE DIHYDRATE 80; 4.5 UG/1; UG/1
2 AEROSOL RESPIRATORY (INHALATION) 2 TIMES DAILY
Qty: 10.2 G | Refills: 1 | Status: SHIPPED | OUTPATIENT
Start: 2021-10-18 | End: 2022-07-07 | Stop reason: SDUPTHER

## 2021-10-18 RX ORDER — ALPRAZOLAM 0.25 MG/1
0.25 TABLET ORAL
Qty: 60 TABLET | Refills: 2 | Status: SHIPPED | OUTPATIENT
Start: 2021-10-18 | End: 2021-10-21

## 2021-10-18 RX ORDER — INSULIN LISPRO 100 [IU]/ML
INJECTION, SOLUTION INTRAVENOUS; SUBCUTANEOUS ONCE
Status: CANCELLED | OUTPATIENT
Start: 2021-10-18 | End: 2021-10-18

## 2021-10-18 NOTE — TELEPHONE ENCOUNTER
Pt called for a rx refill. Please advise   Requested Prescriptions     Pending Prescriptions Disp Refills    ALPRAZolam (XANAX) 0.25 mg tablet 60 Tablet 2     Sig: Take 1 Tablet by mouth two (2) times daily as needed for Anxiety. Max Daily Amount: 0.5 mg.    budesonide-formoteroL (Symbicort) 80-4.5 mcg/actuation HFAA 10.2 g 1     Sig: Take 2 Puffs by inhalation two (2) times a day.

## 2021-10-19 ENCOUNTER — TELEPHONE (OUTPATIENT)
Dept: FAMILY MEDICINE CLINIC | Age: 81
End: 2021-10-19

## 2021-10-19 NOTE — TELEPHONE ENCOUNTER
Patient came in the office stating her prescription for Xanax was incorrect and was supposed to be 2.5 mg daily instead of 0.5 mg daily. I explained I would have Dr. Gabriella Palmer or his nurse call her back.

## 2021-10-21 DIAGNOSIS — F41.9 ANXIETY: ICD-10-CM

## 2021-10-21 RX ORDER — ALPRAZOLAM 0.5 MG/1
0.5 TABLET ORAL 2 TIMES DAILY
Qty: 60 TABLET | Refills: 2 | Status: SHIPPED | OUTPATIENT
Start: 2021-10-21 | End: 2022-01-27 | Stop reason: SDUPTHER

## 2021-10-22 ENCOUNTER — HOSPITAL ENCOUNTER (OUTPATIENT)
Dept: PREADMISSION TESTING | Age: 81
Discharge: HOME | End: 2021-10-22
Payer: MEDICARE

## 2021-10-22 LAB — SARS-COV-2, COV2: NORMAL

## 2021-10-22 PROCEDURE — U0003 INFECTIOUS AGENT DETECTION BY NUCLEIC ACID (DNA OR RNA); SEVERE ACUTE RESPIRATORY SYNDROME CORONAVIRUS 2 (SARS-COV-2) (CORONAVIRUS DISEASE [COVID-19]), AMPLIFIED PROBE TECHNIQUE, MAKING USE OF HIGH THROUGHPUT TECHNOLOGIES AS DESCRIBED BY CMS-2020-01-R: HCPCS

## 2021-10-23 LAB — SARS-COV-2, COV2NT: NOT DETECTED

## 2021-10-26 ENCOUNTER — ANESTHESIA (OUTPATIENT)
Dept: ENDOSCOPY | Age: 81
End: 2021-10-26
Payer: MEDICARE

## 2021-10-26 ENCOUNTER — HOSPITAL ENCOUNTER (OUTPATIENT)
Age: 81
Setting detail: OUTPATIENT SURGERY
Discharge: HOME | End: 2021-10-26
Attending: INTERNAL MEDICINE | Admitting: INTERNAL MEDICINE
Payer: MEDICARE

## 2021-10-26 VITALS
BODY MASS INDEX: 28.85 KG/M2 | OXYGEN SATURATION: 98 % | HEIGHT: 64 IN | HEART RATE: 55 BPM | SYSTOLIC BLOOD PRESSURE: 146 MMHG | WEIGHT: 169 LBS | RESPIRATION RATE: 14 BRPM | DIASTOLIC BLOOD PRESSURE: 65 MMHG | TEMPERATURE: 97 F

## 2021-10-26 PROCEDURE — 45385 COLONOSCOPY W/LESION REMOVAL: CPT | Performed by: INTERNAL MEDICINE

## 2021-10-26 PROCEDURE — 74011000250 HC RX REV CODE- 250: Performed by: NURSE ANESTHETIST, CERTIFIED REGISTERED

## 2021-10-26 PROCEDURE — 74011250636 HC RX REV CODE- 250/636: Performed by: NURSE ANESTHETIST, CERTIFIED REGISTERED

## 2021-10-26 PROCEDURE — 77030037186 HC VLV ENDOSC STRL DEFENDO DISP MVAT -A: Performed by: INTERNAL MEDICINE

## 2021-10-26 PROCEDURE — 76040000019: Performed by: INTERNAL MEDICINE

## 2021-10-26 PROCEDURE — 2709999900 HC NON-CHARGEABLE SUPPLY: Performed by: INTERNAL MEDICINE

## 2021-10-26 PROCEDURE — 43235 EGD DIAGNOSTIC BRUSH WASH: CPT | Performed by: INTERNAL MEDICINE

## 2021-10-26 PROCEDURE — 77030042138 HC TBNG SPECIAL -A: Performed by: INTERNAL MEDICINE

## 2021-10-26 PROCEDURE — 88305 TISSUE EXAM BY PATHOLOGIST: CPT

## 2021-10-26 PROCEDURE — 76060000031 HC ANESTHESIA FIRST 0.5 HR: Performed by: INTERNAL MEDICINE

## 2021-10-26 PROCEDURE — 77030018831 HC SOL IRR H20 BAXT -A: Performed by: INTERNAL MEDICINE

## 2021-10-26 PROCEDURE — 77030013995 HC SNR POLYP ENDOSC OCOA -A: Performed by: INTERNAL MEDICINE

## 2021-10-26 RX ORDER — DIPHENHYDRAMINE HYDROCHLORIDE 50 MG/ML
12.5 INJECTION, SOLUTION INTRAMUSCULAR; INTRAVENOUS
Status: CANCELLED | OUTPATIENT
Start: 2021-10-26

## 2021-10-26 RX ORDER — ONDANSETRON 2 MG/ML
4 INJECTION INTRAMUSCULAR; INTRAVENOUS ONCE
Status: CANCELLED | OUTPATIENT
Start: 2021-10-26 | End: 2021-10-26

## 2021-10-26 RX ORDER — LIDOCAINE HYDROCHLORIDE 20 MG/ML
INJECTION, SOLUTION EPIDURAL; INFILTRATION; INTRACAUDAL; PERINEURAL AS NEEDED
Status: DISCONTINUED | OUTPATIENT
Start: 2021-10-26 | End: 2021-10-26 | Stop reason: HOSPADM

## 2021-10-26 RX ORDER — SODIUM CHLORIDE, SODIUM LACTATE, POTASSIUM CHLORIDE, CALCIUM CHLORIDE 600; 310; 30; 20 MG/100ML; MG/100ML; MG/100ML; MG/100ML
25 INJECTION, SOLUTION INTRAVENOUS CONTINUOUS
Status: CANCELLED | OUTPATIENT
Start: 2021-10-26 | End: 2021-10-26

## 2021-10-26 RX ORDER — FLUMAZENIL 0.1 MG/ML
0.2 INJECTION INTRAVENOUS
Status: CANCELLED | OUTPATIENT
Start: 2021-10-26

## 2021-10-26 RX ORDER — NALOXONE HYDROCHLORIDE 0.4 MG/ML
0.1 INJECTION, SOLUTION INTRAMUSCULAR; INTRAVENOUS; SUBCUTANEOUS
Status: CANCELLED | OUTPATIENT
Start: 2021-10-26

## 2021-10-26 RX ORDER — EPHEDRINE SULFATE/0.9% NACL/PF 50 MG/5 ML
SYRINGE (ML) INTRAVENOUS AS NEEDED
Status: DISCONTINUED | OUTPATIENT
Start: 2021-10-26 | End: 2021-10-26 | Stop reason: HOSPADM

## 2021-10-26 RX ORDER — PROPOFOL 10 MG/ML
INJECTION, EMULSION INTRAVENOUS AS NEEDED
Status: DISCONTINUED | OUTPATIENT
Start: 2021-10-26 | End: 2021-10-26 | Stop reason: HOSPADM

## 2021-10-26 RX ORDER — SODIUM CHLORIDE 0.9 % (FLUSH) 0.9 %
5-40 SYRINGE (ML) INJECTION AS NEEDED
Status: DISCONTINUED | OUTPATIENT
Start: 2021-10-26 | End: 2021-10-26 | Stop reason: HOSPADM

## 2021-10-26 RX ORDER — SODIUM CHLORIDE, SODIUM LACTATE, POTASSIUM CHLORIDE, CALCIUM CHLORIDE 600; 310; 30; 20 MG/100ML; MG/100ML; MG/100ML; MG/100ML
25 INJECTION, SOLUTION INTRAVENOUS CONTINUOUS
Status: DISCONTINUED | OUTPATIENT
Start: 2021-10-26 | End: 2021-10-26 | Stop reason: HOSPADM

## 2021-10-26 RX ORDER — FENTANYL CITRATE 50 UG/ML
50 INJECTION, SOLUTION INTRAMUSCULAR; INTRAVENOUS AS NEEDED
Status: CANCELLED | OUTPATIENT
Start: 2021-10-26

## 2021-10-26 RX ORDER — SODIUM CHLORIDE 0.9 % (FLUSH) 0.9 %
5-40 SYRINGE (ML) INJECTION AS NEEDED
Status: CANCELLED | OUTPATIENT
Start: 2021-10-26

## 2021-10-26 RX ADMIN — PROPOFOL 20 MG: 10 INJECTION, EMULSION INTRAVENOUS at 09:11

## 2021-10-26 RX ADMIN — SODIUM CHLORIDE, POTASSIUM CHLORIDE, SODIUM LACTATE AND CALCIUM CHLORIDE 25 ML/HR: 600; 310; 30; 20 INJECTION, SOLUTION INTRAVENOUS at 07:33

## 2021-10-26 RX ADMIN — PROPOFOL 20 MG: 10 INJECTION, EMULSION INTRAVENOUS at 09:08

## 2021-10-26 RX ADMIN — PROPOFOL 20 MG: 10 INJECTION, EMULSION INTRAVENOUS at 09:13

## 2021-10-26 RX ADMIN — LIDOCAINE HYDROCHLORIDE 100 MG: 20 INJECTION, SOLUTION EPIDURAL; INFILTRATION; INTRACAUDAL; PERINEURAL at 08:59

## 2021-10-26 RX ADMIN — Medication 10 MG: at 09:10

## 2021-10-26 RX ADMIN — PROPOFOL 70 MG: 10 INJECTION, EMULSION INTRAVENOUS at 09:02

## 2021-10-26 RX ADMIN — PROPOFOL 50 MG: 10 INJECTION, EMULSION INTRAVENOUS at 09:04

## 2021-10-26 RX ADMIN — PROPOFOL 20 MG: 10 INJECTION, EMULSION INTRAVENOUS at 09:07

## 2021-10-26 NOTE — H&P
Date of Surgery Update:  Gayatri Jarquin was seen and examined. History and physical has been reviewed. The patient has been examined. There have been no significant clinical changes since the completion of the originally dated History and Physical.  Patient identified by surgeon; surgical site was confirmed by patient and surgeon.     Signed By: Belia Mata MD     October 26, 2021 8:50 AM

## 2021-10-26 NOTE — PROCEDURES
EGD and Colonoscopy procedure notes    Date of procedure: 10/26/2021    Indication for procedure: GI bleed, gastric ulcers, iron deficiency anemia. Type of procedure: Upper endoscopy     Anesthesia classification: ASA class 2    Patient history and physical has been accomplished and documented. Patient is assessed and determined to be an appropriate candidate for planned procedure and sedation. The patient was assessed immediately prior to sedation. Sedation plan: MAC per anesthesia. Airway assessment: Range of motion: normal, mouth opening: Normal, visual obstruction: No.    PREOP EXAM:  Unchanged. VS: Reviewed  Gen: WDWN in NAD  CV: RRR, no murmur  Resp: CTAB  Abd: Soft, NTND, +BS  Extrem: No cyanosis or edema  Neuro: Awake and alert      Informed consent obtained: Yes. The indications, risks, including but not limited to bleeding, perforation, infection, death, potential for failure to visualize or diagnose neoplasia, alternatives, benefits, discussed in detail with the patient prior to the procedure. Patient understands and agrees to the procedure. Patient identity and procedure were verified, consent was obtained, and is consistent with the consent form in the patient's records. INSTRUMENT: Olympus upper endoscope    PROCEDURE FINDINGS:    OROPHARYNX: Normal    ESOPHAGUS:  Esophageal mucosa normal with no masses noted in the proximal, mid, and distal esophagus. No endoscopic features of eosinophilic esophagitis were noted. The Z-line was at 38 cm. and was normal.    A 3 cm hiatal hernia was noted distally. The previous esophagitis had healed. STOMACH: Stomach was then evaluated including the cardia and fundus on retroflexion view. Evaluation of the gastric body, antrum, and pylorus were normal, including normal gastric mucosa with no masses, ulcers, or mucosal abnormalities. There was one small nonbleeding AVM in the greater curvature of the body of the stomach.   The previous small gastric ulcer and gastritis were healed. Retroflexion view of the cardia and fundus were normal.  Unlike for the last endoscopy, the mucosa was no longer friable. DUODENUM:  The duodenal bulb and descending duodenum were then evaluated and found to be normal including no masses, ulcers, or or mucosal abnormalities. SPECIMENS: None    ESTIMATED BLOOD LOSS:  None . COMPLICATIONS:  None     COMMENTS: none    Impression:  1.  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 2.  3 cm hiatal hernia. 3.  Healed esophagitis, gastric ulcer and gastritis. Gastric mucosa no longer friable. Recommendations:    1. Reduce present PPI therapy to once daily. 2.  Follow up as scheduled in 3 months. 4.  Further recommendations pending clinical course. 5.  We will check CBC at follow-up visit. 6.  Proceed with colonoscopy. Karlie Rivera M.D. Colonoscopy procedure note    Date of service: 10/26/2021    Type: Diagnostic     Indication for procedure: Iron deficiency anemia    Anesthesia classification: ASA class 2    Patient history and physical been accomplished and documented. Patient is assessed and determined to be appropriate candidate for planned procedure and sedation; patient reassessed immediately prior to sedation. Sedation plan: MAC per anesthesia    Informed consent obtained: Yes.  he indication, risks including but not limited to bleeding, perforation, infection, death, and potential failure to visual areas are diagnosed neoplasia, alternatives and benefits were discussed with the patient prior to the procedure. Patient identity and procedure was verified, absent was obtained, and is consistent with the consent form found in the patient's records. PROCEDURE PERFORMED:  COLONOSCOPY  to the cecum with MAC 2 hot snare polypectomy's of descending colon polyps. INSTRUMENT: Olympus colonoscope per nursing notes.     FINDINGS:    External anal lesions: Normal   Rectum: normal.   Retroflexion view: Grade 2 internal hemorrhoids. Sigmoid: normal except for scattered diverticulosis. Descending Colon: normal for scattered diverticulosis. There were 2 sessile polyps approximately 1 cm in size in the descending colon that were removed by hot snare polypectomy without incident. Transverse Colon: normal scattered diverticulosis. Ascending Colon: normal for scattered diverticulosis. Cecum: normal   Terminal ileum: normal    Specimens: 1. Hot snare polypectomy of 2 descending colon polyps. Bowel preparation- adequate to detect small (5mm) polyps or larger. Estimated blood loss: none   Complications:  none   Cecal withdrawal time: 8 minutes. Comments:  none     Impression:  1. Removal of 2 sessile polyps from the descending colon. 2. Scattered diverticulosis throughout the colon. 3. Grade 2 internal hemorrhoids. 4. No lower GI tract etiology to explain anemia. Recommendations:  1. Check pathology. Will notify patient with results when they are available. 2.   Repeat colonoscopy in 3 years for surveillance versus none due to patient age of 78. Will be her choice. 3.   Follow up as scheduled in 3 months time.        Michelle Bermudez MD

## 2021-10-26 NOTE — ANESTHESIA POSTPROCEDURE EVALUATION
Procedure(s):  EGD  COLONOSCOPY w/ polypectomy. MAC    Anesthesia Post Evaluation      Multimodal analgesia: multimodal analgesia used between 6 hours prior to anesthesia start to PACU discharge  Patient location during evaluation: PACU  Patient participation: complete - patient participated  Level of consciousness: awake and alert  Pain management: adequate  Airway patency: patent  Anesthetic complications: no  Cardiovascular status: acceptable  Respiratory status: acceptable  Hydration status: acceptable  Comments: Ok to discharge when standard criteria are met.    Post anesthesia nausea and vomiting:  none  Final Post Anesthesia Temperature Assessment:  Normothermia (36.0-37.5 degrees C)      INITIAL Post-op Vital signs:   Vitals Value Taken Time   /50 10/26/21 0921   Temp 36.1 °C (97 °F) 10/26/21 0921   Pulse 50 10/26/21 0921   Resp 16 10/26/21 0921   SpO2 97 % 10/26/21 0921

## 2021-10-26 NOTE — ANESTHESIA PREPROCEDURE EVALUATION
Relevant Problems   RESPIRATORY SYSTEM   (+) Chronic obstructive lung disease (HCC)      CARDIOVASCULAR   (+) Essential hypertension      HEMATOLOGY   (+) Acute anemia   (+) Severe anemia       Anesthetic History   No history of anesthetic complications            Review of Systems / Medical History  Patient summary reviewed, nursing notes reviewed and pertinent labs reviewed    Pulmonary    COPD            Comments: Former smoker 1ppd x 50 years   Neuro/Psych         Psychiatric history    Comments: Resting tremor    Anxiety Cardiovascular    Hypertension          Hyperlipidemia    Exercise tolerance: <4 METS     GI/Hepatic/Renal               Comments: Recent endoscopy for hematemesis/melena 8/2021  Endo/Other             Other Findings            Physical Exam    Airway  Mallampati: III  TM Distance: 4 - 6 cm  Neck ROM: normal range of motion   Mouth opening: Normal     Cardiovascular  Regular rate and rhythm,  S1 and S2 normal,  no murmur, click, rub, or gallop  Rhythm: regular  Rate: normal         Dental    Dentition: Edentulous     Pulmonary  Breath sounds clear to auscultation               Abdominal  GI exam deferred       Other Findings            Anesthetic Plan    ASA: 3  Anesthesia type: MAC          Induction: Intravenous  Anesthetic plan and risks discussed with: Patient

## 2021-10-27 ENCOUNTER — TELEPHONE (OUTPATIENT)
Dept: GASTROENTEROLOGY | Age: 81
End: 2021-10-27

## 2021-10-27 NOTE — TELEPHONE ENCOUNTER
Pt called and wanted to know about what to do about taking her Iron pills. She said she had her procedures on yesterday. Wasn't sure who to route this to. The patient wanted a phone call.   [phone]

## 2021-10-27 NOTE — PROGRESS NOTES
Colonoscopy pathology results-benign adenomatous polyp. No malignancy. Recommend repeat colonoscopy is not needed given age of 78. [place] Stands Please notify patient of result.

## 2021-10-28 RX ORDER — METOPROLOL TARTRATE 25 MG/1
TABLET, FILM COATED ORAL
Qty: 90 TABLET | Refills: 0 | Status: SHIPPED | OUTPATIENT
Start: 2021-10-28 | End: 2022-02-01

## 2021-11-01 ENCOUNTER — OFFICE VISIT (OUTPATIENT)
Dept: FAMILY MEDICINE CLINIC | Age: 81
End: 2021-11-01
Payer: MEDICARE

## 2021-11-01 VITALS
TEMPERATURE: 97.6 F | DIASTOLIC BLOOD PRESSURE: 68 MMHG | WEIGHT: 168 LBS | BODY MASS INDEX: 28.84 KG/M2 | SYSTOLIC BLOOD PRESSURE: 151 MMHG | OXYGEN SATURATION: 96 % | HEART RATE: 53 BPM

## 2021-11-01 DIAGNOSIS — Z00.00 MEDICARE ANNUAL WELLNESS VISIT, SUBSEQUENT: ICD-10-CM

## 2021-11-01 DIAGNOSIS — I10 ESSENTIAL HYPERTENSION: ICD-10-CM

## 2021-11-01 DIAGNOSIS — D64.9 SEVERE ANEMIA: ICD-10-CM

## 2021-11-01 DIAGNOSIS — L30.9 ECZEMA, UNSPECIFIED TYPE: Primary | ICD-10-CM

## 2021-11-01 PROCEDURE — G8753 SYS BP > OR = 140: HCPCS | Performed by: STUDENT IN AN ORGANIZED HEALTH CARE EDUCATION/TRAINING PROGRAM

## 2021-11-01 PROCEDURE — G8400 PT W/DXA NO RESULTS DOC: HCPCS | Performed by: STUDENT IN AN ORGANIZED HEALTH CARE EDUCATION/TRAINING PROGRAM

## 2021-11-01 PROCEDURE — G8427 DOCREV CUR MEDS BY ELIG CLIN: HCPCS | Performed by: STUDENT IN AN ORGANIZED HEALTH CARE EDUCATION/TRAINING PROGRAM

## 2021-11-01 PROCEDURE — 1101F PT FALLS ASSESS-DOCD LE1/YR: CPT | Performed by: STUDENT IN AN ORGANIZED HEALTH CARE EDUCATION/TRAINING PROGRAM

## 2021-11-01 PROCEDURE — G8754 DIAS BP LESS 90: HCPCS | Performed by: STUDENT IN AN ORGANIZED HEALTH CARE EDUCATION/TRAINING PROGRAM

## 2021-11-01 PROCEDURE — G0439 PPPS, SUBSEQ VISIT: HCPCS | Performed by: STUDENT IN AN ORGANIZED HEALTH CARE EDUCATION/TRAINING PROGRAM

## 2021-11-01 PROCEDURE — G8536 NO DOC ELDER MAL SCRN: HCPCS | Performed by: STUDENT IN AN ORGANIZED HEALTH CARE EDUCATION/TRAINING PROGRAM

## 2021-11-01 PROCEDURE — G8417 CALC BMI ABV UP PARAM F/U: HCPCS | Performed by: STUDENT IN AN ORGANIZED HEALTH CARE EDUCATION/TRAINING PROGRAM

## 2021-11-01 PROCEDURE — G8510 SCR DEP NEG, NO PLAN REQD: HCPCS | Performed by: STUDENT IN AN ORGANIZED HEALTH CARE EDUCATION/TRAINING PROGRAM

## 2021-11-01 PROCEDURE — 99214 OFFICE O/P EST MOD 30 MIN: CPT | Performed by: STUDENT IN AN ORGANIZED HEALTH CARE EDUCATION/TRAINING PROGRAM

## 2021-11-01 RX ORDER — PANTOPRAZOLE SODIUM 40 MG/1
40 TABLET, DELAYED RELEASE ORAL DAILY
Qty: 60 TABLET | Refills: 1 | Status: SHIPPED | OUTPATIENT
Start: 2021-11-01 | End: 2022-02-24 | Stop reason: SDUPTHER

## 2021-11-01 RX ORDER — TRIAMCINOLONE ACETONIDE 5 MG/G
CREAM TOPICAL 2 TIMES DAILY
Qty: 15 G | Refills: 0 | Status: ON HOLD | OUTPATIENT
Start: 2021-11-01 | End: 2022-01-17

## 2021-11-01 NOTE — PROGRESS NOTES
This is the Subsequent Medicare Annual Wellness Exam, performed 12 months or more after the Initial AWV or the last Subsequent AWV    I have reviewed the patient's medical history in detail and updated the computerized patient record. Assessment/Plan   Education and counseling provided:  Are appropriate based on today's review and evaluation    1. Eczema, unspecified type  2. Essential hypertension  3. Severe anemia  -     CBC WITH AUTOMATED DIFF  -     METABOLIC PANEL, COMPREHENSIVE  -     IRON PROFILE  -     FERRITIN  4. Medicare annual wellness visit, subsequent       Depression Risk Factor Screening     3 most recent PHQ Screens 11/1/2021   Little interest or pleasure in doing things Several days   Feeling down, depressed, irritable, or hopeless Several days   Total Score PHQ 2 2   Trouble falling or staying asleep, or sleeping too much -   Feeling tired or having little energy -   Poor appetite, weight loss, or overeating -   Feeling bad about yourself - or that you are a failure or have let yourself or your family down -   Trouble concentrating on things such as school, work, reading, or watching TV -   Moving or speaking so slowly that other people could have noticed; or the opposite being so fidgety that others notice -   Thoughts of being better off dead, or hurting yourself in some way -   PHQ 9 Score -   How difficult have these problems made it for you to do your work, take care of your home and get along with others -       Alcohol Risk Screen    Do you average more than 1 drink per night or more than 7 drinks a week:  No    On any one occasion in the past three months have you have had more than 3 drinks containing alcohol:  No        Functional Ability and Level of Safety    Hearing: Hearing is good. Activities of Daily Living: The home contains: no safety equipment.   Patient does total self care      Ambulation: with no difficulty     Fall Risk:  Fall Risk Assessment, last 12 mths 11/1/2021   Able to walk? Yes   Fall in past 12 months? 0   Do you feel unsteady? 0   Are you worried about falling 0   Is the gait abnormal? -   Number of falls in past 12 months -   Fall with injury? -      Abuse Screen:  Patient is not abused       Cognitive Screening    Has your family/caregiver stated any concerns about your memory: no     Cognitive Screening: Normal - Mini Cog Test    Health Maintenance Due     Health Maintenance Due   Topic Date Due    DTaP/Tdap/Td series (1 - Tdap) Never done    Shingrix Vaccine Age 50> (1 of 2) Never done    Bone Densitometry (Dexa) Screening  Never done    Pneumococcal 65+ years (1 of 1 - PPSV23) Never done    Medicare Yearly Exam  Never done    COVID-19 Vaccine (2 - Moderna 2-dose series) 02/23/2021    Flu Vaccine (1) 09/01/2021       Patient Care Team   Patient Care Team:  Alice Bradford MD as PCP - General (Family Medicine)  Alice Bradford MD as PCP - [address] [place] [address] [facility]    History     Patient Active Problem List   Diagnosis Code    Chronic obstructive lung disease ([facility] [address] J44.9    Essential hypertension I10    Hyperlipidemia E78.5    Acute anemia D64.9    Severe anemia D64.9    Abnormal laboratory test R89.9    Herpes zoster without complication V64.8    Fatigue R53.83    Post herpetic neuralgia B02.29    GI bleed K92.2     Past Medical History:   Diagnosis Date    Allergic rhinitis     Anemia     Anxiety disorder     Chronic obstructive pulmonary disease ([facility] [address]     Dyslipidemia     Folic acid deficiency     Hypertension     Neurologic disorder     resting tremor      Past Surgical History:   Procedure Laterality Date    COLONOSCOPY N/A 10/26/2021    COLONOSCOPY w/ polypectomy performed by [facility] at [facility] ENDOSCOPY     Current Outpatient Medications   Medication Sig Dispense Refill    triamcinolone (ARISTOCORT) 0.5 % topical cream Apply  to affected area two (2) times a day.  use thin layer 15 g 0    pantoprazole (PROTONIX) 40 mg tablet Take 1 Tablet by mouth daily. 60 Tablet 1    metoprolol tartrate (LOPRESSOR) 25 mg tablet TAKE 1/2 TABLET BY MOUTH TWICE DAILY 90 Tablet 0    ALPRAZolam (XANAX) 0.5 mg tablet Take 1 Tablet by mouth two (2) times a day. Max Daily Amount: 1 mg. 60 Tablet 2    budesonide-formoteroL (Symbicort) 80-4.5 mcg/actuation HFAA Take 2 Puffs by inhalation two (2) times a day. 10.2 g 1    losartan (COZAAR) 100 mg tablet Take 1 Tablet by mouth daily. 90 Tablet 1    ferrous sulfate (FeosoL) 325 mg (65 mg iron) tablet Take 1 Tablet by mouth Daily (before breakfast). 90 Tablet 2    furosemide (LASIX) 20 mg tablet One po qod 30 Tablet 1    atorvastatin (LIPITOR) 40 mg tablet Take 1 Tablet by mouth daily. 90 Tablet 1    albuterol (PROVENTIL HFA, VENTOLIN HFA, PROAIR HFA) 90 mcg/actuation inhaler Take 2 Puffs by inhalation every four (4) hours as needed for Wheezing. 1 Inhaler 1     Allergies   Allergen Reactions    Isopropyl Alcohol Other (comments)     Weakness all over - pt states she has out grown this    Pen-Vee K Rash     Pt. States she avoids penicillin due to allergy as a child.         Family History   Problem Relation Age of Onset    Cancer Father         stomach    Heart Disease Sister     Alcohol abuse Brother     Cancer Brother         lung     Social History     Tobacco Use    Smoking status: Former Smoker     Packs/day: 1.00     Years: 50.00     Pack years: 50.00    Smokeless tobacco: Never Used   Substance Use Topics    Alcohol use: Not Currently         Candy Hannah MD

## 2021-11-01 NOTE — PROGRESS NOTES
Subjective:   Fernando Bagley is a 78 y.o. female who was seen for annual wellness, [facility] (establish care was a [facility] patient )    Patient recently had an EGD which showed healing ulcers. She has her PPI decreased to once daily. Denies any bleeding and taking iron supplements. Her anemia has improved as well denies any fatigue. She does have some intermittent rash on her body and would like a cream for it. Blood pressures are well controlled at home. Home Medications    Medication Sig Start Date End Date Taking? Authorizing Provider   triamcinolone (ARISTOCORT) 0.5 % topical cream Apply  to affected area two (2) times a day. use thin layer 11/1/21  Yes Massimo Akbar MD   pantoprazole (PROTONIX) 40 mg tablet Take 1 Tablet by mouth daily. 11/1/21  Yes Massimo Akbar MD   metoprolol tartrate (LOPRESSOR) 25 mg tablet TAKE 1/2 TABLET BY MOUTH TWICE DAILY 10/28/21  Yes Massimo Akbar MD   ALPRAZolam Janelle Shira) 0.5 mg tablet Take 1 Tablet by mouth two (2) times a day. Max Daily Amount: 1 mg. 10/21/21  Yes Massimo Akbar MD   budesonide-formoteroL (Symbicort) 80-4.5 mcg/actuation HFAA Take 2 Puffs by inhalation two (2) times a day. 10/18/21  Yes Massimo Akbar MD   losartan (COZAAR) 100 mg tablet Take 1 Tablet by mouth daily. 10/15/21  Yes Massimo Akbar MD   ferrous sulfate (FeosoL) 325 mg (65 mg iron) tablet Take 1 Tablet by mouth Daily (before breakfast). 8/31/21  Yes Deepika Rae MD   furosemide (LASIX) 20 mg tablet One po qod 8/31/21  Yes Deepika Rae MD   atorvastatin (LIPITOR) 40 mg tablet Take 1 Tablet by mouth daily. 6/14/21  Yes Deepika Rae MD   albuterol (PROVENTIL HFA, VENTOLIN HFA, PROAIR HFA) 90 mcg/actuation inhaler Take 2 Puffs by inhalation every four (4) hours as needed for Wheezing. 4/21/21  Yes Deepika Rae MD      Allergies   Allergen Reactions    Isopropyl Alcohol Other (comments)     Weakness all over - pt states she has out grown this    Pen-Yoon K Rash     Pt.  States she avoids penicillin due to allergy as a child. Social History     Tobacco Use    Smoking status: Former Smoker     Packs/day: 1.00     Years: 50.00     Pack years: 50.00    Smokeless tobacco: Never Used   Vaping Use    Vaping Use: Never used   Substance Use Topics    Alcohol use: Not Currently    Drug use: Never            Review of Systems   All other systems reviewed and are negative. Objective:     Visit Vitals  BP (!) 151/68   Pulse (!) 53   Temp 97.6 °F (36.4 °C)   Wt 168 lb (76.2 kg)   SpO2 96%   BMI 28.84 kg/m²        General: alert, oriented, not in distress  Head: scalp normal, atraumatic  Chest/Lungs: clear breath sounds, no wheezing or crackles  Heart: normal rate, regular rhythm, no murmur  Abdomen: soft, non-distended, non-tender, normal bowel sounds, no organomegaly, no masses  Extremities: no focal deformities, no edema  Skin: Dry eczematous rash on bilateral upper extremities. No erythema. Laboratory/Tests:  Hospital Outpatient Visit on 10/22/2021   Component Date Value Ref Range Status    SARS-CoV-2 10/22/2021 Please find results under separate order    Final    SARS-CoV-2 10/22/2021 Not Detected  Not Detected Final    Comment: This nucleic acid amplification test was developed and its  performance characteristics determined by "[employer]. Nucleic acid amplification tests include RT-PCR and TMA. This test  has not been FDA cleared or approved. This test has been authorized  by FDA under an Emergency Use Authorization (EUA). This test is only  authorized for the duration of time the declaration that  circumstances exist justifying the authorization of the emergency use  of in vitro diagnostic tests for detection of SARS-CoV-2 virus and/or  diagnosis of COVID-19 infection under section 564(b)(1) of the Act,  21 U. S.C. 250LHD-1(M) (1), unless the authorization is terminated or  revoked sooner.   When diagnostic testing is negative, the possibility of a false  negative result should be considered in the context of a patient's  recent exposures and the presence of clinical signs and symptoms  consistent with COVID-19. An individual without symptoms of COVID-  19 and who is not shedding SARS-CoV-2 virus w                           ould expect to have a  negative (not detected) result in this assay. Performed At: [address] [id number]   Adam Mackenzie 97 [id number]     Hospital Outpatient Visit on 09/16/2021   Component Date Value Ref Range Status    WBC 09/16/2021 5.4  4.6 - 13.2 K/uL Final    RBC 09/16/2021 3.51* 4.20 - 5.30 M/uL Final    HGB 09/16/2021 11.0* 12.0 - 16.0 g/dL Final    HCT 09/16/2021 34.6* 35.0 - 45.0 % Final    MCV 09/16/2021 98.6  78.0 - 100.0 FL Final    PLEASE NOTE NEW REFERENCE RANGE    MCH 09/16/2021 31.3  24.0 - 34.0 PG Final    MCHC 09/16/2021 31.8  31.0 - 37.0 g/dL Final    RDW 09/16/2021 13.3  11.6 - 14.5 % Final    PLATELET 14/45/6896 686  135 - 420 K/uL Final    MPV 09/16/2021 10.7  9.2 - 11.8 FL Final    NRBC 09/16/2021 0.0  0.0  WBC Final    ABSOLUTE NRBC 09/16/2021 0.00  0.00 - 0.01 K/uL Final    NEUTROPHILS 09/16/2021 65  40 - 73 % Final    LYMPHOCYTES 09/16/2021 18* 21 - 52 % Final    MONOCYTES 09/16/2021 11* 3 - 10 % Final    EOSINOPHILS 09/16/2021 5  0 - 5 % Final    BASOPHILS 09/16/2021 1  0 - 2 % Final    IMMATURE GRANULOCYTES 09/16/2021 0  0 - 0.5 % Final    ABS. NEUTROPHILS 09/16/2021 3.6  1.8 - 8.0 K/UL Final    ABS. LYMPHOCYTES 09/16/2021 1.0  0.9 - 3.6 K/UL Final    ABS. MONOCYTES 09/16/2021 0.6  0.05 - 1.2 K/UL Final    ABS. EOSINOPHILS 09/16/2021 0.3  0.0 - 0.4 K/UL Final    ABS. BASOPHILS 09/16/2021 0.1  0.0 - 0.1 K/UL Final    ABS. IMM.  GRANS. 09/16/2021 0.0  0.00 - 0.04 K/UL Final    DF 09/16/2021 AUTOMATED    Final   Admission on 08/26/2021, Discharged on 08/27/2021   Component Date Value Ref Range Status    WBC 08/26/2021 6.5  4.6 - 13.2 K/uL Final    RBC 08/26/2021 3.43* 4.20 - 5.30 M/uL Final    HGB 08/26/2021 10.8* 12.0 - 16.0 g/dL Final    HCT 08/26/2021 32.6* 35.0 - 45.0 % Final    MCV 08/26/2021 95.0  78.0 - 100.0 FL Final    PLEASE NOTE NEW REFERENCE RANGE    [facility] 08/26/2021 31.5  24.0 - 34.0 PG Final    MCHC 08/26/2021 33.1  31.0 - 37.0 g/dL Final    RDW 08/26/2021 12.0  11.6 - 14.5 % Final    PLATELET 28/01/9516 461  135 - 420 K/uL Final    MPV 08/26/2021 11.3  9.2 - 11.8 FL Final    NRBC 08/26/2021 0.0  0.0  WBC Final    ABSOLUTE NRBC 08/26/2021 0.00  0.00 - 0.01 K/uL Final    NEUTROPHILS 08/26/2021 81* 40 - 73 % Final    LYMPHOCYTES 08/26/2021 12* 21 - 52 % Final    MONOCYTES 08/26/2021 4  3 - 10 % Final    EOSINOPHILS 08/26/2021 2  0 - 5 % Final    BASOPHILS 08/26/2021 1  0 - 2 % Final    IMMATURE GRANULOCYTES 08/26/2021 0  0 - 0.5 % Final    ABS. NEUTROPHILS 08/26/2021 5.2  1.8 - 8.0 K/UL Final    ABS. LYMPHOCYTES 08/26/2021 0.8* 0.9 - 3.6 K/UL Final    ABS. MONOCYTES 08/26/2021 0.2  0.05 - 1.2 K/UL Final    ABS. EOSINOPHILS 08/26/2021 0.1  0.0 - 0.4 K/UL Final    ABS. BASOPHILS 08/26/2021 0.1  0.0 - 0.1 K/UL Final    ABS. IMM.  GRANS. 08/26/2021 0.0  0.00 - 0.04 K/UL Final    DF 08/26/2021 AUTOMATED    Final    Sodium 08/26/2021 140  135 - 145 mmol/L Final    Potassium 08/26/2021 4.1  3.2 - 5.1 mmol/L Final    Chloride 08/26/2021 104  94 - 111 mmol/L Final    CO2 08/26/2021 25  21 - 33 mmol/L Final    Anion gap 08/26/2021 11  mmol/L Final    Glucose 08/26/2021 184* 70 - 110 mg/dL Final    BUN 08/26/2021 31* 9 - 21 mg/dL Final    Creatinine 08/26/2021 1.00  0.70 - 1.20 mg/dL Final    BUN/Creatinine ratio 08/26/2021 31    Final    GFR est AA 08/26/2021 >60  ml/min/1.73m2 Final    GFR est non-AA 08/26/2021 53  ml/min/1.73m2 Final    Comment: Estimated GFR is calculated using the IDMS-traceable Modification of Diet in Renal Disease (MDRD) Study equation, reported for both  Americans (GFRAA) and non- Americans (GFRNA), and normalized to 1.73m2 body surface area. The physician must decide which value applies to the patient. The MDRD study equation should only be used in individuals age 25 or older. It has not been validated for the following: pregnant women, patients with serious comorbid conditions, or on certain medications, or persons with extremes of body size, muscle mass, or nutritional status.  Calcium 08/26/2021 9.2  8.5 - 10.5 mg/dL Final    Bilirubin, total 08/26/2021 0.7  0.2 - 1.0 mg/dL Final    AST (SGOT) 08/26/2021 25  14 - 74 U/L Final    ALT (SGPT) 08/26/2021 20  3 - 52 U/L Final    Alk. phosphatase 08/26/2021 59  38 - 126 U/L Final    Protein, total 08/26/2021 6.4  6.1 - 8.4 g/dL Final    Albumin 08/26/2021 3.6  3.5 - 4.7 g/dL Final    Globulin 08/26/2021 2.8  g/dL Final    A-G Ratio 08/26/2021 1.3    Final    Prothrombin time 08/26/2021 14.3  11.5 - 15.2 sec Final    INR 08/26/2021 1.2  0.8 - 1.2   Final    Occult Blood,day 1 08/26/2021 Positive    Final    Day 1 date: 08/26/[phone]    Final    Specimen source 08/26/2021 Nasopharyngeal    Final    COVID-19 rapid test 08/26/2021 Not Detected  Not Detected   Final    Comment:   Rapid NAAT:  The specimen is NEGATIVE for SARS-CoV-2, the novel coronavirus associated with COVID-19. Negative results should be treated as presumptive and, if inconsistent with clinical signs and symptoms or necessary for patient management, should be tested with an alternative molecular assay. Negative results do not preclude SARS-CoV-2 infection and should not be used as the sole basis for patient management decisions. This test has been authorized by the FDA under an Emergency Use   Authorization (EUA) for use by authorized laboratories.  Fact sheet for Healthcare Providers: ConventionUpdate.co.nz Fact sheet for Patients: ConventionUpdate.co.nz   Methodology: Isothermal Nucleic Acid Amplification      WBC 08/27/2021 8.1  4.6 - 13.2 K/uL Final    RBC 08/27/2021 2.93* 4.20 - 5.30 M/uL Final    HGB 08/27/2021 9.1* 12.0 - 16.0 g/dL Final    HCT 08/27/2021 27.7* 35.0 - 45.0 % Final    MCV 08/27/2021 94.5  78.0 - 100.0 FL Final    PLEASE NOTE NEW REFERENCE RANGE    [facility] 08/27/2021 31.1  24.0 - 34.0 PG Final    MCHC 08/27/2021 32.9  31.0 - 37.0 g/dL Final    RDW 08/27/2021 12.1  11.6 - 14.5 % Final    PLATELET 70/52/8044 694  135 - 420 K/uL Final    MPV 08/27/2021 11.4  9.2 - 11.8 FL Final    NRBC 08/27/2021 0.0  0.0  WBC Final    ABSOLUTE NRBC 08/27/2021 0.00  0.00 - 0.01 K/uL Final    NEUTROPHILS 08/27/2021 81* 40 - 73 % Final    LYMPHOCYTES 08/27/2021 11* 21 - 52 % Final    MONOCYTES 08/27/2021 8  3 - 10 % Final    EOSINOPHILS 08/27/2021 0  0 - 5 % Final    BASOPHILS 08/27/2021 0  0 - 2 % Final    IMMATURE GRANULOCYTES 08/27/2021 0  0 - 0.5 % Final    ABS. NEUTROPHILS 08/27/2021 6.5  1.8 - 8.0 K/UL Final    ABS. LYMPHOCYTES 08/27/2021 0.9  0.9 - 3.6 K/UL Final    ABS. MONOCYTES 08/27/2021 0.7  0.05 - 1.2 K/UL Final    ABS. EOSINOPHILS 08/27/2021 0.0  0.0 - 0.4 K/UL Final    ABS. BASOPHILS 08/27/2021 0.0  0.0 - 0.1 K/UL Final    ABS. IMM.  GRANS. 08/27/2021 0.0  0.00 - 0.04 K/UL Final    DF 08/27/2021 AUTOMATED    Final   Hospital Outpatient Visit on 06/21/2021   Component Date Value Ref Range Status    Iron 06/21/2021 90  35 - 150 ug/dL Final    TIBC 06/21/2021 312  250 - 450 ug/dL Final    Iron % saturation 06/21/2021 29  20 - 50 % Final   Hospital Outpatient Visit on 04/22/2021   Component Date Value Ref Range Status    WBC 04/22/2021 5.2  4.6 - 13.2 K/uL Final    RBC 04/22/2021 4.65  4.20 - 5.30 M/uL Final    HGB 04/22/2021 13.5  12.0 - 16.0 g/dL Final    HCT 04/22/2021 43.1  35.0 - 45.0 % Final    MCV 04/22/2021 92.7  74.0 - 97.0 FL Final    MCH 04/22/2021 29.0  24.0 - 34.0 PG Final    MCHC 04/22/2021 31.3  31.0 - 37.0 g/dL Final    RDW 04/22/2021 15.1* 11.6 - 14.5 % Final    PLATELET 81/73/3688 829  135 - 420 K/uL Final    MPV 04/22/2021 11.1  9.2 - 11.8 FL Final    NEUTROPHILS 04/22/2021 72  40 - 73 % Final    LYMPHOCYTES 04/22/2021 12* 21 - 52 % Final    MONOCYTES 04/22/2021 13* 3 - 10 % Final    EOSINOPHILS 04/22/2021 2  0 - 5 % Final    BASOPHILS 04/22/2021 1  0 - 2 % Final    IMMATURE GRANULOCYTES 04/22/2021 0  % Final    ABS. NEUTROPHILS 04/22/2021 3.8  1.8 - 8.0 K/UL Final    ABS. LYMPHOCYTES 04/22/2021 0.6* 0.9 - 3.6 K/UL Final    ABS. MONOCYTES 04/22/2021 0.7  0.05 - 1.2 K/UL Final    ABS. EOSINOPHILS 04/22/2021 0.1  0.0 - 0.4 K/UL Final    ABS. BASOPHILS 04/22/2021 0.0  0.0 - 0.1 K/UL Final    ABS. IMM. GRANS. 04/22/2021 0.0  K/UL Final    LD 04/22/2021 176  98 - 192 U/L Final    Sodium 04/22/2021 138  135 - 145 mmol/L Final    Potassium 04/22/2021 4.5  3.2 - 5.1 mmol/L Final    Chloride 04/22/2021 104  94 - 111 mmol/L Final    CO2 04/22/2021 25  21 - 33 mmol/L Final    Anion gap 04/22/2021 9  mmol/L Final    Glucose 04/22/2021 114* 70 - 110 mg/dL Final    BUN 04/22/2021 20  9 - 21 mg/dL Final    Creatinine 04/22/2021 0.90  0.70 - 1.20 mg/dL Final    BUN/Creatinine ratio 04/22/2021 22    Final    GFR est AA 04/22/2021 >60  ml/min/1.73m2 Final    GFR est non-AA 04/22/2021 60  ml/min/1.73m2 Final    Comment: Estimated GFR is calculated using the IDMS-traceable Modification of Diet in Renal Disease (MDRD) Study equation, reported for both  Americans (GFRAA) and non- Americans (GFRNA), and normalized to 1.73m2 body surface area. The physician must decide which value applies to the patient. The MDRD study equation should only be used in individuals age 25 or older. It has not been validated for the following: pregnant women, patients with serious comorbid conditions, or on certain medications, or persons with extremes of body size, muscle mass, or nutritional status.       Calcium 04/22/2021 9.5  8.5 - 10.5 mg/dL Final  Bilirubin, total 04/22/2021 0.8  0.2 - 1.0 mg/dL Final    AST (SGOT) 04/22/2021 28  14 - 74 U/L Final    ALT (SGPT) 04/22/2021 30  3 - 52 U/L Final    Alk. phosphatase 04/22/2021 66  38 - 126 U/L Final    Protein, total 04/22/2021 7.2  6.1 - 8.4 g/dL Final    Albumin 04/22/2021 4.1  3.5 - 4.7 g/dL Final    Globulin 04/22/2021 3.1  g/dL Final    A-G Ratio 04/22/2021 1.3    Final    TSH 04/22/2021 0.91  0.35 - 6.20 uIU/mL Final    Comment:    Due to TSH heterogeneity, both structurally and degree of glycosylation, monoclonal antibodies used in the TSH assay may not accurately quantitate TSH. Therefore, this result should be correlated with clinical findings as well as with other assessments of thyroid function, e.g., free T4, free T3. Abstract on 04/12/2021   Component Date Value Ref Range Status    Creatinine, External 03/12/2021 0.8   Final   Hospital Outpatient Visit on 02/01/2021   Component Date Value Ref Range Status    WBC 02/01/2021 5.0  4.6 - 13.2 K/uL Final    RBC 02/01/2021 4.04* 4.20 - 5.30 M/uL Final    HGB 02/01/2021 10.7* 12.0 - 16.0 g/dL Final    HCT 02/01/2021 36.7  35.0 - 45.0 % Final    MCV 02/01/2021 90.8  74.0 - 97.0 FL Final    MCH 02/01/2021 26.5  24.0 - 34.0 PG Final    MCHC 02/01/2021 29.2* 31.0 - 37.0 g/dL Final    RDW 02/01/2021 19.7* 11.6 - 14.5 % Final    PLATELET 46/15/6483 888  135 - 420 K/uL Final    MPV 02/01/2021 10.9  9.2 - 11.8 FL Final    NEUTROPHILS 02/01/2021 76* 40 - 73 % Final    LYMPHOCYTES 02/01/2021 12* 21 - 52 % Final    MONOCYTES 02/01/2021 10  3 - 10 % Final    EOSINOPHILS 02/01/2021 1  0 - 5 % Final    BASOPHILS 02/01/2021 1  0 - 2 % Final    IMMATURE GRANULOCYTES 02/01/2021 0  % Final    ABS. NEUTROPHILS 02/01/2021 3.8  1.8 - 8.0 K/UL Final    ABS. LYMPHOCYTES 02/01/2021 0.6* 0.9 - 3.6 K/UL Final    ABS. MONOCYTES 02/01/2021 0.5  0.05 - 1.2 K/UL Final    ABS. EOSINOPHILS 02/01/2021 0.1  0.0 - 0.4 K/UL Final    ABS.  BASOPHILS 02/01/2021 0.1  0.0 - 0.1 K/UL Final    ABS. IMM. GRANS. 02/01/2021 0.0  K/UL Final    Haptoglobin 02/01/2021 223* 30 - 200 mg/dL Final    Iron 02/01/2021 129  35 - 150 ug/dL Final    TIBC 02/01/2021 346  250 - 450 ug/dL Final    Iron % saturation 02/01/2021 37  20 - 50 % Final    LD 02/01/2021 160  98 - 192 U/L Final    MONTY Poly 02/01/2021 Negative   Final   No results displayed because visit has over 200 results. Hospital Outpatient Visit on 12/11/2020   Component Date Value Ref Range Status    Sodium 12/11/2020 138  135 - 145 mmol/L Final    Potassium 12/11/2020 3.9  3.2 - 5.1 mmol/L Final    Chloride 12/11/2020 106  94 - 111 mmol/L Final    CO2 12/11/2020 25  21 - 33 mmol/L Final    Anion gap 12/11/2020 7  mmol/L Final    Glucose 12/11/2020 103  70 - 110 mg/dL Final    BUN 12/11/2020 17  9 - 21 mg/dL Final    Creatinine 12/11/2020 0.80  0.70 - 1.20 mg/dL Final    BUN/Creatinine ratio 12/11/2020 21    Final    GFR est AA 12/11/2020 >60  ml/min/1.73m2 Final    GFR est non-AA 12/11/2020 >60  ml/min/1.73m2 Final    Comment: Estimated GFR is calculated using the IDMS-traceable Modification of Diet in Renal Disease (MDRD) Study equation, reported for both  Americans (GFRAA) and non- Americans (GFRNA), and normalized to 1.73m2 body surface area. The physician must decide which value applies to the patient. The MDRD study equation should only be used in individuals age 25 or older. It has not been validated for the following: pregnant women, patients with serious comorbid conditions, or on certain medications, or persons with extremes of body size, muscle mass, or nutritional status.  Calcium 12/11/2020 9.4  8.5 - 10.5 mg/dL Final    Bilirubin, total 12/11/2020 0.4  0.2 - 1.0 mg/dL Final    AST (SGOT) 12/11/2020 27  14 - 74 U/L Final    ALT (SGPT) 12/11/2020 21  3 - 52 U/L Final    Alk.  phosphatase 12/11/2020 79  38 - 126 U/L Final    Protein, total 12/11/2020 7.8  6.1 - 8.4 g/dL Final    Albumin 12/11/2020 4.1  3.5 - 4.7 g/dL Final    Globulin 12/11/2020 3.7  g/dL Final    A-G Ratio 12/11/2020 1.1    Final    LIPID PROFILE 12/11/2020      Final    Cholesterol, total 12/11/2020 153  <200 mg/dL Final    Triglyceride 12/11/2020 122  <150 mg/dL Final    Comment: Based on NCEP-ATP III:  Triglycerides <150 mg/dL  is considered  normal, 150-199 mg/dL  borderline high,  200-499 mg/dL high and   greater than or equal to 500 mg/dL very high.  HDL Cholesterol 12/11/2020 53  mg/dL Final    Comment: Based on NCEP ATP III, HDL Cholesterol <40 mg/dL is considered low  and >60 mg/dL is elevated.  LDL, calculated 12/11/2020 75.6  0 - 100 mg/dL Final    Comment: Based on the NCEP-ATP: LDL-C concentrations are considered  optimal  <100 mg/dL,  near optimal/above Normal 100-129 mg/dL  Borderline High: 130-159, High: 160-189 mg/dL  Very High: Greater than or equal to 190 mg/dL      VLDL, calculated 12/11/2020 24.4  mg/dL Final    CHOL/HDL Ratio 12/11/2020 2.9  0.0 - 5.0   Final    WBC 12/11/2020 6.5  4.6 - 13.2 K/uL Final    RBC 12/11/2020 4.28  4.20 - 5.30 M/uL Final    HGB 12/11/2020 11.4* 12.0 - 16.0 g/dL Final    HCT 12/11/2020 37.6  35.0 - 45.0 % Final    MCV 12/11/2020 87.9  74.0 - 97.0 FL Final    MCH 12/11/2020 26.6  24.0 - 34.0 PG Final    MCHC 12/11/2020 30.3* 31.0 - 37.0 g/dL Final    RDW 12/11/2020 14.7* 11.6 - 14.5 % Final    PLATELET 23/30/6033 668  135 - 420 K/uL Final    MPV 12/11/2020 11.4  9.2 - 11.8 FL Final    NEUTROPHILS 12/11/2020 69  40 - 73 % Final    LYMPHOCYTES 12/11/2020 16* 21 - 52 % Final    MONOCYTES 12/11/2020 12* 3 - 10 % Final    EOSINOPHILS 12/11/2020 2  0 - 5 % Final    BASOPHILS 12/11/2020 1  0 - 2 % Final    IMMATURE GRANULOCYTES 12/11/2020 0  % Final    ABS. NEUTROPHILS 12/11/2020 4.5  1.8 - 8.0 K/UL Final    ABS. LYMPHOCYTES 12/11/2020 1.0  0.9 - 3.6 K/UL Final    ABS. MONOCYTES 12/11/2020 0.8  0.05 - 1.2 K/UL Final    ABS. EOSINOPHILS 12/11/2020 0.2  0.0 - 0.4 K/UL Final    ABS. BASOPHILS 12/11/2020 0.0  0.0 - 0.1 K/UL Final    ABS. IMM. GRANS. 12/11/2020 0.0  K/UL Final   Abstract on 11/20/2020   Component Date Value Ref Range Status    LDL-C, External 01/06/2020 86   Final    Creatinine, External 01/06/2020 0.8   Final    Hemoglobin A1c, External 08/15/2017 5.9  % Final    Urine Microalbumin, External 08/15/2017 neg. Final   There may be more visits with results that are not included. Assessment & Plan:     1. Eczema, unspecified type  Start triamcinolone cream    2. Essential hypertension  Controlled. Continue losartan, metoprolol, Lasix    3. Severe anemia  Secondary to GI bleed. Recent EGD shows healing ulcer. Continue Protonix daily. Will check iron panel and CBC  - CBC WITH AUTOMATED DIFF  - METABOLIC PANEL, COMPREHENSIVE  - IRON PROFILE  - FERRITIN    4. Medicare annual wellness visit, subsequent  Refer to separate note             I have discussed the diagnosis with the patient and the intended plan as seen in the above orders. The patient has received an after-visit summary and questions were answered concerning future plans. I have discussed medication side effects and warnings with the patient as well. I have reviewed the plan of care with the patient, accepted their input and they are in agreement with the treatment goals. Previous lab and imaging results were reviewed by me.        Herberth Cano MD  November 1, 2021

## 2021-11-01 NOTE — PROGRESS NOTES
Mansfield Gabby presents today for   Chief Complaint   Patient presents with   [facility] Establish Care     establish care was a [place] patient        Is someone accompanying this pt? no    Is the patient using any DME equipment during OV? no    Depression Screening:  3 most recent PHQ Screens 11/1/2021   Little interest or pleasure in doing things Several days   Feeling down, depressed, irritable, or hopeless Several days   Total Score PHQ 2 2   Trouble falling or staying asleep, or sleeping too much -   Feeling tired or having little energy -   Poor appetite, weight loss, or overeating -   Feeling bad about yourself - or that you are a failure or have let yourself or your family down -   Trouble concentrating on things such as school, work, reading, or watching TV -   Moving or speaking so slowly that other people could have noticed; or the opposite being so fidgety that others notice -   Thoughts of being better off dead, or hurting yourself in some way -   PHQ 9 Score -   How difficult have these problems made it for you to do your work, take care of your home and get along with others -       Learning Assessment:  Learning Assessment 12/3/2020   PRIMARY LEARNER Patient   HIGHEST LEVEL OF EDUCATION - PRIMARY LEARNER  DID NOT GRADUATE HIGH SCHOOL   BARRIERS PRIMARY LEARNER NONE   PRIMARY LANGUAGE ENGLISH   LEARNER PREFERENCE PRIMARY READING   ANSWERED BY patient   RELATIONSHIP SELF       Fall Risk  Fall Risk Assessment, last 12 mths 11/1/2021   Able to walk? Yes   Fall in past 12 months? 0   Do you feel unsteady?  0   Are you worried about falling 0   Is the gait abnormal? -   Number of falls in past 12 months -   Fall with injury? -       ADL  ADL Assessment 1/28/2021   Feeding yourself No Help Needed   Getting from bed to chair No Help Needed   Getting dressed No Help Needed   Bathing or showering No Help Needed   Walk across the room (includes cane/walker) No Help Needed   Using the telphone No Help Needed   Taking your medications No Help Needed   Preparing meals No Help Needed   Managing money (expenses/bills) No Help Needed   Moderately strenuous housework (laundry) No Help Needed   Shopping for personal items (toiletries/medicines) No Help Needed   Shopping for groceries No Help Needed   Driving No Help Needed   Climbing a flight of stairs No Help Needed   Getting to places beyond walking distances No Help Needed       Travel Screening:    Travel Screening     Question   Response    In the last month, have you been in contact with someone who was confirmed or suspected to have Coronavirus / COVID-19? No / Unsure    Have you had a COVID-19 viral test in the last 14 days? Yes - Negative result    Do you have any of the following new or worsening symptoms? None of these    Have you traveled internationally or domestically in the last month? No      Travel History   Travel since 10/01/21     No documented travel since 10/01/21          Health Maintenance reviewed and discussed and ordered per Provider. Health Maintenance Due   Topic Date Due    DTaP/Tdap/Td series (1 - Tdap) Never done    Shingrix Vaccine Age 50> (1 of 2) Never done    Bone Densitometry (Dexa) Screening  Never done    Pneumococcal 65+ years (1 of 1 - PPSV23) Never done    Medicare Yearly Exam  Never done    COVID-19 Vaccine (2 - Moderna 2-dose series) 02/23/2021    Flu Vaccine (1) 09/01/2021   . Coordination of Care:  1. \"Have you been to the ER, urgent care clinic since your last visit? Hospitalized since your last visit? \" No    2. \"Have you seen or consulted any other health care providers outside of the [address] [place] [address] since your last visit? \" No     3. For patients aged 39-70: Has the patient had a colonoscopy? Yes,  satisfied with blue hyperlink     If the patient is female:    4. For patients aged 41-77: Has the patient had a mammogram within the past 2 years? No    5.  For patients aged 21-65: Has the patient had a pap smear?  No

## 2021-11-04 ENCOUNTER — HOSPITAL ENCOUNTER (OUTPATIENT)
Dept: LAB | Age: 81
Discharge: HOME | End: 2021-11-04
Payer: MEDICARE

## 2021-11-04 LAB
ALBUMIN SERPL-MCNC: 4.1 G/DL (ref 3.5–4.7)
ALBUMIN/GLOB SERPL: 1.4 {RATIO}
ALP SERPL-CCNC: 65 U/L (ref 38–126)
ALT SERPL-CCNC: 20 U/L (ref 3–52)
ANION GAP SERPL CALC-SCNC: 10 MMOL/L
AST SERPL W P-5'-P-CCNC: 24 U/L (ref 14–74)
BASOPHILS # BLD: 0 K/UL (ref 0–0.1)
BASOPHILS NFR BLD: 1 % (ref 0–2)
BILIRUB SERPL-MCNC: 0.6 MG/DL (ref 0.2–1)
BUN SERPL-MCNC: 12 MG/DL (ref 9–21)
BUN/CREAT SERPL: 13
CA-I BLD-MCNC: 9.4 MG/DL (ref 8.5–10.5)
CHLORIDE SERPL-SCNC: 101 MMOL/L (ref 94–111)
CO2 SERPL-SCNC: 25 MMOL/L (ref 21–33)
CREAT SERPL-MCNC: 0.9 MG/DL (ref 0.7–1.2)
DIFFERENTIAL METHOD BLD: ABNORMAL
EOSINOPHIL # BLD: 0.1 K/UL (ref 0–0.4)
EOSINOPHIL NFR BLD: 2 % (ref 0–5)
ERYTHROCYTE [DISTWIDTH] IN BLOOD BY AUTOMATED COUNT: 11.7 % (ref 11.6–14.5)
FERRITIN SERPL-MCNC: 42 NG/ML (ref 8–388)
GLOBULIN SER CALC-MCNC: 3 G/DL
GLUCOSE SERPL-MCNC: 118 MG/DL (ref 70–110)
HCT VFR BLD AUTO: 41 % (ref 35–45)
HGB BLD-MCNC: 12.9 G/DL (ref 12–16)
IMM GRANULOCYTES # BLD AUTO: 0 K/UL (ref 0–0.04)
IMM GRANULOCYTES NFR BLD AUTO: 1 % (ref 0–0.5)
IRON SATN MFR SERPL: 21 % (ref 20–50)
IRON SERPL-MCNC: 68 UG/DL (ref 50–175)
LYMPHOCYTES # BLD: 0.7 K/UL (ref 0.9–3.6)
LYMPHOCYTES NFR BLD: 11 % (ref 21–52)
MCH RBC QN AUTO: 30.4 PG (ref 24–34)
MCHC RBC AUTO-ENTMCNC: 31.5 G/DL (ref 31–37)
MCV RBC AUTO: 96.5 FL (ref 78–100)
MONOCYTES # BLD: 0.7 K/UL (ref 0.05–1.2)
MONOCYTES NFR BLD: 10 % (ref 3–10)
NEUTS SEG # BLD: 4.9 K/UL (ref 1.8–8)
NEUTS SEG NFR BLD: 75 % (ref 40–73)
NRBC # BLD: 0 K/UL (ref 0–0.01)
NRBC BLD-RTO: 0 PER 100 WBC
PLATELET # BLD AUTO: 175 K/UL (ref 135–420)
PMV BLD AUTO: 11.6 FL (ref 9.2–11.8)
POTASSIUM SERPL-SCNC: 4.2 MMOL/L (ref 3.2–5.1)
PROT SERPL-MCNC: 7.1 G/DL (ref 6.1–8.4)
RBC # BLD AUTO: 4.25 M/UL (ref 4.2–5.3)
SODIUM SERPL-SCNC: 136 MMOL/L (ref 135–145)
TIBC SERPL-MCNC: 327 UG/DL (ref 250–450)
WBC # BLD AUTO: 6.5 K/UL (ref 4.6–13.2)

## 2021-11-04 PROCEDURE — 83540 ASSAY OF IRON: CPT

## 2021-11-04 PROCEDURE — 80053 COMPREHEN METABOLIC PANEL: CPT

## 2021-11-04 PROCEDURE — 82728 ASSAY OF FERRITIN: CPT

## 2021-11-04 PROCEDURE — 85025 COMPLETE CBC W/AUTO DIFF WBC: CPT

## 2021-11-04 PROCEDURE — 36415 COLL VENOUS BLD VENIPUNCTURE: CPT

## 2021-11-08 ENCOUNTER — OFFICE VISIT (OUTPATIENT)
Dept: FAMILY MEDICINE CLINIC | Age: 81
End: 2021-11-08
Payer: MEDICARE

## 2021-11-08 VITALS
OXYGEN SATURATION: 100 % | DIASTOLIC BLOOD PRESSURE: 89 MMHG | HEART RATE: 58 BPM | TEMPERATURE: 98.4 F | SYSTOLIC BLOOD PRESSURE: 154 MMHG

## 2021-11-08 DIAGNOSIS — L03.012 PARONYCHIA OF FINGER, LEFT: Primary | ICD-10-CM

## 2021-11-08 PROCEDURE — 1101F PT FALLS ASSESS-DOCD LE1/YR: CPT | Performed by: STUDENT IN AN ORGANIZED HEALTH CARE EDUCATION/TRAINING PROGRAM

## 2021-11-08 PROCEDURE — G8428 CUR MEDS NOT DOCUMENT: HCPCS | Performed by: STUDENT IN AN ORGANIZED HEALTH CARE EDUCATION/TRAINING PROGRAM

## 2021-11-08 PROCEDURE — G8432 DEP SCR NOT DOC, RNG: HCPCS | Performed by: STUDENT IN AN ORGANIZED HEALTH CARE EDUCATION/TRAINING PROGRAM

## 2021-11-08 PROCEDURE — G8754 DIAS BP LESS 90: HCPCS | Performed by: STUDENT IN AN ORGANIZED HEALTH CARE EDUCATION/TRAINING PROGRAM

## 2021-11-08 PROCEDURE — G8536 NO DOC ELDER MAL SCRN: HCPCS | Performed by: STUDENT IN AN ORGANIZED HEALTH CARE EDUCATION/TRAINING PROGRAM

## 2021-11-08 PROCEDURE — 1090F PRES/ABSN URINE INCON ASSESS: CPT | Performed by: STUDENT IN AN ORGANIZED HEALTH CARE EDUCATION/TRAINING PROGRAM

## 2021-11-08 PROCEDURE — G8400 PT W/DXA NO RESULTS DOC: HCPCS | Performed by: STUDENT IN AN ORGANIZED HEALTH CARE EDUCATION/TRAINING PROGRAM

## 2021-11-08 PROCEDURE — G8753 SYS BP > OR = 140: HCPCS | Performed by: STUDENT IN AN ORGANIZED HEALTH CARE EDUCATION/TRAINING PROGRAM

## 2021-11-08 PROCEDURE — 99213 OFFICE O/P EST LOW 20 MIN: CPT | Performed by: STUDENT IN AN ORGANIZED HEALTH CARE EDUCATION/TRAINING PROGRAM

## 2021-11-08 PROCEDURE — G8417 CALC BMI ABV UP PARAM F/U: HCPCS | Performed by: STUDENT IN AN ORGANIZED HEALTH CARE EDUCATION/TRAINING PROGRAM

## 2021-11-08 RX ORDER — CEPHALEXIN 500 MG/1
500 CAPSULE ORAL 3 TIMES DAILY
Qty: 21 CAPSULE | Refills: 0 | Status: SHIPPED | OUTPATIENT
Start: 2021-11-08 | End: 2021-11-15

## 2021-11-08 NOTE — PROGRESS NOTES
Subjective:   Francie Casey is a 78 y.o. female who was seen for infected finger. Patient noticed her left index finger became more red and swollen. She is not sure how it started but started here 2 days ago. Denies any trauma to finger. Denies any fevers. Home Medications    Medication Sig Start Date End Date Taking? Authorizing Provider   cephALEXin (KEFLEX) 500 mg capsule Take 1 Capsule by mouth three (3) times daily for 7 days. 11/8/21 11/15/21 Yes Brittany Lazcano MD   triamcinolone (ARISTOCORT) 0.5 % topical cream Apply  to affected area two (2) times a day. use thin layer 11/1/21   Brittany Lazcano MD   pantoprazole (PROTONIX) 40 mg tablet Take 1 Tablet by mouth daily. 11/1/21   Brittany Lazcano MD   metoprolol tartrate (LOPRESSOR) 25 mg tablet TAKE 1/2 TABLET BY MOUTH TWICE DAILY 10/28/21   Brittany Lazcano MD   ALPRAZolam Carlynn Remak) 0.5 mg tablet Take 1 Tablet by mouth two (2) times a day. Max Daily Amount: 1 mg. 10/21/21   Brittany Lazcano MD   budesonide-formoteroL (Symbicort) 80-4.5 mcg/actuation HFAA Take 2 Puffs by inhalation two (2) times a day. 10/18/21   Brittany Lazcano MD   losartan (COZAAR) 100 mg tablet Take 1 Tablet by mouth daily. 10/15/21   Brittany Lazcano MD   furosemide (LASIX) 20 mg tablet One po qod 8/31/21   Shannen Murphy MD   atorvastatin (LIPITOR) 40 mg tablet Take 1 Tablet by mouth daily. 6/14/21   Shannen Murphy MD   albuterol (PROVENTIL HFA, VENTOLIN HFA, PROAIR HFA) 90 mcg/actuation inhaler Take 2 Puffs by inhalation every four (4) hours as needed for Wheezing. 4/21/21   Shannen Murphy MD      Allergies   Allergen Reactions    Isopropyl Alcohol Other (comments)     Weakness all over - pt states she has out grown this    Pen-Vee K Rash     Pt. States she avoids penicillin due to allergy as a child.       Social History     Tobacco Use    Smoking status: Former Smoker     Packs/day: 1.00     Years: 50.00     Pack years: 50.00    Smokeless tobacco: Never Used   Vaping Use    Vaping Use: Never used   Substance Use Topics    Alcohol use: Not Currently    Drug use: Never            Review of Systems   Musculoskeletal: Positive for joint swelling. Skin: Positive for color change. All other systems reviewed and are negative. Objective:     Visit Vitals  BP (!) 154/89   Pulse (!) 58   Temp 98.4 °F (36.9 °C)   SpO2 100%        General: alert, oriented, not in distress  Head: scalp normal, atraumatic  Chest/Lungs: clear breath sounds, no wheezing or crackles  Heart: normal rate, regular rhythm, no murmur  Abdomen: soft, non-distended, non-tender, normal bowel sounds, no organomegaly, no masses  Extremities: Erythema on tip of left index finger near the nail bed. Mildly tender to palpation. Mild swelling present. Range of motion intact. Sensation intact. Skin: no active skin lesions    Laboratory/Tests:  Hospital Outpatient Visit on 11/04/2021   Component Date Value Ref Range Status    WBC 11/04/2021 6.5  4.6 - 13.2 K/uL Final    RBC 11/04/2021 4.25  4.20 - 5.30 M/uL Final    HGB 11/04/2021 12.9  12.0 - 16.0 g/dL Final    HCT 11/04/2021 41.0  35.0 - 45.0 % Final    MCV 11/04/2021 96.5  78.0 - 100.0 FL Final    MCH 11/04/2021 30.4  24.0 - 34.0 PG Final    MCHC 11/04/2021 31.5  31.0 - 37.0 g/dL Final    RDW 11/04/2021 11.7  11.6 - 14.5 % Final    PLATELET 49/52/3134 264  135 - 420 K/uL Final    MPV 11/04/2021 11.6  9.2 - 11.8 FL Final    NRBC 11/04/2021 0.0  0.0  WBC Final    ABSOLUTE NRBC 11/04/2021 0.00  0.00 - 0.01 K/uL Final    NEUTROPHILS 11/04/2021 75* 40 - 73 % Final    LYMPHOCYTES 11/04/2021 11* 21 - 52 % Final    MONOCYTES 11/04/2021 10  3 - 10 % Final    EOSINOPHILS 11/04/2021 2  0 - 5 % Final    BASOPHILS 11/04/2021 1  0 - 2 % Final    IMMATURE GRANULOCYTES 11/04/2021 1* 0 - 0.5 % Final    ABS. NEUTROPHILS 11/04/2021 4.9  1.8 - 8.0 K/UL Final    ABS. LYMPHOCYTES 11/04/2021 0.7* 0.9 - 3.6 K/UL Final    ABS.  MONOCYTES 11/04/2021 0.7  0.05 - 1.2 K/UL Final    ABS. EOSINOPHILS 11/04/2021 0.1  0.0 - 0.4 K/UL Final    ABS. BASOPHILS 11/04/2021 0.0  0.0 - 0.1 K/UL Final    ABS. IMM. GRANS. 11/04/2021 0.0  0.00 - 0.04 K/UL Final    DF 11/04/2021 AUTOMATED    Final    Ferritin 11/04/2021 42  8 - 388 ng/mL Final    Iron 11/04/2021 68  50 - 175 ug/dL Final    Comment: Patients receiving metal-binding drugs (e.g. deferoxamine) may show  spuriously depressed iron values, as chelated iron may not properly  react in the iron assay.  TIBC 11/04/2021 327  250 - 450 ug/dL Final    Iron % saturation 11/04/2021 21  20 - 50 % Final    Sodium 11/04/2021 136  135 - 145 mmol/L Final    Potassium 11/04/2021 4.2  3.2 - 5.1 mmol/L Final    Chloride 11/04/2021 101  94 - 111 mmol/L Final    CO2 11/04/2021 25  21 - 33 mmol/L Final    Anion gap 11/04/2021 10  mmol/L Final    Glucose 11/04/2021 118* 70 - 110 mg/dL Final    BUN 11/04/2021 12  9 - 21 mg/dL Final    Creatinine 11/04/2021 0.90  0.70 - 1.20 mg/dL Final    BUN/Creatinine ratio 11/04/2021 13    Final    GFR est AA 11/04/2021 >60  ml/min/1.73m2 Final    GFR est non-AA 11/04/2021 60  ml/min/1.73m2 Final    Comment: Estimated GFR is calculated using the IDMS-traceable Modification of Diet in Renal Disease (MDRD) Study equation, reported for both  Americans (GFRAA) and non- Americans (GFRNA), and normalized to 1.73m2 body surface area. The physician must decide which value applies to the patient. The MDRD study equation should only be used in individuals age 25 or older. It has not been validated for the following: pregnant women, patients with serious comorbid conditions, or on certain medications, or persons with extremes of body size, muscle mass, or nutritional status.  Calcium 11/04/2021 9.4  8.5 - 10.5 mg/dL Final    Bilirubin, total 11/04/2021 0.6  0.2 - 1.0 mg/dL Final    AST (SGOT) 11/04/2021 24  14 - 74 U/L Final    ALT (SGPT) 11/04/2021 20  3 - 52 U/L Final    Alk. phosphatase 11/04/2021 65  38 - 126 U/L Final    Protein, total 11/04/2021 7.1  6.1 - 8.4 g/dL Final    Albumin 11/04/2021 4.1  3.5 - 4.7 g/dL Final    Globulin 11/04/2021 3.0  g/dL Final    A-G Ratio 11/04/2021 1.4    Final   Hospital Outpatient Visit on 10/22/2021   Component Date Value Ref Range Status    SARS-CoV-2 10/22/2021 Please find results under separate order    Final    SARS-CoV-2 10/22/2021 Not Detected  Not Detected Final    Comment: This nucleic acid amplification test was developed and its  performance characteristics determined by [facility]. Nucleic acid amplification tests include RT-PCR and TMA. This test  has not been FDA cleared or approved. This test has been authorized  by FDA under an Emergency Use Authorization (EUA). This test is only  authorized for the duration of time the declaration that  circumstances exist justifying the authorization of the emergency use  of in vitro diagnostic tests for detection of SARS-CoV-2 virus and/or  diagnosis of COVID-19 infection under section 564(b)(1) of the Act,  21 U. S.C. 073DTF-1(H) (1), unless the authorization is terminated or  revoked sooner. When diagnostic testing is negative, the possibility of a false  negative result should be considered in the context of a patient's  recent exposures and the presence of clinical signs and symptoms  consistent with COVID-19. An individual without symptoms of COVID-  19 and who is not shedding SARS-CoV-2 virus w                           ould expect to have a  negative (not detected) result in this assay.   Performed At: [address] [id number]  LARRY Mackenzie 97 [phone]     Hospital Outpatient Visit on 09/16/2021   Component Date Value Ref Range Status    WBC 09/16/2021 5.4  4.6 - 13.2 K/uL Final    RBC 09/16/2021 3.51* 4.20 - 5.30 M/uL Final    HGB 09/16/2021 11.0* 12.0 - 16.0 g/dL Final    HCT 09/16/2021 34.6* 35.0 - 45.0 % Final    MCV 09/16/2021 98.6  78.0 - 100.0 FL Final    PLEASE NOTE NEW REFERENCE RANGE    MCH 09/16/2021 31.3  24.0 - 34.0 PG Final    MCHC 09/16/2021 31.8  31.0 - 37.0 g/dL Final    RDW 09/16/2021 13.3  11.6 - 14.5 % Final    PLATELET 35/43/4479 335  135 - 420 K/uL Final    MPV 09/16/2021 10.7  9.2 - 11.8 FL Final    NRBC 09/16/2021 0.0  0.0  WBC Final    ABSOLUTE NRBC 09/16/2021 0.00  0.00 - 0.01 K/uL Final    NEUTROPHILS 09/16/2021 65  40 - 73 % Final    LYMPHOCYTES 09/16/2021 18* 21 - 52 % Final    MONOCYTES 09/16/2021 11* 3 - 10 % Final    EOSINOPHILS 09/16/2021 5  0 - 5 % Final    BASOPHILS 09/16/2021 1  0 - 2 % Final    IMMATURE GRANULOCYTES 09/16/2021 0  0 - 0.5 % Final    ABS. NEUTROPHILS 09/16/2021 3.6  1.8 - 8.0 K/UL Final    ABS. LYMPHOCYTES 09/16/2021 1.0  0.9 - 3.6 K/UL Final    ABS. MONOCYTES 09/16/2021 0.6  0.05 - 1.2 K/UL Final    ABS. EOSINOPHILS 09/16/2021 0.3  0.0 - 0.4 K/UL Final    ABS. BASOPHILS 09/16/2021 0.1  0.0 - 0.1 K/UL Final    ABS. IMM.  GRANS. 09/16/2021 0.0  0.00 - 0.04 K/UL Final    DF 09/16/2021 AUTOMATED    Final   Admission on 08/26/2021, Discharged on 08/27/2021   Component Date Value Ref Range Status    WBC 08/26/2021 6.5  4.6 - 13.2 K/uL Final    RBC 08/26/2021 3.43* 4.20 - 5.30 M/uL Final    HGB 08/26/2021 10.8* 12.0 - 16.0 g/dL Final    HCT 08/26/2021 32.6* 35.0 - 45.0 % Final    MCV 08/26/2021 95.0  78.0 - 100.0 FL Final    PLEASE NOTE NEW REFERENCE RANGE    [facility] 08/26/2021 31.5  24.0 - 34.0 PG Final    MCHC 08/26/2021 33.1  31.0 - 37.0 g/dL Final    RDW 08/26/2021 12.0  11.6 - 14.5 % Final    PLATELET 62/88/0549 015  135 - 420 K/uL Final    MPV 08/26/2021 11.3  9.2 - 11.8 FL Final    NRBC 08/26/2021 0.0  0.0  WBC Final    ABSOLUTE NRBC 08/26/2021 0.00  0.00 - 0.01 K/uL Final    NEUTROPHILS 08/26/2021 81* 40 - 73 % Final    LYMPHOCYTES 08/26/2021 12* 21 - 52 % Final    MONOCYTES 08/26/2021 4  3 - 10 % Final    EOSINOPHILS 08/26/2021 2  0 - 5 % Final    BASOPHILS 08/26/2021 1  0 - 2 % Final    IMMATURE GRANULOCYTES 08/26/2021 0  0 - 0.5 % Final    ABS. NEUTROPHILS 08/26/2021 5.2  1.8 - 8.0 K/UL Final    ABS. LYMPHOCYTES 08/26/2021 0.8* 0.9 - 3.6 K/UL Final    ABS. MONOCYTES 08/26/2021 0.2  0.05 - 1.2 K/UL Final    ABS. EOSINOPHILS 08/26/2021 0.1  0.0 - 0.4 K/UL Final    ABS. BASOPHILS 08/26/2021 0.1  0.0 - 0.1 K/UL Final    ABS. IMM. GRANS. 08/26/2021 0.0  0.00 - 0.04 K/UL Final    DF 08/26/2021 AUTOMATED    Final    Sodium 08/26/2021 140  135 - 145 mmol/L Final    Potassium 08/26/2021 4.1  3.2 - 5.1 mmol/L Final    Chloride 08/26/2021 104  94 - 111 mmol/L Final    CO2 08/26/2021 25  21 - 33 mmol/L Final    Anion gap 08/26/2021 11  mmol/L Final    Glucose 08/26/2021 184* 70 - 110 mg/dL Final    BUN 08/26/2021 31* 9 - 21 mg/dL Final    Creatinine 08/26/2021 1.00  0.70 - 1.20 mg/dL Final    BUN/Creatinine ratio 08/26/2021 31    Final    GFR est AA 08/26/2021 >60  ml/min/1.73m2 Final    GFR est non-AA 08/26/2021 53  ml/min/1.73m2 Final    Comment: Estimated GFR is calculated using the IDMS-traceable Modification of Diet in Renal Disease (MDRD) Study equation, reported for both  Americans (GFRAA) and non- Americans (GFRNA), and normalized to 1.73m2 body surface area. The physician must decide which value applies to the patient. The MDRD study equation should only be used in individuals age 25 or older. It has not been validated for the following: pregnant women, patients with serious comorbid conditions, or on certain medications, or persons with extremes of body size, muscle mass, or nutritional status.  Calcium 08/26/2021 9.2  8.5 - 10.5 mg/dL Final    Bilirubin, total 08/26/2021 0.7  0.2 - 1.0 mg/dL Final    AST (SGOT) 08/26/2021 25  14 - 74 U/L Final    ALT (SGPT) 08/26/2021 20  3 - 52 U/L Final    Alk.  phosphatase 08/26/2021 59  38 - 126 U/L Final    Protein, total 08/26/2021 6.4  6.1 - 8.4 g/dL Final    Albumin 08/26/2021 3.6  3.5 - 4.7 g/dL Final    Globulin 08/26/2021 2.8  g/dL Final    A-G Ratio 08/26/2021 1.3    Final    Prothrombin time 08/26/2021 14.3  11.5 - 15.2 sec Final    INR 08/26/2021 1.2  0.8 - 1.2   Final    Occult Blood,day 1 08/26/2021 Positive    Final    Day 1 date: 08/26/2021 3,346,332    Final    Specimen source 08/26/2021 Nasopharyngeal    Final    COVID-19 rapid test 08/26/2021 Not Detected  Not Detected   Final    Comment:   Rapid NAAT:  The specimen is NEGATIVE for SARS-CoV-2, the novel coronavirus associated with COVID-19. Negative results should be treated as presumptive and, if inconsistent with clinical signs and symptoms or necessary for patient management, should be tested with an alternative molecular assay. Negative results do not preclude SARS-CoV-2 infection and should not be used as the sole basis for patient management decisions. This test has been authorized by the FDA under an Emergency Use   Authorization (EUA) for use by authorized laboratories.  Fact sheet for Healthcare Providers: ConventionUpdate.co.nz Fact sheet for Patients: ConventionUpdate.co.nz   Methodology: Isothermal Nucleic Acid Amplification      WBC 08/27/2021 8.1  4.6 - 13.2 K/uL Final    RBC 08/27/2021 2.93* 4.20 - 5.30 M/uL Final    HGB 08/27/2021 9.1* 12.0 - 16.0 g/dL Final    HCT 08/27/2021 27.7* 35.0 - 45.0 % Final    MCV 08/27/2021 94.5  78.0 - 100.0 FL Final    PLEASE NOTE NEW REFERENCE RANGE    [facility] 08/27/2021 31.1  24.0 - 34.0 PG Final    MCHC 08/27/2021 32.9  31.0 - 37.0 g/dL Final    RDW 08/27/2021 12.1  11.6 - 14.5 % Final    PLATELET 62/65/0781 260  135 - 420 K/uL Final    MPV 08/27/2021 11.4  9.2 - 11.8 FL Final    NRBC 08/27/2021 0.0  0.0  WBC Final    ABSOLUTE NRBC 08/27/2021 0.00  0.00 - 0.01 K/uL Final    NEUTROPHILS 08/27/2021 81* 40 - 73 % Final    LYMPHOCYTES 08/27/2021 11* 21 - 52 % Final    MONOCYTES 08/27/2021 8  3 - 10 % Final    EOSINOPHILS 08/27/2021 0  0 - 5 % Final    BASOPHILS 08/27/2021 0  0 - 2 % Final    IMMATURE GRANULOCYTES 08/27/2021 0  0 - 0.5 % Final    ABS. NEUTROPHILS 08/27/2021 6.5  1.8 - 8.0 K/UL Final    ABS. LYMPHOCYTES 08/27/2021 0.9  0.9 - 3.6 K/UL Final    ABS. MONOCYTES 08/27/2021 0.7  0.05 - 1.2 K/UL Final    ABS. EOSINOPHILS 08/27/2021 0.0  0.0 - 0.4 K/UL Final    ABS. BASOPHILS 08/27/2021 0.0  0.0 - 0.1 K/UL Final    ABS. IMM. GRANS. 08/27/2021 0.0  0.00 - 0.04 K/UL Final    DF 08/27/2021 AUTOMATED    Final   Hospital Outpatient Visit on 06/21/2021   Component Date Value Ref Range Status    Iron 06/21/2021 90  35 - 150 ug/dL Final    TIBC 06/21/2021 312  250 - 450 ug/dL Final    Iron % saturation 06/21/2021 29  20 - 50 % Final   Hospital Outpatient Visit on 04/22/2021   Component Date Value Ref Range Status    WBC 04/22/2021 5.2  4.6 - 13.2 K/uL Final    RBC 04/22/2021 4.65  4.20 - 5.30 M/uL Final    HGB 04/22/2021 13.5  12.0 - 16.0 g/dL Final    HCT 04/22/2021 43.1  35.0 - 45.0 % Final    MCV 04/22/2021 92.7  74.0 - 97.0 FL Final    MCH 04/22/2021 29.0  24.0 - 34.0 PG Final    MCHC 04/22/2021 31.3  31.0 - 37.0 g/dL Final    RDW 04/22/2021 15.1* 11.6 - 14.5 % Final    PLATELET 77/99/7123 483  135 - 420 K/uL Final    MPV 04/22/2021 11.1  9.2 - 11.8 FL Final    NEUTROPHILS 04/22/2021 72  40 - 73 % Final    LYMPHOCYTES 04/22/2021 12* 21 - 52 % Final    MONOCYTES 04/22/2021 13* 3 - 10 % Final    EOSINOPHILS 04/22/2021 2  0 - 5 % Final    BASOPHILS 04/22/2021 1  0 - 2 % Final    IMMATURE GRANULOCYTES 04/22/2021 0  % Final    ABS. NEUTROPHILS 04/22/2021 3.8  1.8 - 8.0 K/UL Final    ABS. LYMPHOCYTES 04/22/2021 0.6* 0.9 - 3.6 K/UL Final    ABS. MONOCYTES 04/22/2021 0.7  0.05 - 1.2 K/UL Final    ABS. EOSINOPHILS 04/22/2021 0.1  0.0 - 0.4 K/UL Final    ABS. BASOPHILS 04/22/2021 0.0  0.0 - 0.1 K/UL Final    ABS. IMM.  GRANS. 04/22/2021 0.0  K/UL Final    LD 04/22/2021 176  98 - 192 U/L Final    Sodium 04/22/2021 138  135 - 145 mmol/L Final    Potassium 04/22/2021 4.5  3.2 - 5.1 mmol/L Final    Chloride 04/22/2021 104  94 - 111 mmol/L Final    CO2 04/22/2021 25  21 - 33 mmol/L Final    Anion gap 04/22/2021 9  mmol/L Final    Glucose 04/22/2021 114* 70 - 110 mg/dL Final    BUN 04/22/2021 20  9 - 21 mg/dL Final    Creatinine 04/22/2021 0.90  0.70 - 1.20 mg/dL Final    BUN/Creatinine ratio 04/22/2021 22    Final    GFR est AA 04/22/2021 >60  ml/min/1.73m2 Final    GFR est non-AA 04/22/2021 60  ml/min/1.73m2 Final    Comment: Estimated GFR is calculated using the IDMS-traceable Modification of Diet in Renal Disease (MDRD) Study equation, reported for both  Americans (GFRAA) and non- Americans (GFRNA), and normalized to 1.73m2 body surface area. The physician must decide which value applies to the patient. The MDRD study equation should only be used in individuals age 25 or older. It has not been validated for the following: pregnant women, patients with serious comorbid conditions, or on certain medications, or persons with extremes of body size, muscle mass, or nutritional status.  Calcium 04/22/2021 9.5  8.5 - 10.5 mg/dL Final    Bilirubin, total 04/22/2021 0.8  0.2 - 1.0 mg/dL Final    AST (SGOT) 04/22/2021 28  14 - 74 U/L Final    ALT (SGPT) 04/22/2021 30  3 - 52 U/L Final    Alk. phosphatase 04/22/2021 66  38 - 126 U/L Final    Protein, total 04/22/2021 7.2  6.1 - 8.4 g/dL Final    Albumin 04/22/2021 4.1  3.5 - 4.7 g/dL Final    Globulin 04/22/2021 3.1  g/dL Final    A-G Ratio 04/22/2021 1.3    Final    TSH 04/22/2021 0.91  0.35 - 6.20 uIU/mL Final    Comment:    Due to TSH heterogeneity, both structurally and degree of glycosylation, monoclonal antibodies used in the TSH assay may not accurately quantitate TSH.  Therefore, this result should be correlated with clinical findings as well as with other assessments of thyroid function, e.g., free T4, free T3. Abstract on 04/12/2021   Component Date Value Ref Range Status    Creatinine, External 03/12/2021 0.8   Final   Hospital Outpatient Visit on 02/01/2021   Component Date Value Ref Range Status    WBC 02/01/2021 5.0  4.6 - 13.2 K/uL Final    RBC 02/01/2021 4.04* 4.20 - 5.30 M/uL Final    HGB 02/01/2021 10.7* 12.0 - 16.0 g/dL Final    HCT 02/01/2021 36.7  35.0 - 45.0 % Final    MCV 02/01/2021 90.8  74.0 - 97.0 FL Final    MCH 02/01/2021 26.5  24.0 - 34.0 PG Final    MCHC 02/01/2021 29.2* 31.0 - 37.0 g/dL Final    RDW 02/01/2021 19.7* 11.6 - 14.5 % Final    PLATELET 25/74/0375 674  135 - 420 K/uL Final    MPV 02/01/2021 10.9  9.2 - 11.8 FL Final    NEUTROPHILS 02/01/2021 76* 40 - 73 % Final    LYMPHOCYTES 02/01/2021 12* 21 - 52 % Final    MONOCYTES 02/01/2021 10  3 - 10 % Final    EOSINOPHILS 02/01/2021 1  0 - 5 % Final    BASOPHILS 02/01/2021 1  0 - 2 % Final    IMMATURE GRANULOCYTES 02/01/2021 0  % Final    ABS. NEUTROPHILS 02/01/2021 3.8  1.8 - 8.0 K/UL Final    ABS. LYMPHOCYTES 02/01/2021 0.6* 0.9 - 3.6 K/UL Final    ABS. MONOCYTES 02/01/2021 0.5  0.05 - 1.2 K/UL Final    ABS. EOSINOPHILS 02/01/2021 0.1  0.0 - 0.4 K/UL Final    ABS. BASOPHILS 02/01/2021 0.1  0.0 - 0.1 K/UL Final    ABS. IMM. GRANS. 02/01/2021 0.0  K/UL Final    Haptoglobin 02/01/2021 223* 30 - 200 mg/dL Final    Iron 02/01/2021 129  35 - 150 ug/dL Final    TIBC 02/01/2021 346  250 - 450 ug/dL Final    Iron % saturation 02/01/2021 37  20 - 50 % Final    LD 02/01/2021 160  98 - 192 U/L Final    MONTY Poly 02/01/2021 Negative   Final   No results displayed because visit has over 200 results.       Hospital Outpatient Visit on 12/11/2020   Component Date Value Ref Range Status    Sodium 12/11/2020 138  135 - 145 mmol/L Final    Potassium 12/11/2020 3.9  3.2 - 5.1 mmol/L Final    Chloride 12/11/2020 106  94 - 111 mmol/L Final    CO2 12/11/2020 25  21 - 33 mmol/L Final    Anion gap 12/11/2020 7  mmol/L Final  Glucose 12/11/2020 103  70 - 110 mg/dL Final    BUN 12/11/2020 17  9 - 21 mg/dL Final    Creatinine 12/11/2020 0.80  0.70 - 1.20 mg/dL Final    BUN/Creatinine ratio 12/11/2020 21    Final    GFR est AA 12/11/2020 >60  ml/min/1.73m2 Final    GFR est non-AA 12/11/2020 >60  ml/min/1.73m2 Final    Comment: Estimated GFR is calculated using the IDMS-traceable Modification of Diet in Renal Disease (MDRD) Study equation, reported for both  Americans (GFRAA) and non- Americans (GFRNA), and normalized to 1.73m2 body surface area. The physician must decide which value applies to the patient. The MDRD study equation should only be used in individuals age 25 or older. It has not been validated for the following: pregnant women, patients with serious comorbid conditions, or on certain medications, or persons with extremes of body size, muscle mass, or nutritional status.  Calcium 12/11/2020 9.4  8.5 - 10.5 mg/dL Final    Bilirubin, total 12/11/2020 0.4  0.2 - 1.0 mg/dL Final    AST (SGOT) 12/11/2020 27  14 - 74 U/L Final    ALT (SGPT) 12/11/2020 21  3 - 52 U/L Final    Alk. phosphatase 12/11/2020 79  38 - 126 U/L Final    Protein, total 12/11/2020 7.8  6.1 - 8.4 g/dL Final    Albumin 12/11/2020 4.1  3.5 - 4.7 g/dL Final    Globulin 12/11/2020 3.7  g/dL Final    A-G Ratio 12/11/2020 1.1    Final    LIPID PROFILE 12/11/2020      Final    Cholesterol, total 12/11/2020 153  <200 mg/dL Final    Triglyceride 12/11/2020 122  <150 mg/dL Final    Comment: Based on NCEP-ATP III:  Triglycerides <150 mg/dL  is considered  normal, 150-199 mg/dL  borderline high,  200-499 mg/dL high and   greater than or equal to 500 mg/dL very high.  HDL Cholesterol 12/11/2020 53  mg/dL Final    Comment: Based on NCEP ATP III, HDL Cholesterol <40 mg/dL is considered low  and >60 mg/dL is elevated.       LDL, calculated 12/11/2020 75.6  0 - 100 mg/dL Final    Comment: Based on the NCEP-ATP: LDL-C concentrations are considered  optimal  <100 mg/dL,  near optimal/above Normal 100-129 mg/dL  Borderline High: 130-159, High: 160-189 mg/dL  Very High: Greater than or equal to 190 mg/dL      VLDL, calculated 12/11/2020 24.4  mg/dL Final    CHOL/HDL Ratio 12/11/2020 2.9  0.0 - 5.0   Final    WBC 12/11/2020 6.5  4.6 - 13.2 K/uL Final    RBC 12/11/2020 4.28  4.20 - 5.30 M/uL Final    HGB 12/11/2020 11.4* 12.0 - 16.0 g/dL Final    HCT 12/11/2020 37.6  35.0 - 45.0 % Final    MCV 12/11/2020 87.9  74.0 - 97.0 FL Final    MCH 12/11/2020 26.6  24.0 - 34.0 PG Final    MCHC 12/11/2020 30.3* 31.0 - 37.0 g/dL Final    RDW 12/11/2020 14.7* 11.6 - 14.5 % Final    PLATELET 22/66/9021 103  135 - 420 K/uL Final    MPV 12/11/2020 11.4  9.2 - 11.8 FL Final    NEUTROPHILS 12/11/2020 69  40 - 73 % Final    LYMPHOCYTES 12/11/2020 16* 21 - 52 % Final    MONOCYTES 12/11/2020 12* 3 - 10 % Final    EOSINOPHILS 12/11/2020 2  0 - 5 % Final    BASOPHILS 12/11/2020 1  0 - 2 % Final    IMMATURE GRANULOCYTES 12/11/2020 0  % Final    ABS. NEUTROPHILS 12/11/2020 4.5  1.8 - 8.0 K/UL Final    ABS. LYMPHOCYTES 12/11/2020 1.0  0.9 - 3.6 K/UL Final    ABS. MONOCYTES 12/11/2020 0.8  0.05 - 1.2 K/UL Final    ABS. EOSINOPHILS 12/11/2020 0.2  0.0 - 0.4 K/UL Final    ABS. BASOPHILS 12/11/2020 0.0  0.0 - 0.1 K/UL Final    ABS. IMM. GRANS. 12/11/2020 0.0  K/UL Final   There may be more visits with results that are not included. Assessment & Plan:     1. Paronychia of finger, left  Start Keflex. Follow-up as needed if no improvement             I have discussed the diagnosis with the patient and the intended plan as seen in the above orders. The patient has received an after-visit summary and questions were answered concerning future plans. I have discussed medication side effects and warnings with the patient as well. I have reviewed the plan of care with the patient, accepted their input and they are in agreement with the treatment goals. Previous lab and imaging results were reviewed by me.        Ami Jett MD  November 8, 2021

## 2021-11-12 ENCOUNTER — OFFICE VISIT (OUTPATIENT)
Dept: FAMILY MEDICINE CLINIC | Age: 81
End: 2021-11-12
Payer: MEDICARE

## 2021-11-12 ENCOUNTER — HOSPITAL ENCOUNTER (OUTPATIENT)
Dept: GENERAL RADIOLOGY | Age: 81
Discharge: HOME | End: 2021-11-12
Attending: STUDENT IN AN ORGANIZED HEALTH CARE EDUCATION/TRAINING PROGRAM
Payer: MEDICARE

## 2021-11-12 VITALS
BODY MASS INDEX: 28.65 KG/M2 | HEART RATE: 56 BPM | SYSTOLIC BLOOD PRESSURE: 143 MMHG | HEIGHT: 64 IN | DIASTOLIC BLOOD PRESSURE: 74 MMHG | OXYGEN SATURATION: 98 % | WEIGHT: 167.8 LBS

## 2021-11-12 DIAGNOSIS — L03.012 PARONYCHIA OF FINGER, LEFT: Primary | ICD-10-CM

## 2021-11-12 DIAGNOSIS — L03.012 PARONYCHIA OF FINGER, LEFT: ICD-10-CM

## 2021-11-12 PROCEDURE — 1101F PT FALLS ASSESS-DOCD LE1/YR: CPT | Performed by: STUDENT IN AN ORGANIZED HEALTH CARE EDUCATION/TRAINING PROGRAM

## 2021-11-12 PROCEDURE — G8753 SYS BP > OR = 140: HCPCS | Performed by: STUDENT IN AN ORGANIZED HEALTH CARE EDUCATION/TRAINING PROGRAM

## 2021-11-12 PROCEDURE — G8536 NO DOC ELDER MAL SCRN: HCPCS | Performed by: STUDENT IN AN ORGANIZED HEALTH CARE EDUCATION/TRAINING PROGRAM

## 2021-11-12 PROCEDURE — 73120 X-RAY EXAM OF HAND: CPT

## 2021-11-12 PROCEDURE — 99213 OFFICE O/P EST LOW 20 MIN: CPT | Performed by: STUDENT IN AN ORGANIZED HEALTH CARE EDUCATION/TRAINING PROGRAM

## 2021-11-12 PROCEDURE — G8432 DEP SCR NOT DOC, RNG: HCPCS | Performed by: STUDENT IN AN ORGANIZED HEALTH CARE EDUCATION/TRAINING PROGRAM

## 2021-11-12 PROCEDURE — G8417 CALC BMI ABV UP PARAM F/U: HCPCS | Performed by: STUDENT IN AN ORGANIZED HEALTH CARE EDUCATION/TRAINING PROGRAM

## 2021-11-12 PROCEDURE — 1090F PRES/ABSN URINE INCON ASSESS: CPT | Performed by: STUDENT IN AN ORGANIZED HEALTH CARE EDUCATION/TRAINING PROGRAM

## 2021-11-12 PROCEDURE — G8754 DIAS BP LESS 90: HCPCS | Performed by: STUDENT IN AN ORGANIZED HEALTH CARE EDUCATION/TRAINING PROGRAM

## 2021-11-12 PROCEDURE — G8400 PT W/DXA NO RESULTS DOC: HCPCS | Performed by: STUDENT IN AN ORGANIZED HEALTH CARE EDUCATION/TRAINING PROGRAM

## 2021-11-12 PROCEDURE — G8428 CUR MEDS NOT DOCUMENT: HCPCS | Performed by: STUDENT IN AN ORGANIZED HEALTH CARE EDUCATION/TRAINING PROGRAM

## 2021-11-12 RX ORDER — SULFAMETHOXAZOLE AND TRIMETHOPRIM 800; 160 MG/1; MG/1
2 TABLET ORAL 2 TIMES DAILY
Qty: 40 TABLET | Refills: 0 | Status: SHIPPED | OUTPATIENT
Start: 2021-11-12 | End: 2021-11-22

## 2021-11-12 NOTE — PROGRESS NOTES
Gail Marquez presents today for   Chief Complaint   Patient presents with    Follow-up     finger nail        Is someone accompanying this pt? No     Is the patient using any DME equipment during OV? No     Depression Screening:  3 most recent PHQ Screens 11/12/2021   Little interest or pleasure in doing things Not at all   Feeling down, depressed, irritable, or hopeless Nearly every day   Total Score PHQ 2 3   Trouble falling or staying asleep, or sleeping too much -   Feeling tired or having little energy -   Poor appetite, weight loss, or overeating -   Feeling bad about yourself - or that you are a failure or have let yourself or your family down -   Trouble concentrating on things such as school, work, reading, or watching TV -   Moving or speaking so slowly that other people could have noticed; or the opposite being so fidgety that others notice -   Thoughts of being better off dead, or hurting yourself in some way -   PHQ 9 Score -   How difficult have these problems made it for you to do your work, take care of your home and get along with others -       Learning Assessment:  Learning Assessment 12/3/2020   PRIMARY LEARNER Patient   HIGHEST LEVEL OF EDUCATION - PRIMARY LEARNER  DID NOT GRADUATE HIGH SCHOOL   BARRIERS PRIMARY LEARNER NONE   PRIMARY LANGUAGE ENGLISH   LEARNER PREFERENCE PRIMARY READING   ANSWERED BY patient   RELATIONSHIP SELF       Fall Risk  Fall Risk Assessment, last 12 mths 11/1/2021   Able to walk? Yes   Fall in past 12 months? 0   Do you feel unsteady? 0   Are you worried about falling 0   Is the gait abnormal? -   Number of falls in past 12 months -   Fall with injury? -       Health Maintenance reviewed and discussed and ordered per Provider.     Health Maintenance Due   Topic Date Due    DTaP/Tdap/Td series (1 - Tdap) Never done    Shingrix Vaccine Age 50> (1 of 2) Never done    Bone Densitometry (Dexa) Screening  Never done    Pneumococcal 65+ years (1 of 1 - PPSV23) Never done    COVID-19 Vaccine (2 - Moderna 3-dose booster series) 02/23/2021    Flu Vaccine (1) 09/01/2021   . Coordination of Care:  1. Have you been to the ER, urgent care clinic since your last visit? Hospitalized since your last visit? No     2. Have you seen or consulted any other health care providers outside of the [address] [place] [address] since your last visit? Include any pap smears or colon screening.  [facility] cardiology

## 2021-11-15 NOTE — PROGRESS NOTES
Subjective:   lAiya Valente is a 78 y.o. female who was seen for Follow-up (finger nail )    Patient's swelling has not improved. She states that the redness and pain is still there but it has not progressed up her hand. She states that there is pus coming out as well. Denies any fevers. Home Medications    Medication Sig Start Date End Date Taking? Authorizing Provider   trimethoprim-sulfamethoxazole (BACTRIM DS, SEPTRA DS) 160-800 mg per tablet Take 2 Tablets by mouth two (2) times a day for 10 days. 11/12/21 11/22/21 Yes Shane Nation MD   cephALEXin [facility]) 500 mg capsule Take 1 Capsule by mouth three (3) times daily for 7 days. 11/8/21 11/15/21 Yes Shane Nation MD   pantoprazole (PROTONIX) 40 mg tablet Take 1 Tablet by mouth daily. 11/1/21  Yes Shane Nation MD   metoprolol tartrate (LOPRESSOR) 25 mg tablet TAKE 1/2 TABLET BY MOUTH TWICE DAILY 10/28/21  Yes Shane Nation MD   ALPRAZolam Angela Blockis) 0.5 mg tablet Take 1 Tablet by mouth two (2) times a day. Max Daily Amount: 1 mg. 10/21/21  Yes Shane Nation MD   budesonide-formoteroL (Symbicort) 80-4.5 mcg/actuation HFAA Take 2 Puffs by inhalation two (2) times a day. 10/18/21  Yes Shane Nation MD   losartan (COZAAR) 100 mg tablet Take 1 Tablet by mouth daily. 10/15/21  Yes Shane Nation MD   furosemide (LASIX) 20 mg tablet One po qod 8/31/21  Yes Silver Booth MD   atorvastatin (LIPITOR) 40 mg tablet Take 1 Tablet by mouth daily. 6/14/21  Yes Silver Booth MD   albuterol (PROVENTIL HFA, VENTOLIN HFA, PROAIR HFA) 90 mcg/actuation inhaler Take 2 Puffs by inhalation every four (4) hours as needed for Wheezing. 4/21/21  Yes Silver Booth MD   triamcinolone (ARISTOCORT) 0.5 % topical cream Apply  to affected area two (2) times a day. use thin layer 11/1/21   Shane Nation MD      Allergies   Allergen Reactions    Isopropyl Alcohol Other (comments)     Weakness all over - pt states she has out grown this    Pen-Vee K Rash     Pt.  States she avoids penicillin due to allergy as a child. Social History     Tobacco Use    Smoking status: Former Smoker     Packs/day: 1.00     Years: 50.00     Pack years: 50.00    Smokeless tobacco: Never Used   Vaping Use    Vaping Use: Never used   Substance Use Topics    Alcohol use: Not Currently    Drug use: Never            Review of Systems   Musculoskeletal: Positive for joint swelling. All other systems reviewed and are negative. Objective:     Visit Vitals  BP (!) 143/74 (BP 1 Location: Left upper arm, BP Patient Position: Sitting, BP Cuff Size: Adult)   Pulse (!) 56   Ht 5' 4\" (1.626 m)   Wt 167 lb 12.8 oz (76.1 kg)   SpO2 98%   BMI 28.80 kg/m²        General: alert, oriented, not in distress  Head: scalp normal, atraumatic  Chest/Lungs: clear breath sounds, no wheezing or crackles  Heart: normal rate, regular rhythm, no murmur  Abdomen: soft, non-distended, non-tender, normal bowel sounds, no organomegaly, no masses  Extremities: Tenderness to palpation of left index finger with erythema and swelling. Pus is present. Appropriate range of motion. Sensation intact.    Skin: no active skin lesions    Laboratory/Tests:  Hospital Outpatient Visit on 11/04/2021   Component Date Value Ref Range Status    WBC 11/04/2021 6.5  4.6 - 13.2 K/uL Final    RBC 11/04/2021 4.25  4.20 - 5.30 M/uL Final    HGB 11/04/2021 12.9  12.0 - 16.0 g/dL Final    HCT 11/04/2021 41.0  35.0 - 45.0 % Final    MCV 11/04/2021 96.5  78.0 - 100.0 FL Final    MCH 11/04/2021 30.4  24.0 - 34.0 PG Final    MCHC 11/04/2021 31.5  31.0 - 37.0 g/dL Final    RDW 11/04/2021 11.7  11.6 - 14.5 % Final    PLATELET 01/26/9278 926  135 - 420 K/uL Final    MPV 11/04/2021 11.6  9.2 - 11.8 FL Final    NRBC 11/04/2021 0.0  0.0  WBC Final    ABSOLUTE NRBC 11/04/2021 0.00  0.00 - 0.01 K/uL Final    NEUTROPHILS 11/04/2021 75* 40 - 73 % Final    LYMPHOCYTES 11/04/2021 11* 21 - 52 % Final    MONOCYTES 11/04/2021 10  3 - 10 % Final  EOSINOPHILS 11/04/2021 2  0 - 5 % Final    BASOPHILS 11/04/2021 1  0 - 2 % Final    IMMATURE GRANULOCYTES 11/04/2021 1* 0 - 0.5 % Final    ABS. NEUTROPHILS 11/04/2021 4.9  1.8 - 8.0 K/UL Final    ABS. LYMPHOCYTES 11/04/2021 0.7* 0.9 - 3.6 K/UL Final    ABS. MONOCYTES 11/04/2021 0.7  0.05 - 1.2 K/UL Final    ABS. EOSINOPHILS 11/04/2021 0.1  0.0 - 0.4 K/UL Final    ABS. BASOPHILS 11/04/2021 0.0  0.0 - 0.1 K/UL Final    ABS. IMM. GRANS. 11/04/2021 0.0  0.00 - 0.04 K/UL Final    DF 11/04/2021 AUTOMATED    Final    Ferritin 11/04/2021 42  8 - 388 ng/mL Final    Iron 11/04/2021 68  50 - 175 ug/dL Final    Comment: Patients receiving metal-binding drugs (e.g. deferoxamine) may show  spuriously depressed iron values, as chelated iron may not properly  react in the iron assay.  TIBC 11/04/2021 327  250 - 450 ug/dL Final    Iron % saturation 11/04/2021 21  20 - 50 % Final    Sodium 11/04/2021 136  135 - 145 mmol/L Final    Potassium 11/04/2021 4.2  3.2 - 5.1 mmol/L Final    Chloride 11/04/2021 101  94 - 111 mmol/L Final    CO2 11/04/2021 25  21 - 33 mmol/L Final    Anion gap 11/04/2021 10  mmol/L Final    Glucose 11/04/2021 118* 70 - 110 mg/dL Final    BUN 11/04/2021 12  9 - 21 mg/dL Final    Creatinine 11/04/2021 0.90  0.70 - 1.20 mg/dL Final    BUN/Creatinine ratio 11/04/2021 13    Final    GFR est AA 11/04/2021 >60  ml/min/1.73m2 Final    GFR est non-AA 11/04/2021 60  ml/min/1.73m2 Final    Comment: Estimated GFR is calculated using the IDMS-traceable Modification of Diet in Renal Disease (MDRD) Study equation, reported for both  Americans (GFRAA) and non- Americans (GFRNA), and normalized to 1.73m2 body surface area. The physician must decide which value applies to the patient. The MDRD study equation should only be used in individuals age 25 or older.  It has not been validated for the following: pregnant women, patients with serious comorbid conditions, or on certain medications, or persons with extremes of body size, muscle mass, or nutritional status.  Calcium 11/04/2021 9.4  8.5 - 10.5 mg/dL Final    Bilirubin, total 11/04/2021 0.6  0.2 - 1.0 mg/dL Final    AST (SGOT) 11/04/2021 24  14 - 74 U/L Final    ALT (SGPT) 11/04/2021 20  3 - 52 U/L Final    Alk. phosphatase 11/04/2021 65  38 - 126 U/L Final    Protein, total 11/04/2021 7.1  6.1 - 8.4 g/dL Final    Albumin 11/04/2021 4.1  3.5 - 4.7 g/dL Final    Globulin 11/04/2021 3.0  g/dL Final    A-G Ratio 11/04/2021 1.4    Final   Hospital Outpatient Visit on 10/22/2021   Component Date Value Ref Range Status    SARS-CoV-2 10/22/2021 Please find results under separate order    Final    SARS-CoV-2 10/22/2021 Not Detected  Not Detected Final    Comment: This nucleic acid amplification test was developed and its  performance characteristics determined by [employer]. Nucleic acid amplification tests include RT-PCR and TMA. This test  has not been FDA cleared or approved. This test has been authorized  by FDA under an Emergency Use Authorization (EUA). This test is only  authorized for the duration of time the declaration that  circumstances exist justifying the authorization of the emergency use  of in vitro diagnostic tests for detection of SARS-CoV-2 virus and/or  diagnosis of COVID-19 infection under section 564(b)(1) of the Act,  21 U. S.C. 609YKW-7(J) (1), unless the authorization is terminated or  revoked sooner. When diagnostic testing is negative, the possibility of a false  negative result should be considered in the context of a patient's  recent exposures and the presence of clinical signs and symptoms  consistent with COVID-19. An individual without symptoms of COVID-  19 and who is not shedding SARS-CoV-2 virus w                           ould expect to have a  negative (not detected) result in this assay.   Performed At: [address] [id number]  Narda Blanco [address] [place] [address] [id number]     Hospital Outpatient Visit on 09/16/2021   Component Date Value Ref Range Status    WBC 09/16/2021 5.4  4.6 - 13.2 K/uL Final    RBC 09/16/2021 3.51* 4.20 - 5.30 M/uL Final    HGB 09/16/2021 11.0* 12.0 - 16.0 g/dL Final    HCT 09/16/2021 34.6* 35.0 - 45.0 % Final    MCV 09/16/2021 98.6  78.0 - 100.0 FL Final    PLEASE NOTE NEW REFERENCE RANGE    MCH 09/16/2021 31.3  24.0 - 34.0 PG Final    MCHC 09/16/2021 31.8  31.0 - 37.0 g/dL Final    RDW 09/16/2021 13.3  11.6 - 14.5 % Final    PLATELET 01/72/6692 462  135 - 420 K/uL Final    MPV 09/16/2021 10.7  9.2 - 11.8 FL Final    NRBC 09/16/2021 0.0  0.0  WBC Final    ABSOLUTE NRBC 09/16/2021 0.00  0.00 - 0.01 K/uL Final    NEUTROPHILS 09/16/2021 65  40 - 73 % Final    LYMPHOCYTES 09/16/2021 18* 21 - 52 % Final    MONOCYTES 09/16/2021 11* 3 - 10 % Final    EOSINOPHILS 09/16/2021 5  0 - 5 % Final    BASOPHILS 09/16/2021 1  0 - 2 % Final    IMMATURE GRANULOCYTES 09/16/2021 0  0 - 0.5 % Final    ABS. NEUTROPHILS 09/16/2021 3.6  1.8 - 8.0 K/UL Final    ABS. LYMPHOCYTES 09/16/2021 1.0  0.9 - 3.6 K/UL Final    ABS. MONOCYTES 09/16/2021 0.6  0.05 - 1.2 K/UL Final    ABS. EOSINOPHILS 09/16/2021 0.3  0.0 - 0.4 K/UL Final    ABS. BASOPHILS 09/16/2021 0.1  0.0 - 0.1 K/UL Final    ABS. IMM.  GRANS. 09/16/2021 0.0  0.00 - 0.04 K/UL Final    DF 09/16/2021 AUTOMATED    Final   Admission on 08/26/2021, Discharged on 08/27/2021   Component Date Value Ref Range Status    WBC 08/26/2021 6.5  4.6 - 13.2 K/uL Final    RBC 08/26/2021 3.43* 4.20 - 5.30 M/uL Final    HGB 08/26/2021 10.8* 12.0 - 16.0 g/dL Final    HCT 08/26/2021 32.6* 35.0 - 45.0 % Final    MCV 08/26/2021 95.0  78.0 - 100.0 FL Final    PLEASE NOTE NEW REFERENCE RANGE    [facility] 08/26/2021 31.5  24.0 - 34.0 PG Final    MCHC 08/26/2021 33.1  31.0 - 37.0 g/dL Final    RDW 08/26/2021 12.0  11.6 - 14.5 % Final    PLATELET 36/70/4803 964  135 - 420 K/uL Final    MPV 08/26/2021 11.3  9.2 - 11.8 FL Final    NRBC 08/26/2021 0.0  0.0  WBC Final    ABSOLUTE NRBC 08/26/2021 0.00  0.00 - 0.01 K/uL Final    NEUTROPHILS 08/26/2021 81* 40 - 73 % Final    LYMPHOCYTES 08/26/2021 12* 21 - 52 % Final    MONOCYTES 08/26/2021 4  3 - 10 % Final    EOSINOPHILS 08/26/2021 2  0 - 5 % Final    BASOPHILS 08/26/2021 1  0 - 2 % Final    IMMATURE GRANULOCYTES 08/26/2021 0  0 - 0.5 % Final    ABS. NEUTROPHILS 08/26/2021 5.2  1.8 - 8.0 K/UL Final    ABS. LYMPHOCYTES 08/26/2021 0.8* 0.9 - 3.6 K/UL Final    ABS. MONOCYTES 08/26/2021 0.2  0.05 - 1.2 K/UL Final    ABS. EOSINOPHILS 08/26/2021 0.1  0.0 - 0.4 K/UL Final    ABS. BASOPHILS 08/26/2021 0.1  0.0 - 0.1 K/UL Final    ABS. IMM. GRANS. 08/26/2021 0.0  0.00 - 0.04 K/UL Final    DF 08/26/2021 AUTOMATED    Final    Sodium 08/26/2021 140  135 - 145 mmol/L Final    Potassium 08/26/2021 4.1  3.2 - 5.1 mmol/L Final    Chloride 08/26/2021 104  94 - 111 mmol/L Final    CO2 08/26/2021 25  21 - 33 mmol/L Final    Anion gap 08/26/2021 11  mmol/L Final    Glucose 08/26/2021 184* 70 - 110 mg/dL Final    BUN 08/26/2021 31* 9 - 21 mg/dL Final    Creatinine 08/26/2021 1.00  0.70 - 1.20 mg/dL Final    BUN/Creatinine ratio 08/26/2021 31    Final    GFR est AA 08/26/2021 >60  ml/min/1.73m2 Final    GFR est non-AA 08/26/2021 53  ml/min/1.73m2 Final    Comment: Estimated GFR is calculated using the IDMS-traceable Modification of Diet in Renal Disease (MDRD) Study equation, reported for both  Americans (GFRAA) and non- Americans (GFRNA), and normalized to 1.73m2 body surface area. The physician must decide which value applies to the patient. The MDRD study equation should only be used in individuals age 25 or older. It has not been validated for the following: pregnant women, patients with serious comorbid conditions, or on certain medications, or persons with extremes of body size, muscle mass, or nutritional status.       Calcium 08/26/2021 9.2  8.5 - 10.5 mg/dL Final    Bilirubin, total 08/26/2021 0.7  0.2 - 1.0 mg/dL Final    AST (SGOT) 08/26/2021 25  14 - 74 U/L Final    ALT (SGPT) 08/26/2021 20  3 - 52 U/L Final    Alk. phosphatase 08/26/2021 59  38 - 126 U/L Final    Protein, total 08/26/2021 6.4  6.1 - 8.4 g/dL Final    Albumin 08/26/2021 3.6  3.5 - 4.7 g/dL Final    Globulin 08/26/2021 2.8  g/dL Final    A-G Ratio 08/26/2021 1.3    Final    Prothrombin time 08/26/2021 14.3  11.5 - 15.2 sec Final    INR 08/26/2021 1.2  0.8 - 1.2   Final    Occult Blood,day 1 08/26/2021 Positive    Final    Day 1 date: 08/26/2021 [id number]    Final    Specimen source 08/26/2021 Nasopharyngeal    Final    COVID-19 rapid test 08/26/2021 Not Detected  Not Detected   Final    Comment:   Rapid NAAT:  The specimen is NEGATIVE for SARS-CoV-2, the novel coronavirus associated with COVID-19. Negative results should be treated as presumptive and, if inconsistent with clinical signs and symptoms or necessary for patient management, should be tested with an alternative molecular assay. Negative results do not preclude SARS-CoV-2 infection and should not be used as the sole basis for patient management decisions. This test has been authorized by the FDA under an Emergency Use   Authorization (EUA) for use by authorized laboratories.  Fact sheet for Healthcare Providers: ConventionUpdate.co.nz Fact sheet for Patients: ConventionUpdate.co.nz   Methodology: Isothermal Nucleic Acid Amplification      WBC 08/27/2021 8.1  4.6 - 13.2 K/uL Final    RBC 08/27/2021 2.93* 4.20 - 5.30 M/uL Final    HGB 08/27/2021 9.1* 12.0 - 16.0 g/dL Final    HCT 08/27/2021 27.7* 35.0 - 45.0 % Final    MCV 08/27/2021 94.5  78.0 - 100.0 FL Final    PLEASE NOTE NEW REFERENCE RANGE    [facility] 08/27/2021 31.1  24.0 - 34.0 PG Final    [facility] 08/27/2021 32.9  31.0 - 37.0 g/dL Final    RDW 08/27/2021 12.1  11.6 - 14.5 % Final    PLATELET 53/51/8994 603  135 - 420 K/uL Final    MPV 08/27/2021 11.4  9.2 - 11.8 FL Final    NRBC 08/27/2021 0.0  0.0  WBC Final    ABSOLUTE NRBC 08/27/2021 0.00  0.00 - 0.01 K/uL Final    NEUTROPHILS 08/27/2021 81* 40 - 73 % Final    LYMPHOCYTES 08/27/2021 11* 21 - 52 % Final    MONOCYTES 08/27/2021 8  3 - 10 % Final    EOSINOPHILS 08/27/2021 0  0 - 5 % Final    BASOPHILS 08/27/2021 0  0 - 2 % Final    IMMATURE GRANULOCYTES 08/27/2021 0  0 - 0.5 % Final    ABS. NEUTROPHILS 08/27/2021 6.5  1.8 - 8.0 K/UL Final    ABS. LYMPHOCYTES 08/27/2021 0.9  0.9 - 3.6 K/UL Final    ABS. MONOCYTES 08/27/2021 0.7  0.05 - 1.2 K/UL Final    ABS. EOSINOPHILS 08/27/2021 0.0  0.0 - 0.4 K/UL Final    ABS. BASOPHILS 08/27/2021 0.0  0.0 - 0.1 K/UL Final    ABS. IMM. GRANS. 08/27/2021 0.0  0.00 - 0.04 K/UL Final    DF 08/27/2021 AUTOMATED    Final   Hospital Outpatient Visit on 06/21/2021   Component Date Value Ref Range Status    Iron 06/21/2021 90  35 - 150 ug/dL Final    TIBC 06/21/2021 312  250 - 450 ug/dL Final    Iron % saturation 06/21/2021 29  20 - 50 % Final   Hospital Outpatient Visit on 04/22/2021   Component Date Value Ref Range Status    WBC 04/22/2021 5.2  4.6 - 13.2 K/uL Final    RBC 04/22/2021 4.65  4.20 - 5.30 M/uL Final    HGB 04/22/2021 13.5  12.0 - 16.0 g/dL Final    HCT 04/22/2021 43.1  35.0 - 45.0 % Final    MCV 04/22/2021 92.7  74.0 - 97.0 FL Final    MCH 04/22/2021 29.0  24.0 - 34.0 PG Final    MCHC 04/22/2021 31.3  31.0 - 37.0 g/dL Final    RDW 04/22/2021 15.1* 11.6 - 14.5 % Final    PLATELET 45/49/3329 559  135 - 420 K/uL Final    MPV 04/22/2021 11.1  9.2 - 11.8 FL Final    NEUTROPHILS 04/22/2021 72  40 - 73 % Final    LYMPHOCYTES 04/22/2021 12* 21 - 52 % Final    MONOCYTES 04/22/2021 13* 3 - 10 % Final    EOSINOPHILS 04/22/2021 2  0 - 5 % Final    BASOPHILS 04/22/2021 1  0 - 2 % Final    IMMATURE GRANULOCYTES 04/22/2021 0  % Final    ABS.  NEUTROPHILS 04/22/2021 3.8  1.8 - 8.0 K/UL Final    ABS. LYMPHOCYTES 04/22/2021 0.6* 0.9 - 3.6 K/UL Final    ABS. MONOCYTES 04/22/2021 0.7  0.05 - 1.2 K/UL Final    ABS. EOSINOPHILS 04/22/2021 0.1  0.0 - 0.4 K/UL Final    ABS. BASOPHILS 04/22/2021 0.0  0.0 - 0.1 K/UL Final    ABS. IMM. GRANS. 04/22/2021 0.0  K/UL Final    LD 04/22/2021 176  98 - 192 U/L Final    Sodium 04/22/2021 138  135 - 145 mmol/L Final    Potassium 04/22/2021 4.5  3.2 - 5.1 mmol/L Final    Chloride 04/22/2021 104  94 - 111 mmol/L Final    CO2 04/22/2021 25  21 - 33 mmol/L Final    Anion gap 04/22/2021 9  mmol/L Final    Glucose 04/22/2021 114* 70 - 110 mg/dL Final    BUN 04/22/2021 20  9 - 21 mg/dL Final    Creatinine 04/22/2021 0.90  0.70 - 1.20 mg/dL Final    BUN/Creatinine ratio 04/22/2021 22    Final    GFR est AA 04/22/2021 >60  ml/min/1.73m2 Final    GFR est non-AA 04/22/2021 60  ml/min/1.73m2 Final    Comment: Estimated GFR is calculated using the IDMS-traceable Modification of Diet in Renal Disease (MDRD) Study equation, reported for both  Americans (GFRAA) and non- Americans (GFRNA), and normalized to 1.73m2 body surface area. The physician must decide which value applies to the patient. The MDRD study equation should only be used in individuals age 25 or older. It has not been validated for the following: pregnant women, patients with serious comorbid conditions, or on certain medications, or persons with extremes of body size, muscle mass, or nutritional status.  Calcium 04/22/2021 9.5  8.5 - 10.5 mg/dL Final    Bilirubin, total 04/22/2021 0.8  0.2 - 1.0 mg/dL Final    AST (SGOT) 04/22/2021 28  14 - 74 U/L Final    ALT (SGPT) 04/22/2021 30  3 - 52 U/L Final    Alk.  phosphatase 04/22/2021 66  38 - 126 U/L Final    Protein, total 04/22/2021 7.2  6.1 - 8.4 g/dL Final    Albumin 04/22/2021 4.1  3.5 - 4.7 g/dL Final    Globulin 04/22/2021 3.1  g/dL Final    A-G Ratio 04/22/2021 1.3    Final    TSH 04/22/2021 0.91  0.35 - 6. 20 uIU/mL Final    Comment:    Due to TSH heterogeneity, both structurally and degree of glycosylation, monoclonal antibodies used in the TSH assay may not accurately quantitate TSH. Therefore, this result should be correlated with clinical findings as well as with other assessments of thyroid function, e.g., free T4, free T3. Abstract on 04/12/2021   Component Date Value Ref Range Status    Creatinine, External 03/12/2021 0.8   Final   Hospital Outpatient Visit on 02/01/2021   Component Date Value Ref Range Status    WBC 02/01/2021 5.0  4.6 - 13.2 K/uL Final    RBC 02/01/2021 4.04* 4.20 - 5.30 M/uL Final    HGB 02/01/2021 10.7* 12.0 - 16.0 g/dL Final    HCT 02/01/2021 36.7  35.0 - 45.0 % Final    MCV 02/01/2021 90.8  74.0 - 97.0 FL Final    MCH 02/01/2021 26.5  24.0 - 34.0 PG Final    MCHC 02/01/2021 29.2* 31.0 - 37.0 g/dL Final    RDW 02/01/2021 19.7* 11.6 - 14.5 % Final    PLATELET 63/90/4726 116  135 - 420 K/uL Final    MPV 02/01/2021 10.9  9.2 - 11.8 FL Final    NEUTROPHILS 02/01/2021 76* 40 - 73 % Final    LYMPHOCYTES 02/01/2021 12* 21 - 52 % Final    MONOCYTES 02/01/2021 10  3 - 10 % Final    EOSINOPHILS 02/01/2021 1  0 - 5 % Final    BASOPHILS 02/01/2021 1  0 - 2 % Final    IMMATURE GRANULOCYTES 02/01/2021 0  % Final    ABS. NEUTROPHILS 02/01/2021 3.8  1.8 - 8.0 K/UL Final    ABS. LYMPHOCYTES 02/01/2021 0.6* 0.9 - 3.6 K/UL Final    ABS. MONOCYTES 02/01/2021 0.5  0.05 - 1.2 K/UL Final    ABS. EOSINOPHILS 02/01/2021 0.1  0.0 - 0.4 K/UL Final    ABS. BASOPHILS 02/01/2021 0.1  0.0 - 0.1 K/UL Final    ABS. IMM. GRANS. 02/01/2021 0.0  K/UL Final    Haptoglobin 02/01/2021 223* 30 - 200 mg/dL Final    Iron 02/01/2021 129  35 - 150 ug/dL Final    TIBC 02/01/2021 346  250 - 450 ug/dL Final    Iron % saturation 02/01/2021 37  20 - 50 % Final    LD 02/01/2021 160  98 - 192 U/L Final    MONTY Poly 02/01/2021 Negative   Final   No results displayed because visit has over 200 results. Hospital Outpatient Visit on 12/11/2020   Component Date Value Ref Range Status    Sodium 12/11/2020 138  135 - 145 mmol/L Final    Potassium 12/11/2020 3.9  3.2 - 5.1 mmol/L Final    Chloride 12/11/2020 106  94 - 111 mmol/L Final    CO2 12/11/2020 25  21 - 33 mmol/L Final    Anion gap 12/11/2020 7  mmol/L Final    Glucose 12/11/2020 103  70 - 110 mg/dL Final    BUN 12/11/2020 17  9 - 21 mg/dL Final    Creatinine 12/11/2020 0.80  0.70 - 1.20 mg/dL Final    BUN/Creatinine ratio 12/11/2020 21    Final    GFR est AA 12/11/2020 >60  ml/min/1.73m2 Final    GFR est non-AA 12/11/2020 >60  ml/min/1.73m2 Final    Comment: Estimated GFR is calculated using the IDMS-traceable Modification of Diet in Renal Disease (MDRD) Study equation, reported for both  Americans (GFRAA) and non- Americans (GFRNA), and normalized to 1.73m2 body surface area. The physician must decide which value applies to the patient. The MDRD study equation should only be used in individuals age 25 or older. It has not been validated for the following: pregnant women, patients with serious comorbid conditions, or on certain medications, or persons with extremes of body size, muscle mass, or nutritional status.  Calcium 12/11/2020 9.4  8.5 - 10.5 mg/dL Final    Bilirubin, total 12/11/2020 0.4  0.2 - 1.0 mg/dL Final    AST (SGOT) 12/11/2020 27  14 - 74 U/L Final    ALT (SGPT) 12/11/2020 21  3 - 52 U/L Final    Alk.  phosphatase 12/11/2020 79  38 - 126 U/L Final    Protein, total 12/11/2020 7.8  6.1 - 8.4 g/dL Final    Albumin 12/11/2020 4.1  3.5 - 4.7 g/dL Final    Globulin 12/11/2020 3.7  g/dL Final    A-G Ratio 12/11/2020 1.1    Final    LIPID PROFILE 12/11/2020      Final    Cholesterol, total 12/11/2020 153  <200 mg/dL Final    Triglyceride 12/11/2020 122  <150 mg/dL Final    Comment: Based on NCEP-ATP III:  Triglycerides <150 mg/dL  is considered  normal, 150-199 mg/dL  borderline high,  200-499 mg/dL high and greater than or equal to 500 mg/dL very high.  HDL Cholesterol 12/11/2020 53  mg/dL Final    Comment: Based on NCEP ATP III, HDL Cholesterol <40 mg/dL is considered low  and >60 mg/dL is elevated.  LDL, calculated 12/11/2020 75.6  0 - 100 mg/dL Final    Comment: Based on the NCEP-ATP: LDL-C concentrations are considered  optimal  <100 mg/dL,  near optimal/above Normal 100-129 mg/dL  Borderline High: 130-159, High: 160-189 mg/dL  Very High: Greater than or equal to 190 mg/dL      VLDL, calculated 12/11/2020 24.4  mg/dL Final    CHOL/HDL Ratio 12/11/2020 2.9  0.0 - 5.0   Final    WBC 12/11/2020 6.5  4.6 - 13.2 K/uL Final    RBC 12/11/2020 4.28  4.20 - 5.30 M/uL Final    HGB 12/11/2020 11.4* 12.0 - 16.0 g/dL Final    HCT 12/11/2020 37.6  35.0 - 45.0 % Final    MCV 12/11/2020 87.9  74.0 - 97.0 FL Final    MCH 12/11/2020 26.6  24.0 - 34.0 PG Final    MCHC 12/11/2020 30.3* 31.0 - 37.0 g/dL Final    RDW 12/11/2020 14.7* 11.6 - 14.5 % Final    PLATELET 19/02/4820 627  135 - 420 K/uL Final    MPV 12/11/2020 11.4  9.2 - 11.8 FL Final    NEUTROPHILS 12/11/2020 69  40 - 73 % Final    LYMPHOCYTES 12/11/2020 16* 21 - 52 % Final    MONOCYTES 12/11/2020 12* 3 - 10 % Final    EOSINOPHILS 12/11/2020 2  0 - 5 % Final    BASOPHILS 12/11/2020 1  0 - 2 % Final    IMMATURE GRANULOCYTES 12/11/2020 0  % Final    ABS. NEUTROPHILS 12/11/2020 4.5  1.8 - 8.0 K/UL Final    ABS. LYMPHOCYTES 12/11/2020 1.0  0.9 - 3.6 K/UL Final    ABS. MONOCYTES 12/11/2020 0.8  0.05 - 1.2 K/UL Final    ABS. EOSINOPHILS 12/11/2020 0.2  0.0 - 0.4 K/UL Final    ABS. BASOPHILS 12/11/2020 0.0  0.0 - 0.1 K/UL Final    ABS. IMM. GRANS. 12/11/2020 0.0  K/UL Final   There may be more visits with results that are not included. Assessment & Plan:     1. Paronychia of finger, left  Will add Bactrim for MRSA coverage. Continue keflex. Hand x-ray negative  - XR HAND LT AP/LAT;  Future             I have discussed the diagnosis with the patient and the intended plan as seen in the above orders. The patient has received an after-visit summary and questions were answered concerning future plans. I have discussed medication side effects and warnings with the patient as well. I have reviewed the plan of care with the patient, accepted their input and they are in agreement with the treatment goals. Previous lab and imaging results were reviewed by me.        Maddie Terrell MD  November 14, 2021

## 2021-11-18 ENCOUNTER — OFFICE VISIT (OUTPATIENT)
Dept: FAMILY MEDICINE CLINIC | Age: 81
End: 2021-11-18
Payer: MEDICARE

## 2021-11-18 VITALS
HEART RATE: 56 BPM | WEIGHT: 166 LBS | SYSTOLIC BLOOD PRESSURE: 116 MMHG | BODY MASS INDEX: 28.34 KG/M2 | RESPIRATION RATE: 20 BRPM | HEIGHT: 64 IN | DIASTOLIC BLOOD PRESSURE: 66 MMHG | OXYGEN SATURATION: 96 %

## 2021-11-18 DIAGNOSIS — L03.012 PARONYCHIA OF FINGER, LEFT: Primary | ICD-10-CM

## 2021-11-18 PROBLEM — F41.9 ANXIETY: Status: ACTIVE | Noted: 2021-03-11

## 2021-11-18 PROBLEM — I48.91 ATRIAL FIBRILLATION (HCC): Status: ACTIVE | Noted: 2021-03-11

## 2021-11-18 PROCEDURE — G8400 PT W/DXA NO RESULTS DOC: HCPCS | Performed by: STUDENT IN AN ORGANIZED HEALTH CARE EDUCATION/TRAINING PROGRAM

## 2021-11-18 PROCEDURE — G8417 CALC BMI ABV UP PARAM F/U: HCPCS | Performed by: STUDENT IN AN ORGANIZED HEALTH CARE EDUCATION/TRAINING PROGRAM

## 2021-11-18 PROCEDURE — G8536 NO DOC ELDER MAL SCRN: HCPCS | Performed by: STUDENT IN AN ORGANIZED HEALTH CARE EDUCATION/TRAINING PROGRAM

## 2021-11-18 PROCEDURE — 1101F PT FALLS ASSESS-DOCD LE1/YR: CPT | Performed by: STUDENT IN AN ORGANIZED HEALTH CARE EDUCATION/TRAINING PROGRAM

## 2021-11-18 PROCEDURE — 99213 OFFICE O/P EST LOW 20 MIN: CPT | Performed by: STUDENT IN AN ORGANIZED HEALTH CARE EDUCATION/TRAINING PROGRAM

## 2021-11-18 PROCEDURE — G8510 SCR DEP NEG, NO PLAN REQD: HCPCS | Performed by: STUDENT IN AN ORGANIZED HEALTH CARE EDUCATION/TRAINING PROGRAM

## 2021-11-18 PROCEDURE — G8427 DOCREV CUR MEDS BY ELIG CLIN: HCPCS | Performed by: STUDENT IN AN ORGANIZED HEALTH CARE EDUCATION/TRAINING PROGRAM

## 2021-11-18 PROCEDURE — G8752 SYS BP LESS 140: HCPCS | Performed by: STUDENT IN AN ORGANIZED HEALTH CARE EDUCATION/TRAINING PROGRAM

## 2021-11-18 PROCEDURE — 1090F PRES/ABSN URINE INCON ASSESS: CPT | Performed by: STUDENT IN AN ORGANIZED HEALTH CARE EDUCATION/TRAINING PROGRAM

## 2021-11-18 PROCEDURE — G8754 DIAS BP LESS 90: HCPCS | Performed by: STUDENT IN AN ORGANIZED HEALTH CARE EDUCATION/TRAINING PROGRAM

## 2021-11-19 NOTE — PROGRESS NOTES
Subjective:   Monique Richmond is a 78 y.o. female who was seen for Finger Pain    Since redness and swelling has resolved. She finished her Keflex prescription and is almost finished with her Bactrim. X-ray showed arthritis. Pain has resolved. Home Medications    Medication Sig Start Date End Date Taking? Authorizing Provider   trimethoprim-sulfamethoxazole (BACTRIM DS, SEPTRA DS) 160-800 mg per tablet Take 2 Tablets by mouth two (2) times a day for 10 days. 11/12/21 11/22/21 Yes Brittany Darden MD   triamcinolone (ARISTOCORT) 0.5 % topical cream Apply  to affected area two (2) times a day. use thin layer 11/1/21  Yes Brittany Darden MD   pantoprazole (PROTONIX) 40 mg tablet Take 1 Tablet by mouth daily. 11/1/21  Yes Brittany Darden MD   metoprolol tartrate (LOPRESSOR) 25 mg tablet TAKE 1/2 TABLET BY MOUTH TWICE DAILY 10/28/21  Yes Brittany Darden MD   ALPRAZolam Evaline Beams) 0.5 mg tablet Take 1 Tablet by mouth two (2) times a day. Max Daily Amount: 1 mg. 10/21/21  Yes Brittany Darden MD   budesonide-formoteroL (Symbicort) 80-4.5 mcg/actuation HFAA Take 2 Puffs by inhalation two (2) times a day. 10/18/21  Yes Brittany Darden MD   losartan (COZAAR) 100 mg tablet Take 1 Tablet by mouth daily. 10/15/21  Yes Brittany Darden MD   furosemide (LASIX) 20 mg tablet One po qod 8/31/21  Yes Valere Landau, MD   atorvastatin (LIPITOR) 40 mg tablet Take 1 Tablet by mouth daily. 6/14/21  Yes Valere Landau, MD   albuterol (PROVENTIL HFA, VENTOLIN HFA, PROAIR HFA) 90 mcg/actuation inhaler Take 2 Puffs by inhalation every four (4) hours as needed for Wheezing. 4/21/21  Yes Valere Landau, MD      Allergies   Allergen Reactions    Isopropyl Alcohol Other (comments)     Weakness all over - pt states she has out grown this    Pen-Vee K Rash     Pt. States she avoids penicillin due to allergy as a child.       Social History     Tobacco Use    Smoking status: Former Smoker     Packs/day: 1.00     Years: 50.00     Pack years: 50.00    Smokeless tobacco: Never Used   Vaping Use    Vaping Use: Never used   Substance Use Topics    Alcohol use: Not Currently    Drug use: Never            Review of Systems   Musculoskeletal: Positive for arthralgias. All other systems reviewed and are negative.          Objective:     Visit Vitals  /66   Pulse (!) 56   Resp 20   Ht 5' 4\" (1.626 m)   Wt 166 lb (75.3 kg)   SpO2 96%   BMI 28.49 kg/m²        General: alert, oriented, not in distress  Head: scalp normal, atraumatic  Eyes: pupils are equal and reactive, full and intact EOM's  Ears: patent ear canal, intact tympanic membrane  Nose: normal turbinates, no congestion or discharge  Lips/Mouth: moist lips and buccal mucosa, non-enlarged tonsils, pink throat  Neck: supple, no JVD, no lymphadenopathy, non-palpable thyroid  Chest/Lungs: clear breath sounds, no wheezing or crackles  Heart: normal rate, regular rhythm, no murmur  Abdomen: soft, non-distended, non-tender, normal bowel sounds, no organomegaly, no masses  Extremities: no focal deformities, no edema  Skin: no active skin lesions    Laboratory/Tests:  Hospital Outpatient Visit on 11/04/2021   Component Date Value Ref Range Status    WBC 11/04/2021 6.5  4.6 - 13.2 K/uL Final    RBC 11/04/2021 4.25  4.20 - 5.30 M/uL Final    HGB 11/04/2021 12.9  12.0 - 16.0 g/dL Final    HCT 11/04/2021 41.0  35.0 - 45.0 % Final    MCV 11/04/2021 96.5  78.0 - 100.0 FL Final    MCH 11/04/2021 30.4  24.0 - 34.0 PG Final    MCHC 11/04/2021 31.5  31.0 - 37.0 g/dL Final    RDW 11/04/2021 11.7  11.6 - 14.5 % Final    PLATELET 54/61/0828 870  135 - 420 K/uL Final    MPV 11/04/2021 11.6  9.2 - 11.8 FL Final    NRBC 11/04/2021 0.0  0.0  WBC Final    ABSOLUTE NRBC 11/04/2021 0.00  0.00 - 0.01 K/uL Final    NEUTROPHILS 11/04/2021 75* 40 - 73 % Final    LYMPHOCYTES 11/04/2021 11* 21 - 52 % Final    MONOCYTES 11/04/2021 10  3 - 10 % Final    EOSINOPHILS 11/04/2021 2  0 - 5 % Final    BASOPHILS 11/04/2021 1  0 - 2 % Final    IMMATURE GRANULOCYTES 11/04/2021 1* 0 - 0.5 % Final    ABS. NEUTROPHILS 11/04/2021 4.9  1.8 - 8.0 K/UL Final    ABS. LYMPHOCYTES 11/04/2021 0.7* 0.9 - 3.6 K/UL Final    ABS. MONOCYTES 11/04/2021 0.7  0.05 - 1.2 K/UL Final    ABS. EOSINOPHILS 11/04/2021 0.1  0.0 - 0.4 K/UL Final    ABS. BASOPHILS 11/04/2021 0.0  0.0 - 0.1 K/UL Final    ABS. IMM. GRANS. 11/04/2021 0.0  0.00 - 0.04 K/UL Final    DF 11/04/2021 AUTOMATED    Final    Ferritin 11/04/2021 42  8 - 388 ng/mL Final    Iron 11/04/2021 68  50 - 175 ug/dL Final    Comment: Patients receiving metal-binding drugs (e.g. deferoxamine) may show  spuriously depressed iron values, as chelated iron may not properly  react in the iron assay.  TIBC 11/04/2021 327  250 - 450 ug/dL Final    Iron % saturation 11/04/2021 21  20 - 50 % Final    Sodium 11/04/2021 136  135 - 145 mmol/L Final    Potassium 11/04/2021 4.2  3.2 - 5.1 mmol/L Final    Chloride 11/04/2021 101  94 - 111 mmol/L Final    CO2 11/04/2021 25  21 - 33 mmol/L Final    Anion gap 11/04/2021 10  mmol/L Final    Glucose 11/04/2021 118* 70 - 110 mg/dL Final    BUN 11/04/2021 12  9 - 21 mg/dL Final    Creatinine 11/04/2021 0.90  0.70 - 1.20 mg/dL Final    BUN/Creatinine ratio 11/04/2021 13    Final    GFR est AA 11/04/2021 >60  ml/min/1.73m2 Final    GFR est non-AA 11/04/2021 60  ml/min/1.73m2 Final    Comment: Estimated GFR is calculated using the IDMS-traceable Modification of Diet in Renal Disease (MDRD) Study equation, reported for both  Americans (GFRAA) and non- Americans (GFRNA), and normalized to 1.73m2 body surface area. The physician must decide which value applies to the patient. The MDRD study equation should only be used in individuals age 25 or older.  It has not been validated for the following: pregnant women, patients with serious comorbid conditions, or on certain medications, or persons with extremes of body size, muscle mass, or nutritional status.  Calcium 11/04/2021 9.4  8.5 - 10.5 mg/dL Final    Bilirubin, total 11/04/2021 0.6  0.2 - 1.0 mg/dL Final    AST (SGOT) 11/04/2021 24  14 - 74 U/L Final    ALT (SGPT) 11/04/2021 20  3 - 52 U/L Final    Alk. phosphatase 11/04/2021 65  38 - 126 U/L Final    Protein, total 11/04/2021 7.1  6.1 - 8.4 g/dL Final    Albumin 11/04/2021 4.1  3.5 - 4.7 g/dL Final    Globulin 11/04/2021 3.0  g/dL Final    A-G Ratio 11/04/2021 1.4    Final   Hospital Outpatient Visit on 10/22/2021   Component Date Value Ref Range Status    SARS-CoV-2 10/22/2021 Please find results under separate order    Final    SARS-CoV-2 10/22/2021 Not Detected  Not Detected Final    Comment: This nucleic acid amplification test was developed and its  performance characteristics determined by [facility]. Nucleic acid amplification tests include RT-PCR and TMA. This test  has not been FDA cleared or approved. This test has been authorized  by FDA under an Emergency Use Authorization (EUA). This test is only  authorized for the duration of time the declaration that  circumstances exist justifying the authorization of the emergency use  of in vitro diagnostic tests for detection of SARS-CoV-2 virus and/or  diagnosis of COVID-19 infection under section 564(b)(1) of the Act,  21 U. S.C. 040HGB-9(M) (1), unless the authorization is terminated or  revoked sooner. When diagnostic testing is negative, the possibility of a false  negative result should be considered in the context of a patient's  recent exposures and the presence of clinical signs and symptoms  consistent with COVID-19. An individual without symptoms of COVID-  19 and who is not shedding SARS-CoV-2 virus w                           ould expect to have a  negative (not detected) result in this assay.   Performed At: [address] [id number]   Adam Mackenzie 97 [id number]     Hospital Outpatient Visit on 09/16/2021 Component Date Value Ref Range Status    WBC 09/16/2021 5.4  4.6 - 13.2 K/uL Final    RBC 09/16/2021 3.51* 4.20 - 5.30 M/uL Final    HGB 09/16/2021 11.0* 12.0 - 16.0 g/dL Final    HCT 09/16/2021 34.6* 35.0 - 45.0 % Final    MCV 09/16/2021 98.6  78.0 - 100.0 FL Final    PLEASE NOTE NEW REFERENCE RANGE    MCH 09/16/2021 31.3  24.0 - 34.0 PG Final    MCHC 09/16/2021 31.8  31.0 - 37.0 g/dL Final    RDW 09/16/2021 13.3  11.6 - 14.5 % Final    PLATELET 10/09/1095 159  135 - 420 K/uL Final    MPV 09/16/2021 10.7  9.2 - 11.8 FL Final    NRBC 09/16/2021 0.0  0.0  WBC Final    ABSOLUTE NRBC 09/16/2021 0.00  0.00 - 0.01 K/uL Final    NEUTROPHILS 09/16/2021 65  40 - 73 % Final    LYMPHOCYTES 09/16/2021 18* 21 - 52 % Final    MONOCYTES 09/16/2021 11* 3 - 10 % Final    EOSINOPHILS 09/16/2021 5  0 - 5 % Final    BASOPHILS 09/16/2021 1  0 - 2 % Final    IMMATURE GRANULOCYTES 09/16/2021 0  0 - 0.5 % Final    ABS. NEUTROPHILS 09/16/2021 3.6  1.8 - 8.0 K/UL Final    ABS. LYMPHOCYTES 09/16/2021 1.0  0.9 - 3.6 K/UL Final    ABS. MONOCYTES 09/16/2021 0.6  0.05 - 1.2 K/UL Final    ABS. EOSINOPHILS 09/16/2021 0.3  0.0 - 0.4 K/UL Final    ABS. BASOPHILS 09/16/2021 0.1  0.0 - 0.1 K/UL Final    ABS. IMM.  GRANS. 09/16/2021 0.0  0.00 - 0.04 K/UL Final    DF 09/16/2021 AUTOMATED    Final   Admission on 08/26/2021, Discharged on 08/27/2021   Component Date Value Ref Range Status    WBC 08/26/2021 6.5  4.6 - 13.2 K/uL Final    RBC 08/26/2021 3.43* 4.20 - 5.30 M/uL Final    HGB 08/26/2021 10.8* 12.0 - 16.0 g/dL Final    HCT 08/26/2021 32.6* 35.0 - 45.0 % Final    MCV 08/26/2021 95.0  78.0 - 100.0 FL Final    PLEASE NOTE NEW REFERENCE RANGE    [facility] 08/26/2021 31.5  24.0 - 34.0 PG Final    MCHC 08/26/2021 33.1  31.0 - 37.0 g/dL Final    RDW 08/26/2021 12.0  11.6 - 14.5 % Final    PLATELET 04/98/6091 880  135 - 420 K/uL Final    MPV 08/26/2021 11.3  9.2 - 11.8 FL Final    NRBC 08/26/2021 0.0  0.0  WBC Final    ABSOLUTE NRBC 08/26/2021 0.00  0.00 - 0.01 K/uL Final    NEUTROPHILS 08/26/2021 81* 40 - 73 % Final    LYMPHOCYTES 08/26/2021 12* 21 - 52 % Final    MONOCYTES 08/26/2021 4  3 - 10 % Final    EOSINOPHILS 08/26/2021 2  0 - 5 % Final    BASOPHILS 08/26/2021 1  0 - 2 % Final    IMMATURE GRANULOCYTES 08/26/2021 0  0 - 0.5 % Final    ABS. NEUTROPHILS 08/26/2021 5.2  1.8 - 8.0 K/UL Final    ABS. LYMPHOCYTES 08/26/2021 0.8* 0.9 - 3.6 K/UL Final    ABS. MONOCYTES 08/26/2021 0.2  0.05 - 1.2 K/UL Final    ABS. EOSINOPHILS 08/26/2021 0.1  0.0 - 0.4 K/UL Final    ABS. BASOPHILS 08/26/2021 0.1  0.0 - 0.1 K/UL Final    ABS. IMM. GRANS. 08/26/2021 0.0  0.00 - 0.04 K/UL Final    DF 08/26/2021 AUTOMATED    Final    Sodium 08/26/2021 140  135 - 145 mmol/L Final    Potassium 08/26/2021 4.1  3.2 - 5.1 mmol/L Final    Chloride 08/26/2021 104  94 - 111 mmol/L Final    CO2 08/26/2021 25  21 - 33 mmol/L Final    Anion gap 08/26/2021 11  mmol/L Final    Glucose 08/26/2021 184* 70 - 110 mg/dL Final    BUN 08/26/2021 31* 9 - 21 mg/dL Final    Creatinine 08/26/2021 1.00  0.70 - 1.20 mg/dL Final    BUN/Creatinine ratio 08/26/2021 31    Final    GFR est AA 08/26/2021 >60  ml/min/1.73m2 Final    GFR est non-AA 08/26/2021 53  ml/min/1.73m2 Final    Comment: Estimated GFR is calculated using the IDMS-traceable Modification of Diet in Renal Disease (MDRD) Study equation, reported for both  Americans (GFRAA) and non- Americans (GFRNA), and normalized to 1.73m2 body surface area. The physician must decide which value applies to the patient. The MDRD study equation should only be used in individuals age 25 or older. It has not been validated for the following: pregnant women, patients with serious comorbid conditions, or on certain medications, or persons with extremes of body size, muscle mass, or nutritional status.       Calcium 08/26/2021 9.2  8.5 - 10.5 mg/dL Final    Bilirubin, total 08/26/2021 0.7 0.2 - 1.0 mg/dL Final    AST (SGOT) 08/26/2021 25  14 - 74 U/L Final    ALT (SGPT) 08/26/2021 20  3 - 52 U/L Final    Alk. phosphatase 08/26/2021 59  38 - 126 U/L Final    Protein, total 08/26/2021 6.4  6.1 - 8.4 g/dL Final    Albumin 08/26/2021 3.6  3.5 - 4.7 g/dL Final    Globulin 08/26/2021 2.8  g/dL Final    A-G Ratio 08/26/2021 1.3    Final    Prothrombin time 08/26/2021 14.3  11.5 - 15.2 sec Final    INR 08/26/2021 1.2  0.8 - 1.2   Final    Occult Blood,day 1 08/26/2021 Positive    Final    Day 1 date: 08/26/[phone]    Final    Specimen source 08/26/2021 Nasopharyngeal    Final    COVID-19 rapid test 08/26/2021 Not Detected  Not Detected   Final    Comment:   Rapid NAAT:  The specimen is NEGATIVE for SARS-CoV-2, the novel coronavirus associated with COVID-19. Negative results should be treated as presumptive and, if inconsistent with clinical signs and symptoms or necessary for patient management, should be tested with an alternative molecular assay. Negative results do not preclude SARS-CoV-2 infection and should not be used as the sole basis for patient management decisions. This test has been authorized by the FDA under an Emergency Use   Authorization (EUA) for use by authorized laboratories.  Fact sheet for Healthcare Providers: ConventionUpdate.co.nz Fact sheet for Patients: ConventionUpdate.co.nz   Methodology: Isothermal Nucleic Acid Amplification      WBC 08/27/2021 8.1  4.6 - 13.2 K/uL Final    RBC 08/27/2021 2.93* 4.20 - 5.30 M/uL Final    HGB 08/27/2021 9.1* 12.0 - 16.0 g/dL Final    HCT 08/27/2021 27.7* 35.0 - 45.0 % Final    MCV 08/27/2021 94.5  78.0 - 100.0 FL Final    PLEASE NOTE NEW REFERENCE RANGE    [facility] 08/27/2021 31.1  24.0 - 34.0 PG Final    MCHC 08/27/2021 32.9  31.0 - 37.0 g/dL Final    RDW 08/27/2021 12.1  11.6 - 14.5 % Final    PLATELET 50/02/5502 776  135 - 420 K/uL Final    MPV 08/27/2021 11.4  9.2 - 11.8 FL Final    NRBC 08/27/2021 0.0  0.0  WBC Final    ABSOLUTE NRBC 08/27/2021 0.00  0.00 - 0.01 K/uL Final    NEUTROPHILS 08/27/2021 81* 40 - 73 % Final    LYMPHOCYTES 08/27/2021 11* 21 - 52 % Final    MONOCYTES 08/27/2021 8  3 - 10 % Final    EOSINOPHILS 08/27/2021 0  0 - 5 % Final    BASOPHILS 08/27/2021 0  0 - 2 % Final    IMMATURE GRANULOCYTES 08/27/2021 0  0 - 0.5 % Final    ABS. NEUTROPHILS 08/27/2021 6.5  1.8 - 8.0 K/UL Final    ABS. LYMPHOCYTES 08/27/2021 0.9  0.9 - 3.6 K/UL Final    ABS. MONOCYTES 08/27/2021 0.7  0.05 - 1.2 K/UL Final    ABS. EOSINOPHILS 08/27/2021 0.0  0.0 - 0.4 K/UL Final    ABS. BASOPHILS 08/27/2021 0.0  0.0 - 0.1 K/UL Final    ABS. IMM. GRANS. 08/27/2021 0.0  0.00 - 0.04 K/UL Final    DF 08/27/2021 AUTOMATED    Final   Hospital Outpatient Visit on 06/21/2021   Component Date Value Ref Range Status    Iron 06/21/2021 90  35 - 150 ug/dL Final    TIBC 06/21/2021 312  250 - 450 ug/dL Final    Iron % saturation 06/21/2021 29  20 - 50 % Final   Hospital Outpatient Visit on 04/22/2021   Component Date Value Ref Range Status    WBC 04/22/2021 5.2  4.6 - 13.2 K/uL Final    RBC 04/22/2021 4.65  4.20 - 5.30 M/uL Final    HGB 04/22/2021 13.5  12.0 - 16.0 g/dL Final    HCT 04/22/2021 43.1  35.0 - 45.0 % Final    MCV 04/22/2021 92.7  74.0 - 97.0 FL Final    MCH 04/22/2021 29.0  24.0 - 34.0 PG Final    MCHC 04/22/2021 31.3  31.0 - 37.0 g/dL Final    RDW 04/22/2021 15.1* 11.6 - 14.5 % Final    PLATELET 97/61/8518 103  135 - 420 K/uL Final    MPV 04/22/2021 11.1  9.2 - 11.8 FL Final    NEUTROPHILS 04/22/2021 72  40 - 73 % Final    LYMPHOCYTES 04/22/2021 12* 21 - 52 % Final    MONOCYTES 04/22/2021 13* 3 - 10 % Final    EOSINOPHILS 04/22/2021 2  0 - 5 % Final    BASOPHILS 04/22/2021 1  0 - 2 % Final    IMMATURE GRANULOCYTES 04/22/2021 0  % Final    ABS. NEUTROPHILS 04/22/2021 3.8  1.8 - 8.0 K/UL Final    ABS. LYMPHOCYTES 04/22/2021 0.6* 0.9 - 3.6 K/UL Final    ABS. MONOCYTES 04/22/2021 0.7  0.05 - 1.2 K/UL Final    ABS. EOSINOPHILS 04/22/2021 0.1  0.0 - 0.4 K/UL Final    ABS. BASOPHILS 04/22/2021 0.0  0.0 - 0.1 K/UL Final    ABS. IMM. GRANS. 04/22/2021 0.0  K/UL Final    LD 04/22/2021 176  98 - 192 U/L Final    Sodium 04/22/2021 138  135 - 145 mmol/L Final    Potassium 04/22/2021 4.5  3.2 - 5.1 mmol/L Final    Chloride 04/22/2021 104  94 - 111 mmol/L Final    CO2 04/22/2021 25  21 - 33 mmol/L Final    Anion gap 04/22/2021 9  mmol/L Final    Glucose 04/22/2021 114* 70 - 110 mg/dL Final    BUN 04/22/2021 20  9 - 21 mg/dL Final    Creatinine 04/22/2021 0.90  0.70 - 1.20 mg/dL Final    BUN/Creatinine ratio 04/22/2021 22    Final    GFR est AA 04/22/2021 >60  ml/min/1.73m2 Final    GFR est non-AA 04/22/2021 60  ml/min/1.73m2 Final    Comment: Estimated GFR is calculated using the IDMS-traceable Modification of Diet in Renal Disease (MDRD) Study equation, reported for both  Americans (GFRAA) and non- Americans (GFRNA), and normalized to 1.73m2 body surface area. The physician must decide which value applies to the patient. The MDRD study equation should only be used in individuals age 25 or older. It has not been validated for the following: pregnant women, patients with serious comorbid conditions, or on certain medications, or persons with extremes of body size, muscle mass, or nutritional status.  Calcium 04/22/2021 9.5  8.5 - 10.5 mg/dL Final    Bilirubin, total 04/22/2021 0.8  0.2 - 1.0 mg/dL Final    AST (SGOT) 04/22/2021 28  14 - 74 U/L Final    ALT (SGPT) 04/22/2021 30  3 - 52 U/L Final    Alk.  phosphatase 04/22/2021 66  38 - 126 U/L Final    Protein, total 04/22/2021 7.2  6.1 - 8.4 g/dL Final    Albumin 04/22/2021 4.1  3.5 - 4.7 g/dL Final    Globulin 04/22/2021 3.1  g/dL Final    A-G Ratio 04/22/2021 1.3    Final    TSH 04/22/2021 0.91  0.35 - 6.20 uIU/mL Final    Comment:    Due to TSH heterogeneity, both structurally and degree of glycosylation, monoclonal antibodies used in the TSH assay may not accurately quantitate TSH. Therefore, this result should be correlated with clinical findings as well as with other assessments of thyroid function, e.g., free T4, free T3. Abstract on 04/12/2021   Component Date Value Ref Range Status    Creatinine, External 03/12/2021 0.8   Final   Hospital Outpatient Visit on 02/01/2021   Component Date Value Ref Range Status    WBC 02/01/2021 5.0  4.6 - 13.2 K/uL Final    RBC 02/01/2021 4.04* 4.20 - 5.30 M/uL Final    HGB 02/01/2021 10.7* 12.0 - 16.0 g/dL Final    HCT 02/01/2021 36.7  35.0 - 45.0 % Final    MCV 02/01/2021 90.8  74.0 - 97.0 FL Final    MCH 02/01/2021 26.5  24.0 - 34.0 PG Final    MCHC 02/01/2021 29.2* 31.0 - 37.0 g/dL Final    RDW 02/01/2021 19.7* 11.6 - 14.5 % Final    PLATELET 74/58/5070 238  135 - 420 K/uL Final    MPV 02/01/2021 10.9  9.2 - 11.8 FL Final    NEUTROPHILS 02/01/2021 76* 40 - 73 % Final    LYMPHOCYTES 02/01/2021 12* 21 - 52 % Final    MONOCYTES 02/01/2021 10  3 - 10 % Final    EOSINOPHILS 02/01/2021 1  0 - 5 % Final    BASOPHILS 02/01/2021 1  0 - 2 % Final    IMMATURE GRANULOCYTES 02/01/2021 0  % Final    ABS. NEUTROPHILS 02/01/2021 3.8  1.8 - 8.0 K/UL Final    ABS. LYMPHOCYTES 02/01/2021 0.6* 0.9 - 3.6 K/UL Final    ABS. MONOCYTES 02/01/2021 0.5  0.05 - 1.2 K/UL Final    ABS. EOSINOPHILS 02/01/2021 0.1  0.0 - 0.4 K/UL Final    ABS. BASOPHILS 02/01/2021 0.1  0.0 - 0.1 K/UL Final    ABS. IMM. GRANS. 02/01/2021 0.0  K/UL Final    Haptoglobin 02/01/2021 223* 30 - 200 mg/dL Final    Iron 02/01/2021 129  35 - 150 ug/dL Final    TIBC 02/01/2021 346  250 - 450 ug/dL Final    Iron % saturation 02/01/2021 37  20 - 50 % Final    LD 02/01/2021 160  98 - 192 U/L Final    MONTY Poly 02/01/2021 Negative   Final   No results displayed because visit has over 200 results.       Hospital Outpatient Visit on 12/11/2020   Component Date Value Ref Range Status    Sodium 12/11/2020 138  135 - 145 mmol/L Final    Potassium 12/11/2020 3.9  3.2 - 5.1 mmol/L Final    Chloride 12/11/2020 106  94 - 111 mmol/L Final    CO2 12/11/2020 25  21 - 33 mmol/L Final    Anion gap 12/11/2020 7  mmol/L Final    Glucose 12/11/2020 103  70 - 110 mg/dL Final    BUN 12/11/2020 17  9 - 21 mg/dL Final    Creatinine 12/11/2020 0.80  0.70 - 1.20 mg/dL Final    BUN/Creatinine ratio 12/11/2020 21    Final    GFR est AA 12/11/2020 >60  ml/min/1.73m2 Final    GFR est non-AA 12/11/2020 >60  ml/min/1.73m2 Final    Comment: Estimated GFR is calculated using the IDMS-traceable Modification of Diet in Renal Disease (MDRD) Study equation, reported for both  Americans (GFRAA) and non- Americans (GFRNA), and normalized to 1.73m2 body surface area. The physician must decide which value applies to the patient. The MDRD study equation should only be used in individuals age 25 or older. It has not been validated for the following: pregnant women, patients with serious comorbid conditions, or on certain medications, or persons with extremes of body size, muscle mass, or nutritional status.  Calcium 12/11/2020 9.4  8.5 - 10.5 mg/dL Final    Bilirubin, total 12/11/2020 0.4  0.2 - 1.0 mg/dL Final    AST (SGOT) 12/11/2020 27  14 - 74 U/L Final    ALT (SGPT) 12/11/2020 21  3 - 52 U/L Final    Alk. phosphatase 12/11/2020 79  38 - 126 U/L Final    Protein, total 12/11/2020 7.8  6.1 - 8.4 g/dL Final    Albumin 12/11/2020 4.1  3.5 - 4.7 g/dL Final    Globulin 12/11/2020 3.7  g/dL Final    A-G Ratio 12/11/2020 1.1    Final    LIPID PROFILE 12/11/2020      Final    Cholesterol, total 12/11/2020 153  <200 mg/dL Final    Triglyceride 12/11/2020 122  <150 mg/dL Final    Comment: Based on NCEP-ATP III:  Triglycerides <150 mg/dL  is considered  normal, 150-199 mg/dL  borderline high,  200-499 mg/dL high and   greater than or equal to 500 mg/dL very high.       HDL Cholesterol 12/11/2020 53 mg/dL Final    Comment: Based on NCEP ATP III, HDL Cholesterol <40 mg/dL is considered low  and >60 mg/dL is elevated.  LDL, calculated 12/11/2020 75.6  0 - 100 mg/dL Final    Comment: Based on the NCEP-ATP: LDL-C concentrations are considered  optimal  <100 mg/dL,  near optimal/above Normal 100-129 mg/dL  Borderline High: 130-159, High: 160-189 mg/dL  Very High: Greater than or equal to 190 mg/dL      VLDL, calculated 12/11/2020 24.4  mg/dL Final    CHOL/HDL Ratio 12/11/2020 2.9  0.0 - 5.0   Final    WBC 12/11/2020 6.5  4.6 - 13.2 K/uL Final    RBC 12/11/2020 4.28  4.20 - 5.30 M/uL Final    HGB 12/11/2020 11.4* 12.0 - 16.0 g/dL Final    HCT 12/11/2020 37.6  35.0 - 45.0 % Final    MCV 12/11/2020 87.9  74.0 - 97.0 FL Final    MCH 12/11/2020 26.6  24.0 - 34.0 PG Final    MCHC 12/11/2020 30.3* 31.0 - 37.0 g/dL Final    RDW 12/11/2020 14.7* 11.6 - 14.5 % Final    PLATELET 36/84/0640 337  135 - 420 K/uL Final    MPV 12/11/2020 11.4  9.2 - 11.8 FL Final    NEUTROPHILS 12/11/2020 69  40 - 73 % Final    LYMPHOCYTES 12/11/2020 16* 21 - 52 % Final    MONOCYTES 12/11/2020 12* 3 - 10 % Final    EOSINOPHILS 12/11/2020 2  0 - 5 % Final    BASOPHILS 12/11/2020 1  0 - 2 % Final    IMMATURE GRANULOCYTES 12/11/2020 0  % Final    ABS. NEUTROPHILS 12/11/2020 4.5  1.8 - 8.0 K/UL Final    ABS. LYMPHOCYTES 12/11/2020 1.0  0.9 - 3.6 K/UL Final    ABS. MONOCYTES 12/11/2020 0.8  0.05 - 1.2 K/UL Final    ABS. EOSINOPHILS 12/11/2020 0.2  0.0 - 0.4 K/UL Final    ABS. BASOPHILS 12/11/2020 0.0  0.0 - 0.1 K/UL Final    ABS. IMM. GRANS. 12/11/2020 0.0  K/UL Final   There may be more visits with results that are not included. Assessment & Plan:     1. Paronychia of finger, left  Resolved. Continue Bactrim             I have discussed the diagnosis with the patient and the intended plan as seen in the above orders.   The patient has received an after-visit summary and questions were answered concerning future plans.  I have discussed medication side effects and warnings with the patient as well. I have reviewed the plan of care with the patient, accepted their input and they are in agreement with the treatment goals. Previous lab and imaging results were reviewed by me.        Campbell Gar MD  November 19, 2021

## 2021-12-16 DIAGNOSIS — D50.0 IRON DEFICIENCY ANEMIA DUE TO CHRONIC BLOOD LOSS: ICD-10-CM

## 2021-12-16 DIAGNOSIS — K29.91 GASTROINTESTINAL HEMORRHAGE ASSOCIATED WITH GASTRODUODENITIS: ICD-10-CM

## 2021-12-18 RX ORDER — ATORVASTATIN CALCIUM 40 MG/1
TABLET, FILM COATED ORAL
Qty: 90 TABLET | Refills: 1 | Status: ON HOLD | OUTPATIENT
Start: 2021-12-18 | End: 2022-01-17

## 2021-12-20 RX ORDER — ATORVASTATIN CALCIUM 40 MG/1
40 TABLET, FILM COATED ORAL DAILY
Qty: 90 TABLET | Refills: 1 | Status: ON HOLD | OUTPATIENT
Start: 2021-12-20 | End: 2022-01-17

## 2021-12-20 RX ORDER — ATORVASTATIN CALCIUM 40 MG/1
40 TABLET, FILM COATED ORAL DAILY
Qty: 90 TABLET | Refills: 1 | Status: SHIPPED | OUTPATIENT
Start: 2021-12-20 | End: 2022-03-17 | Stop reason: SDUPTHER

## 2022-01-14 DIAGNOSIS — F41.9 ANXIETY: ICD-10-CM

## 2022-01-15 ENCOUNTER — APPOINTMENT (OUTPATIENT)
Dept: GENERAL RADIOLOGY | Age: 82
DRG: 065 | End: 2022-01-15
Attending: STUDENT IN AN ORGANIZED HEALTH CARE EDUCATION/TRAINING PROGRAM
Payer: MEDICARE

## 2022-01-15 ENCOUNTER — APPOINTMENT (OUTPATIENT)
Dept: CT IMAGING | Age: 82
DRG: 065 | End: 2022-01-15
Attending: STUDENT IN AN ORGANIZED HEALTH CARE EDUCATION/TRAINING PROGRAM
Payer: MEDICARE

## 2022-01-15 ENCOUNTER — HOSPITAL ENCOUNTER (INPATIENT)
Age: 82
LOS: 1 days | Discharge: HOME | DRG: 065 | End: 2022-01-17
Attending: STUDENT IN AN ORGANIZED HEALTH CARE EDUCATION/TRAINING PROGRAM | Admitting: INTERNAL MEDICINE
Payer: MEDICARE

## 2022-01-15 DIAGNOSIS — R53.1 ACUTE LEFT-SIDED WEAKNESS: Primary | ICD-10-CM

## 2022-01-15 PROBLEM — I63.9 STROKE (HCC): Status: ACTIVE | Noted: 2022-01-15

## 2022-01-15 LAB
ALBUMIN SERPL-MCNC: 4 G/DL (ref 3.5–4.7)
ALBUMIN/GLOB SERPL: 1.3 {RATIO}
ALP SERPL-CCNC: 66 U/L (ref 38–126)
ALT SERPL-CCNC: 20 U/L (ref 3–52)
AMPHET UR QL SCN: NEGATIVE
ANION GAP SERPL CALC-SCNC: 9 MMOL/L
APTT PPP: 30.1 SEC (ref 23–36.4)
AST SERPL W P-5'-P-CCNC: 26 U/L (ref 14–74)
BARBITURATES UR QL SCN: NEGATIVE
BASOPHILS # BLD: 0 K/UL (ref 0–0.1)
BASOPHILS NFR BLD: 1 % (ref 0–2)
BENZODIAZ UR QL: POSITIVE
BILIRUB SERPL-MCNC: 0.8 MG/DL (ref 0.2–1)
BUN SERPL-MCNC: 20 MG/DL (ref 9–21)
BUN/CREAT SERPL: 20
CA-I BLD-MCNC: 9.4 MG/DL (ref 8.5–10.5)
CANNABINOIDS UR QL SCN: NEGATIVE
CHLORIDE SERPL-SCNC: 103 MMOL/L (ref 94–111)
CO2 SERPL-SCNC: 26 MMOL/L (ref 21–33)
COCAINE UR QL SCN: NEGATIVE
CREAT SERPL-MCNC: 1 MG/DL (ref 0.7–1.2)
DIFFERENTIAL METHOD BLD: ABNORMAL
DRUG SCRN COMMENT,DRGCM: ABNORMAL
EOSINOPHIL # BLD: 0.1 K/UL (ref 0–0.4)
EOSINOPHIL NFR BLD: 2 % (ref 0–5)
ERYTHROCYTE [DISTWIDTH] IN BLOOD BY AUTOMATED COUNT: 12.4 % (ref 11.6–14.5)
ETHANOL PERCENT, ALCP: <0.004 %
ETHANOL SERPL-MCNC: <4 MG/DL
GLOBULIN SER CALC-MCNC: 3.2 G/DL
GLUCOSE BLD STRIP.AUTO-MCNC: 90 MG/DL (ref 70–110)
GLUCOSE SERPL-MCNC: 112 MG/DL (ref 70–110)
HCT VFR BLD AUTO: 43.1 % (ref 35–45)
HGB BLD-MCNC: 13.7 G/DL (ref 12–16)
IMM GRANULOCYTES # BLD AUTO: 0 K/UL (ref 0–0.04)
IMM GRANULOCYTES NFR BLD AUTO: 0 % (ref 0–0.5)
INR PPP: 1.1 (ref 0.8–1.2)
LYMPHOCYTES # BLD: 0.6 K/UL (ref 0.9–3.6)
LYMPHOCYTES NFR BLD: 10 % (ref 21–52)
MCH RBC QN AUTO: 29.9 PG (ref 24–34)
MCHC RBC AUTO-ENTMCNC: 31.8 G/DL (ref 31–37)
MCV RBC AUTO: 94.1 FL (ref 78–100)
METHADONE UR QL: NEGATIVE
MONOCYTES # BLD: 0.5 K/UL (ref 0.05–1.2)
MONOCYTES NFR BLD: 10 % (ref 3–10)
NEUTS SEG # BLD: 4.2 K/UL (ref 1.8–8)
NEUTS SEG NFR BLD: 77 % (ref 40–73)
NRBC # BLD: 0 K/UL (ref 0–0.01)
NRBC BLD-RTO: 0 PER 100 WBC
OPIATES UR QL: NEGATIVE
OXYCODONE UR QL SCN: NEGATIVE
PCP UR QL: NEGATIVE
PERFORMED BY, TECHID: NORMAL
PLATELET # BLD AUTO: 171 K/UL (ref 135–420)
PMV BLD AUTO: 11 FL (ref 9.2–11.8)
POTASSIUM SERPL-SCNC: 4.1 MMOL/L (ref 3.2–5.1)
PROPOXYPH UR QL: NEGATIVE
PROT SERPL-MCNC: 7.2 G/DL (ref 6.1–8.4)
PROTHROMBIN TIME: 13.8 SEC (ref 11.5–15.2)
RBC # BLD AUTO: 4.58 M/UL (ref 4.2–5.3)
SODIUM SERPL-SCNC: 138 MMOL/L (ref 135–145)
THERAPEUTIC RANGE,PTTT: NORMAL SEC (ref 82–109)
TRICYCLICS UR QL: NEGATIVE
WBC # BLD AUTO: 5.5 K/UL (ref 4.6–13.2)

## 2022-01-15 PROCEDURE — 74011000636 HC RX REV CODE- 636: Performed by: STUDENT IN AN ORGANIZED HEALTH CARE EDUCATION/TRAINING PROGRAM

## 2022-01-15 PROCEDURE — 85730 THROMBOPLASTIN TIME PARTIAL: CPT

## 2022-01-15 PROCEDURE — 99285 EMERGENCY DEPT VISIT HI MDM: CPT

## 2022-01-15 PROCEDURE — 70450 CT HEAD/BRAIN W/O DYE: CPT

## 2022-01-15 PROCEDURE — 85025 COMPLETE CBC W/AUTO DIFF WBC: CPT

## 2022-01-15 PROCEDURE — 85610 PROTHROMBIN TIME: CPT

## 2022-01-15 PROCEDURE — 82077 ASSAY SPEC XCP UR&BREATH IA: CPT

## 2022-01-15 PROCEDURE — 74011250637 HC RX REV CODE- 250/637: Performed by: STUDENT IN AN ORGANIZED HEALTH CARE EDUCATION/TRAINING PROGRAM

## 2022-01-15 PROCEDURE — G0378 HOSPITAL OBSERVATION PER HR: HCPCS

## 2022-01-15 PROCEDURE — 93005 ELECTROCARDIOGRAM TRACING: CPT

## 2022-01-15 PROCEDURE — 73502 X-RAY EXAM HIP UNI 2-3 VIEWS: CPT

## 2022-01-15 PROCEDURE — 80053 COMPREHEN METABOLIC PANEL: CPT

## 2022-01-15 PROCEDURE — 74011250637 HC RX REV CODE- 250/637: Performed by: NURSE PRACTITIONER

## 2022-01-15 PROCEDURE — 70496 CT ANGIOGRAPHY HEAD: CPT

## 2022-01-15 PROCEDURE — 82962 GLUCOSE BLOOD TEST: CPT

## 2022-01-15 PROCEDURE — 71045 X-RAY EXAM CHEST 1 VIEW: CPT

## 2022-01-15 PROCEDURE — 80307 DRUG TEST PRSMV CHEM ANLYZR: CPT

## 2022-01-15 PROCEDURE — 94640 AIRWAY INHALATION TREATMENT: CPT

## 2022-01-15 RX ORDER — BUDESONIDE AND FORMOTEROL FUMARATE DIHYDRATE 80; 4.5 UG/1; UG/1
2 AEROSOL RESPIRATORY (INHALATION)
Status: DISCONTINUED | OUTPATIENT
Start: 2022-01-15 | End: 2022-01-17 | Stop reason: HOSPADM

## 2022-01-15 RX ORDER — PANTOPRAZOLE SODIUM 40 MG/1
40 TABLET, DELAYED RELEASE ORAL DAILY
Status: DISCONTINUED | OUTPATIENT
Start: 2022-01-16 | End: 2022-01-17 | Stop reason: HOSPADM

## 2022-01-15 RX ORDER — LOSARTAN POTASSIUM 25 MG/1
100 TABLET ORAL DAILY
Status: DISCONTINUED | OUTPATIENT
Start: 2022-01-16 | End: 2022-01-17 | Stop reason: HOSPADM

## 2022-01-15 RX ORDER — ATORVASTATIN CALCIUM 40 MG/1
40 TABLET, FILM COATED ORAL DAILY
Status: DISCONTINUED | OUTPATIENT
Start: 2022-01-16 | End: 2022-01-15

## 2022-01-15 RX ORDER — ALBUTEROL SULFATE 90 UG/1
2 AEROSOL, METERED RESPIRATORY (INHALATION)
Status: DISCONTINUED | OUTPATIENT
Start: 2022-01-15 | End: 2022-01-17 | Stop reason: HOSPADM

## 2022-01-15 RX ORDER — ATORVASTATIN CALCIUM 40 MG/1
40 TABLET, FILM COATED ORAL DAILY
Status: DISCONTINUED | OUTPATIENT
Start: 2022-01-15 | End: 2022-01-16

## 2022-01-15 RX ORDER — ALPRAZOLAM 0.5 MG/1
0.5 TABLET ORAL 2 TIMES DAILY
Status: DISCONTINUED | OUTPATIENT
Start: 2022-01-15 | End: 2022-01-17 | Stop reason: HOSPADM

## 2022-01-15 RX ORDER — METOPROLOL TARTRATE 25 MG/1
12.5 TABLET, FILM COATED ORAL 2 TIMES DAILY
Status: DISCONTINUED | OUTPATIENT
Start: 2022-01-15 | End: 2022-01-17 | Stop reason: HOSPADM

## 2022-01-15 RX ORDER — GUAIFENESIN 100 MG/5ML
81 LIQUID (ML) ORAL DAILY
Status: DISCONTINUED | OUTPATIENT
Start: 2022-01-15 | End: 2022-01-17 | Stop reason: HOSPADM

## 2022-01-15 RX ADMIN — BUDESONIDE AND FORMOTEROL FUMARATE DIHYDRATE 2 PUFF: 80; 4.5 AEROSOL RESPIRATORY (INHALATION) at 19:24

## 2022-01-15 RX ADMIN — METOPROLOL TARTRATE 12.5 MG: 25 TABLET, FILM COATED ORAL at 17:59

## 2022-01-15 RX ADMIN — ASPIRIN 81 MG: 81 TABLET, CHEWABLE ORAL at 12:02

## 2022-01-15 RX ADMIN — ATORVASTATIN CALCIUM 40 MG: 40 TABLET, FILM COATED ORAL at 19:03

## 2022-01-15 RX ADMIN — IOPAMIDOL 100 ML: 755 INJECTION, SOLUTION INTRAVENOUS at 13:29

## 2022-01-15 RX ADMIN — ALPRAZOLAM 0.5 MG: 0.5 TABLET ORAL at 17:59

## 2022-01-15 NOTE — ED TRIAGE NOTES
Pt brought in by daughter for fall after her legs were too weak to keep her standing up. Pt states that she hit her head, denies LOC.

## 2022-01-15 NOTE — ED NOTES
TRANSFER - OUT REPORT:    Verbal report given to [facility], RN(name) on Gabbie Hurst  being transferred to [employer]) for routine progression of care       Report consisted of patients Situation, Background, Assessment and   Recommendations(SBAR). Information from the following report(s) SBAR, ED Summary, MAR, Recent Results, Cardiac Rhythm SR and Dual Neuro Assessment was reviewed with the receiving nurse. Lines:   Peripheral IV 01/15/22 Right Antecubital (Active)   Site Assessment Clean, dry, & intact 01/15/22 1027   Phlebitis Assessment 0 01/15/22 1027   Infiltration Assessment 0 01/15/22 1027   Dressing Status Clean, dry, & intact 01/15/22 1027   Dressing Type Transparent 01/15/22 1027   Hub Color/Line Status Pink 01/15/22 1027   Alcohol Cap Used Yes 01/15/22 1027        Opportunity for questions and clarification was provided.       Patient transported with:   Monitor  Registered Nurse

## 2022-01-15 NOTE — PROGRESS NOTES
Problem: Pressure Injury - Risk of  Goal: *Prevention of pressure injury  Description: Document Juwan Scale and appropriate interventions in the flowsheet. Outcome: Progressing Towards Goal  Note: Pressure Injury Interventions:  Sensory Interventions: Assess changes in LOC,Check visual cues for pain,Float heels,Keep linens dry and wrinkle-free    Moisture Interventions: Absorbent underpads,Assess need for specialty bed,Limit adult briefs    Activity Interventions: Assess need for specialty bed,Increase time out of bed,Pressure redistribution bed/mattress(bed type),PT/OT evaluation    Mobility Interventions: Chair cushion,HOB 30 degrees or less    Nutrition Interventions: Document food/fluid/supplement intake,Discuss nutritional consult with provider,Offer support with meals,snacks and hydration    Friction and Shear Interventions: Feet elevated on foot rest,Lift team/patient mobility team,Minimize layers,Transferring/repositioning devices                Problem: Patient Education: Go to Patient Education Activity  Goal: Patient/Family Education  Outcome: Progressing Towards Goal     Problem: Falls - Risk of  Goal: *Absence of Falls  Description: Document Vernia Colder Fall Risk and appropriate interventions in the flowsheet.   Outcome: Progressing Towards Goal  Note: Fall Risk Interventions:  Mobility Interventions: Assess mobility with egress test,Communicate number of staff needed for ambulation/transfer,PT Consult for mobility concerns,PT Consult for assist device competence         Medication Interventions: Assess postural VS orthostatic hypotension,Bed/chair exit alarm,Evaluate medications/consider consulting pharmacy,Patient to call before getting OOB,Teach patient to arise slowly,Utilize gait belt for transfers/ambulation    Elimination Interventions: Call light in reach,Patient to call for help with toileting needs    History of Falls Interventions: Door open when patient unattended,Room close to nurse's station,Utilize gait belt for transfer/ambulation,Assess for delayed presentation/identification of injury for 48 hrs (comment for end date),Vital signs minimum Q4HRs X 24 hrs (comment for end date)         Problem: Patient Education: Go to Patient Education Activity  Goal: Patient/Family Education  Outcome: Progressing Towards Goal     Problem: Patient Education: Go to Patient Education Activity  Goal: Patient/Family Education  Outcome: Progressing Towards Goal     Problem: TIA/CVA Stroke: 0-24 hours  Goal: Off Pathway (Use only if patient is Off Pathway)  Outcome: Progressing Towards Goal  Goal: Activity/Safety  Outcome: Progressing Towards Goal  Goal: Consults, if ordered  Outcome: Progressing Towards Goal  Goal: Diagnostic Test/Procedures  Outcome: Progressing Towards Goal  Goal: Nutrition/Diet  Outcome: Progressing Towards Goal  Goal: Discharge Planning  Outcome: Progressing Towards Goal  Goal: Medications  Outcome: Progressing Towards Goal  Goal: Respiratory  Outcome: Progressing Towards Goal  Goal: Treatments/Interventions/Procedures  Outcome: Progressing Towards Goal  Goal: Minimize risk of bleeding post-thrombolytic infusion  Outcome: Progressing Towards Goal  Goal: Monitor for complications post-thrombolytic infusion  Outcome: Progressing Towards Goal  Goal: Psychosocial  Outcome: Progressing Towards Goal  Goal: *Hemodynamically stable  Outcome: Progressing Towards Goal  Goal: *Neurologically stable  Description: Absence of additional neurological deficits    Outcome: Progressing Towards Goal  Goal: *Verbalizes anxiety and depression are reduced or absent  Outcome: Progressing Towards Goal  Goal: *Absence of Signs of Aspiration on Current Diet  Outcome: Progressing Towards Goal  Goal: *Absence of deep venous thrombosis signs and symptoms(Stroke Metric)  Outcome: Progressing Towards Goal  Goal: *Ability to perform ADLs and demonstrates progressive mobility and function  Outcome: Progressing Towards Goal  Goal: *Stroke education started(Stroke Metric)  Outcome: Progressing Towards Goal  Goal: *Dysphagia screen performed(Stroke Metric)  Outcome: Progressing Towards Goal  Goal: *Rehab consulted(Stroke Metric)  Outcome: Progressing Towards Goal     Problem: Ischemic Stroke: Discharge Outcomes  Goal: *Verbalizes anxiety and depression are reduced or absent  Outcome: Progressing Towards Goal  Goal: *Verbalize understanding of risk factor modification(Stroke Metric)  Outcome: Progressing Towards Goal  Goal: *Hemodynamically stable  Outcome: Progressing Towards Goal  Goal: *Absence of aspiration pneumonia  Outcome: Progressing Towards Goal  Goal: *Aware of needed dietary changes  Outcome: Progressing Towards Goal  Goal: *Verbalize understanding of prescribed medications including anti-coagulants, anti-lipid, and/or anti-platelets(Stroke Metric)  Outcome: Progressing Towards Goal  Goal: *Tolerating diet  Outcome: Progressing Towards Goal  Goal: *Aware of follow-up diagnostics related to anticoagulants  Outcome: Progressing Towards Goal  Goal: *Ability to perform ADLs and demonstrates progressive mobility and function  Outcome: Progressing Towards Goal  Goal: *Absence of DVT(Stroke Metric)  Outcome: Progressing Towards Goal  Goal: *Absence of aspiration  Outcome: Progressing Towards Goal  Goal: *Optimal pain control at patient's stated goal  Outcome: Progressing Towards Goal  Goal: *Home safety concerns addressed  Outcome: Progressing Towards Goal  Goal: *Describes available resources and support systems  Outcome: Progressing Towards Goal  Goal: *Verbalizes understanding of activation of EMS(911) for stroke symptoms(Stroke Metric)  Outcome: Progressing Towards Goal  Goal: *Understands and describes signs and symptoms to report to providers(Stroke Metric)  Outcome: Progressing Towards Goal  Goal: *Neurolgocially stable (absence of additional neurological deficits)  Outcome: Progressing Towards Goal  Goal: *Verbalizes importance of follow-up with primary care physician(Stroke Metric)  Outcome: Progressing Towards Goal  Goal: *Smoking cessation discussed,if applicable(Stroke Metric)  Outcome: Progressing Towards Goal  Goal: *Depression screening completed(Stroke Metric)  Outcome: Progressing Towards Goal

## 2022-01-15 NOTE — PROGRESS NOTES
1238- received report from Tha MONTELONGO in ER - patient going to room 243, room set up.    1300- Patient arrived by stretcher, alert and oriented, Tele on patient [address] Manning, left sided weakness noted, left  weaker than right, no facial droop, no dysphagia, vital signs and BS taken, lung sounds clear, swallow eval at bedside performed, lunch tray provided. SCD's bilateral on patient feet. Neuro-muscular check done. Call bell in reach. 1350- down in radiology. 1400- returned to room, tele on patient running NSR and BBB, Hr 70 bpm.    1430- admission procedure and MRI screening form completed. 1555- vital signs taken, neuro muscular checks done - no acute changes - continue to have left sided weakness. Ambulated patient to bathroom with walker. 1800- med's given, no distress at this time. Patient requested to have lipitor 40 mg changed to evening dose - called NP Sherman - modified order - pending on RX to verify.

## 2022-01-15 NOTE — ED NOTES
Pt able to transfer with assist.  Able to bear weight, but states that her legs feel like they are about to give out.

## 2022-01-15 NOTE — H&P
History and Physical    Patient: John Guardado MRN: [id number]  SSN: [id number]    YOB: 1940  Age: 78 y.o. Sex: female      Subjective:      John Guardado is a 78 y.o. female with past medical history of hypertension, hyperlipidemia, COPD, anxiety presents today due to left-sided weakness. She developed symptoms last night at 7 PM and had a fall on her left side. He has never had this experience before and noticed that her left side is weak. He is having difficulty lifting her left leg. Denies any loss of consciousness and did not hit her head. Denies any chest pain. She was seen in the ER and neurology was consulted and recommended admission for an MRI. CT head was negative. EKG was unremarkable    Past Medical History:   Diagnosis Date    Allergic rhinitis     Anemia     Anxiety disorder     Chronic obstructive pulmonary disease (HCC)     Dyslipidemia     Folic acid deficiency     Hypertension     Neurologic disorder     resting tremor     Past Surgical History:   Procedure Laterality Date    COLONOSCOPY N/A 10/26/2021    COLONOSCOPY w/ polypectomy [facility] Senia [facility]ates [facility]      Family History   Problem Relation Age of Onset    Cancer Father         stomach    Heart Disease Sister     Alcohol abuse Brother     Cancer Brother         lung     Social History     Tobacco Use    Smoking status: Former Smoker     Packs/day: 1.00     Years: 50.00     Pack years: 50.00    Smokeless tobacco: Never Used   Substance Use Topics    Alcohol use: Not Currently      Prior to Admission medications    Medication Sig Start Date End Date Taking? Authorizing Provider   atorvastatin (LIPITOR) 40 mg tablet TAKE 1 TABLET BY MOUTH DAILY 12/20/21  Yes Ronnie Rivera MD   pantoprazole (PROTONIX) 40 mg tablet Take 1 Tablet by mouth daily.  11/1/21  Yes Ronnie Rivera MD   metoprolol tartrate (LOPRESSOR) 25 mg tablet TAKE 1/2 TABLET BY MOUTH TWICE DAILY 10/28/21 Yes Jakob Gallegos MD   ALPRAZolam Larnell Satchel) 0.5 mg tablet Take 1 Tablet by mouth two (2) times a day. Max Daily Amount: 1 mg. 10/21/21  Yes Jakob Gallegos MD   budesonide-formoteroL (Symbicort) 80-4.5 mcg/actuation HFAA Take 2 Puffs by inhalation two (2) times a day. 10/18/21  Yes Jakob Gallegos MD   losartan (COZAAR) 100 mg tablet Take 1 Tablet by mouth daily. 10/15/21  Yes Jakob Gallegos MD   albuterol (PROVENTIL HFA, VENTOLIN HFA, PROAIR HFA) 90 mcg/actuation inhaler Take 2 Puffs by inhalation every four (4) hours as needed for Wheezing. 4/21/21  Yes Quynh Obregon MD   atorvastatin (LIPITOR) 40 mg tablet TAKE 1 TABLET BY MOUTH DAILY  Patient not taking: Reported on 1/15/2022 12/20/21   Jakob Gallegos MD   atorvastatin (LIPITOR) 40 mg tablet TAKE 1 TABLET BY MOUTH EVERY NIGHT AT BEDTIME  Patient not taking: Reported on 1/15/2022 12/18/21   Bruno Walker MD   triamcinolone (ARISTOCORT) 0.5 % topical cream Apply  to affected area two (2) times a day. use thin layer  Patient not taking: Reported on 1/15/2022 11/1/21   Jakob Gallegos MD   furosemide (LASIX) 20 mg tablet One po qod  Patient not taking: Reported on 1/15/2022 8/31/21   Quynh Obregon MD        Allergies   Allergen Reactions    Isopropyl Alcohol Other (comments)     Weakness all over - pt states she has out grown this    Pen-Vee K Rash     Pt. States she avoids penicillin due to allergy as a child. Review of Systems:  A comprehensive review of systems was negative except for that written in the History of Present Illness.     Objective:     Vitals:    01/15/22 1200 01/15/22 1207 01/15/22 1307 01/15/22 1421   BP:  (!) 170/67 (!) 162/57 (!) 149/53   Pulse: 60 (!) 58 (!) 59    Resp:  18 18    Temp:   97.8 °F (36.6 °C)    SpO2:  100% 100%    Weight:       Height:            Physical Exam:  GENERAL: alert, cooperative, no distress, appears stated age  EYE: negative  LYMPHATIC: Cervical, supraclavicular, and axillary nodes normal.   THROAT & NECK: normal  LUNG: clear to auscultation bilaterally  HEART: regular rate and rhythm, S1, S2 normal, no murmur, click, rub or gallop  ABDOMEN: soft, non-tender. Bowel sounds normal. No masses,  no organomegaly  EXTREMITIES:  extremities normal, atraumatic, no cyanosis or edema. Swelling on left hip joint. SKIN: Normal. and no rash or abnormalities  NEUROLOGIC: lWeakness on left upper extremity and lower extremity. Sensation intact. Rest of cranial nerves intact. PSYCHIATRIC: Appropriate    Assessment:     Hospital Problems  Date Reviewed: 9/16/2021          Codes Class Noted POA    Stroke [facility]) ICD-10-CM: I63.9  ICD-9-CM: 434.91  1/15/2022 Unknown        Anxiety ICD-10-CM: F41.9  ICD-9-CM: 300.00  3/11/2021 Yes        Atrial fibrillation ([facility] ICD-10-CM: I48.91  ICD-9-CM: 427.31  3/11/2021 Yes        Chronic obstructive lung disease ([facility] ICD-10-CM: J44.9  ICD-9-CM: 638  11/20/2020 Yes        Essential hypertension ICD-10-CM: I10  ICD-9-CM: 401.9  11/20/2020 Yes        Hyperlipidemia ICD-10-CM: E78.5  ICD-9-CM: 272.4  11/20/2020 Yes              Plan:     1. Left-sided weakness  CT head negative. Will obtain CTA head and neck and MRI brain to rule out stroke. We will also obtain echocardiogram.  Hip x-ray ordered as well. Continue aspirin and statin. PT OT consulted. 2. COPD  Continue Symbicort and albuterol    3. Hypertension  Continue losartan and metoprolol    4. History of GI bleed  Continue protonix    5. Atrial fibrillation  Rate controlled on metoprolol. Not on AC due to hx of GI bleed    6. Anxiety  Currently on xanax. Will consider weaning off of xanax as outpatient due to high risk of falls. This has been discussed before in office with me.      Time spent on admission: 45 minutes    DVT Prophylaxis: SCDs (has history of GI bleed)  Signed By: Leeann Parker MD     January 15, 2022

## 2022-01-15 NOTE — Clinical Note
Patient Class[de-identified] OBSERVATION [104]   Type of Bed: Telemetry [19]   Cardiac Monitoring Required?: Yes   Reason for Observation: stroke   Admitting Diagnosis: Stroke [facility]) [[id number]]   Admitting Physician: Dany Torrez   Attending Physician: Elvi Cowan [[id number]]

## 2022-01-15 NOTE — ED PROVIDER NOTES
HPI   Patient is an 53-year-old female who presents to the emergency department after a fall. Patient states that starting around 6 PM last night she noticed that she was walking funny and felt like she was having weakness in her left lower extremity. She did think much of it but did not have dinner and went to bed. When she woke up today she was making breakfast when she was unsteady and fell. She did hit her head on the cabinet on her way down. Did not lose consciousness, complains of pain to the back of her head, denies any nausea or vomiting. Is not on any blood thinners. Remembers everything that happened. Denies any dizziness, chest pain or shortness of breath. Still feels like she is having weakness in her leg. Denies any other trauma or injury. Otherwise feels at her baseline. She does not have any back pain. Denies any trouble holding onto her urine or stool or numbness in her groin.   Past Medical History:   Diagnosis Date    Allergic rhinitis     Anemia     Anxiety disorder     Chronic obstructive pulmonary disease ([facility]     Dyslipidemia     Folic acid deficiency     Hypertension     Neurologic disorder     resting tremor       Past Surgical History:   Procedure Laterality Date    COLONOSCOPY N/A 10/26/2021    COLONOSCOPY w/ polypectomy performed by Fiordaliza Marsh MD at [facility] ENDOSCOPY         Family History:   Problem Relation Age of Onset    Cancer Father         stomach    Heart Disease Sister     Alcohol abuse Brother     Cancer Brother         lung       Social History     Socioeconomic History    Marital status:      Spouse name: Not on file    Number of children: Not on file    Years of education: Not on file    Highest education level: Not on file   Occupational History    Not on file   Tobacco Use    Smoking status: Former Smoker     Packs/day: 1.00     Years: 50.00     Pack years: 50.00    Smokeless tobacco: Never Used   Vaping Use    Vaping Use: Never used   Substance and Sexual Activity    Alcohol use: Not Currently    Drug use: Never    Sexual activity: Not Currently   Other Topics Concern    Not on file   Social History Narrative    Not on file     Social Determinants of Health     Financial Resource Strain:     Difficulty of Paying Living Expenses: Not on file   Food Insecurity:     Worried About Running Out of Food in the Last Year: Not on file    Sunshine of Food in the Last Year: Not on file   Transportation Needs:     Lack of Transportation (Medical): Not on file    Lack of Transportation (Non-Medical):  Not on file   Physical Activity:     Days of Exercise per Week: Not on file    Minutes of Exercise per Session: Not on file   Stress:     Feeling of Stress : Not on file   Social Connections:     Frequency of Communication with Friends and Family: Not on file    Frequency of Social Gatherings with Friends and Family: Not on file    Attends [employer] Services: Not on file    Active Member of [address] [place] [address] or Organizations: Not on file    Attends Club or Organization Meetings: Not on file    Marital Status: Not on file   Intimate Partner Violence:     Fear of Current or Ex-Partner: Not on file    Emotionally Abused: Not on file    Physically Abused: Not on file    Sexually Abused: Not on file   Housing Stability:     Unable to Pay for Housing in the Last Year: Not on file    Number of [place] in the Last Year: Not on file    Unstable Housing in the Last Year: Not on file         ALLERGIES: Isopropyl alcohol and Pen-vee k    Review of Systems  Constitutional: No fever  HENT: No ear pain  Eyes: No change in vision  Respiratory: No SOB  Cardio: No chest pain  GI: No blood in stool  : No hematuria  MSK: No back pain  Skin: No rashes  Neuro: Positive for headache    Vitals:    01/15/22 1005   BP: (!) 140/75   Pulse: (!) 54   Resp: 18   Temp: 98.8 °F (37.1 °C)   SpO2: 96%   Weight: 75.3 kg (166 lb)   Height: 5' 4\" (1.626 m) Physical Exam   General: No acute distress  Head: Normocephalic, atraumatic  Psych: Cooperative and alert  Eyes: No scleral icterus, normal conjunctiva  ENT: Moist oral mucosa  Neck: Supple  CV: Regular rate and rhythm, no pitting edema, palpable radial pulses  Pulm: Clear breath sounds bilaterally without any wheezing or rhonchi, normal respiratory rate  GI: Normal bowel sounds, soft, non-tender  MSK: Moves all four extremities  Skin: No rashes  Neuro: Face is symmetrical, tongue protrudes midline, vision and hearing grossly intact, normal speech, 5/5 strength to right upper and lower extremities, left lower extremity she is only able to lift against gravity and hold out for approximately 1 to 2 seconds. Very slightly weaker  strength on the left compared to the right, no pronator drift, finger-to-nose cerebellar testing is within normal limits, no extinction, sensation grossly intact, can name common objects, alert and oriented x4, can repeat no ifs, ands or buts        MDM   Patient is an 68-year-old female who presents with left sided weakness and a fall. From the standpoint of her fall she does not have any concerning findings of trauma, no specific tenderness and no significant mechanism. This does not require any further work-up at this time however for the standpoint of her left lower extremity weakness this is reproducible on exam and I do also note some minor left upper extremity weakness. This is concerning for a stroke. Her last known well was 6 PM last night so she is outside the window for tPA or thrombectomy at this point however stroke work-up will be initiated. EKG shows a bradycardic sinus rhythm at a rate of 51 bpm.  QRS is wide, axis is normal, R wave progression across the pericardium is satisfactory. Does have decreased amplitude throughout. No specific ST elevations or depressions are noted. Overall is consistent with a left bundle branch block.   This is compared to previous EKG and found to be stable. Overall shows no signs of ACS or arrhythmia. CBC, INR, CMP, alcohol and glucose are all within normal limits. Head CT shows no acute intracranial process. Chest x-ray shows no acute cardiopulmonary process. Spoke with telemetry neurologist.  She states that due to her recent GI bleed would hold off on Plavix until can be approved by GI however would recommend starting aspirin for now. Recommends MRI/MRA. Patient is admitted to the hospitalist service. Patient is in agreement with the plan to be admitted at this time.   All orders moving forward replacement the admitting team.        Procedures

## 2022-01-16 PROBLEM — I63.9 CVA (CEREBRAL VASCULAR ACCIDENT) (HCC): Status: ACTIVE | Noted: 2022-01-16

## 2022-01-16 LAB
ALBUMIN SERPL-MCNC: 3.7 G/DL (ref 3.5–4.7)
ALBUMIN/GLOB SERPL: 1.2 {RATIO}
ALP SERPL-CCNC: 66 U/L (ref 38–126)
ALT SERPL-CCNC: 18 U/L (ref 3–52)
ANION GAP SERPL CALC-SCNC: 8 MMOL/L
AST SERPL W P-5'-P-CCNC: 23 U/L (ref 14–74)
ATRIAL RATE: 51 BPM
BASOPHILS # BLD: 0 K/UL (ref 0–0.1)
BASOPHILS NFR BLD: 1 % (ref 0–2)
BILIRUB SERPL-MCNC: 0.7 MG/DL (ref 0.2–1)
BUN SERPL-MCNC: 20 MG/DL (ref 9–21)
BUN/CREAT SERPL: 20
CA-I BLD-MCNC: 9.3 MG/DL (ref 8.5–10.5)
CALCULATED P AXIS, ECG09: 13 DEGREES
CALCULATED R AXIS, ECG10: 31 DEGREES
CALCULATED T AXIS, ECG11: 101 DEGREES
CHLORIDE SERPL-SCNC: 104 MMOL/L (ref 94–111)
CO2 SERPL-SCNC: 26 MMOL/L (ref 21–33)
CREAT SERPL-MCNC: 1 MG/DL (ref 0.7–1.2)
DIAGNOSIS, 93000: NORMAL
DIFFERENTIAL METHOD BLD: ABNORMAL
EOSINOPHIL # BLD: 0.2 K/UL (ref 0–0.4)
EOSINOPHIL NFR BLD: 3 % (ref 0–5)
ERYTHROCYTE [DISTWIDTH] IN BLOOD BY AUTOMATED COUNT: 12.4 % (ref 11.6–14.5)
GLOBULIN SER CALC-MCNC: 3.1 G/DL
GLUCOSE SERPL-MCNC: 112 MG/DL (ref 70–110)
HCT VFR BLD AUTO: 40.6 % (ref 35–45)
HGB BLD-MCNC: 12.9 G/DL (ref 12–16)
IMM GRANULOCYTES # BLD AUTO: 0 K/UL (ref 0–0.04)
IMM GRANULOCYTES NFR BLD AUTO: 0 % (ref 0–0.5)
LYMPHOCYTES # BLD: 0.7 K/UL (ref 0.9–3.6)
LYMPHOCYTES NFR BLD: 13 % (ref 21–52)
MAGNESIUM SERPL-MCNC: 2 MG/DL (ref 1.7–2.8)
MCH RBC QN AUTO: 29.7 PG (ref 24–34)
MCHC RBC AUTO-ENTMCNC: 31.8 G/DL (ref 31–37)
MCV RBC AUTO: 93.5 FL (ref 78–100)
MONOCYTES # BLD: 0.5 K/UL (ref 0.05–1.2)
MONOCYTES NFR BLD: 10 % (ref 3–10)
NEUTS SEG # BLD: 3.7 K/UL (ref 1.8–8)
NEUTS SEG NFR BLD: 73 % (ref 40–73)
NRBC # BLD: 0 K/UL (ref 0–0.01)
NRBC BLD-RTO: 0 PER 100 WBC
P-R INTERVAL, ECG05: 192 MS
PHOSPHATE SERPL-MCNC: 4.3 MG/DL (ref 2.5–4.5)
PLATELET # BLD AUTO: 154 K/UL (ref 135–420)
PMV BLD AUTO: 11.3 FL (ref 9.2–11.8)
POTASSIUM SERPL-SCNC: 4.2 MMOL/L (ref 3.2–5.1)
PROT SERPL-MCNC: 6.8 G/DL (ref 6.1–8.4)
Q-T INTERVAL, ECG07: 498 MS
QRS DURATION, ECG06: 138 MS
QTC CALCULATION (BEZET), ECG08: 459 MS
RBC # BLD AUTO: 4.34 M/UL (ref 4.2–5.3)
SODIUM SERPL-SCNC: 138 MMOL/L (ref 135–145)
VENTRICULAR RATE, ECG03: 51 BPM
WBC # BLD AUTO: 5.1 K/UL (ref 4.6–13.2)

## 2022-01-16 PROCEDURE — 83735 ASSAY OF MAGNESIUM: CPT

## 2022-01-16 PROCEDURE — 65270000029 HC RM PRIVATE

## 2022-01-16 PROCEDURE — 74011250637 HC RX REV CODE- 250/637: Performed by: STUDENT IN AN ORGANIZED HEALTH CARE EDUCATION/TRAINING PROGRAM

## 2022-01-16 PROCEDURE — 94640 AIRWAY INHALATION TREATMENT: CPT

## 2022-01-16 PROCEDURE — 99232 SBSQ HOSP IP/OBS MODERATE 35: CPT | Performed by: INTERNAL MEDICINE

## 2022-01-16 PROCEDURE — G0378 HOSPITAL OBSERVATION PER HR: HCPCS

## 2022-01-16 PROCEDURE — 84100 ASSAY OF PHOSPHORUS: CPT

## 2022-01-16 PROCEDURE — 80053 COMPREHEN METABOLIC PANEL: CPT

## 2022-01-16 PROCEDURE — 85025 COMPLETE CBC W/AUTO DIFF WBC: CPT

## 2022-01-16 PROCEDURE — 36415 COLL VENOUS BLD VENIPUNCTURE: CPT

## 2022-01-16 PROCEDURE — 74011250637 HC RX REV CODE- 250/637: Performed by: NURSE PRACTITIONER

## 2022-01-16 PROCEDURE — 74011250637 HC RX REV CODE- 250/637: Performed by: INTERNAL MEDICINE

## 2022-01-16 RX ORDER — LORAZEPAM 2 MG/ML
1 INJECTION INTRAMUSCULAR ONCE
Status: DISCONTINUED | OUTPATIENT
Start: 2022-01-17 | End: 2022-01-16

## 2022-01-16 RX ORDER — ACETAMINOPHEN 325 MG/1
650 TABLET ORAL
Status: DISCONTINUED | OUTPATIENT
Start: 2022-01-16 | End: 2022-01-17 | Stop reason: HOSPADM

## 2022-01-16 RX ORDER — ATORVASTATIN CALCIUM 40 MG/1
40 TABLET, FILM COATED ORAL EVERY EVENING
Status: DISCONTINUED | OUTPATIENT
Start: 2022-01-16 | End: 2022-01-17 | Stop reason: HOSPADM

## 2022-01-16 RX ORDER — LORAZEPAM 2 MG/ML
1 INJECTION INTRAMUSCULAR
Status: DISCONTINUED | OUTPATIENT
Start: 2022-01-17 | End: 2022-01-17 | Stop reason: HOSPADM

## 2022-01-16 RX ORDER — LANOLIN ALCOHOL/MO/W.PET/CERES
9 CREAM (GRAM) TOPICAL
Status: DISCONTINUED | OUTPATIENT
Start: 2022-01-16 | End: 2022-01-17 | Stop reason: HOSPADM

## 2022-01-16 RX ORDER — TRAZODONE HYDROCHLORIDE 50 MG/1
100 TABLET ORAL
Status: DISCONTINUED | OUTPATIENT
Start: 2022-01-16 | End: 2022-01-17 | Stop reason: HOSPADM

## 2022-01-16 RX ADMIN — METOPROLOL TARTRATE 12.5 MG: 25 TABLET, FILM COATED ORAL at 17:33

## 2022-01-16 RX ADMIN — PANTOPRAZOLE SODIUM 40 MG: 40 TABLET, DELAYED RELEASE ORAL at 08:37

## 2022-01-16 RX ADMIN — ACETAMINOPHEN 650 MG: 325 TABLET ORAL at 16:16

## 2022-01-16 RX ADMIN — BUDESONIDE AND FORMOTEROL FUMARATE DIHYDRATE 2 PUFF: 80; 4.5 AEROSOL RESPIRATORY (INHALATION) at 19:27

## 2022-01-16 RX ADMIN — TRAZODONE HYDROCHLORIDE 100 MG: 50 TABLET ORAL at 21:06

## 2022-01-16 RX ADMIN — BUDESONIDE AND FORMOTEROL FUMARATE DIHYDRATE 2 PUFF: 80; 4.5 AEROSOL RESPIRATORY (INHALATION) at 08:34

## 2022-01-16 RX ADMIN — ALPRAZOLAM 0.5 MG: 0.5 TABLET ORAL at 08:37

## 2022-01-16 RX ADMIN — Medication 9 MG: at 21:06

## 2022-01-16 RX ADMIN — ATORVASTATIN CALCIUM 40 MG: 40 TABLET, FILM COATED ORAL at 17:33

## 2022-01-16 RX ADMIN — ALPRAZOLAM 0.5 MG: 0.5 TABLET ORAL at 17:33

## 2022-01-16 RX ADMIN — ACETAMINOPHEN 650 MG: 325 TABLET ORAL at 05:57

## 2022-01-16 RX ADMIN — METOPROLOL TARTRATE 12.5 MG: 25 TABLET, FILM COATED ORAL at 08:39

## 2022-01-16 RX ADMIN — ASPIRIN 81 MG: 81 TABLET, CHEWABLE ORAL at 08:38

## 2022-01-16 RX ADMIN — LOSARTAN POTASSIUM 100 MG: 25 TABLET, FILM COATED ORAL at 08:36

## 2022-01-16 NOTE — PROGRESS NOTES
Problem: Pressure Injury - Risk of  Goal: *Prevention of pressure injury  Description: Document Juwan Scale and appropriate interventions in the flowsheet.   Outcome: Progressing Towards Goal  Note: Pressure Injury Interventions:  Sensory Interventions: Assess changes in LOC    Moisture Interventions: Absorbent underpads    Activity Interventions: Assess need for specialty bed    Mobility Interventions: Chair cushion    Nutrition Interventions: Document food/fluid/supplement intake    Friction and Shear Interventions: Minimize layers

## 2022-01-16 NOTE — PROGRESS NOTES
0700-Report received from off going nurse. Assumed care of patient. 0837-Scheduled meds given. Patient tolerated well. 1230-Patient eating lunch at this time. NAD. CBWR.    1619-Patient c/o headache. 5/10. PrN Tylenol given. Patient tolerated well.

## 2022-01-16 NOTE — PROGRESS NOTES
Progress Note    Patient: Ritika Centeno MRN: [id number]  SSN: [id number]    YOB: 1940  Age: 78 y.o. Sex: female      Admit Date: 1/15/2022    LOS: 0 days     Assessment and Plan:     1. Left-sided weakness  CT angio was reviewed by me and does show some fairly significant ICA stenosis bilaterally. Her CT did not show any signs of an acute infarction. Given this patient's continued weakness on the left side I would recommend MRI/MRA prior to discharge. Continue aspirin which she had stopped prior to admission. Her echocardiogram does not show any signs of a thrombus. We will have PT OT see her and plan for discharge after we have her MRI. She will require sedation for this and I am also ordering something for sleep    2. COPD  Continue Symbicort and albuterol     3. Hypertension  Continue losartan and metoprolol     4. History of GI bleed  Continue protonix     5. Atrial fibrillation  Rate controlled on metoprolol. Not on AC due to hx of GI bleed     6. Anxiety  Currently on xanax. Will consider weaning off of xanax as outpatient due to high risk of falls. This has been discussed before in office with dr. Rebecca Hussein in his office. [place]: add trazadone for sleep along with elatonin  Given the patient still has residual neurologic symptoms this is a stroke. We should get an MRI prior to discharge. Change to inpatient status given that she will require greater than 2 midnight stay  Case discussed with the patient's eldest daughter Priscilla Escobar who is in agreement with the plan we will give 1 mg of Ativan prior to her MRI    Subjective:   Still with some left-sided weakness. I do believe she has had an acute stroke given that she has additional sequela. The patient also reports she just does not sleep well here and she reports she is also very anxious. She denies any new neurologic symptoms. She is requiring a walker to get around.   She does have a walker at home but was not requiring a walker to walk before this admission. Current Facility-Administered Medications   Medication Dose Route Frequency    acetaminophen (TYLENOL) tablet 650 mg  650 mg Oral Q6H PRN    aspirin chewable tablet 81 mg  81 mg Oral DAILY    albuterol (PROVENTIL HFA, VENTOLIN HFA, PROAIR HFA) inhaler 2 Puff  2 Puff Inhalation Q4H PRN    ALPRAZolam (XANAX) tablet 0.5 mg  0.5 mg Oral BID    budesonide-formoterol (SYMBICORT) 80-4.5 mcg inhaler  2 Puff Inhalation BID RT    losartan (COZAAR) tablet 100 mg  100 mg Oral DAILY    metoprolol tartrate (LOPRESSOR) tablet 12.5 mg  12.5 mg Oral BID    pantoprazole (PROTONIX) tablet 40 mg  40 mg Oral DAILY    atorvastatin (LIPITOR) tablet 40 mg  40 mg Oral DAILY        Vitals:    01/16/22 0000 01/16/22 0029 01/16/22 0400 01/16/22 0427   BP:  (!) 153/47  (!) 153/56   Pulse: (!) 52 (!) 52 (!) 59 (!) 59   Resp:  18  16   Temp:  96.9 °F (36.1 °C)  (!) 96.6 °F (35.9 °C)   SpO2:  94%  99%   Weight:       Height:         Objective:   General: alert awake well-oriented, no acute distress. HEENT: EOMI, no Icterus, no Pallor,  mucosa moist.  Neck: Neck is supple, No JVD  Lungs: breathsounds normal, no respiratory distress on inspection, no rhonchi, no rales,   CVS: heart sounds normal, regular rate and rhythm, 1/6 sm lsb   GI: soft, nontender, normal BS. Extremeties: no cyanosis, + edema,   Neuro: Alert, awake, oriented x3,  Slightly diminished strength on left with dorsiflexion and leg raise. Skin: normal skin turgor, no skin rashes. Intake and Output:  Current Shift: No intake/output data recorded.   Last three shifts: 01/14 1901 - 01/16 0700  In: 800 [P.O.:800]  Out: -       Lab/Data Review:  Recent Results (from the past 24 hour(s))   CBC WITH AUTOMATED DIFF    Collection Time: 01/15/22 10:15 AM   Result Value Ref Range    WBC 5.5 4.6 - 13.2 K/uL    RBC 4.58 4.20 - 5.30 M/uL    HGB 13.7 12.0 - 16.0 g/dL    HCT 43.1 35.0 - 45.0 %    MCV 94.1 78.0 - 100.0 FL    MCH 29.9 24.0 - 34.0 PG    MCHC 31.8 31.0 - 37.0 g/dL    RDW 12.4 11.6 - 14.5 %    PLATELET 913 548 - 465 K/uL    MPV 11.0 9.2 - 11.8 FL    NRBC 0.0 0.0  WBC    ABSOLUTE NRBC 0.00 0.00 - 0.01 K/uL    NEUTROPHILS 77 (H) 40 - 73 %    LYMPHOCYTES 10 (L) 21 - 52 %    MONOCYTES 10 3 - 10 %    EOSINOPHILS 2 0 - 5 %    BASOPHILS 1 0 - 2 %    IMMATURE GRANULOCYTES 0 0 - 0.5 %    ABS. NEUTROPHILS 4.2 1.8 - 8.0 K/UL    ABS. LYMPHOCYTES 0.6 (L) 0.9 - 3.6 K/UL    ABS. MONOCYTES 0.5 0.05 - 1.2 K/UL    ABS. EOSINOPHILS 0.1 0.0 - 0.4 K/UL    ABS. BASOPHILS 0.0 0.0 - 0.1 K/UL    ABS. IMM. GRANS. 0.0 0.00 - 0.04 K/UL    DF AUTOMATED     PROTHROMBIN TIME + INR    Collection Time: 01/15/22 10:15 AM   Result Value Ref Range    Prothrombin time 13.8 11.5 - 15.2 sec    INR 1.1 0.8 - 1.2     PTT    Collection Time: 01/15/22 10:15 AM   Result Value Ref Range    aPTT 30.1 23.0 - 36.4 sec    aPTT, therapeutic range   82 - 685 sec   METABOLIC PANEL, COMPREHENSIVE    Collection Time: 01/15/22 10:15 AM   Result Value Ref Range    Sodium 138 135 - 145 mmol/L    Potassium 4.1 3.2 - 5.1 mmol/L    Chloride 103 94 - 111 mmol/L    CO2 26 21 - 33 mmol/L    Anion gap 9 mmol/L    Glucose 112 (H) 70 - 110 mg/dL    BUN 20 9 - 21 mg/dL    Creatinine 1.00 0.70 - 1.20 mg/dL    BUN/Creatinine ratio 20      GFR est AA >60 ml/min/1.73m2    GFR est non-AA 53 ml/min/1.73m2    Calcium 9.4 8.5 - 10.5 mg/dL    Bilirubin, total 0.8 0.2 - 1.0 mg/dL    AST (SGOT) 26 14 - 74 U/L    ALT (SGPT) 20 3 - 52 U/L    Alk.  phosphatase 66 38 - 126 U/L    Protein, total 7.2 6.1 - 8.4 g/dL    Albumin 4.0 3.5 - 4.7 g/dL    Globulin 3.2 g/dL    A-G Ratio 1.3     ETHYL ALCOHOL    Collection Time: 01/15/22 10:15 AM   Result Value Ref Range    ALCOHOL(ETHYL),SERUM <4 <4 mg/dL    Ethanol, percent <0.004 <0.004 %   EKG, 12 LEAD, INITIAL    Collection Time: 01/15/22 10:16 AM   Result Value Ref Range    Ventricular Rate 51 BPM    Atrial Rate 51 BPM    P-R Interval 192 ms    QRS Duration 138 ms Q-T Interval 498 ms    QTC Calculation (Bezet) 459 ms    Calculated P Axis 13 degrees    Calculated R Axis 31 degrees    Calculated T Axis 101 degrees    Diagnosis Sinus rhythm  Left bundle branch block      GLUCOSE, POC    Collection Time: 01/15/22  1:05 PM   Result Value Ref Range    Glucose (POC) 90 70 - 110 mg/dL    Performed by [address], URINE    Collection Time: 01/15/22  2:50 PM   Result Value Ref Range    AMPHETAMINES Negative Negative      BARBITURATES Negative Negative      BENZODIAZEPINES Positive (A) Negative      COCAINE Negative Negative      METHADONE Negative Negative      OPIATES Negative Negative      OXYCODONE SCREEN Negative Negative      PCP(PHENCYCLIDINE) Negative Negative      PROPOXYPHENE Negative Negative      THC (TH-CANNABINOL) Negative Negative      TRICYCLICS Negative Negative      Drug screen comment        This test is a screen for drugs of abuse in a medical setting only (i.e., they are unconfirmed results and as such must not be used for non-medical purposes, e.g.,employment testing, legal testing). Due to its inherent nature, false positive (FP) and false negative (FN) results may be obtained. Therefore, if necessary for medical care, recommend confirmation of positive findings by GC/MS. CBC WITH AUTOMATED DIFF    Collection Time: 01/16/22  4:30 AM   Result Value Ref Range    WBC 5.1 4.6 - 13.2 K/uL    RBC 4.34 4.20 - 5.30 M/uL    HGB 12.9 12.0 - 16.0 g/dL    HCT 40.6 35.0 - 45.0 %    MCV 93.5 78.0 - 100.0 FL    MCH 29.7 24.0 - 34.0 PG    MCHC 31.8 31.0 - 37.0 g/dL    RDW 12.4 11.6 - 14.5 %    PLATELET 659 042 - 812 K/uL    MPV 11.3 9.2 - 11.8 FL    NRBC 0.0 0.0  WBC    ABSOLUTE NRBC 0.00 0.00 - 0.01 K/uL    NEUTROPHILS 73 40 - 73 %    LYMPHOCYTES 13 (L) 21 - 52 %    MONOCYTES 10 3 - 10 %    EOSINOPHILS 3 0 - 5 %    BASOPHILS 1 0 - 2 %    IMMATURE GRANULOCYTES 0 0 - 0.5 %    ABS. NEUTROPHILS 3.7 1.8 - 8.0 K/UL    ABS.  LYMPHOCYTES 0.7 (L) 0.9 - 3.6 K/UL ABS. MONOCYTES 0.5 0.05 - 1.2 K/UL    ABS. EOSINOPHILS 0.2 0.0 - 0.4 K/UL    ABS. BASOPHILS 0.0 0.0 - 0.1 K/UL    ABS. IMM. GRANS. 0.0 0.00 - 0.04 K/UL    DF AUTOMATED     METABOLIC PANEL, COMPREHENSIVE    Collection Time: 01/16/22  4:30 AM   Result Value Ref Range    Sodium 138 135 - 145 mmol/L    Potassium 4.2 3.2 - 5.1 mmol/L    Chloride 104 94 - 111 mmol/L    CO2 26 21 - 33 mmol/L    Anion gap 8 mmol/L    Glucose 112 (H) 70 - 110 mg/dL    BUN 20 9 - 21 mg/dL    Creatinine 1.00 0.70 - 1.20 mg/dL    BUN/Creatinine ratio 20      GFR est AA >60 ml/min/1.73m2    GFR est non-AA 53 ml/min/1.73m2    Calcium 9.3 8.5 - 10.5 mg/dL    Bilirubin, total 0.7 0.2 - 1.0 mg/dL    AST (SGOT) 23 14 - 74 U/L    ALT (SGPT) 18 3 - 52 U/L    Alk.  phosphatase 66 38 - 126 U/L    Protein, total 6.8 6.1 - 8.4 g/dL    Albumin 3.7 3.5 - 4.7 g/dL    Globulin 3.1 g/dL    A-G Ratio 1.2     MAGNESIUM    Collection Time: 01/16/22  4:30 AM   Result Value Ref Range    Magnesium 2.0 1.7 - 2.8 mg/dL   PHOSPHORUS    Collection Time: 01/16/22  4:30 AM   Result Value Ref Range    Phosphorus 4.3 2.5 - 4.5 mg/dL          Signed By: Geeta Romero MD     January 16, 2022

## 2022-01-16 NOTE — PROGRESS NOTES
Comprehensive Nutrition Assessment    Type and Reason for Visit: Initial    Nutrition Recommendations/Plan: Cardiac restricted diet    Nutrition Assessment:  77 yo female PMH: HTN, HLD, COPD, anxiety, anemia   oveweight female BMI 28.5 admitted due to left sided weakness and fall 1 night ago. Pt is Stroke R/O with MRI pending and PT/OT eval pending. Pt currenlty eating % of meals on regular texture diet. Modify to a cardiac diet due to possible stroke    Recent Results (from the past 24 hour(s))   GLUCOSE, POC    Collection Time: 01/15/22  1:05 PM   Result Value Ref Range    Glucose (POC) 90 70 - 110 mg/dL    Performed by [employer], URINE    Collection Time: 01/15/22  2:50 PM   Result Value Ref Range    AMPHETAMINES Negative Negative      BARBITURATES Negative Negative      BENZODIAZEPINES Positive (A) Negative      COCAINE Negative Negative      METHADONE Negative Negative      OPIATES Negative Negative      OXYCODONE SCREEN Negative Negative      PCP(PHENCYCLIDINE) Negative Negative      PROPOXYPHENE Negative Negative      THC (TH-CANNABINOL) Negative Negative      TRICYCLICS Negative Negative      Drug screen comment        This test is a screen for drugs of abuse in a medical setting only (i.e., they are unconfirmed results and as such must not be used for non-medical purposes, e.g.,employment testing, legal testing). Due to its inherent nature, false positive (FP) and false negative (FN) results may be obtained. Therefore, if necessary for medical care, recommend confirmation of positive findings by GC/MS.    CBC WITH AUTOMATED DIFF    Collection Time: 01/16/22  4:30 AM   Result Value Ref Range    WBC 5.1 4.6 - 13.2 K/uL    RBC 4.34 4.20 - 5.30 M/uL    HGB 12.9 12.0 - 16.0 g/dL    HCT 40.6 35.0 - 45.0 %    MCV 93.5 78.0 - 100.0 FL    MCH 29.7 24.0 - 34.0 PG    MCHC 31.8 31.0 - 37.0 g/dL    RDW 12.4 11.6 - 14.5 %    PLATELET 786 637 - 318 K/uL    MPV 11.3 9.2 - 11.8 FL    NRBC 0.0 0.0  WBC    ABSOLUTE NRBC 0.00 0.00 - 0.01 K/uL    NEUTROPHILS 73 40 - 73 %    LYMPHOCYTES 13 (L) 21 - 52 %    MONOCYTES 10 3 - 10 %    EOSINOPHILS 3 0 - 5 %    BASOPHILS 1 0 - 2 %    IMMATURE GRANULOCYTES 0 0 - 0.5 %    ABS. NEUTROPHILS 3.7 1.8 - 8.0 K/UL    ABS. LYMPHOCYTES 0.7 (L) 0.9 - 3.6 K/UL    ABS. MONOCYTES 0.5 0.05 - 1.2 K/UL    ABS. EOSINOPHILS 0.2 0.0 - 0.4 K/UL    ABS. BASOPHILS 0.0 0.0 - 0.1 K/UL    ABS. IMM. GRANS. 0.0 0.00 - 0.04 K/UL    DF AUTOMATED     METABOLIC PANEL, COMPREHENSIVE    Collection Time: 01/16/22  4:30 AM   Result Value Ref Range    Sodium 138 135 - 145 mmol/L    Potassium 4.2 3.2 - 5.1 mmol/L    Chloride 104 94 - 111 mmol/L    CO2 26 21 - 33 mmol/L    Anion gap 8 mmol/L    Glucose 112 (H) 70 - 110 mg/dL    BUN 20 9 - 21 mg/dL    Creatinine 1.00 0.70 - 1.20 mg/dL    BUN/Creatinine ratio 20      GFR est AA >60 ml/min/1.73m2    GFR est non-AA 53 ml/min/1.73m2    Calcium 9.3 8.5 - 10.5 mg/dL    Bilirubin, total 0.7 0.2 - 1.0 mg/dL    AST (SGOT) 23 14 - 74 U/L    ALT (SGPT) 18 3 - 52 U/L    Alk.  phosphatase 66 38 - 126 U/L    Protein, total 6.8 6.1 - 8.4 g/dL    Albumin 3.7 3.5 - 4.7 g/dL    Globulin 3.1 g/dL    A-G Ratio 1.2     MAGNESIUM    Collection Time: 01/16/22  4:30 AM   Result Value Ref Range    Magnesium 2.0 1.7 - 2.8 mg/dL   PHOSPHORUS    Collection Time: 01/16/22  4:30 AM   Result Value Ref Range    Phosphorus 4.3 2.5 - 4.5 mg/dL       Malnutrition Assessment:  Malnutrition Status:  No malnutrition    Context:  Acute illness     Findings of the 6 clinical characteristics of malnutrition:   Energy Intake:  No significant decrease in energy intake  Weight Loss:  No significant weight loss     Body Fat Loss:  No significant body fat loss,     Muscle Mass Loss:  No significant muscle mass loss,    Fluid Accumulation:  No significant fluid accumulation,     Strength:  Not performed (weakness 2/2 stroke)         Estimated Daily Nutrient Needs:  Energy (kcal): 8909-8185 kcal/day; Weight Used for Energy Requirements: Admission (75 kg)  Protein (g): 60-75 g/day; Weight Used for Protein Requirements: Admission (0.8-1 g/kg)  Fluid (ml/day): 6422-0838 ml/day; Method Used for Fluid Requirements: 1 ml/kcal      Nutrition Related Findings:  marco female BMI 28.5 admitted due to left sided weakness and fall 1 night ago. Pt is Stroke R/O with MRI pending and PT/OT eval pending. Pt currenlty eating % of meals on regular texture diet. Wounds:    None       Current Nutrition Therapies:  ADULT DIET Regular; Low Fat/Low Chol/High Fiber/2 gm Na    Anthropometric Measures:  · Height:  5' 4\" (162.6 cm)  · Current Body Wt:  75.3 kg (166 lb)   · Admission Body Wt:  166 lb    · Usual Body Wt:        · Ideal Body Wt:  120 lbs:  138.3 %   · Adjusted Body Weight:   ; Weight Adjustment for: No adjustment   · Adjusted BMI:       · BMI Category: Overweight (BMI 25.0-29. 9)       Nutrition Diagnosis:   · No nutrition diagnosis at this time related to   as evidenced by        Nutrition Interventions:   Food and/or Nutrient Delivery: Continue current diet  Nutrition Education and Counseling: No recommendations at this time (Pt 79 yo)  Coordination of Nutrition Care: Continue to monitor while inpatient    Goals:  Pt to eat greater than 75% of meals, BM every 1-3 days, BMI 22-25 LTG, no signs aspiration       Nutrition Monitoring and Evaluation:   Behavioral-Environmental Outcomes:    Food/Nutrient Intake Outcomes: Food and nutrient intake  Physical Signs/Symptoms Outcomes: Meal time behavior,Weight     F/u: 1/21/2022    Discharge Planning:    Continue current diet     Electronically signed by Angela Raya on 1/16/2022 at 10:46 AM    Contact: [facility] [phone]

## 2022-01-16 NOTE — PROGRESS NOTES
Care Management Interventions  PCP Verified by CM: Yes  Last Visit to PCP: 11/01/21  Palliative Care Criteria Met (RRAT>21 & CHF Dx)?: No  Mode of Transport at Discharge: Other (see comment) (POV)  Transition of Care Consult (CM Consult): Discharge Planning  Health Maintenance Reviewed: Yes  Physical Therapy Consult: Yes  Occupational Therapy Consult: Yes  Speech Therapy Consult: Yes  Support Systems: Child(charissa),Other Family Member(s) (Pending MRI and PT/OT screen)  Confirm Follow Up Transport: Family  The Plan for Transition of Care is Related to the Following Treatment Goals : Discharge planning based on the recommendation of the MDT. Home with [place] or SNF. The Patient and/or Patient Representative was Provided with a Choice of Provider and Agrees with the Discharge Plan?: Yes  Name of the Patient Representative Who was Provided with a Choice of Provider and Agrees with the Discharge Plan: PT  Discharge Location  Discharge Placement: Unable to determine at this time (Pending MRI and PT/OT eval.)   Reason for Admission:   Chart reviewed and pt interviewed at bedside. Adm noted for CC of weakness and feeling as if her legs would not hold her. Per note pt was feeling unsteady and fell prior to adm. She can recall all events prior to coming to the hospital.  CT head was neg. Pt is pending MRI. PMH:  Allergic rhinitis, Anemia, Anxiety disorder, COPD, Dyslipidemia, Folic Acid deficiency, HTN, Neurologic disorder-resting tremor. HX GI Bleed. PSH.   Colonoscopy, Endoscopy    DX:  Stroke, Anxiety, Atrial Fib, COPD, Essential HTN, HLD,     RUR Score:    13%                 Plan for utilizing home health:  TBD    PCP: First and Last name:  Cristhian Ivan MD     Name of Practice:   [facility]   Are you a current patient: Yes/No: Y   Approximate date of last visit:  11/1/21   Can you participate in a virtual visit with your PCP:  Y                    Current Advanced Directive/Advance Care Plan: Prior      Healthcare Decision Maker:   Click here to complete [address] including selection of the Healthcare Decision Maker Relationship (ie \"Primary\")             Primary Decision MakerLidia Esposito - Child - [phone]    Secondary Decision Maker: Bhargavi Parish - Daughter - [phone]                  Transition of Care Plan:  Discharge planning to  identify the approp post acute services. Pt is pending MRI natalio and she continues to have left sided weakness. Prior to hospitalization, pt was independent in all her ADLs and was able to drive \" anywhere I want too. \"  Pt is pending PT/OT recom also. CM following.

## 2022-01-17 ENCOUNTER — TELEPHONE (OUTPATIENT)
Dept: FAMILY MEDICINE CLINIC | Age: 82
End: 2022-01-17

## 2022-01-17 ENCOUNTER — APPOINTMENT (OUTPATIENT)
Dept: NON INVASIVE DIAGNOSTICS | Age: 82
DRG: 065 | End: 2022-01-17
Attending: STUDENT IN AN ORGANIZED HEALTH CARE EDUCATION/TRAINING PROGRAM
Payer: MEDICARE

## 2022-01-17 VITALS
SYSTOLIC BLOOD PRESSURE: 114 MMHG | WEIGHT: 166 LBS | HEART RATE: 55 BPM | RESPIRATION RATE: 16 BRPM | HEIGHT: 64 IN | OXYGEN SATURATION: 98 % | BODY MASS INDEX: 28.34 KG/M2 | DIASTOLIC BLOOD PRESSURE: 45 MMHG | TEMPERATURE: 96.9 F

## 2022-01-17 LAB
APPEARANCE UR: CLEAR
BACTERIA URNS QL MICRO: ABNORMAL /HPF
BILIRUB UR QL: NEGATIVE
COLOR UR: YELLOW
ECHO AO ROOT DIAM: 3.2 CM
ECHO AO ROOT INDEX: 1.77 CM/M2
ECHO AV AREA PEAK VELOCITY: 1.9 CM2
ECHO AV AREA VTI: 1.9 CM2
ECHO AV AREA/BSA PEAK VELOCITY: 1 CM2/M2
ECHO AV AREA/BSA VTI: 1 CM2/M2
ECHO AV MEAN GRADIENT: 10 MMHG
ECHO AV MEAN VELOCITY: 1.5 M/S
ECHO AV PEAK GRADIENT: 17 MMHG
ECHO AV PEAK VELOCITY: 2.1 M/S
ECHO AV VELOCITY RATIO: 0.62
ECHO AV VTI: 56.2 CM
ECHO LA AREA 2C: 18.7 CM2
ECHO LA AREA 4C: 18.6 CM2
ECHO LA DIAMETER INDEX: 2.15 CM/M2
ECHO LA DIAMETER: 3.9 CM
ECHO LA MAJOR AXIS: 5.4 CM
ECHO LA MINOR AXIS: 5.3 CM
ECHO LA TO AORTIC ROOT RATIO: 1.22
ECHO LA VOL BP: 52 ML (ref 22–52)
ECHO LA VOL/BSA BIPLANE: 29 ML/M2 (ref 16–34)
ECHO LV E' LATERAL VELOCITY: 8 CM/S
ECHO LV E' SEPTAL VELOCITY: 5 CM/S
ECHO LV EDV A2C: 59 ML
ECHO LV EDV A4C: 59 ML
ECHO LV EDV BP: 59 ML (ref 56–104)
ECHO LV EDV INDEX A4C: 33 ML/M2
ECHO LV EDV INDEX BP: 33 ML/M2
ECHO LV EDV NDEX A2C: 33 ML/M2
ECHO LV EJECTION FRACTION A2C: 58 %
ECHO LV EJECTION FRACTION A4C: 59 %
ECHO LV EJECTION FRACTION BIPLANE: 58 % (ref 55–100)
ECHO LV ESV A2C: 25 ML
ECHO LV ESV A4C: 24 ML
ECHO LV ESV INDEX A2C: 14 ML/M2
ECHO LV ESV INDEX A4C: 13 ML/M2
ECHO LV FRACTIONAL SHORTENING: 30 % (ref 28–44)
ECHO LV INTERNAL DIMENSION DIASTOLE INDEX: 2.43 CM/M2
ECHO LV INTERNAL DIMENSION DIASTOLIC: 4.4 CM (ref 3.9–5.3)
ECHO LV INTERNAL DIMENSION SYSTOLIC INDEX: 1.71 CM/M2
ECHO LV INTERNAL DIMENSION SYSTOLIC: 3.1 CM
ECHO LV IVSD: 1.1 CM (ref 0.6–0.9)
ECHO LV MASS 2D: 168.9 G (ref 67–162)
ECHO LV MASS INDEX 2D: 93.3 G/M2 (ref 43–95)
ECHO LV POSTERIOR WALL DIASTOLIC: 1.1 CM (ref 0.6–0.9)
ECHO LV RELATIVE WALL THICKNESS RATIO: 0.5
ECHO LVOT AREA: 3.1 CM2
ECHO LVOT AV VTI INDEX: 0.6
ECHO LVOT DIAM: 2 CM
ECHO LVOT MEAN GRADIENT: 4 MMHG
ECHO LVOT PEAK GRADIENT: 6 MMHG
ECHO LVOT PEAK VELOCITY: 1.3 M/S
ECHO LVOT STROKE VOLUME INDEX: 58.3 ML/M2
ECHO LVOT SV: 105.5 ML
ECHO LVOT VTI: 33.6 CM
ECHO MV A VELOCITY: 1.26 M/S
ECHO MV AREA VTI: 3.1 CM2
ECHO MV E DECELERATION TIME (DT): 412 MS
ECHO MV E VELOCITY: 0.88 M/S
ECHO MV E/A RATIO: 0.7
ECHO MV E/E' LATERAL: 11
ECHO MV E/E' RATIO (AVERAGED): 14.3
ECHO MV E/E' SEPTAL: 17.6
ECHO MV LVOT VTI INDEX: 1.02
ECHO MV MAX VELOCITY: 1.3 M/S
ECHO MV MEAN GRADIENT: 2 MMHG
ECHO MV MEAN VELOCITY: 0.6 M/S
ECHO MV PEAK GRADIENT: 6 MMHG
ECHO MV VTI: 34.3 CM
ECHO PV MAX VELOCITY: 1 M/S
ECHO PV PEAK GRADIENT: 4 MMHG
ECHO RA AREA 4C: 10.8 CM2
ECHO RV TAPSE: 2.2 CM (ref 1.5–2)
EPITH CASTS URNS QL MICRO: ABNORMAL /LPF (ref 0–20)
GLUCOSE UR STRIP.AUTO-MCNC: NEGATIVE MG/DL
HGB UR QL STRIP: NEGATIVE
KETONES UR QL STRIP.AUTO: ABNORMAL MG/DL
LEUKOCYTE ESTERASE UR QL STRIP.AUTO: ABNORMAL
NITRITE UR QL STRIP.AUTO: NEGATIVE
PH UR STRIP: 5 [PH] (ref 5–8)
PROT UR STRIP-MCNC: NEGATIVE MG/DL
RBC #/AREA URNS HPF: ABNORMAL /HPF (ref 0–2)
SP GR UR REFRACTOMETRY: 1.03 (ref 1–1.03)
UROBILINOGEN UR QL STRIP.AUTO: 1 EU/DL (ref 0.2–1)
WBC URNS QL MICRO: ABNORMAL /HPF (ref 0–4)

## 2022-01-17 PROCEDURE — 81001 URINALYSIS AUTO W/SCOPE: CPT

## 2022-01-17 PROCEDURE — 74011250637 HC RX REV CODE- 250/637: Performed by: STUDENT IN AN ORGANIZED HEALTH CARE EDUCATION/TRAINING PROGRAM

## 2022-01-17 PROCEDURE — 93306 TTE W/DOPPLER COMPLETE: CPT

## 2022-01-17 PROCEDURE — 97161 PT EVAL LOW COMPLEX 20 MIN: CPT

## 2022-01-17 PROCEDURE — 94640 AIRWAY INHALATION TREATMENT: CPT

## 2022-01-17 RX ORDER — ALPRAZOLAM 0.5 MG/1
0.5 TABLET ORAL 2 TIMES DAILY
Qty: 60 TABLET | Refills: 0 | OUTPATIENT
Start: 2022-01-17 | End: 2022-02-16

## 2022-01-17 RX ORDER — ASPIRIN 81 MG/1
81 TABLET ORAL DAILY
COMMUNITY

## 2022-01-17 RX ADMIN — PANTOPRAZOLE SODIUM 40 MG: 40 TABLET, DELAYED RELEASE ORAL at 09:29

## 2022-01-17 RX ADMIN — LOSARTAN POTASSIUM 100 MG: 25 TABLET, FILM COATED ORAL at 09:30

## 2022-01-17 RX ADMIN — ASPIRIN 81 MG: 81 TABLET, CHEWABLE ORAL at 09:30

## 2022-01-17 RX ADMIN — BUDESONIDE AND FORMOTEROL FUMARATE DIHYDRATE 2 PUFF: 80; 4.5 AEROSOL RESPIRATORY (INHALATION) at 07:50

## 2022-01-17 RX ADMIN — ALPRAZOLAM 0.5 MG: 0.5 TABLET ORAL at 09:29

## 2022-01-17 RX ADMIN — METOPROLOL TARTRATE 12.5 MG: 25 TABLET, FILM COATED ORAL at 09:30

## 2022-01-17 NOTE — PROGRESS NOTES
Problem: Mobility Impaired (Adult and Pediatric)  Goal: *Acute Goals and Plan of Care (Insert Text)  Description: Pt is MOD (I)  with gait and performs transfers with (I) . PLOF: Community ambulator who did not use an AD and who was (I) with ADLs. Outcome: Resolved/Met     Problem: Patient Education: Go to Patient Education Activity  Goal: Patient/Family Education  Outcome: Resolved/Met   PHYSICAL THERAPY EVALUATION AND DISCHARGE    Patient: Melinda Rodríguez (58 y.o. female)  Date: 1/17/2022  Primary Diagnosis: Stroke [facility]) [I63.9]  CVA (cerebral vascular accident) ([facility] [address] [I63.9]        Precautions: N/A       ASSESSMENT :  Based on the objective data described below, the patient presents with a CVA. She presents with L hemiparesis, but she is able to compensate well with a RW. She performs gait without assistance and no complaints. She can return home with outpatient P.T. Patient does not require further skilled intervention at this level of care. PLAN :  Recommendations and Planned Interventions:   No formal PT needs identified at this time.   Discharge Recommendations: Outpatient  Further Equipment Recommendations for Discharge: N/A     SUBJECTIVE:   Patient stated Néstor Whitaker you please tell them to get me out of here ASAP?    OBJECTIVE DATA SUMMARY:     Past Medical History:   Diagnosis Date    Allergic rhinitis     Anemia     Anxiety disorder     Chronic obstructive pulmonary disease (HCC)     Dyslipidemia     Folic acid deficiency     Hypertension     Neurologic disorder     resting tremor     Past Surgical History:   Procedure Laterality Date    COLONOSCOPY N/A 10/26/2021    COLONOSCOPY w/ polypectomy performed by Abner Henderson MD at [facility] ENDOSCOPY     Barriers to Learning/Limitations: None  Compensate with: N/A  Home Situation:   Home Situation  Home Environment: Apartment  # Steps to Enter: 0  One/Two Story Residence: One story  Living Alone: No  Support Systems: Child(charissa)  Patient Expects to be Discharged to[de-identified] Apartment  Current DME Used/Available at Home: Walker, rolling  Critical Behavior:  Neurologic State: Alert  Orientation Level: Oriented X4  Cognition: Appropriate decision making     Psychosocial  Patient Behaviors: Agitated     Strength:    Strength: Generally decreased, functional  RUE:5/5  LUE:4/5  RLE:5/5  LLE:4/5  Tone & Sensation:   Tone: Normal  Sensation: Intact  Range Of Motion:   AROM: Within functional limits  PROM: Within functional limits    Posture:  Posture (WDL): Within defined limits      Functional Mobility:  Bed Mobility:  Rolling: Independent  Supine to Sit: Independent  Sit to Supine: Independent  Scooting: Independent  Transfers:  Sit to Stand: Independent  Stand to Sit: Independent  Balance:   Sitting: Intact  Standing: Intact  Ambulation/Gait Training:  Distance (ft): 100 Feet (ft)  Assistive Device: Walker, rolling  Ambulation - Level of Assistance: Modified independent     Gait Description (WDL): Exceptions to WDL    Pain:  Pain level pre-treatment: 0/10   Pain level post-treatment: 0/10  Pain Location: N/A  Activity Tolerance:   Good  Please refer to the flowsheet for vital signs taken during this treatment. After treatment:   []         Patient left in no apparent distress sitting up in chair  [x]         Patient left in no apparent distress in bed  [x]         Call bell left within reach  [x]         Nursing notified  []         Caregiver present  []         Bed alarm activated  []         SCDs applied    COMMUNICATION/EDUCATION:   [x]         Role of Physical Therapy in the acute care setting. [x]         Fall prevention education was provided and the patient/caregiver indicated understanding. [x]         Patient/family have participated as able in goal setting and plan of care. [x]         Patient/family agree to work toward stated goals and plan of care.   []         Patient understands intent and goals of therapy, but is neutral about his/her participation. []         Patient is unable to participate in goal setting/plan of care: ongoing with therapy staff.  []         Other:     Thank you for this referral.  Luca De Paz, PT, DPT   Time Calculation: 15 mins

## 2022-01-17 NOTE — PROGRESS NOTES
2005 - Vital signs assessed and stable   2106 - Scheduled medication administered with PRN melatonin. Refuses offer of snack or beverage. Lights dim. CBWR.   0100 - urine collected and sent to lab. Vital signs assessed and stable. 7148 - Patient resting quietly. No signs or symptoms of distress. CBWR.

## 2022-01-17 NOTE — PROGRESS NOTES
Problem: Pressure Injury - Risk of  Goal: *Prevention of pressure injury  Description: Document Juwan Scale and appropriate interventions in the flowsheet.   Outcome: Progressing Towards Goal  Note: Pressure Injury Interventions:  Sensory Interventions: Float heels    Moisture Interventions: Absorbent underpads    Activity Interventions: Assess need for specialty bed    Mobility Interventions: Float heels    Nutrition Interventions: Document food/fluid/supplement intake    Friction and Shear Interventions: Minimize layers

## 2022-01-17 NOTE — PROGRESS NOTES
Admission Medication Reconciliation:    Information obtained from:  PATIENT HERSELF    Comments/Recommendations: Reviewed PTA medications and patient's allergies. REMOVED:  DUPLICATE ATORVASTATIN 83DT ENTRIES  LASIX 20MG every other day  TRIAMCINOLONE CREAM    ADDED  ASPRIN 81MG PO DAILY        Allergies:  Isopropyl alcohol and Pen-vee k    Significant PMH/Disease States:   Past Medical History:   Diagnosis Date    Allergic rhinitis     Anemia     Anxiety disorder     Chronic obstructive pulmonary disease (HCC)     Dyslipidemia     Folic acid deficiency     Hypertension     Neurologic disorder     resting tremor     Chief Complaint for this Admission:    Chief Complaint   Patient presents with    Fall    Extremity Weakness     Prior to Admission Medications:   Prior to Admission Medications   Prescriptions Last Dose Informant Patient Reported? Taking? ALPRAZolam (XANAX) 0.5 mg tablet 1/15/2022 at Unknown time  No Yes   Sig: Take 1 Tablet by mouth two (2) times a day. Max Daily Amount: 1 mg.   albuterol (PROVENTIL HFA, VENTOLIN HFA, PROAIR HFA) 90 mcg/actuation inhaler 1/15/2022 at Unknown time  No Yes   Sig: Take 2 Puffs by inhalation every four (4) hours as needed for Wheezing. aspirin delayed-release 81 mg tablet   Yes Yes   Sig: Take 81 mg by mouth daily. atorvastatin (LIPITOR) 40 mg tablet 1/14/2022 at Unknown time  No Yes   Sig: TAKE 1 TABLET BY MOUTH DAILY   budesonide-formoteroL (Symbicort) 80-4.5 mcg/actuation HFAA 1/15/2022 at Unknown time  No Yes   Sig: Take 2 Puffs by inhalation two (2) times a day. losartan (COZAAR) 100 mg tablet 1/15/2022 at Unknown time  No Yes   Sig: Take 1 Tablet by mouth daily. metoprolol tartrate (LOPRESSOR) 25 mg tablet 1/15/2022 at Unknown time  No Yes   Sig: TAKE 1/2 TABLET BY MOUTH TWICE DAILY   pantoprazole (PROTONIX) 40 mg tablet 1/15/2022 at Unknown time  No Yes   Sig: Take 1 Tablet by mouth daily.       Facility-Administered Medications: None       Js Oglesby Lisa Kidd

## 2022-01-17 NOTE — DISCHARGE SUMMARY
HOSPITALIST DISCHARGE NOTE  Akilah Shah MD, [address], [place]       PATIENT ID: Vira Aleman  MRN: [id number]   YOB: 1940    DATE OF ADMISSION: 1/15/2022 10:03 AM    DATE OF DISCHARGE: 01/17/22    PRIMARY CARE PROVIDER: Gabriel Funk MD     ATTENDING PHYSICIAN: Akilah Shah MD  DISCHARGING PROVIDER: Akilah Shah MD        CONSULTATIONS: None    PROCEDURES/SURGERIES: * No surgery found *    ADMITTING [facility] COURSE:     78 y.o. female with past medical history of hypertension, hyperlipidemia, COPD, anxiety presents today due to left-sided weakness. She developed symptoms around 7 PM on night of admission and had fall on her left side previously to the. She has never had this experience before and noticed that her left side is weak. Having difficulty lifting her left leg. Denies any loss of consciousness and did not hit her head. Denies any chest pain. She was seen in the ER and neurology was consulted and recommended admission for an MRI. CT head was negative. EKG was unremarkable      DISCHARGE DIAGNOSES / PLAN:      Left-sided weakness  Likely secondary to significant fall on left side, 1 day prior to onset of symptoms. Evaluated by telemetry neurology and recommended for MRI/MRA of the head. Patient is extremely claustrophobic and has severe anxiety to the point that she is not able to travel in avehicle inside a tunnel, per her daughter. Patient refuses MRI and is unable to sit through an MRI. This has been confirmed and acknowledged per daughter as well. CT lanette of the head and neck reveals only moderate sclerotic disease without any urgent need or evaluation for vascular currently needed. Unable to obtain MRI of the head prior to discharge. Echocardiogram does not show any signs of a thrombus. Tolerating PT quite well with walker, which she obtained in August 2021.   Reviewed previous GI note, per Dr. Mario Malave in August 2021 status post EGD and colonoscopy revealing erosive esophagitis and possible gastric ulcer with absolute contraindication for any baby aspirin, full dose aspirin or NSAID use. However patient has been tolerating aspirin 81 mg daily with normal hemoglobin and hematocrit therefore will continue and counseled this to patient's daughter as well  Will obtain follow-up with GI, Dr. Mario Malave as well as Neurology on outpatient basis for further evaluation for increasing patient's aspirin to treat 25 mg if indicated per GI. Patient is also in agreement and is wanting to be discharged home today     COPD  Continue Symbicort and albuterol     Hypertension  Continue losartan and metoprolol     History of GI bleed  Continue protonix     Patient able history of atrial fibrillation  No documentation of atrial fibrillation noted previously and patient on telemetry throughout hospital stay since 1/15/2022 has been in normal sinus rhythm.     Severe anxiety  Also manifested as severe claustrophobia. Currently on xanax. Consider outpatient weaning of Xanax with history of falls. Patient unable to obtain MRI of the brain secondary to severe claustrophobia. PENDING TEST RESULTS:   At the time of discharge the following test results are still pending: None    FOLLOW UP APPOINTMENTS:    Follow-up Information     Follow up With Specialties Details Why Contact Info    Raven Christy NP Neurology Go on 3/4/2022 @ 11:00AM  [facility]  [phone]        GI - Dr. Mario Malave - 2 Weeks. DIET: Cardiac Diet    ACTIVITY: Activity as tolerated    DISCHARGE MEDICATIONS:  Current Discharge Medication List      CONTINUE these medications which have NOT CHANGED    Details   aspirin delayed-release 81 mg tablet Take 81 mg by mouth daily.       atorvastatin (LIPITOR) 40 mg tablet TAKE 1 TABLET BY MOUTH DAILY  Qty: 90 Tablet, Refills: 1      pantoprazole (PROTONIX) 40 mg tablet Take 1 Tablet by mouth daily. Qty: 60 Tablet, Refills: 1      metoprolol tartrate (LOPRESSOR) 25 mg tablet TAKE 1/2 TABLET BY MOUTH TWICE DAILY  Qty: 90 Tablet, Refills: 0    Comments: **Patient requests 90 days supply**      ALPRAZolam (XANAX) 0.5 mg tablet Take 1 Tablet by mouth two (2) times a day. Max Daily Amount: 1 mg. Qty: 60 Tablet, Refills: 2    Associated Diagnoses: Anxiety      budesonide-formoteroL (Symbicort) 80-4.5 mcg/actuation HFAA Take 2 Puffs by inhalation two (2) times a day. Qty: 10.2 g, Refills: 1    Associated Diagnoses: Chronic obstructive pulmonary disease, unspecified COPD type ([facility])      losartan (COZAAR) 100 mg tablet Take 1 Tablet by mouth daily. Qty: 90 Tablet, Refills: 1    Comments: Needs refill has taken the previous rx wrong  Associated Diagnoses: Essential hypertension      albuterol (PROVENTIL HFA, VENTOLIN HFA, PROAIR HFA) 90 mcg/actuation inhaler Take 2 Puffs by inhalation every four (4) hours as needed for Wheezing. Qty: 1 Inhaler, Refills: 1    Associated Diagnoses: Chronic obstructive pulmonary disease, unspecified COPD type ([facility]               Recent Days:  Recent Labs     01/16/22  0430 01/15/22  1015   WBC 5.1 5.5   HGB 12.9 13.7   HCT 40.6 43.1    171     Recent Labs     01/16/22  0430 01/15/22  1015    138   K 4.2 4.1    103   CO2 26 26   * 112*   BUN 20 20   CREA 1.00 1.00   CA 9.3 9.4   MG 2.0  --    PHOS 4.3  --    ALB 3.7 4.0   TBILI 0.7 0.8   ALT 18 20   INR  --  1.1     No results for input(s): PH, PCO2, PO2, HCO3, FIO2 in the last 72 hours.       Recent Results (from the past 336 hour(s))   CBC WITH AUTOMATED DIFF    Collection Time: 01/15/22 10:15 AM   Result Value Ref Range    WBC 5.5 4.6 - 13.2 K/uL    RBC 4.58 4.20 - 5.30 M/uL    HGB 13.7 12.0 - 16.0 g/dL    HCT 43.1 35.0 - 45.0 %    MCV 94.1 78.0 - 100.0 FL    MCH 29.9 24.0 - 34.0 PG    MCHC 31.8 31.0 - 37.0 g/dL    RDW 12.4 11.6 - 14.5 %    PLATELET 407 331 - 007 K/uL MPV 11.0 9.2 - 11.8 FL    NRBC 0.0 0.0  WBC    ABSOLUTE NRBC 0.00 0.00 - 0.01 K/uL    NEUTROPHILS 77 (H) 40 - 73 %    LYMPHOCYTES 10 (L) 21 - 52 %    MONOCYTES 10 3 - 10 %    EOSINOPHILS 2 0 - 5 %    BASOPHILS 1 0 - 2 %    IMMATURE GRANULOCYTES 0 0 - 0.5 %    ABS. NEUTROPHILS 4.2 1.8 - 8.0 K/UL    ABS. LYMPHOCYTES 0.6 (L) 0.9 - 3.6 K/UL    ABS. MONOCYTES 0.5 0.05 - 1.2 K/UL    ABS. EOSINOPHILS 0.1 0.0 - 0.4 K/UL    ABS. BASOPHILS 0.0 0.0 - 0.1 K/UL    ABS. IMM. GRANS. 0.0 0.00 - 0.04 K/UL    DF AUTOMATED     PROTHROMBIN TIME + INR    Collection Time: 01/15/22 10:15 AM   Result Value Ref Range    Prothrombin time 13.8 11.5 - 15.2 sec    INR 1.1 0.8 - 1.2     PTT    Collection Time: 01/15/22 10:15 AM   Result Value Ref Range    aPTT 30.1 23.0 - 36.4 sec    aPTT, therapeutic range   82 - 745 sec   METABOLIC PANEL, COMPREHENSIVE    Collection Time: 01/15/22 10:15 AM   Result Value Ref Range    Sodium 138 135 - 145 mmol/L    Potassium 4.1 3.2 - 5.1 mmol/L    Chloride 103 94 - 111 mmol/L    CO2 26 21 - 33 mmol/L    Anion gap 9 mmol/L    Glucose 112 (H) 70 - 110 mg/dL    BUN 20 9 - 21 mg/dL    Creatinine 1.00 0.70 - 1.20 mg/dL    BUN/Creatinine ratio 20      GFR est AA >60 ml/min/1.73m2    GFR est non-AA 53 ml/min/1.73m2    Calcium 9.4 8.5 - 10.5 mg/dL    Bilirubin, total 0.8 0.2 - 1.0 mg/dL    AST (SGOT) 26 14 - 74 U/L    ALT (SGPT) 20 3 - 52 U/L    Alk.  phosphatase 66 38 - 126 U/L    Protein, total 7.2 6.1 - 8.4 g/dL    Albumin 4.0 3.5 - 4.7 g/dL    Globulin 3.2 g/dL    A-G Ratio 1.3     ETHYL ALCOHOL    Collection Time: 01/15/22 10:15 AM   Result Value Ref Range    ALCOHOL(ETHYL),SERUM <4 <4 mg/dL    Ethanol, percent <0.004 <0.004 %   EKG, 12 LEAD, INITIAL    Collection Time: 01/15/22 10:16 AM   Result Value Ref Range    Ventricular Rate 51 BPM    Atrial Rate 51 BPM    P-R Interval 192 ms    QRS Duration 138 ms    Q-T Interval 498 ms    QTC Calculation (Bezet) 459 ms    Calculated P Axis 13 degrees Calculated R Axis 31 degrees    Calculated T Axis 101 degrees    Diagnosis       Sinus rhythm  Left bundle branch block    Confirmed by [facility] Crystal OLIVARES ([id number]) on 1/16/2022 4:07:32 PM     GLUCOSE, POC    Collection Time: 01/15/22  1:05 PM   Result Value Ref Range    Glucose (POC) 90 70 - 110 mg/dL    Performed by Janice W Brit Hinson, URINE    Collection Time: 01/15/22  2:50 PM   Result Value Ref Range    AMPHETAMINES Negative Negative      BARBITURATES Negative Negative      BENZODIAZEPINES Positive (A) Negative      COCAINE Negative Negative      METHADONE Negative Negative      OPIATES Negative Negative      OXYCODONE SCREEN Negative Negative      PCP(PHENCYCLIDINE) Negative Negative      PROPOXYPHENE Negative Negative      THC (TH-CANNABINOL) Negative Negative      TRICYCLICS Negative Negative      Drug screen comment        This test is a screen for drugs of abuse in a medical setting only (i.e., they are unconfirmed results and as such must not be used for non-medical purposes, e.g.,employment testing, legal testing). Due to its inherent nature, false positive (FP) and false negative (FN) results may be obtained. Therefore, if necessary for medical care, recommend confirmation of positive findings by GC/MS. CBC WITH AUTOMATED DIFF    Collection Time: 01/16/22  4:30 AM   Result Value Ref Range    WBC 5.1 4.6 - 13.2 K/uL    RBC 4.34 4.20 - 5.30 M/uL    HGB 12.9 12.0 - 16.0 g/dL    HCT 40.6 35.0 - 45.0 %    MCV 93.5 78.0 - 100.0 FL    MCH 29.7 24.0 - 34.0 PG    MCHC 31.8 31.0 - 37.0 g/dL    RDW 12.4 11.6 - 14.5 %    PLATELET 364 441 - 998 K/uL    MPV 11.3 9.2 - 11.8 FL    NRBC 0.0 0.0  WBC    ABSOLUTE NRBC 0.00 0.00 - 0.01 K/uL    NEUTROPHILS 73 40 - 73 %    LYMPHOCYTES 13 (L) 21 - 52 %    MONOCYTES 10 3 - 10 %    EOSINOPHILS 3 0 - 5 %    BASOPHILS 1 0 - 2 %    IMMATURE GRANULOCYTES 0 0 - 0.5 %    ABS. NEUTROPHILS 3.7 1.8 - 8.0 K/UL    ABS. LYMPHOCYTES 0.7 (L) 0.9 - 3.6 K/UL    ABS.  MONOCYTES 0.5 0.05 - 1.2 K/UL    ABS. EOSINOPHILS 0.2 0.0 - 0.4 K/UL    ABS. BASOPHILS 0.0 0.0 - 0.1 K/UL    ABS. IMM. GRANS. 0.0 0.00 - 0.04 K/UL    DF AUTOMATED     METABOLIC PANEL, COMPREHENSIVE    Collection Time: 01/16/22  4:30 AM   Result Value Ref Range    Sodium 138 135 - 145 mmol/L    Potassium 4.2 3.2 - 5.1 mmol/L    Chloride 104 94 - 111 mmol/L    CO2 26 21 - 33 mmol/L    Anion gap 8 mmol/L    Glucose 112 (H) 70 - 110 mg/dL    BUN 20 9 - 21 mg/dL    Creatinine 1.00 0.70 - 1.20 mg/dL    BUN/Creatinine ratio 20      GFR est AA >60 ml/min/1.73m2    GFR est non-AA 53 ml/min/1.73m2    Calcium 9.3 8.5 - 10.5 mg/dL    Bilirubin, total 0.7 0.2 - 1.0 mg/dL    AST (SGOT) 23 14 - 74 U/L    ALT (SGPT) 18 3 - 52 U/L    Alk.  phosphatase 66 38 - 126 U/L    Protein, total 6.8 6.1 - 8.4 g/dL    Albumin 3.7 3.5 - 4.7 g/dL    Globulin 3.1 g/dL    A-G Ratio 1.2     MAGNESIUM    Collection Time: 01/16/22  4:30 AM   Result Value Ref Range    Magnesium 2.0 1.7 - 2.8 mg/dL   PHOSPHORUS    Collection Time: 01/16/22  4:30 AM   Result Value Ref Range    Phosphorus 4.3 2.5 - 4.5 mg/dL   URINALYSIS W/MICROSCOPIC    Collection Time: 01/17/22  2:45 AM   Result Value Ref Range    Color Yellow      Appearance Clear      Specific gravity 1.029 1.005 - 1.030      pH (UA) 5.0 5.0 - 8.0      Protein Negative Negative mg/dL    Glucose Negative Negative mg/dL    Ketone Trace (A) Negative mg/dL    Bilirubin Negative Negative      Blood Negative Negative      Urobilinogen 1.0 0.2 - 1.0 EU/dL    Nitrites Negative Negative      Leukocyte Esterase Small (A) Negative      WBC 5-10 0 - 4 /hpf    RBC 0-5 0 - 2 /hpf    Epithelial cells Many 0 - 20 /lpf    Bacteria 1+ (A) None /hpf   ECHO ADULT COMPLETE    Collection Time: 01/17/22  8:59 AM   Result Value Ref Range    LV EDV A2C 59 mL    LV EDV A4C 59 mL    LV EDV BP 59 56 - 104 mL    LV ESV A2C 25 mL    LV ESV A4C 24 mL    IVSd 1.1 (A) 0.6 - 0.9 cm    LVIDd 4.4 3.9 - 5.3 cm    LVIDs 3.1 cm    LVOT Diameter 2.0 cm    LVOT Mean Gradient 4 mmHg    LVOT VTI 33.6 cm    LVOT Peak Velocity 1.3 m/s    LVOT Peak Gradient 6 mmHg    LVPWd 1.1 (A) 0.6 - 0.9 cm    LV E' Lateral Velocity 8 cm/s    LV E' Septal Velocity 5 cm/s    LV Ejection Fraction A2C 58 %    LV Ejection Fraction A4C 59 %    EF BP 58 55 - 100 %    LVOT Area 3.1 cm2    LVOT .5 ml    LA Minor Axis 5.3 cm    LA Major [place] 5.4 cm    LA Area 2C 18.7 cm2    LA Area 4C 18.6 cm2    LA Volume BP 52 22 - 52 mL    LA Diameter 3.9 cm    RA Area 4C 10.8 cm2    AV Mean Gradient 10 mmHg    AV VTI 56.2 cm    AV Mean Velocity 1.5 m/s    AV Peak Velocity 2.1 m/s    AV Peak Gradient 17 mmHg    AV Area by VTI 1.9 cm2    AV Area by Peak Velocity 1.9 cm2    Aortic Root 3.2 cm    MV E Wave Deceleration Time 412.0 ms    MV A Velocity 1.26 m/s    MV E Velocity 0.88 m/s    MV Mean Gradient 2 mmHg    MV VTI 34.3 cm    MV Mean Velocity 0.6 m/s    MV Max Velocity 1.3 m/s    MV Peak Gradient 6 mmHg    MV Area by VTI 3.1 cm2    PV Max Velocity 1.0 m/s    PV Peak Gradient 4 mmHg    TAPSE 2.2 (A) 1.5 - 2.0 cm    Fractional Shortening 2D 30 28 - 44 %    LV EDV Index BP 33 mL/m2    LV ESV Index A4C 13 mL/m2    LV EDV Index A4C 33 mL/m2    LV ESV Index A2C 14 mL/m2    LV EDV Index A2C 33 mL/m2    LVIDd Index 2.43 cm/m2    LVIDs Index 1.71 cm/m2    LV RWT Ratio 0.50     LV Mass 2D 168.9 (A) 67 - 162 g    LV Mass 2D Index 93.3 43 - 95 g/m2    MV E/A 0.70     E/E' Ratio (Averaged) 14.30     E/E' Lateral 11.00     E/E' Septal 17.60     LA Volume Index BP 29 16 - 34 ml/m2    LVOT Stroke Volume Index 58.3 mL/m2    LA Size Index 2.15 cm/m2    LA/AO Root Ratio 1.22     Ao Root Index 1.77 cm/m2    AV Velocity Ratio 0.62     LVOT:AV VTI Index 0.60     MOHSEN/BSA VTI 1.0 cm2/m2    MOHSEN/BSA Peak Velocity 1.0 cm2/m2    MV:LVOT VTI Index 1.02         NOTIFY YOUR PHYSICIAN FOR ANY OF THE FOLLOWING:   Fever over 101 degrees for 24 hours.    Chest pain, shortness of breath, fever, chills, nausea, vomiting, diarrhea, change in mentation, falling, weakness, bleeding. Severe pain or pain not relieved by medications. Or, any other signs or symptoms that you may have questions about. DISPOSITION:  x  Home With:   OT  PT  HH  RN       Long term SNF/Inpatient Rehab    Independent/assisted living    Hospice    Other:       PATIENT CONDITION AT DISCHARGE:     Functional status    Poor     Deconditioned    x Independent      Cognition    x Lucid     Forgetful     Dementia      Catheters/lines (plus indication)    Kirby     PICC     PEG    x None      Code status     Full code     DNR      PHYSICAL EXAMINATION AT DISCHARGE:  General:          Alert, cooperative, no distress, appears stated age. Neck:               Supple, symmetrical  Lungs:             Clear to auscultation bilaterally. No Wheezing or Rhonchi. No rales. Chest wall:      No tenderness  No Accessory muscle use. Heart:              Regular  rhythm,  No  murmur   No edema  Abdomen:        Soft, non-tender. Not distended. Bowel sounds normal  Extremities:     No cyanosis. No clubbing,                            Skin turgor normal, Capillary refill normal  Skin:                Not pale. Not Jaundiced  No rashes   Psych:             Not anxious or agitated.   Neurologic:      Alert, moves all extremities, answers questions appropriately and responds to commands       CHRONIC MEDICAL DIAGNOSES:  Problem List as of 1/17/2022 Date Reviewed: 9/16/2021          Codes Class Noted - Resolved    CVA (cerebral vascular accident) [facility]) ICD-10-CM: I63.9  ICD-9-CM: 434.91  1/16/2022 - Present        Stroke ([facility] [address] [facility] ICD-10-CM: I63.9  ICD-9-CM: 434.91  1/15/2022 - Present        GI bleed ICD-10-CM: K92.2  ICD-9-CM: 578.9  8/26/2021 - Present        Post herpetic neuralgia ICD-10-CM: B02.29  ICD-9-CM: 053.19  5/21/2021 - Present        Herpes zoster without complication R-91-WD: Q16.0  ICD-9-CM: 053.9  4/21/2021 - Present        Fatigue ICD-10-CM: R53.83  ICD-9-CM: 780.79 4/21/2021 - Present        Abnormal laboratory test ICD-10-CM: R89.9  ICD-9-CM: 796.4  3/24/2021 - Present        Anxiety ICD-10-CM: F41.9  ICD-9-CM: 300.00  3/11/2021 - Present        Atrial fibrillation ([facility] ICD-10-CM: I48.91  ICD-9-CM: 427.31  3/11/2021 - Present        Acute anemia ICD-10-CM: D64.9  ICD-9-CM: 285.9  1/17/2021 - Present        Severe anemia ICD-10-CM: D64.9  ICD-9-CM: 285.9  1/17/2021 - Present        Chronic obstructive lung disease ([facility] ICD-10-CM: J44.9  ICD-9-CM: 330  11/20/2020 - Present        Essential hypertension ICD-10-CM: I10  ICD-9-CM: 401.9  11/20/2020 - Present        Hyperlipidemia ICD-10-CM: E78.5  ICD-9-CM: 272.4  11/20/2020 - Present              Greater than 40 minutes were spent with the patient on counseling and coordination of care    Signed:   Jared Ornelas MD  1/17/2022  1:20 PM

## 2022-01-17 NOTE — TELEPHONE ENCOUNTER
----- Message from ABDOUL FRANK sent at 1/17/2022  2:11 PM EST -----  Subject: Hospital Follow Up    QUESTIONS  What hospital was the Patient Discharged from? [facility]  Date of Discharge? 2022-01-17  Discharge Location? Home  Reason for hospitalization as patient stated? Fall and weakness  What question does the patient have, if applicable? Please give patient   call back to get her scheduled. ---------------------------------------------------------------------------  --------------  Cary BANDA  What is the best way for the office to contact you? OK to leave message on   voicemail  Preferred Call Back Phone Number? [phone]  ---------------------------------------------------------------------------  --------------  SCRIPT ANSWERS  Relationship to Patient? Third Party  Representative Name?  [facility]

## 2022-01-17 NOTE — PROGRESS NOTES
0700- Assumed care of patient resting in bed. CB in reach patient denies any needs at this time.  Shift assessment complete

## 2022-01-18 DIAGNOSIS — Z86.73 HISTORY OF CVA (CEREBROVASCULAR ACCIDENT): Primary | ICD-10-CM

## 2022-01-27 DIAGNOSIS — F41.9 ANXIETY: ICD-10-CM

## 2022-01-27 RX ORDER — ALPRAZOLAM 0.5 MG/1
0.5 TABLET ORAL 2 TIMES DAILY
Qty: 60 TABLET | Refills: 2 | Status: SHIPPED | OUTPATIENT
Start: 2022-01-27 | End: 2022-02-07

## 2022-02-01 RX ORDER — METOPROLOL TARTRATE 25 MG/1
TABLET, FILM COATED ORAL
Qty: 90 TABLET | Refills: 0 | Status: SHIPPED | OUTPATIENT
Start: 2022-02-01 | End: 2022-05-02

## 2022-02-07 ENCOUNTER — OFFICE VISIT (OUTPATIENT)
Dept: FAMILY MEDICINE CLINIC | Age: 82
End: 2022-02-07
Payer: MEDICARE

## 2022-02-07 VITALS
BODY MASS INDEX: 28.68 KG/M2 | OXYGEN SATURATION: 99 % | HEART RATE: 57 BPM | WEIGHT: 168 LBS | HEIGHT: 64 IN | SYSTOLIC BLOOD PRESSURE: 156 MMHG | DIASTOLIC BLOOD PRESSURE: 64 MMHG

## 2022-02-07 DIAGNOSIS — I48.0 PAROXYSMAL ATRIAL FIBRILLATION (HCC): ICD-10-CM

## 2022-02-07 DIAGNOSIS — I63.89 CEREBROVASCULAR ACCIDENT (CVA) DUE TO OTHER MECHANISM (HCC): ICD-10-CM

## 2022-02-07 DIAGNOSIS — F41.9 ANXIETY: Primary | ICD-10-CM

## 2022-02-07 PROCEDURE — 1090F PRES/ABSN URINE INCON ASSESS: CPT | Performed by: STUDENT IN AN ORGANIZED HEALTH CARE EDUCATION/TRAINING PROGRAM

## 2022-02-07 PROCEDURE — 1101F PT FALLS ASSESS-DOCD LE1/YR: CPT | Performed by: STUDENT IN AN ORGANIZED HEALTH CARE EDUCATION/TRAINING PROGRAM

## 2022-02-07 PROCEDURE — 99214 OFFICE O/P EST MOD 30 MIN: CPT | Performed by: STUDENT IN AN ORGANIZED HEALTH CARE EDUCATION/TRAINING PROGRAM

## 2022-02-07 PROCEDURE — G8432 DEP SCR NOT DOC, RNG: HCPCS | Performed by: STUDENT IN AN ORGANIZED HEALTH CARE EDUCATION/TRAINING PROGRAM

## 2022-02-07 PROCEDURE — G8400 PT W/DXA NO RESULTS DOC: HCPCS | Performed by: STUDENT IN AN ORGANIZED HEALTH CARE EDUCATION/TRAINING PROGRAM

## 2022-02-07 PROCEDURE — G8754 DIAS BP LESS 90: HCPCS | Performed by: STUDENT IN AN ORGANIZED HEALTH CARE EDUCATION/TRAINING PROGRAM

## 2022-02-07 PROCEDURE — G8536 NO DOC ELDER MAL SCRN: HCPCS | Performed by: STUDENT IN AN ORGANIZED HEALTH CARE EDUCATION/TRAINING PROGRAM

## 2022-02-07 PROCEDURE — G8417 CALC BMI ABV UP PARAM F/U: HCPCS | Performed by: STUDENT IN AN ORGANIZED HEALTH CARE EDUCATION/TRAINING PROGRAM

## 2022-02-07 PROCEDURE — G8427 DOCREV CUR MEDS BY ELIG CLIN: HCPCS | Performed by: STUDENT IN AN ORGANIZED HEALTH CARE EDUCATION/TRAINING PROGRAM

## 2022-02-07 PROCEDURE — G8753 SYS BP > OR = 140: HCPCS | Performed by: STUDENT IN AN ORGANIZED HEALTH CARE EDUCATION/TRAINING PROGRAM

## 2022-02-07 PROCEDURE — 1111F DSCHRG MED/CURRENT MED MERGE: CPT | Performed by: STUDENT IN AN ORGANIZED HEALTH CARE EDUCATION/TRAINING PROGRAM

## 2022-02-07 RX ORDER — SERTRALINE HYDROCHLORIDE 50 MG/1
50 TABLET, FILM COATED ORAL DAILY
Qty: 30 TABLET | Refills: 0 | Status: SHIPPED | OUTPATIENT
Start: 2022-02-07 | End: 2022-03-17

## 2022-02-07 NOTE — PROGRESS NOTES
Rosa Wolff presents today for   Chief Complaint   Patient presents with   [facility] Follow Up       Is someone accompanying this pt? No     Is the patient using any DME equipment during OV? No     Depression Screening:  3 most recent PHQ Screens 2/7/2022   Little interest or pleasure in doing things Several days   Feeling down, depressed, irritable, or hopeless Nearly every day   Total Score PHQ 2 4   Trouble falling or staying asleep, or sleeping too much Not at all   Feeling tired or having little energy More than half the days   Poor appetite, weight loss, or overeating Not at all   Feeling bad about yourself - or that you are a failure or have let yourself or your family down Not at all   Trouble concentrating on things such as school, work, reading, or watching TV Not at all   Moving or speaking so slowly that other people could have noticed; or the opposite being so fidgety that others notice Several days   Thoughts of being better off dead, or hurting yourself in some way Not at all   PHQ 9 Score 7   How difficult have these problems made it for you to do your work, take care of your home and get along with others Very difficult       Learning Assessment:  Learning Assessment 12/3/2020   PRIMARY LEARNER Patient   HIGHEST LEVEL OF EDUCATION - PRIMARY LEARNER  DID NOT GRADUATE HIGH SCHOOL   BARRIERS PRIMARY LEARNER NONE   PRIMARY LANGUAGE ENGLISH   LEARNER PREFERENCE PRIMARY READING   ANSWERED BY patient   RELATIONSHIP SELF       Fall Risk  Fall Risk Assessment, last 12 mths 11/18/2021   Able to walk? Yes   Fall in past 12 months? 0   Do you feel unsteady? 0   Are you worried about falling 0   Is the gait abnormal? -   Number of falls in past 12 months -   Fall with injury? -       Health Maintenance reviewed and discussed and ordered per Provider.     Health Maintenance Due   Topic Date Due    DTaP/Tdap/Td series (1 - Tdap) Never done    Shingrix Vaccine Age 50> (1 of 2) Never done    Bone Densitometry (Dexa) Screening  Never done    Pneumococcal 65+ years (1 of 1 - PPSV23) Never done    COVID-19 Vaccine (2 - Moderna 3-dose series) 02/23/2021    Flu Vaccine (1) 09/01/2021    Lipid Screen  12/11/2021   . Coordination of Care:    1. \"Have you been to the ER, urgent care clinic since your last visit? Hospitalized since your last visit? \" Yes When: 1/15/22-1/17/22 [facility] had a stroke     2. \"Have you seen or consulted any other health care providers outside of the [address] [place] [address] since your last visit? \" No     3. For patients aged 39-70: Has the patient had a colonoscopy? Yes - no Care Gap present     If the patient is female:    4. For patients aged 41-77: Has the patient had a mammogram within the past 2 years? No    5. For patients aged 21-65: Has the patient had a pap smear?  NA - based on age

## 2022-02-09 NOTE — PROGRESS NOTES
Subjective:   Wallace Trejo is a 78 y.o. female who was seen for Hospital Follow Up    Patient is here for a hospital follow up after being hospitalized for left sided weakness likely secondary to CVA. She was supposed to have an MRI but she refused to have one due to claustrophobia. She was then discharged on aspirin. Patient denies any bleeding on aspirin. Her strength has improved. She does have a history of atrial fibrillation and eliquis was discontinued in the past due to GI bleed. She was in sinus rhythm during her hospitalization. Patient is willing to start weaning off of her ativan for anxiety and would like to try a daily medication. Home Medications    Medication Sig Start Date End Date Taking? Authorizing Provider   sertraline (ZOLOFT) 50 mg tablet Take 1 Tablet by mouth daily. 2/7/22  Yes Clarita Casillas MD   metoprolol tartrate (LOPRESSOR) 25 mg tablet TAKE 1/2 TABLET BY MOUTH TWICE DAILY 2/1/22  Yes Clarita Casillas MD   aspirin delayed-release 81 mg tablet Take 81 mg by mouth daily. Yes Provider, Historical   atorvastatin (LIPITOR) 40 mg tablet TAKE 1 TABLET BY MOUTH DAILY 12/20/21  Yes Clarita Casillas MD   pantoprazole (PROTONIX) 40 mg tablet Take 1 Tablet by mouth daily. 11/1/21  Yes Clarita Casillas MD   budesonide-formoteroL (Symbicort) 80-4.5 mcg/actuation HFAA Take 2 Puffs by inhalation two (2) times a day. 10/18/21  Yes Clarita Casillas MD   losartan (COZAAR) 100 mg tablet Take 1 Tablet by mouth daily. 10/15/21  Yes Clarita Casillas MD   albuterol (PROVENTIL HFA, VENTOLIN HFA, PROAIR HFA) 90 mcg/actuation inhaler Take 2 Puffs by inhalation every four (4) hours as needed for Wheezing. 4/21/21  Yes Jade Marte MD      Allergies   Allergen Reactions    Isopropyl Alcohol Other (comments)     Weakness all over - pt states she has out grown this    Pen-Vee K Rash     Pt. States she avoids penicillin due to allergy as a child.       Social History     Tobacco Use    Smoking status: Former Smoker Packs/day: 1.00     Years: 50.00     Pack years: 50.00    Smokeless tobacco: Never Used   Vaping Use    Vaping Use: Never used   Substance Use Topics    Alcohol use: Not Currently    Drug use: Never            Review of Systems       Objective:     Visit Vitals  BP (!) 156/64 (BP 1 Location: Left upper arm, BP Patient Position: Sitting, BP Cuff Size: Adult)   Pulse (!) 57   Ht 5' 4\" (1.626 m)   Wt 168 lb (76.2 kg)   SpO2 99%   BMI 28.84 kg/m²        General: alert, oriented, not in distress  Head: scalp normal, atraumatic  Eyes: pupils are equal and reactive, full and intact EOM's  Ears: patent ear canal, intact tympanic membrane  Nose: normal turbinates, no congestion or discharge  Lips/Mouth: moist lips and buccal mucosa, non-enlarged tonsils, pink throat  Neck: supple, no JVD, no lymphadenopathy, non-palpable thyroid  Chest/Lungs: clear breath sounds, no wheezing or crackles  Heart: normal rate, regular rhythm, no murmur  Abdomen: soft, non-distended, non-tender, normal bowel sounds, no organomegaly, no masses  Extremities: no focal deformities, no edema  Skin: no active skin lesions  Neuro: 4/5 strength on left upper and lower extremity. Normal strength on right side. Sensation intact    Laboratory/Tests:  No results displayed because visit has over 200 results.       Hospital Outpatient Visit on 11/04/2021   Component Date Value Ref Range Status    WBC 11/04/2021 6.5  4.6 - 13.2 K/uL Final    RBC 11/04/2021 4.25  4.20 - 5.30 M/uL Final    HGB 11/04/2021 12.9  12.0 - 16.0 g/dL Final    HCT 11/04/2021 41.0  35.0 - 45.0 % Final    MCV 11/04/2021 96.5  78.0 - 100.0 FL Final    MCH 11/04/2021 30.4  24.0 - 34.0 PG Final    MCHC 11/04/2021 31.5  31.0 - 37.0 g/dL Final    RDW 11/04/2021 11.7  11.6 - 14.5 % Final    PLATELET 18/49/5443 535  135 - 420 K/uL Final    MPV 11/04/2021 11.6  9.2 - 11.8 FL Final    NRBC 11/04/2021 0.0  0.0  WBC Final    ABSOLUTE NRBC 11/04/2021 0.00  0.00 - 0.01 K/uL Final    NEUTROPHILS 11/04/2021 75* 40 - 73 % Final    LYMPHOCYTES 11/04/2021 11* 21 - 52 % Final    MONOCYTES 11/04/2021 10  3 - 10 % Final    EOSINOPHILS 11/04/2021 2  0 - 5 % Final    BASOPHILS 11/04/2021 1  0 - 2 % Final    IMMATURE GRANULOCYTES 11/04/2021 1* 0 - 0.5 % Final    ABS. NEUTROPHILS 11/04/2021 4.9  1.8 - 8.0 K/UL Final    ABS. LYMPHOCYTES 11/04/2021 0.7* 0.9 - 3.6 K/UL Final    ABS. MONOCYTES 11/04/2021 0.7  0.05 - 1.2 K/UL Final    ABS. EOSINOPHILS 11/04/2021 0.1  0.0 - 0.4 K/UL Final    ABS. BASOPHILS 11/04/2021 0.0  0.0 - 0.1 K/UL Final    ABS. IMM. GRANS. 11/04/2021 0.0  0.00 - 0.04 K/UL Final    DF 11/04/2021 AUTOMATED    Final    Ferritin 11/04/2021 42  8 - 388 ng/mL Final    Iron 11/04/2021 68  50 - 175 ug/dL Final    Comment: Patients receiving metal-binding drugs (e.g. deferoxamine) may show  spuriously depressed iron values, as chelated iron may not properly  react in the iron assay.  TIBC 11/04/2021 327  250 - 450 ug/dL Final    Iron % saturation 11/04/2021 21  20 - 50 % Final    Sodium 11/04/2021 136  135 - 145 mmol/L Final    Potassium 11/04/2021 4.2  3.2 - 5.1 mmol/L Final    Chloride 11/04/2021 101  94 - 111 mmol/L Final    CO2 11/04/2021 25  21 - 33 mmol/L Final    Anion gap 11/04/2021 10  mmol/L Final    Glucose 11/04/2021 118* 70 - 110 mg/dL Final    BUN 11/04/2021 12  9 - 21 mg/dL Final    Creatinine 11/04/2021 0.90  0.70 - 1.20 mg/dL Final    BUN/Creatinine ratio 11/04/2021 13    Final    GFR est AA 11/04/2021 >60  ml/min/1.73m2 Final    GFR est non-AA 11/04/2021 60  ml/min/1.73m2 Final    Comment: Estimated GFR is calculated using the IDMS-traceable Modification of Diet in Renal Disease (MDRD) Study equation, reported for both  Americans (GFRAA) and non- Americans (GFRNA), and normalized to 1.73m2 body surface area. The physician must decide which value applies to the patient.   The MDRD study equation should only be used in individuals age 25 or older. It has not been validated for the following: pregnant women, patients with serious comorbid conditions, or on certain medications, or persons with extremes of body size, muscle mass, or nutritional status.  Calcium 11/04/2021 9.4  8.5 - 10.5 mg/dL Final    Bilirubin, total 11/04/2021 0.6  0.2 - 1.0 mg/dL Final    AST (SGOT) 11/04/2021 24  14 - 74 U/L Final    ALT (SGPT) 11/04/2021 20  3 - 52 U/L Final    Alk. phosphatase 11/04/2021 65  38 - 126 U/L Final    Protein, total 11/04/2021 7.1  6.1 - 8.4 g/dL Final    Albumin 11/04/2021 4.1  3.5 - 4.7 g/dL Final    Globulin 11/04/2021 3.0  g/dL Final    A-G Ratio 11/04/2021 1.4    Final   Hospital Outpatient Visit on 10/22/2021   Component Date Value Ref Range Status    SARS-CoV-2 10/22/2021 Please find results under separate order    Final    SARS-CoV-2 10/22/2021 Not Detected  Not Detected Final    Comment: This nucleic acid amplification test was developed and its  performance characteristics determined by [facility]. Nucleic acid amplification tests include RT-PCR and TMA. This test  has not been FDA cleared or approved. This test has been authorized  by FDA under an Emergency Use Authorization (EUA). This test is only  authorized for the duration of time the declaration that  circumstances exist justifying the authorization of the emergency use  of in vitro diagnostic tests for detection of SARS-CoV-2 virus and/or  diagnosis of COVID-19 infection under section 564(b)(1) of the Act,  21 U. S.C. 232DOC-7(B) (1), unless the authorization is terminated or  revoked sooner. When diagnostic testing is negative, the possibility of a false  negative result should be considered in the context of a patient's  recent exposures and the presence of clinical signs and symptoms  consistent with COVID-19.  An individual without symptoms of COVID-  19 and who is not shedding SARS-CoV-2 virus w                           ould expect to have a  negative (not detected) result in this assay. Performed At: [facility] & [address] [id number]  LARRY Mackenzie 97 [id number]     Hospital Outpatient Visit on 09/16/2021   Component Date Value Ref Range Status    WBC 09/16/2021 5.4  4.6 - 13.2 K/uL Final    RBC 09/16/2021 3.51* 4.20 - 5.30 M/uL Final    HGB 09/16/2021 11.0* 12.0 - 16.0 g/dL Final    HCT 09/16/2021 34.6* 35.0 - 45.0 % Final    MCV 09/16/2021 98.6  78.0 - 100.0 FL Final    PLEASE NOTE NEW REFERENCE RANGE    MCH 09/16/2021 31.3  24.0 - 34.0 PG Final    MCHC 09/16/2021 31.8  31.0 - 37.0 g/dL Final    RDW 09/16/2021 13.3  11.6 - 14.5 % Final    PLATELET 29/12/2123 756  135 - 420 K/uL Final    MPV 09/16/2021 10.7  9.2 - 11.8 FL Final    NRBC 09/16/2021 0.0  0.0  WBC Final    ABSOLUTE NRBC 09/16/2021 0.00  0.00 - 0.01 K/uL Final    NEUTROPHILS 09/16/2021 65  40 - 73 % Final    LYMPHOCYTES 09/16/2021 18* 21 - 52 % Final    MONOCYTES 09/16/2021 11* 3 - 10 % Final    EOSINOPHILS 09/16/2021 5  0 - 5 % Final    BASOPHILS 09/16/2021 1  0 - 2 % Final    IMMATURE GRANULOCYTES 09/16/2021 0  0 - 0.5 % Final    ABS. NEUTROPHILS 09/16/2021 3.6  1.8 - 8.0 K/UL Final    ABS. LYMPHOCYTES 09/16/2021 1.0  0.9 - 3.6 K/UL Final    ABS. MONOCYTES 09/16/2021 0.6  0.05 - 1.2 K/UL Final    ABS. EOSINOPHILS 09/16/2021 0.3  0.0 - 0.4 K/UL Final    ABS. BASOPHILS 09/16/2021 0.1  0.0 - 0.1 K/UL Final    ABS. IMM.  GRANS. 09/16/2021 0.0  0.00 - 0.04 K/UL Final    DF 09/16/2021 AUTOMATED    Final   Admission on 08/26/2021, Discharged on 08/27/2021   Component Date Value Ref Range Status    WBC 08/26/2021 6.5  4.6 - 13.2 K/uL Final    RBC 08/26/2021 3.43* 4.20 - 5.30 M/uL Final    HGB 08/26/2021 10.8* 12.0 - 16.0 g/dL Final    HCT 08/26/2021 32.6* 35.0 - 45.0 % Final    MCV 08/26/2021 95.0  78.0 - 100.0 FL Final    PLEASE NOTE NEW REFERENCE RANGE    MCH 08/26/2021 31.5  24.0 - 34.0 PG Final    MCHC 08/26/2021 33.1  31.0 - 37.0 g/dL Final    RDW 08/26/2021 12.0  11.6 - 14.5 % Final    PLATELET 49/52/2740 198  135 - 420 K/uL Final    MPV 08/26/2021 11.3  9.2 - 11.8 FL Final    NRBC 08/26/2021 0.0  0.0  WBC Final    ABSOLUTE NRBC 08/26/2021 0.00  0.00 - 0.01 K/uL Final    NEUTROPHILS 08/26/2021 81* 40 - 73 % Final    LYMPHOCYTES 08/26/2021 12* 21 - 52 % Final    MONOCYTES 08/26/2021 4  3 - 10 % Final    EOSINOPHILS 08/26/2021 2  0 - 5 % Final    BASOPHILS 08/26/2021 1  0 - 2 % Final    IMMATURE GRANULOCYTES 08/26/2021 0  0 - 0.5 % Final    ABS. NEUTROPHILS 08/26/2021 5.2  1.8 - 8.0 K/UL Final    ABS. LYMPHOCYTES 08/26/2021 0.8* 0.9 - 3.6 K/UL Final    ABS. MONOCYTES 08/26/2021 0.2  0.05 - 1.2 K/UL Final    ABS. EOSINOPHILS 08/26/2021 0.1  0.0 - 0.4 K/UL Final    ABS. BASOPHILS 08/26/2021 0.1  0.0 - 0.1 K/UL Final    ABS. IMM. GRANS. 08/26/2021 0.0  0.00 - 0.04 K/UL Final    DF 08/26/2021 AUTOMATED    Final    Sodium 08/26/2021 140  135 - 145 mmol/L Final    Potassium 08/26/2021 4.1  3.2 - 5.1 mmol/L Final    Chloride 08/26/2021 104  94 - 111 mmol/L Final    CO2 08/26/2021 25  21 - 33 mmol/L Final    Anion gap 08/26/2021 11  mmol/L Final    Glucose 08/26/2021 184* 70 - 110 mg/dL Final    BUN 08/26/2021 31* 9 - 21 mg/dL Final    Creatinine 08/26/2021 1.00  0.70 - 1.20 mg/dL Final    BUN/Creatinine ratio 08/26/2021 31    Final    GFR est AA 08/26/2021 >60  ml/min/1.73m2 Final    GFR est non-AA 08/26/2021 53  ml/min/1.73m2 Final    Comment: Estimated GFR is calculated using the IDMS-traceable Modification of Diet in Renal Disease (MDRD) Study equation, reported for both  Americans (GFRAA) and non- Americans (GFRNA), and normalized to 1.73m2 body surface area. The physician must decide which value applies to the patient. The MDRD study equation should only be used in individuals age 25 or older.  It has not been validated for the following: pregnant women, patients with serious comorbid conditions, or on certain medications, or persons with extremes of body size, muscle mass, or nutritional status.  Calcium 08/26/2021 9.2  8.5 - 10.5 mg/dL Final    Bilirubin, total 08/26/2021 0.7  0.2 - 1.0 mg/dL Final    AST (SGOT) 08/26/2021 25  14 - 74 U/L Final    ALT (SGPT) 08/26/2021 20  3 - 52 U/L Final    Alk. phosphatase 08/26/2021 59  38 - 126 U/L Final    Protein, total 08/26/2021 6.4  6.1 - 8.4 g/dL Final    Albumin 08/26/2021 3.6  3.5 - 4.7 g/dL Final    Globulin 08/26/2021 2.8  g/dL Final    A-G Ratio 08/26/2021 1.3    Final    Prothrombin time 08/26/2021 14.3  11.5 - 15.2 sec Final    INR 08/26/2021 1.2  0.8 - 1.2   Final    Occult Blood,day 1 08/26/2021 Positive    Final    Day 1 date: 08/26/2021 8,728,330    Final    Specimen source 08/26/2021 Nasopharyngeal    Final    COVID-19 rapid test 08/26/2021 Not Detected  Not Detected   Final    Comment:   Rapid NAAT:  The specimen is NEGATIVE for SARS-CoV-2, the novel coronavirus associated with COVID-19. Negative results should be treated as presumptive and, if inconsistent with clinical signs and symptoms or necessary for patient management, should be tested with an alternative molecular assay. Negative results do not preclude SARS-CoV-2 infection and should not be used as the sole basis for patient management decisions. This test has been authorized by the FDA under an Emergency Use   Authorization (EUA) for use by authorized laboratories.  Fact sheet for Healthcare Providers: ConventionUpdate.co.nz Fact sheet for Patients: ConventionUpdate.co.nz   Methodology: Isothermal Nucleic Acid Amplification      WBC 08/27/2021 8.1  4.6 - 13.2 K/uL Final    RBC 08/27/2021 2.93* 4.20 - 5.30 M/uL Final    HGB 08/27/2021 9.1* 12.0 - 16.0 g/dL Final    HCT 08/27/2021 27.7* 35.0 - 45.0 % Final    MCV 08/27/2021 94.5  78.0 - 100.0 FL Final    PLEASE NOTE NEW REFERENCE RANGE   [facility] 08/27/2021 31.1  24.0 - 34.0 PG Final    MCHC 08/27/2021 32.9  31.0 - 37.0 g/dL Final    RDW 08/27/2021 12.1  11.6 - 14.5 % Final    PLATELET 93/10/4584 110  135 - 420 K/uL Final    MPV 08/27/2021 11.4  9.2 - 11.8 FL Final    NRBC 08/27/2021 0.0  0.0  WBC Final    ABSOLUTE NRBC 08/27/2021 0.00  0.00 - 0.01 K/uL Final    NEUTROPHILS 08/27/2021 81* 40 - 73 % Final    LYMPHOCYTES 08/27/2021 11* 21 - 52 % Final    MONOCYTES 08/27/2021 8  3 - 10 % Final    EOSINOPHILS 08/27/2021 0  0 - 5 % Final    BASOPHILS 08/27/2021 0  0 - 2 % Final    IMMATURE GRANULOCYTES 08/27/2021 0  0 - 0.5 % Final    ABS. NEUTROPHILS 08/27/2021 6.5  1.8 - 8.0 K/UL Final    ABS. LYMPHOCYTES 08/27/2021 0.9  0.9 - 3.6 K/UL Final    ABS. MONOCYTES 08/27/2021 0.7  0.05 - 1.2 K/UL Final    ABS. EOSINOPHILS 08/27/2021 0.0  0.0 - 0.4 K/UL Final    ABS. BASOPHILS 08/27/2021 0.0  0.0 - 0.1 K/UL Final    ABS. IMM.  GRANS. 08/27/2021 0.0  0.00 - 0.04 K/UL Final    DF 08/27/2021 AUTOMATED    Final   Hospital Outpatient Visit on 06/21/2021   Component Date Value Ref Range Status    Iron 06/21/2021 90  35 - 150 ug/dL Final    TIBC 06/21/2021 312  250 - 450 ug/dL Final    Iron % saturation 06/21/2021 29  20 - 50 % Final   Hospital Outpatient Visit on 04/22/2021   Component Date Value Ref Range Status    WBC 04/22/2021 5.2  4.6 - 13.2 K/uL Final    RBC 04/22/2021 4.65  4.20 - 5.30 M/uL Final    HGB 04/22/2021 13.5  12.0 - 16.0 g/dL Final    HCT 04/22/2021 43.1  35.0 - 45.0 % Final    MCV 04/22/2021 92.7  74.0 - 97.0 FL Final    MCH 04/22/2021 29.0  24.0 - 34.0 PG Final    MCHC 04/22/2021 31.3  31.0 - 37.0 g/dL Final    RDW 04/22/2021 15.1* 11.6 - 14.5 % Final    PLATELET 73/51/2679 623  135 - 420 K/uL Final    MPV 04/22/2021 11.1  9.2 - 11.8 FL Final    NEUTROPHILS 04/22/2021 72  40 - 73 % Final    LYMPHOCYTES 04/22/2021 12* 21 - 52 % Final    MONOCYTES 04/22/2021 13* 3 - 10 % Final    EOSINOPHILS 04/22/2021 2  0 - 5 % Final    BASOPHILS 04/22/2021 1  0 - 2 % Final    IMMATURE GRANULOCYTES 04/22/2021 0  % Final    ABS. NEUTROPHILS 04/22/2021 3.8  1.8 - 8.0 K/UL Final    ABS. LYMPHOCYTES 04/22/2021 0.6* 0.9 - 3.6 K/UL Final    ABS. MONOCYTES 04/22/2021 0.7  0.05 - 1.2 K/UL Final    ABS. EOSINOPHILS 04/22/2021 0.1  0.0 - 0.4 K/UL Final    ABS. BASOPHILS 04/22/2021 0.0  0.0 - 0.1 K/UL Final    ABS. IMM. GRANS. 04/22/2021 0.0  K/UL Final    LD 04/22/2021 176  98 - 192 U/L Final    Sodium 04/22/2021 138  135 - 145 mmol/L Final    Potassium 04/22/2021 4.5  3.2 - 5.1 mmol/L Final    Chloride 04/22/2021 104  94 - 111 mmol/L Final    CO2 04/22/2021 25  21 - 33 mmol/L Final    Anion gap 04/22/2021 9  mmol/L Final    Glucose 04/22/2021 114* 70 - 110 mg/dL Final    BUN 04/22/2021 20  9 - 21 mg/dL Final    Creatinine 04/22/2021 0.90  0.70 - 1.20 mg/dL Final    BUN/Creatinine ratio 04/22/2021 22    Final    GFR est AA 04/22/2021 >60  ml/min/1.73m2 Final    GFR est non-AA 04/22/2021 60  ml/min/1.73m2 Final    Comment: Estimated GFR is calculated using the IDMS-traceable Modification of Diet in Renal Disease (MDRD) Study equation, reported for both  Americans (GFRAA) and non- Americans (GFRNA), and normalized to 1.73m2 body surface area. The physician must decide which value applies to the patient. The MDRD study equation should only be used in individuals age 25 or older. It has not been validated for the following: pregnant women, patients with serious comorbid conditions, or on certain medications, or persons with extremes of body size, muscle mass, or nutritional status.  Calcium 04/22/2021 9.5  8.5 - 10.5 mg/dL Final    Bilirubin, total 04/22/2021 0.8  0.2 - 1.0 mg/dL Final    AST (SGOT) 04/22/2021 28  14 - 74 U/L Final    ALT (SGPT) 04/22/2021 30  3 - 52 U/L Final    Alk.  phosphatase 04/22/2021 66  38 - 126 U/L Final    Protein, total 04/22/2021 7.2  6.1 - 8.4 g/dL Final    Albumin 04/22/2021 4.1  3.5 - 4.7 g/dL Final    Globulin 04/22/2021 3.1  g/dL Final    A-G Ratio 04/22/2021 1.3    Final    TSH 04/22/2021 0.91  0.35 - 6.20 uIU/mL Final    Comment:    Due to TSH heterogeneity, both structurally and degree of glycosylation, monoclonal antibodies used in the TSH assay may not accurately quantitate TSH. Therefore, this result should be correlated with clinical findings as well as with other assessments of thyroid function, e.g., free T4, free T3. Abstract on 04/12/2021   Component Date Value Ref Range Status    Creatinine, External 03/12/2021 0.8   Final         Assessment & Plan:     1. Cerebrovascular accident (CVA) due to other mechanism ([address]  No MRI obtained due to claustrophobia. Had mild residual left sided weakness which has improved. Continue aspirin, statin. Monitor for bleeding due to hx of GI bleed    2. Anxiety  Will wean off of ativan due to increased risk of falls. Start zoloft. 3. Paroxysmal atrial fibrillation (HCC)  Rate controlled on metoprolol. Not on AC due to hx of GI bleed. Currently on aspirin. I have discussed the diagnosis with the patient and the intended plan as seen in the above orders. The patient has received an after-visit summary and questions were answered concerning future plans. I have discussed medication side effects and warnings with the patient as well. I have reviewed the plan of care with the patient, accepted their input and they are in agreement with the treatment goals. Previous lab and imaging results were reviewed by me.        Adron Bence, MD  February 9, 2022

## 2022-02-24 ENCOUNTER — OFFICE VISIT (OUTPATIENT)
Dept: GASTROENTEROLOGY | Age: 82
End: 2022-02-24
Payer: MEDICARE

## 2022-02-24 VITALS
SYSTOLIC BLOOD PRESSURE: 133 MMHG | BODY MASS INDEX: 28.84 KG/M2 | HEART RATE: 48 BPM | DIASTOLIC BLOOD PRESSURE: 65 MMHG | WEIGHT: 168 LBS

## 2022-02-24 DIAGNOSIS — Z12.11 COLON CANCER SCREENING: ICD-10-CM

## 2022-02-24 DIAGNOSIS — F41.9 ANXIETY: ICD-10-CM

## 2022-02-24 DIAGNOSIS — I10 PRIMARY HYPERTENSION: ICD-10-CM

## 2022-02-24 DIAGNOSIS — K21.9 GASTROESOPHAGEAL REFLUX DISEASE WITHOUT ESOPHAGITIS: ICD-10-CM

## 2022-02-24 DIAGNOSIS — J44.9 CHRONIC OBSTRUCTIVE PULMONARY DISEASE, UNSPECIFIED COPD TYPE (HCC): ICD-10-CM

## 2022-02-24 DIAGNOSIS — D50.0 IRON DEFICIENCY ANEMIA DUE TO CHRONIC BLOOD LOSS: Primary | ICD-10-CM

## 2022-02-24 PROCEDURE — 1090F PRES/ABSN URINE INCON ASSESS: CPT | Performed by: INTERNAL MEDICINE

## 2022-02-24 PROCEDURE — G8536 NO DOC ELDER MAL SCRN: HCPCS | Performed by: INTERNAL MEDICINE

## 2022-02-24 PROCEDURE — 1101F PT FALLS ASSESS-DOCD LE1/YR: CPT | Performed by: INTERNAL MEDICINE

## 2022-02-24 PROCEDURE — 99213 OFFICE O/P EST LOW 20 MIN: CPT | Performed by: INTERNAL MEDICINE

## 2022-02-24 PROCEDURE — G8427 DOCREV CUR MEDS BY ELIG CLIN: HCPCS | Performed by: INTERNAL MEDICINE

## 2022-02-24 PROCEDURE — G8752 SYS BP LESS 140: HCPCS | Performed by: INTERNAL MEDICINE

## 2022-02-24 PROCEDURE — G8417 CALC BMI ABV UP PARAM F/U: HCPCS | Performed by: INTERNAL MEDICINE

## 2022-02-24 PROCEDURE — G8754 DIAS BP LESS 90: HCPCS | Performed by: INTERNAL MEDICINE

## 2022-02-24 PROCEDURE — G8400 PT W/DXA NO RESULTS DOC: HCPCS | Performed by: INTERNAL MEDICINE

## 2022-02-24 PROCEDURE — G8510 SCR DEP NEG, NO PLAN REQD: HCPCS | Performed by: INTERNAL MEDICINE

## 2022-02-24 RX ORDER — PANTOPRAZOLE SODIUM 40 MG/1
40 TABLET, DELAYED RELEASE ORAL DAILY
Qty: 90 TABLET | Refills: 3 | Status: SHIPPED | OUTPATIENT
Start: 2022-02-24 | End: 2023-02-19

## 2022-02-24 NOTE — PROGRESS NOTES
Ritika Centeno presents today for   Chief Complaint   Patient presents with    Follow-up     patient needs her pantoprazole refilled it has really helped with her stomach       Is someone accompanying this pt? no    Is the patient using any DME equipment during OV? no    Depression Screening:  3 most recent PHQ Screens 2/24/2022   Little interest or pleasure in doing things Several days   Feeling down, depressed, irritable, or hopeless Several days   Total Score PHQ 2 2   Trouble falling or staying asleep, or sleeping too much Not at all   Feeling tired or having little energy Several days   Poor appetite, weight loss, or overeating Several days   Feeling bad about yourself - or that you are a failure or have let yourself or your family down Not at all   Trouble concentrating on things such as school, work, reading, or watching TV Not at all   Moving or speaking so slowly that other people could have noticed; or the opposite being so fidgety that others notice Several days   Thoughts of being better off dead, or hurting yourself in some way Not at all   PHQ 9 Score 5   How difficult have these problems made it for you to do your work, take care of your home and get along with others Very difficult       Learning Assessment:  Learning Assessment 12/3/2020   PRIMARY LEARNER Patient   HIGHEST LEVEL OF EDUCATION - PRIMARY LEARNER  DID NOT GRADUATE HIGH SCHOOL   BARRIERS PRIMARY LEARNER NONE   PRIMARY LANGUAGE ENGLISH   LEARNER PREFERENCE PRIMARY READING   ANSWERED BY patient   RELATIONSHIP SELF       Fall Risk  Fall Risk Assessment, last 12 mths 2/24/2022   Able to walk? Yes   Fall in past 12 months? 0   Do you feel unsteady? 0   Are you worried about falling 0   Is the gait abnormal? -   Number of falls in past 12 months -   Fall with injury? -       Coordination of Care:  1. Have you been to the ER, urgent care clinic since your last visit? Hospitalized since your last visit?  Yes, [facility] patient had a stroke recently    2. Have you seen or consulted any other health care providers outside of the [address] [place] [address] since your last visit? Include any pap smears or colon screening.  Yes, PCP [place]

## 2022-02-24 NOTE — PROGRESS NOTES
Gastroenterology Progress Note         Referring Physician: Ramos Greco MD     Chief Complaint: GI bleed and anemia    Date of service: 12/22/2021    Subjective:     History of Present Illness:  Patient is a female [address] [facility] 78 y.o.  who is seen for evaluation for GI bleeding. The patient has GI complaints of hematemesis and melena starting this morning. The patient states she awoke this morning and had nausea. She then had a bowel movement and passed black tarry stool with some red blood. She subsequently vomited bright red blood and some coffee-ground material x1. She was brought to the emergency room was found to be mildly anemic with hemoglobin/hematocrit 11 and 35. BUN is elevated at 31 with normal creatinine. She has a history of anemia and has been on iron replacement with normalization of her hemoglobin hematocrit in May. Her primary care doctor took her off the iron because her anemia resolved. She has not been taking iron since May. She denies weight loss, change in appetite, change in her bowel movements, dysphagia or heartburn. She takes a baby aspirin but no NSAIDs or anticoagulation. She has had no syncope, lightheadedness or dizziness. She has a history of anemia been hospitalized with anemia 1/2021. She is gastroenterology in 3/2021 and EGD and colonoscopy was recommended but she did not follow through to have it done. The patient denies fever, chills, abdominal pain, reflux, dysphagia, change in bowel habits, diarrhea, constipation, weight loss. Last colonoscopyhas never had 1. Family history for GI disease is significant for no GI or liver disease. The patient denies liver related risk factors.     Past medical history is significant for iron deficiency anemia, anxiety, and COPD.    09/16/2021 visit: The patient returns today in follow-up from her recent hospitalization for acute GI bleeding. EGD done at that time revealed the following:   Impression:  1.   Upper GI bleeding from erosive esophagitis, several small gastric polyps, small gastric ulcer and very friable mucosa. The findings, although they do explain the patient's acute bleeding, are somewhat underwhelming for this amount of significant bleeding. This makes me wonder if she has mucosal instability such as linitus plastica or atrophic gastritis with underlying malignancy involved or sometimes a clotting abnormality. I also wonder if this is due to her taking her baby aspirin at night causing the above appearance and results. There was diffuse friability and easy bleeding in any area that was washed or or biopsies were done. A total of 10 cc of 1:10,000 epinephrine was injected into the obvious bleeding areas including several polyps, small gastric ulcer in the body, and several friable areas. Also, 0.5 cc was injected into the bleeding esophageal erosion. Biopsies were negative for H. Pylori. Her last hemoglobin and hematocrit prior to discharge were 9.1 and 27 respectively. The patient was discharged on Protonix 40 mg twice a day to be taken for 12 weeks then decrease to once daily. I also recommended at that time because she appeared to have acute on chronic anemia, that she have colonoscopy to be done as an outpatient. She is doing well at this time except for some fatigue and having no further bleeding. Stool is black from iron once daily. She overall feels better. 12/22/21 follow-up visit: Patient returns today in follow-up after recent upper endoscopy and colonoscopy. Findings revealed the following:    EGD- Impression:  1.  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 2.  3 cm hiatal hernia. 3.  Healed esophagitis, gastric ulcer and gastritis. Gastric mucosa no longer friable. Colonoscopy -Impression:  1. Removal of 2 sessile polyps from the descending colon. 2. Scattered diverticulosis throughout the colon. 3. Grade 2 internal hemorrhoids.   4. No lower GI tract etiology to explain anemia. I reduced her pantoprazole from 40 mg twice a day to once daily after her procedures above. 11/4/2021 lab work revealed her anemia had resolved with a hemoglobin and hematocrit of 12.9 and 41. Ferritin and iron panel are all now normal.  She is feeling well. 2/24/2022 visit: Patient returns today in follow-up for her iron deficiency anemia. Is doing well and is off iron. No bleeding or melena. She would like a refill of the pantoprazole as she says she has reflux and this tends to help her reflux. Did have erosive esophagitis on her endoscopy. She denies rectal bleeding or melena. PMH:  Past Medical History:   Diagnosis Date    Allergic rhinitis     Anemia     Anxiety disorder     Chronic obstructive pulmonary disease ([address]     Dyslipidemia     Folic acid deficiency     Hypertension     Neurologic disorder     resting tremor        PSH:  Past Surgical History:   Procedure Laterality Date    COLONOSCOPY N/A 10/26/2021    COLONOSCOPY w/ polypectomy performed by Senia Yates MD at [facility] ENDOSCOPY        Allergies: Allergies   Allergen Reactions    Isopropyl Alcohol Other (comments)     Weakness all over - pt states she has out grown this    Pen-Vee K Rash     Pt. States she avoids penicillin due to allergy as a child. Home Medications:  Prior to Admission medications    Medication Sig Start Date End Date Taking? Authorizing Provider   sertraline (ZOLOFT) 50 mg tablet Take 1 Tablet by mouth daily. 2/7/22  Yes Ronnie Rivera MD   metoprolol tartrate (LOPRESSOR) 25 mg tablet TAKE 1/2 TABLET BY MOUTH TWICE DAILY 2/1/22  Yes Ronnie Rivera MD   aspirin delayed-release 81 mg tablet Take 81 mg by mouth daily. Yes Provider, Historical   atorvastatin (LIPITOR) 40 mg tablet TAKE 1 TABLET BY MOUTH DAILY 12/20/21  Yes Ronnie Rivera MD   pantoprazole (PROTONIX) 40 mg tablet Take 1 Tablet by mouth daily.  11/1/21  Yes Ronnie Rivera MD   budesonide-formoteroL (Symbicort) 80-4.5 mcg/actuation HFAA Take 2 Puffs by inhalation two (2) times a day. 10/18/21  Yes Jakob Gallegos MD   losartan (COZAAR) 100 mg tablet Take 1 Tablet by mouth daily. 10/15/21  Yes Jakob Gallegos MD   albuterol (PROVENTIL HFA, VENTOLIN HFA, PROAIR HFA) 90 mcg/actuation inhaler Take 2 Puffs by inhalation every four (4) hours as needed for Wheezing. 4/21/21  Yes Quynh Obregon MD        Hospital Medications:  Current Outpatient Medications   Medication Sig    sertraline (ZOLOFT) 50 mg tablet Take 1 Tablet by mouth daily.  metoprolol tartrate (LOPRESSOR) 25 mg tablet TAKE 1/2 TABLET BY MOUTH TWICE DAILY    aspirin delayed-release 81 mg tablet Take 81 mg by mouth daily.  atorvastatin (LIPITOR) 40 mg tablet TAKE 1 TABLET BY MOUTH DAILY    pantoprazole (PROTONIX) 40 mg tablet Take 1 Tablet by mouth daily.  budesonide-formoteroL (Symbicort) 80-4.5 mcg/actuation HFAA Take 2 Puffs by inhalation two (2) times a day.  losartan (COZAAR) 100 mg tablet Take 1 Tablet by mouth daily.  albuterol (PROVENTIL HFA, VENTOLIN HFA, PROAIR HFA) 90 mcg/actuation inhaler Take 2 Puffs by inhalation every four (4) hours as needed for Wheezing. No current facility-administered medications for this visit. Social History:  Social History     Tobacco Use    Smoking status: Former Smoker     Packs/day: 1.00     Years: 50.00     Pack years: 50.00    Smokeless tobacco: Never Used   Substance Use Topics    Alcohol use: Not Currently        Pt denies any history of IV drug use, blood transfusions. Family History:  Family History   Problem Relation Age of Onset    Cancer Father         stomach    Heart Disease Sister     Alcohol abuse Brother     Cancer Brother         lung        Review of Systems:  A detailed 10 system ROS is obtained, with pertinent positives as listed above. All others are negative.     Objective:     Physical Exam:  Vitals:  /65 (BP 1 Location: Right arm, BP Patient Position: Sitting)   Pulse (!) 48   Wt 76.2 kg (168 lb)   BMI 28.84 kg/m²    Gen:  Pt is alert, cooperative, no acute distress  Skin:  Extremities and face reveal no rashes. No recinos erythema. No telangiectasias on the chest wall. HEENT: Sclerae anicteric. Extra-occular muscles are intact. No oral ulcers. No abnormal pigmentation of the lips. The neck is supple. Cardiovascular: Regular rate and rhythm. No murmurs, gallops, or rubs. Respiratory:  Comfortable breathing with no accessory muscle use. GI:  Abdomen nondistended, soft, and nontender. Normal active bowel sounds. No enlargement of the liver or spleen. No masses palpable. Rectal:  Deferred  Musculoskeletal:  No costovertebral tenderness. Good muscle strength with no focal weakness. Extremities:  No pitting edema of the lower legs. Extremities have good range of motion. Neurological:  Gross memory appears intact. Patient is alert and oriented. Balance overall good. Psychiatric:  Mood appears appropriate with judgement intact. Lymphatic:  No obvious cervical or supraclavicular adenopathy.     Laboratory:      Lab Results   Component Value Date     01/16/2022    K 4.2 01/16/2022     01/16/2022    CO2 26 01/16/2022    AGAP 8 01/16/2022     (H) 01/16/2022    BUN 20 01/16/2022    CREA 1.00 01/16/2022    BUCR 20 01/16/2022    GFRAA >60 01/16/2022    GFRNA 53 01/16/2022    CA 9.3 01/16/2022    TBILI 0.7 01/16/2022    AST 23 01/16/2022    ALT 18 01/16/2022    AP 66 01/16/2022    TP 6.8 01/16/2022    ALB 3.7 01/16/2022    GLOB 3.1 01/16/2022    AGRAT 1.2 01/16/2022    WBC 5.1 01/16/2022    RBC 4.34 01/16/2022    HGB 12.9 01/16/2022    HCT 40.6 01/16/2022    MCV 93.5 01/16/2022    MCH 29.7 01/16/2022    MCHC 31.8 01/16/2022    RDW 12.4 01/16/2022     01/16/2022    MPLV 11.3 01/16/2022    GRANS 73 01/16/2022    LYMPH 13 (L) 01/16/2022    MONOS 10 01/16/2022    EOS 3 01/16/2022    BASOS 1 01/16/2022    IG 0 01/16/2022 ANEU 3.7 01/16/2022    ABL 0.7 (L) 01/16/2022    ABM 0.5 01/16/2022    CORINE 0.2 01/16/2022    ABB 0.0 01/16/2022    AIG 0.0 01/16/2022         CT scan of abdomen and pelvis-  CT Results (most recent):  Results from [place] encounter on 01/15/22    CTA HEAD NECK W WO CONT    Narrative  EXAM: CTA HEAD AND NECK    INDICATION: Left sided weakness    COMPARISON: No prior studies    TECHNIQUE:  Multiple axial CT images of the neck were obtained extending from  the level of the aortic arch to the skull base after the administration of the  IV contrast utilizing a CTA protocol. Maximum intensity projection  reconstructions were performed in multiple planes. Multiple axial CT images of the head were obtained extending from below the  level of the skull base to the vertex after the administration of the IV  contrast utilizing a CTA protocol. Maximum intensity projection reconstructions  were performed in three planes. Additional 3 D reconstructions were performed  at a separate workstation. One or more dose reduction techniques were used on this CT: automated exposure  control, adjustment of the mAs and/or kVp according to patient's size, and  iterative reconstruction techniques. The specific techniques utilized on this CT  exam have been documented in the patient's electronic medical record. Digital  Imaging and Communications in Medicine (DICOM) format image data are available  to nonaffiliated external healthcare facilities or entities on a secure, media  free, reciprocally searchable basis with patient authorization for at least a  12-month period after this study. ___________________    FINDINGS:    CTA NECK    Atherosclerotic calcifications are present along the aortic arch and the  proximal great vessels without evidence high-grade stenosis. The left common carotid is slightly irregular. The left carotid bifurcation is  moderately irregular.  Estimated minimal luminal diameter proximal left ICA 2. 6  mm. Estimated luminal diameter of normal left ICA cervical segment is 5.6 mm. Estimated degree of stenosis of the left ICA based upon NASCET criteria is 54%. Remainder of left ICA cervical segment is mildly tortuous without focal  stenosis. The right common carotid is slightly irregular. The right carotid bifurcation  is moderately irregular. Estimated minimal luminal diameter proximal right ICA  2.9 mm. Estimated luminal diameter of normal right ICA cervical segment is 5.3  mm. Estimated degree of stenosis of the right ICA based upon NASCET criteria is  45%. Remainder of right ICA cervical segment is slightly tortuous. Right vertebral artery is slightly dominant. No high grade vertebral artery  stenosis is seen. Degenerative changes are seen in the spine. Moderate C4-5 and C5-6 central  stenosis is suggested. Severe C4-5 through the C6-7 level foraminal stenosis is  suggested bilaterally. The bilateral lenses are absent, likely due to prior lens replacement. Visualized lung apices are clear. CTA HEAD    Mild narrowing is seen in the bilateral carotid siphons without high grade  stenosis. The carotid terminus is unremarkable. The anterior cerebral arteries are unremarkable. An anterior communicating  artery is present. The middle cerebral artery bifurcations are unremarkable. Very small focal  saccular outpouching projects inferolaterally and anteriorly from the right MCA  bifurcation, estimated 2 mm in size suspicious for cerebral aneurysm. Right vertebral artery slightly dominant. PICA origins are within normal limits  including right-sided variant extradural origin. The basilar artery is  unremarkable. Posterior cerebral arteries are unremarkable. The dural venous sinuses are grossly patent but not well evaluated on this  arterial phase study. ___________________    Impression  1. No definite large vessel occlusion.     2.  Moderate proximal bilateral ICA stenosis estimated 54% on the left and 45%  on the right, based on NASCET criteria. 3. No high-grade vertebral artery stenosis. 4. No high-grade intracranial stenosis. Mild bilateral carotid siphon  atherosclerotic narrowing. 5. Small right MCA bifurcation aneurysm. US abdomen- no results US Results (most recent):  No results found for this or any previous visit. MRI and MRCP- no results  MRI Results (most recent):  No results found for this or any previous visit. Assessment:     1. GI bleeding. This was resolved and feel was multifactorial in nature due to the original findings on endoscopy including esophagitis and gastritis. This was healed at the time of repeat endoscopy. 2. Iron deficiency anemia. I felt initially her anemia was acute on chronic anemia as she had bleeding previously. This is since resolved with avoiding aspirin and nonsteroidal anti-inflammatory drugs and use of pantoprazole making the upper GI tract pathology found the most likely culprit for her anemia. 2/24/2022: Again, this appears to be doing well and she is avoiding nonsteroidal anti-inflammatory drugs. The pantoprazole helps prevent bleeding if she does restart them in the future. 3. COPD. This is controlled with inhalers. 4. Anxiety. Stable. 5. History of colon polyps. Her polyps were benign adenomas. 6. Esophageal reflux disease. This is controlled with the pantoprazole and not much of a problem she states as long she takes the pantoprazole. The patient did have erosive esophagitis on her EGD. Plan:     1. Observation and reassurance. 2.   Continue pantoprazole 40 mg once daily. I renewed her prescription for her today. 3.   Avoid aspirin and nonsteroidal anti-inflammatory drugs. 4.   She does not need surveillance colonoscopy unless she prefers to do it. 5.   Further recommendations pending her clinical course. 6.   She will follow up with me as needed.      Sharath Patterson MD

## 2022-03-02 ENCOUNTER — TELEPHONE (OUTPATIENT)
Dept: FAMILY MEDICINE CLINIC | Age: 82
End: 2022-03-02

## 2022-03-02 NOTE — TELEPHONE ENCOUNTER
Left message on patient's voice mail that Dr. Jose Shah is not in the office today and her message has been forwarded to him and she can expect a return call when Dr. Jose Shah is back in the office tomorrow.

## 2022-03-02 NOTE — TELEPHONE ENCOUNTER
----- Message from [facility] sent at 3/2/2022 12:36 PM EST -----  Subject: Message to Provider    QUESTIONS  Information for Provider? Pt. is requesting a phone call about her   medication. She stated that medication is making her feel like she is   climbing the walls and anxious.  ---------------------------------------------------------------------------  --------------  CALL BACK INFO  What is the best way for the office to contact you? OK to leave message on   voicemail  Preferred Call Back Phone Number? [phone]  ---------------------------------------------------------------------------  --------------  SCRIPT ANSWERS  Relationship to Patient?  Self

## 2022-03-04 RX ORDER — ALPRAZOLAM 0.5 MG/1
0.25 TABLET ORAL 2 TIMES DAILY
COMMUNITY
Start: 2022-02-07 | End: 2022-03-08

## 2022-03-04 RX ORDER — ALPRAZOLAM 0.5 MG/1
0.25 TABLET ORAL 2 TIMES DAILY
Qty: 30 TABLET | Refills: 0 | OUTPATIENT
Start: 2022-03-04

## 2022-03-04 NOTE — TELEPHONE ENCOUNTER
Patient called begging for her xanax refill. She said she takes 1/2 tab of 0.5mg in the am and pm.  She said she feels like she is climbing up the walls and the Zoloft is not helping. She was advised that Dr. Silvia Melgar was not in office today and she asked if we could please see if another provider would do this as she feels she is going crazy and shaking with her nerves.

## 2022-03-08 ENCOUNTER — TELEPHONE (OUTPATIENT)
Dept: FAMILY MEDICINE CLINIC | Age: 82
End: 2022-03-08

## 2022-03-08 RX ORDER — HYDROXYZINE PAMOATE 25 MG/1
25 CAPSULE ORAL
Qty: 90 CAPSULE | Refills: 0 | Status: SHIPPED | OUTPATIENT
Start: 2022-03-08 | End: 2022-03-28

## 2022-03-08 NOTE — TELEPHONE ENCOUNTER
Got with Acosta Handy to find out where we could squeeze in patient and she states she would call pt. Pt has been scheduled.

## 2022-03-17 RX ORDER — ATORVASTATIN CALCIUM 40 MG/1
40 TABLET, FILM COATED ORAL DAILY
Qty: 90 TABLET | Refills: 1 | Status: SHIPPED | OUTPATIENT
Start: 2022-03-17

## 2022-03-17 RX ORDER — SERTRALINE HYDROCHLORIDE 50 MG/1
50 TABLET, FILM COATED ORAL DAILY
Qty: 30 TABLET | Refills: 0 | Status: SHIPPED | OUTPATIENT
Start: 2022-03-17 | End: 2022-03-28

## 2022-03-17 NOTE — TELEPHONE ENCOUNTER
Requested Prescriptions     Pending Prescriptions Disp Refills    sertraline (ZOLOFT) 50 mg tablet [Pharmacy Med Name: SERTRALINE 50MG TABLETS] 30 Tablet 0     Sig: TAKE 1 TABLET BY MOUTH DAILY    atorvastatin (LIPITOR) 40 mg tablet 90 Tablet 1     Sig: Take 1 Tablet by mouth daily.        Patient is out and needs refill

## 2022-03-18 PROBLEM — R53.83 FATIGUE: Status: ACTIVE | Noted: 2021-04-21

## 2022-03-18 PROBLEM — K92.2 GI BLEED: Status: ACTIVE | Noted: 2021-08-26

## 2022-03-18 PROBLEM — I63.9 STROKE (HCC): Status: ACTIVE | Noted: 2022-01-15

## 2022-03-19 PROBLEM — B02.9 HERPES ZOSTER WITHOUT COMPLICATION: Status: ACTIVE | Noted: 2021-04-21

## 2022-03-19 PROBLEM — E78.5 HYPERLIPIDEMIA: Status: ACTIVE | Noted: 2020-11-20

## 2022-03-19 PROBLEM — I63.9 CVA (CEREBRAL VASCULAR ACCIDENT) (HCC): Status: ACTIVE | Noted: 2022-01-16

## 2022-03-19 PROBLEM — F41.9 ANXIETY: Status: ACTIVE | Noted: 2021-03-11

## 2022-03-19 PROBLEM — I48.91 ATRIAL FIBRILLATION (HCC): Status: ACTIVE | Noted: 2021-03-11

## 2022-03-19 PROBLEM — I10 ESSENTIAL HYPERTENSION: Status: ACTIVE | Noted: 2020-11-20

## 2022-03-19 PROBLEM — D64.9 ACUTE ANEMIA: Status: ACTIVE | Noted: 2021-01-17

## 2022-03-19 PROBLEM — D64.9 SEVERE ANEMIA: Status: ACTIVE | Noted: 2021-01-17

## 2022-03-20 PROBLEM — J44.9 CHRONIC OBSTRUCTIVE LUNG DISEASE (HCC): Status: ACTIVE | Noted: 2020-11-20

## 2022-03-20 PROBLEM — B02.29 POST HERPETIC NEURALGIA: Status: ACTIVE | Noted: 2021-05-21

## 2022-03-20 PROBLEM — R89.9 ABNORMAL LABORATORY TEST: Status: ACTIVE | Noted: 2021-03-24

## 2022-03-28 ENCOUNTER — VIRTUAL VISIT (OUTPATIENT)
Dept: FAMILY MEDICINE CLINIC | Age: 82
End: 2022-03-28
Payer: MEDICARE

## 2022-03-28 DIAGNOSIS — G25.2 COARSE TREMORS: ICD-10-CM

## 2022-03-28 DIAGNOSIS — F41.9 ANXIETY: Primary | ICD-10-CM

## 2022-03-28 PROCEDURE — 99442 PR PHYS/QHP TELEPHONE EVALUATION 11-20 MIN: CPT | Performed by: STUDENT IN AN ORGANIZED HEALTH CARE EDUCATION/TRAINING PROGRAM

## 2022-03-28 RX ORDER — SERTRALINE HYDROCHLORIDE 100 MG/1
100 TABLET, FILM COATED ORAL DAILY
Qty: 90 TABLET | Refills: 0 | Status: SHIPPED | OUTPATIENT
Start: 2022-03-28 | End: 2022-06-02

## 2022-03-28 NOTE — PROGRESS NOTES
Emili Villasenor is a 78 y.o. female who was seen by synchronous (real-time) audio technology on 3/28/2022. Consent: Emili Villasenor, who was seen by synchronous (real-time) audio technology, and/or her healthcare decision maker, is aware that this patient-initiated, Telehealth encounter on 3/28/2022 is a billable service, with coverage as determined by her insurance carrier. She is aware that she may receive a bill and has provided verbal consent to proceed: Yes. Subjective:   Emili Villasenor is a 78 y.o. female who was seen for Medication Evaluation    Patient's anxiety is not well controlled on Zoloft. Currently takes 50 mg daily. She would like to be back on the Xanax. She states she has some tremors and states that it has been worse when she is off her Xanax. Denies any recent falls. Vistaril has not helped with her anxiety    Home Medications    Medication Sig Start Date End Date Taking? Authorizing Provider   sertraline (ZOLOFT) 100 mg tablet Take 1 Tablet by mouth daily. 3/28/22  Yes Pinky Blake MD   atorvastatin (LIPITOR) 40 mg tablet Take 1 Tablet by mouth daily. 3/17/22  Yes Pinky Blake MD   hydrOXYzine pamoate (VISTARIL) 25 mg capsule Take 1 Capsule by mouth four (4) times daily as needed for Anxiety. 3/8/22  Yes Pinky Blake MD   pantoprazole (PROTONIX) 40 mg tablet Take 1 Tablet by mouth daily for 360 days. Indications: gastroesophageal reflux disease 2/24/22 2/19/23 Yes Elsi Medina MD   metoprolol tartrate (LOPRESSOR) 25 mg tablet TAKE 1/2 TABLET BY MOUTH TWICE DAILY 2/1/22  Yes Pinky Blake MD   aspirin delayed-release 81 mg tablet Take 81 mg by mouth daily. Yes Provider, Historical   budesonide-formoteroL (Symbicort) 80-4.5 mcg/actuation HFAA Take 2 Puffs by inhalation two (2) times a day. 10/18/21  Yes Pinky Blake MD   losartan (COZAAR) 100 mg tablet Take 1 Tablet by mouth daily.  10/15/21  Yes Pinky Blake MD   albuterol (PROVENTIL HFA, VENTOLIN HFA, PROAIR HFA) 90 mcg/actuation inhaler Take 2 Puffs by inhalation every four (4) hours as needed for Wheezing. 4/21/21  Yes Gerardo Flowers MD      Allergies   Allergen Reactions    Isopropyl Alcohol Other (comments)     Weakness all over - pt states she has out grown this    Pen-Vee K Rash     Pt. States she avoids penicillin due to allergy as a child. Social History     Tobacco Use    Smoking status: Former Smoker     Packs/day: 1.00     Years: 50.00     Pack years: 50.00    Smokeless tobacco: Never Used   Vaping Use    Vaping Use: Never used   Substance Use Topics    Alcohol use: Not Currently    Drug use: Never        Review of systems:  All other systems are negative          Objective: There were no vitals taken for this visit. General: alert, cooperative, no distress   Mental  status: normal mood, behavior, speech, thought processes, able to follow commands   Psychiatric: normal affect, consistent with stated mood, no evidence of hallucinations       Assessment & Plan:     1. Anxiety  Uncontrolled. Will increase Zoloft to 100 mg daily. Patient is not a candidate for benzodiazepines due to high risk of falls. There are no benefits with benzodiazepines for her anxiety currently. 2. Coarse tremors  We will need to evaluate in office. Likely from anxiety. Increase Zoloft to 100 mg daily. Patient is not a candidate for benzodiazepines due to high risk of falls. There are no benefits with benzodiazepines for her anxiety currently. Time spent on encounter: 15 minutes  712      We discussed the expected course, resolution and complications of the diagnosis(es) in detail. Medication risks, benefits, costs, interactions, and alternatives were discussed as indicated. I advised her to contact the office if her condition worsens, changes or fails to improve as anticipated. She expressed understanding with the diagnosis(es) and plan.      Dayana Simental is a 78 y.o. female who was evaluated by an audio visit encounter for concerns as above. Patient identification was verified prior to start of the visit. A caregiver was present when appropriate. Due to this being a TeleHealth encounter (During [facility] public [facility] emergency), evaluation of the following organ systems was limited: Vitals/Constitutional/EENT/Resp/CV/GI//MS/Neuro/Skin/Heme-Lymph-Imm. Pursuant to the emergency declaration under the [address] [place] [address] waiver authority and the [employer] and [employer] Act, this Virtual  Visit was conducted, with patient's (and/or legal guardian's) consent, to reduce the patient's risk of exposure to COVID-19 and provide necessary medical care. Mehul Dean is a 78 y.o. female who was seen by synchronous (real-time) audio technology on 3/28/2022. Consent: Mehul Dean, who was seen by synchronous (real-time) audio technology, and/or her healthcare decision maker, is aware that this patient-initiated, Telehealth encounter on 3/28/2022 is a billable service, with coverage as determined by her insurance carrier. She is aware that she may receive a bill and has provided verbal consent to proceed: Yes. Ability to conduct physical exam was limited. I was in the office. The patient was at home.

## 2022-03-28 NOTE — PROGRESS NOTES
Patient states that the last 2 medications are not helping. States she just shakes all the time. States she never did this when she was on her xanax. Dea Nix presents today for   Chief Complaint   Patient presents with    Medication Evaluation       Depression Screening:  3 most recent PHQ Screens 3/28/2022   Little interest or pleasure in doing things Not at all   Feeling down, depressed, irritable, or hopeless Several days   Total Score PHQ 2 1   Trouble falling or staying asleep, or sleeping too much -   Feeling tired or having little energy -   Poor appetite, weight loss, or overeating -   Feeling bad about yourself - or that you are a failure or have let yourself or your family down -   Trouble concentrating on things such as school, work, reading, or watching TV -   Moving or speaking so slowly that other people could have noticed; or the opposite being so fidgety that others notice -   Thoughts of being better off dead, or hurting yourself in some way -   PHQ 9 Score -   How difficult have these problems made it for you to do your work, take care of your home and get along with others -       Learning Assessment:  Learning Assessment 12/3/2020   PRIMARY LEARNER Patient   HIGHEST LEVEL OF EDUCATION - PRIMARY LEARNER  DID NOT GRADUATE HIGH SCHOOL   BARRIERS PRIMARY LEARNER NONE   PRIMARY LANGUAGE ENGLISH   LEARNER PREFERENCE PRIMARY READING   ANSWERED BY patient   RELATIONSHIP SELF       Fall Risk  Fall Risk Assessment, last 12 mths 3/28/2022   Able to walk? Yes   Fall in past 12 months? 0   Do you feel unsteady?  0   Are you worried about falling 0   Is the gait abnormal? -   Number of falls in past 12 months -   Fall with injury? -       ADL  ADL Assessment 3/28/2022   Feeding yourself No Help Needed   Getting from bed to chair No Help Needed   Getting dressed No Help Needed   Bathing or showering No Help Needed   Walk across the room (includes cane/walker) No Help Needed   Using the telphone No Help Needed   Taking your medications No Help Needed   Preparing meals No Help Needed   Managing money (expenses/bills) No Help Needed   Moderately strenuous housework (laundry) No Help Needed   Shopping for personal items (toiletries/medicines) No Help Needed   Shopping for groceries No Help Needed   Driving No Help Needed   Climbing a flight of stairs No Help Needed   Getting to places beyond walking distances No Help Needed       Health Maintenance reviewed and discussed and ordered per Provider. Health Maintenance Due   Topic Date Due    DTaP/Tdap/Td series (1 - Tdap) Never done    Shingrix Vaccine Age 50> (1 of 2) Never done    Bone Densitometry (Dexa) Screening  Never done    Pneumococcal 65+ years (1 of 1 - PPSV23) Never done    COVID-19 Vaccine (2 - Moderna 3-dose series) 02/23/2021    Flu Vaccine (1) 09/01/2021    Lipid Screen  12/11/2021   . Coordination of Care:  1. \"Have you been to the ER, urgent care clinic since your last visit? Hospitalized since your last visit? \" No    2. \"Have you seen or consulted any other health care providers outside of the [address] [place] [address] since your last visit? \" No     3. For patients aged 39-70: Has the patient had a colonoscopy? NA - based on age     If the patient is female:    4. For patients aged 41-77: Has the patient had a mammogram within the past 2 years? NA - based on age    11. For patients aged 21-65: Has the patient had a pap smear?  NA - based on age

## 2022-04-19 DIAGNOSIS — I10 ESSENTIAL HYPERTENSION: ICD-10-CM

## 2022-04-19 RX ORDER — HYDROXYZINE PAMOATE 25 MG/1
25 CAPSULE ORAL
Qty: 90 CAPSULE | Refills: 0 | Status: SHIPPED | OUTPATIENT
Start: 2022-04-19 | End: 2022-07-07 | Stop reason: ALTCHOICE

## 2022-04-19 RX ORDER — LOSARTAN POTASSIUM 100 MG/1
100 TABLET ORAL DAILY
Qty: 90 TABLET | Refills: 1 | Status: SHIPPED | OUTPATIENT
Start: 2022-04-19 | End: 2022-06-02

## 2022-04-19 NOTE — TELEPHONE ENCOUNTER
Pt needs rx refill. Requested Prescriptions     Pending Prescriptions Disp Refills    hydrOXYzine pamoate (VISTARIL) 25 mg capsule 90 Capsule 0     Sig: Take 1 Capsule by mouth four (4) times daily as needed for Anxiety.  losartan (COZAAR) 100 mg tablet 90 Tablet 1     Sig: Take 1 Tablet by mouth daily. Pt stated that she is completely out of medication. Pharmacy has been verified.  Please advise

## 2022-05-02 RX ORDER — METOPROLOL TARTRATE 25 MG/1
TABLET, FILM COATED ORAL
Qty: 90 TABLET | Refills: 0 | Status: SHIPPED | OUTPATIENT
Start: 2022-05-02 | End: 2022-07-07 | Stop reason: ALTCHOICE

## 2022-05-03 DIAGNOSIS — J44.9 CHRONIC OBSTRUCTIVE PULMONARY DISEASE, UNSPECIFIED COPD TYPE (HCC): ICD-10-CM

## 2022-05-03 RX ORDER — METOPROLOL TARTRATE 25 MG/1
TABLET, FILM COATED ORAL
Qty: 90 TABLET | Refills: 0 | Status: CANCELLED | OUTPATIENT
Start: 2022-05-03

## 2022-05-03 NOTE — TELEPHONE ENCOUNTER
Lopressor was already sent to the pharmacy yesterday    Last Visit: 3/28/22 with MD Toño Alvarado  Next Appointment: 6/2/22 with MD Toño Alvarado  Previous Refill Encounter(s): 4/21/21 #1 with 1 refill    Requested Prescriptions     Pending Prescriptions Disp Refills    albuterol (PROVENTIL HFA, VENTOLIN HFA, PROAIR HFA) 90 mcg/actuation inhaler 1 Each 1     Sig: Take 2 Puffs by inhalation every four (4) hours as needed for Wheezing.

## 2022-05-03 NOTE — TELEPHONE ENCOUNTER
----- Message from Timbo Sumner sent at 5/2/2022 12:22 PM EDT -----  Subject: Refill Request    QUESTIONS  Name of Medication? metoprolol tartrate (LOPRESSOR) 25 mg tablet  Patient-reported dosage and instructions? TAKE 1/2 TABLET BY MOUTH TWICE   DAILY  How many days do you have left? 2  Preferred Pharmacy? [facility]  Pharmacy phone number (if available)? [phone]  Additional Information for Provider? Libby Fernandez has an appointment scheduled   on 6/2/22 at 3:15PM.  ---------------------------------------------------------------------------  --------------,  Name of Medication? albuterol (PROVENTIL HFA, VENTOLIN HFA, PROAIR HFA) 90   mcg/actuation inhaler  Patient-reported dosage and instructions? 2 puffs daily   How many days do you have left? 1  Preferred Pharmacy? [facility]  Pharmacy phone number (if available)? [phone]  Additional Information for Provider? Libby Fernandez stated that she takes 2 puffs   daily. She has an appointment scheduled on 6/2/22 at 3:15PM.  ---------------------------------------------------------------------------  --------------  CALL BACK INFO  What is the best way for the office to contact you? OK to leave message on   voicemail  Preferred Call Back Phone Number? [phone]  ---------------------------------------------------------------------------  --------------  SCRIPT ANSWERS  Relationship to Patient?  Self

## 2022-05-05 RX ORDER — ALBUTEROL SULFATE 90 UG/1
2 AEROSOL, METERED RESPIRATORY (INHALATION)
Qty: 1 EACH | Refills: 1 | Status: SHIPPED | OUTPATIENT
Start: 2022-05-05

## 2022-05-23 ENCOUNTER — APPOINTMENT (OUTPATIENT)
Dept: CT IMAGING | Age: 82
End: 2022-05-23
Attending: EMERGENCY MEDICINE
Payer: MEDICARE

## 2022-05-23 ENCOUNTER — HOSPITAL ENCOUNTER (EMERGENCY)
Age: 82
Discharge: HOME | End: 2022-05-23
Attending: EMERGENCY MEDICINE
Payer: MEDICARE

## 2022-05-23 VITALS
HEART RATE: 50 BPM | TEMPERATURE: 98.3 F | SYSTOLIC BLOOD PRESSURE: 121 MMHG | BODY MASS INDEX: 28.17 KG/M2 | OXYGEN SATURATION: 97 % | WEIGHT: 165 LBS | HEIGHT: 64 IN | DIASTOLIC BLOOD PRESSURE: 62 MMHG | RESPIRATION RATE: 20 BRPM

## 2022-05-23 DIAGNOSIS — S00.03XA CONTUSION OF SCALP, INITIAL ENCOUNTER: ICD-10-CM
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
## 2022-05-23 PROCEDURE — 70450 CT HEAD/BRAIN W/O DYE: CPT

## 2022-05-23 PROCEDURE — 99284 EMERGENCY DEPT VISIT MOD MDM: CPT

## 2022-05-23 PROCEDURE — 72125 CT NECK SPINE W/O DYE: CPT

## 2022-05-23 NOTE — ED TRIAGE NOTES
Patient arrives via EMS after having a ground-level fall while trying to open the door at "[employer]s. She denies becoming dizzy or losing consciousness. Small bloody area noted to the back of her head. She reports pain to the area with palpation. She is alert and oriented. Answers questions appropriately.

## 2022-05-29 NOTE — ED PROVIDER NOTES
EMERGENCY DEPARTMENT HISTORY AND PHYSICAL EXAM      Date: 5/23/2022  Patient Name: Christopher Bolden    History of Presenting Illness     Chief Complaint   Patient presents with    Fall       History Provided By: Patient    HPI: Christopher Bolden, 78 y.o. female with a past medical history significant hypertension, hyperlipidemia, COPD and Anemia presents to the ED with cc of fall. Patient had ground-level fall while trying to open the door at this. She denies any dizziness or loss of consciousness. She complains of a small area of back of head that is painful. She has no other complaints. There are no other complaints, changes, or physical findings at this time. PCP: Alma Knox MD    No current facility-administered medications on file prior to encounter. Current Outpatient Medications on File Prior to Encounter   Medication Sig Dispense Refill    albuterol (PROVENTIL HFA, VENTOLIN HFA, PROAIR HFA) 90 mcg/actuation inhaler Take 2 Puffs by inhalation every four (4) hours as needed for Wheezing. 1 Each 1    metoprolol tartrate (LOPRESSOR) 25 mg tablet TAKE 1/2 TABLET BY MOUTH TWICE DAILY 90 Tablet 0    hydrOXYzine pamoate (VISTARIL) 25 mg capsule Take 1 Capsule by mouth four (4) times daily as needed for Anxiety. 90 Capsule 0    losartan (COZAAR) 100 mg tablet Take 1 Tablet by mouth daily. 90 Tablet 1    sertraline (ZOLOFT) 100 mg tablet Take 1 Tablet by mouth daily. 90 Tablet 0    atorvastatin (LIPITOR) 40 mg tablet Take 1 Tablet by mouth daily. 90 Tablet 1    pantoprazole (PROTONIX) 40 mg tablet Take 1 Tablet by mouth daily for 360 days. Indications: gastroesophageal reflux disease 90 Tablet 3    aspirin delayed-release 81 mg tablet Take 81 mg by mouth daily.  budesonide-formoteroL (Symbicort) 80-4.5 mcg/actuation HFAA Take 2 Puffs by inhalation two (2) times a day.  10.2 g 1       Past History     Past Medical History:  Past Medical History:   Diagnosis Date    Allergic rhinitis     Anemia     Anxiety disorder     Chronic obstructive pulmonary disease (HCC)     Dyslipidemia     Folic acid deficiency     Hypertension     Neurologic disorder     resting tremor       Past Surgical History:  Past Surgical History:   Procedure Laterality Date    COLONOSCOPY N/A 10/26/2021    COLONOSCOPY w/ polypectomy performed by Yesica Woodruff MD at [facility] ENDOSCOPY       Family History:  Family History   Problem Relation Age of Onset    Cancer Father         stomach    Heart Disease Sister     Alcohol abuse Brother     Cancer Brother         lung       Social History:  Social History     Tobacco Use    Smoking status: Former Smoker     Packs/day: 1.00     Years: 50.00     Pack years: 50.00    Smokeless tobacco: Never Used   Vaping Use    Vaping Use: Never used   Substance Use Topics    Alcohol use: Not Currently    Drug use: Never       Allergies: Allergies   Allergen Reactions    Isopropyl Alcohol Other (comments)     Weakness all over - pt states she has out grown this    Pen-Vee K Rash     Pt. States she avoids penicillin due to allergy as a child. Review of Systems     Review of Systems   All other systems reviewed and are negative. Physical Exam     Physical Exam  Vitals and nursing note reviewed. Constitutional:       General: She is not in acute distress. Appearance: She is well-developed. She is not diaphoretic. HENT:      Head: Normocephalic. Jaw: No trismus. Comments: There is a small contusion occiput area with no bleeding. Right Ear: External ear normal. No swelling or tenderness. Tympanic membrane is not perforated, erythematous or bulging. Left Ear: External ear normal. No swelling or tenderness. Tympanic membrane is not perforated, erythematous or bulging. Nose: Nose normal. No mucosal edema or rhinorrhea. Right Sinus: No maxillary sinus tenderness or frontal sinus tenderness.       Left Sinus: No maxillary sinus tenderness or frontal sinus tenderness. Mouth/Throat:      Mouth: No oral lesions. Dentition: No dental abscesses. Pharynx: Uvula midline. No oropharyngeal exudate, posterior oropharyngeal erythema or uvula swelling. Tonsils: No tonsillar abscesses. Eyes:      General: No scleral icterus. Right eye: No discharge. Left eye: No discharge. Conjunctiva/sclera: Conjunctivae normal.   Cardiovascular:      Rate and Rhythm: Normal rate and regular rhythm. Heart sounds: Normal heart sounds. No murmur heard. No friction rub. No gallop. Pulmonary:      Effort: Pulmonary effort is normal. No tachypnea, accessory muscle usage or respiratory distress. Breath sounds: Normal breath sounds. No decreased breath sounds, wheezing, rhonchi or rales. Abdominal:      Palpations: Abdomen is soft. Musculoskeletal:         General: No tenderness. Normal range of motion. Cervical back: Normal range of motion and neck supple. Lymphadenopathy:      Cervical: No cervical adenopathy. Skin:     General: Skin is warm and dry. Findings: No erythema or rash. Neurological:      Mental Status: She is alert and oriented to person, place, and time. Psychiatric:         Judgment: Judgment normal.         Lab and Diagnostic Study Results     Labs -   No results found for this or any previous visit (from the past 12 hour(s)). Radiologic Studies -   @lastxrresult@  CT Results  (Last 48 hours)    None        CXR Results  (Last 48 hours)    None            Medical Decision Making   - I am the first provider for this patient. - I reviewed the vital signs, available nursing notes, past medical history, past surgical history, family history and social history. - Initial assessment performed. The patients presenting problems have been discussed, and they are in agreement with the care plan formulated and outlined with them.   I have encouraged them to ask questions as they arise throughout their visit. Vital Signs-Reviewed the patient's vital signs. No data found. Records Reviewed: Nursing Notes and Old Medical Records    The patient presents with fall with small contusion occiput. ED Course:          Provider Notes (Medical Decision Making):         Procedures   Medical Decision Makingedical Decision Making      Disposition   Disposition: Condition stable  DC- Adult Discharges: All of the diagnostic tests were reviewed and questions answered. Diagnosis, care plan and treatment options were discussed. The patient understands the instructions and will follow up as directed. The patients results have been reviewed with them. They have been counseled regarding their diagnosis. The patient verbally convey understanding and agreement of the signs, symptoms, diagnosis, treatment and prognosis and additionally agrees to follow up as recommended with their PCP in 24 - 48 hours. They also agree with the care-plan and convey that all of their questions have been answered. I have also put together some discharge instructions for them that include: 1) educational information regarding their diagnosis, 2) how to care for their diagnosis at home, as well a 3) list of reasons why they would want to return to the ED prior to their follow-up appointment, should their condition change. Diagnosis:   1. Fall, initial encounter    2.  Contusion of scalp, initial encounter          Disposition:     Follow-up Information     Follow up With Specialties Details Why Contact Info    Lakesha Kearns MD Family Medicine In 2 days  [facility] [address] [id number]  [phone]      [facility] EMERGENCY DEPT Emergency Medicine  If symptoms worsen [address] [facility]  [phone]          Discharge Medication List as of 5/23/2022  2:55 PM      CONTINUE these medications which have NOT CHANGED    Details   albuterol (PROVENTIL HFA, VENTOLIN HFA, PROAIR HFA) 90 mcg/actuation inhaler Take 2 Puffs by inhalation every four (4) hours as needed for Wheezing., Normal, Disp-1 Each, R-1      metoprolol tartrate (LOPRESSOR) 25 mg tablet TAKE 1/2 TABLET BY MOUTH TWICE DAILY, Normal, Disp-90 Tablet, R-0      hydrOXYzine pamoate (VISTARIL) 25 mg capsule Take 1 Capsule by mouth four (4) times daily as needed for Anxiety., Normal, Disp-90 Capsule, R-0      losartan (COZAAR) 100 mg tablet Take 1 Tablet by mouth daily. , Normal, Disp-90 Tablet, R-1Needs refill has taken the previous rx wrong      sertraline (ZOLOFT) 100 mg tablet Take 1 Tablet by mouth daily. , Normal, Disp-90 Tablet, R-0      atorvastatin (LIPITOR) 40 mg tablet Take 1 Tablet by mouth daily. , Normal, Disp-90 Tablet, R-1      pantoprazole (PROTONIX) 40 mg tablet Take 1 Tablet by mouth daily for 360 days. Indications: gastroesophageal reflux disease, Normal, Disp-90 Tablet, R-3      aspirin delayed-release 81 mg tablet Take 81 mg by mouth daily. , Historical Med      budesonide-formoteroL (Symbicort) 80-4.5 mcg/actuation HFAA Take 2 Puffs by inhalation two (2) times a day., Normal, Disp-10.2 g, R-1             DISCHARGE PLAN:  1. Cannot display discharge medications since this patient is not currently admitted. 2.   Follow-up Information     Follow up With Specialties Details Why Contact Info    Mainor Bauer MD Family Medicine In 2 days  [facility] [id number] [phone]      [facility] EMERGENCY DEPT Emergency Medicine  If symptoms worsen [facility] [address] [id number] [phone]        3. Return to ED if worse   4. Discharge Medication List as of 5/23/2022  2:55 PM            Diagnosis     Clinical Impression:   1. Fall, initial encounter    2. Contusion of scalp, initial encounter        Attestations:    Qiana Miranda MD    Please note that this dictation was completed with wooju, the [employer] voice recognition software.   Quite often unanticipated grammatical, syntax, homophones, and other interpretive errors are inadvertently transcribed by the computer software. Please disregard these errors. Please excuse any errors that have escaped final proofreading. Thank you.

## 2022-06-02 ENCOUNTER — OFFICE VISIT (OUTPATIENT)
Dept: FAMILY MEDICINE CLINIC | Age: 82
End: 2022-06-02
Payer: MEDICARE

## 2022-06-02 VITALS
HEART RATE: 53 BPM | WEIGHT: 154 LBS | DIASTOLIC BLOOD PRESSURE: 59 MMHG | SYSTOLIC BLOOD PRESSURE: 101 MMHG | HEIGHT: 64 IN | OXYGEN SATURATION: 96 % | TEMPERATURE: 97.5 F | BODY MASS INDEX: 26.29 KG/M2

## 2022-06-02 DIAGNOSIS — G25.2 COARSE TREMORS: Primary | ICD-10-CM

## 2022-06-02 DIAGNOSIS — I10 ESSENTIAL HYPERTENSION: ICD-10-CM

## 2022-06-02 PROCEDURE — 1101F PT FALLS ASSESS-DOCD LE1/YR: CPT | Performed by: STUDENT IN AN ORGANIZED HEALTH CARE EDUCATION/TRAINING PROGRAM

## 2022-06-02 PROCEDURE — G8536 NO DOC ELDER MAL SCRN: HCPCS | Performed by: STUDENT IN AN ORGANIZED HEALTH CARE EDUCATION/TRAINING PROGRAM

## 2022-06-02 PROCEDURE — 1090F PRES/ABSN URINE INCON ASSESS: CPT | Performed by: STUDENT IN AN ORGANIZED HEALTH CARE EDUCATION/TRAINING PROGRAM

## 2022-06-02 PROCEDURE — G8428 CUR MEDS NOT DOCUMENT: HCPCS | Performed by: STUDENT IN AN ORGANIZED HEALTH CARE EDUCATION/TRAINING PROGRAM

## 2022-06-02 PROCEDURE — G8754 DIAS BP LESS 90: HCPCS | Performed by: STUDENT IN AN ORGANIZED HEALTH CARE EDUCATION/TRAINING PROGRAM

## 2022-06-02 PROCEDURE — 99214 OFFICE O/P EST MOD 30 MIN: CPT | Performed by: STUDENT IN AN ORGANIZED HEALTH CARE EDUCATION/TRAINING PROGRAM

## 2022-06-02 PROCEDURE — G8510 SCR DEP NEG, NO PLAN REQD: HCPCS | Performed by: STUDENT IN AN ORGANIZED HEALTH CARE EDUCATION/TRAINING PROGRAM

## 2022-06-02 PROCEDURE — G8417 CALC BMI ABV UP PARAM F/U: HCPCS | Performed by: STUDENT IN AN ORGANIZED HEALTH CARE EDUCATION/TRAINING PROGRAM

## 2022-06-02 PROCEDURE — 1123F ACP DISCUSS/DSCN MKR DOCD: CPT | Performed by: STUDENT IN AN ORGANIZED HEALTH CARE EDUCATION/TRAINING PROGRAM

## 2022-06-02 PROCEDURE — G8752 SYS BP LESS 140: HCPCS | Performed by: STUDENT IN AN ORGANIZED HEALTH CARE EDUCATION/TRAINING PROGRAM

## 2022-06-02 PROCEDURE — G8400 PT W/DXA NO RESULTS DOC: HCPCS | Performed by: STUDENT IN AN ORGANIZED HEALTH CARE EDUCATION/TRAINING PROGRAM

## 2022-06-02 RX ORDER — PRIMIDONE 50 MG/1
50 TABLET ORAL DAILY
Qty: 30 TABLET | Refills: 0 | Status: SHIPPED | OUTPATIENT
Start: 2022-06-02 | End: 2022-07-01

## 2022-06-02 RX ORDER — LOSARTAN POTASSIUM 50 MG/1
50 TABLET ORAL DAILY
Qty: 90 TABLET | Refills: 0 | Status: SHIPPED | OUTPATIENT
Start: 2022-06-02 | End: 2022-07-07 | Stop reason: ALTCHOICE

## 2022-06-02 NOTE — PROGRESS NOTES
Betsy Meek presents today for   Chief Complaint   Patient presents with    Follow-up       Is someone accompanying this pt? No     Is the patient using any DME equipment during OV? No     Depression Screening:  3 most recent PHQ Screens 6/2/2022   Little interest or pleasure in doing things Not at all   Feeling down, depressed, irritable, or hopeless Several days   Total Score PHQ 2 1   Trouble falling or staying asleep, or sleeping too much -   Feeling tired or having little energy -   Poor appetite, weight loss, or overeating -   Feeling bad about yourself - or that you are a failure or have let yourself or your family down -   Trouble concentrating on things such as school, work, reading, or watching TV -   Moving or speaking so slowly that other people could have noticed; or the opposite being so fidgety that others notice -   Thoughts of being better off dead, or hurting yourself in some way -   PHQ 9 Score -   How difficult have these problems made it for you to do your work, take care of your home and get along with others -       Learning Assessment:  Learning Assessment 12/3/2020   PRIMARY LEARNER Patient   HIGHEST LEVEL OF EDUCATION - PRIMARY LEARNER  DID NOT GRADUATE HIGH SCHOOL   BARRIERS PRIMARY LEARNER NONE   PRIMARY LANGUAGE ENGLISH   LEARNER PREFERENCE PRIMARY READING   ANSWERED BY patient   RELATIONSHIP SELF       Fall Risk  Fall Risk Assessment, last 12 mths 3/28/2022   Able to walk? Yes   Fall in past 12 months? 0   Do you feel unsteady? 0   Are you worried about falling 0   Is the gait abnormal? -   Number of falls in past 12 months -   Fall with injury? -       Health Maintenance reviewed and discussed and ordered per Provider.     Health Maintenance Due   Topic Date Due    Pneumococcal 65+ years (1 - PCV) Never done    DTaP/Tdap/Td series (1 - Tdap) Never done    Shingrix Vaccine Age 50> (1 of 2) Never done    Bone Densitometry (Dexa) Screening  Never done    COVID-19 Vaccine (2 - Moderna 3-dose series) 02/23/2021    Lipid Screen  12/11/2021   . Coordination of Care:    1. \"Have you been to the ER, urgent care clinic since your last visit? Hospitalized since your last visit? \" Yes When: 5/23/22 Where: [facility] Reason for visit: fall     2. \"Have you seen or consulted any other health care providers outside of the [address] [place] [address] since your last visit? \" No     3. For patients aged 39-70: Has the patient had a colonoscopy? NA - based on age     If the patient is female:    4. For patients aged 41-77: Has the patient had a mammogram within the past 2 years? NA - based on age    11. For patients aged 21-65: Has the patient had a pap smear?  NA - based on age

## 2022-06-05 NOTE — PROGRESS NOTES
Subjective:   Nida Brown is a 78 y.o. female who was seen for Follow-up    Patient has uncontrolled tremors and started having since her Xanax was discontinued. Her anxiety is well controlled and has not been taking her Zoloft. She also had a recent fall last week and felt dizzy. Home Medications    Medication Sig Start Date End Date Taking? Authorizing Provider   losartan (COZAAR) 50 mg tablet Take 1 Tablet by mouth daily. 6/2/22  Yes Bk Baez MD   primidone (MYSOLINE) 50 mg tablet Take 1 Tablet by mouth daily. 6/2/22  Yes Bk Baez MD   albuterol (PROVENTIL HFA, VENTOLIN HFA, PROAIR HFA) 90 mcg/actuation inhaler Take 2 Puffs by inhalation every four (4) hours as needed for Wheezing. 5/5/22  Yes Bk Baez MD   metoprolol tartrate (LOPRESSOR) 25 mg tablet TAKE 1/2 TABLET BY MOUTH TWICE DAILY 5/2/22  Yes Bk Baez MD   hydrOXYzine pamoate (VISTARIL) 25 mg capsule Take 1 Capsule by mouth four (4) times daily as needed for Anxiety. 4/19/22  Yes Bk Baez MD   atorvastatin (LIPITOR) 40 mg tablet Take 1 Tablet by mouth daily. 3/17/22  Yes Bk Baez MD   pantoprazole (PROTONIX) 40 mg tablet Take 1 Tablet by mouth daily for 360 days. Indications: gastroesophageal reflux disease 2/24/22 2/19/23 Yes Darrin Medina MD   aspirin delayed-release 81 mg tablet Take 81 mg by mouth daily. Yes Provider, Historical   budesonide-formoteroL (Symbicort) 80-4.5 mcg/actuation HFAA Take 2 Puffs by inhalation two (2) times a day. 10/18/21  Yes Bk Baez MD      Allergies   Allergen Reactions    Isopropyl Alcohol Other (comments)     Weakness all over - pt states she has out grown this    Pen-Vee K Rash     Pt. States she avoids penicillin due to allergy as a child.       Social History     Tobacco Use    Smoking status: Former Smoker     Packs/day: 1.00     Years: 50.00     Pack years: 50.00    Smokeless tobacco: Never Used   Vaping Use    Vaping Use: Never used   Substance Use Topics  Alcohol use: Not Currently    Drug use: Never        Review of Systems   All other systems reviewed and are negative. Objective:     Visit Vitals  BP (!) 101/59 (BP 1 Location: Right upper arm, BP Patient Position: Sitting, BP Cuff Size: Adult)   Pulse (!) 53   Temp 97.5 °F (36.4 °C) (Temporal)   Ht 5' 4\" (1.626 m)   Wt 154 lb (69.9 kg)   SpO2 96%   BMI 26.43 kg/m²        General: alert, oriented, not in distress  Chest/Lungs: clear breath sounds, no wheezing or crackles  Heart: normal rate, regular rhythm, no murmur  Abdomen: soft, non-distended, non-tender, normal bowel sounds, no organomegaly, no masses  Extremities: no focal deformities, no edema  Skin: no active skin lesions      Assessment & Plan:     1. Coarse tremors  Uncontrolled. We will start primidone    2. Essential hypertension  Blood pressures were low. Has had recent follow-up. We will reduce losartan to 50 mg daily and continue metoprolol. Follow-up 1 month             I have discussed the diagnosis with the patient and the intended plan as seen in the above orders. The patient has received an after-visit summary and questions were answered concerning future plans. I have discussed medication side effects and warnings with the patient as well. I have reviewed the plan of care with the patient, accepted their input and they are in agreement with the treatment goals. Previous lab and imaging results were reviewed by me.        Ernie Broussard MD  June 5, 2022

## 2022-07-01 RX ORDER — PRIMIDONE 50 MG/1
50 TABLET ORAL DAILY
Qty: 30 TABLET | Refills: 0 | OUTPATIENT
Start: 2022-07-01

## 2022-07-01 RX ORDER — PRIMIDONE 50 MG/1
50 TABLET ORAL DAILY
Qty: 30 TABLET | Refills: 0 | Status: SHIPPED | OUTPATIENT
Start: 2022-07-01 | End: 2022-07-07 | Stop reason: SDUPTHER

## 2022-07-07 ENCOUNTER — OFFICE VISIT (OUTPATIENT)
Dept: INTERNAL MEDICINE CLINIC | Age: 82
End: 2022-07-07
Payer: MEDICARE

## 2022-07-07 VITALS
TEMPERATURE: 97.6 F | SYSTOLIC BLOOD PRESSURE: 127 MMHG | DIASTOLIC BLOOD PRESSURE: 85 MMHG | BODY MASS INDEX: 26.84 KG/M2 | OXYGEN SATURATION: 96 % | RESPIRATION RATE: 22 BRPM | WEIGHT: 157.2 LBS | HEIGHT: 64 IN | HEART RATE: 63 BPM

## 2022-07-07 DIAGNOSIS — R54 FRAILTY SYNDROME IN GERIATRIC PATIENT: ICD-10-CM

## 2022-07-07 DIAGNOSIS — R29.6 RECURRENT FALLS: ICD-10-CM

## 2022-07-07 DIAGNOSIS — Z91.81 AT HIGH RISK FOR FALLS: ICD-10-CM

## 2022-07-07 DIAGNOSIS — I10 ESSENTIAL HYPERTENSION: Primary | ICD-10-CM

## 2022-07-07 DIAGNOSIS — G25.0 ESSENTIAL TREMOR: ICD-10-CM

## 2022-07-07 DIAGNOSIS — R26.89 IMBALANCE: ICD-10-CM

## 2022-07-07 DIAGNOSIS — Z74.09 IMPAIRED MOBILITY AND ADLS: ICD-10-CM

## 2022-07-07 DIAGNOSIS — Z78.9 IMPAIRED MOBILITY AND ADLS: ICD-10-CM

## 2022-07-07 DIAGNOSIS — J44.9 CHRONIC OBSTRUCTIVE PULMONARY DISEASE, UNSPECIFIED COPD TYPE (HCC): ICD-10-CM

## 2022-07-07 DIAGNOSIS — E53.9 VITAMIN B DEFICIENCY: ICD-10-CM

## 2022-07-07 DIAGNOSIS — I44.7 LBBB (LEFT BUNDLE BRANCH BLOCK): ICD-10-CM

## 2022-07-07 DIAGNOSIS — F41.9 ANXIETY: ICD-10-CM

## 2022-07-07 DIAGNOSIS — I65.23 CAROTID STENOSIS, BILATERAL: ICD-10-CM

## 2022-07-07 DIAGNOSIS — Z78.0 POST-MENOPAUSAL: ICD-10-CM

## 2022-07-07 DIAGNOSIS — J30.1 NON-SEASONAL ALLERGIC RHINITIS DUE TO POLLEN: ICD-10-CM

## 2022-07-07 DIAGNOSIS — R53.81 PHYSICAL DECONDITIONING: ICD-10-CM

## 2022-07-07 DIAGNOSIS — R51.9 HEADACHE, TEMPORAL: ICD-10-CM

## 2022-07-07 DIAGNOSIS — I67.1 MIDDLE CEREBRAL ARTERY ANEURYSM: ICD-10-CM

## 2022-07-07 DIAGNOSIS — I51.89 DIASTOLIC DYSFUNCTION: ICD-10-CM

## 2022-07-07 DIAGNOSIS — E55.9 VITAMIN D DEFICIENCY: ICD-10-CM

## 2022-07-07 DIAGNOSIS — Z86.79 HISTORY OF ATRIAL FIBRILLATION: ICD-10-CM

## 2022-07-07 DIAGNOSIS — E78.2 MIXED HYPERLIPIDEMIA: ICD-10-CM

## 2022-07-07 PROCEDURE — 99214 OFFICE O/P EST MOD 30 MIN: CPT | Performed by: FAMILY MEDICINE

## 2022-07-07 PROCEDURE — 1123F ACP DISCUSS/DSCN MKR DOCD: CPT | Performed by: FAMILY MEDICINE

## 2022-07-07 PROCEDURE — 36415 COLL VENOUS BLD VENIPUNCTURE: CPT | Performed by: FAMILY MEDICINE

## 2022-07-07 RX ORDER — LOSARTAN POTASSIUM 25 MG/1
25 TABLET ORAL DAILY
Qty: 30 TABLET | Refills: 0 | Status: SHIPPED | OUTPATIENT
Start: 2022-07-07 | End: 2022-07-07

## 2022-07-07 RX ORDER — PROPRANOLOL HYDROCHLORIDE 80 MG/1
80 CAPSULE, EXTENDED RELEASE ORAL DAILY
Qty: 60 CAPSULE | Refills: 0 | Status: SHIPPED | OUTPATIENT
Start: 2022-07-07 | End: 2022-07-07

## 2022-07-07 RX ORDER — VITAMIN B COMPLEX
1 CAPSULE ORAL DAILY
Qty: 90 CAPSULE | Refills: 1 | Status: SHIPPED | OUTPATIENT
Start: 2022-07-07

## 2022-07-07 RX ORDER — BUDESONIDE AND FORMOTEROL FUMARATE DIHYDRATE 80; 4.5 UG/1; UG/1
2 AEROSOL RESPIRATORY (INHALATION) 2 TIMES DAILY
Qty: 10.2 G | Refills: 1 | Status: SHIPPED | OUTPATIENT
Start: 2022-07-07

## 2022-07-07 RX ORDER — FLUTICASONE PROPIONATE 50 MCG
SPRAY, SUSPENSION (ML) NASAL
Qty: 1 EACH | Refills: 1 | Status: SHIPPED | OUTPATIENT
Start: 2022-07-07 | End: 2022-07-07

## 2022-07-07 RX ORDER — PRIMIDONE 50 MG/1
75 TABLET ORAL DAILY
Qty: 45 TABLET | Refills: 0 | Status: SHIPPED | OUTPATIENT
Start: 2022-07-07 | End: 2022-07-25 | Stop reason: SDUPTHER

## 2022-07-07 NOTE — PROGRESS NOTES
Verbal order from Keith Fyre MD to order labs/sign and draw them in office    Labs were drawn and sent to [facility] by Jp Bañuelos LPN:    The following tubes were sent:    2 Lavendar, 0 Red, 4 SST, 0 Urine    Draw site right antecubital.  Patient tolerated draw with no distress.

## 2022-07-07 NOTE — PATIENT INSTRUCTIONS
Ms. Carlos Isaac,  It is very nice to meet you today. For your tremor and the narrowing to some of the blood vessels in your neck as well as the small aneurysm, I have referred you to a Neurologist who also specializes in movement disorders, Dr. Erin De Luna. I considered starting propranolol to see if this would help with your tremors, however your resting heart rate is already low and since you have a history of \"conduction abnormality\" with your heart I feel that it would be safer to increase your primidone to 1-1/2 tablets (for a total of 75 mg daily), until you are evaluated by Cardiology. Since your blood pressure has been on the lower range, and because of your recurrent falls I have reduced your losartan from 50 mg to 25 mg daily. As we discussed, it appears that she may have had a \"silent heart attack\" in the past, and you now have intermittent \"A. fib\" and a conduction abnormality called a \"left bundle branch block\", so I am referring you to an excellent Cardiologist, Dr. Gabriela Adams, to have this further evaluated. To help with your COPD, I have refilled your Symbicort; please use 1 to 2 puffs twice a day and be sure to rinse your mouth after use, please try to cut down on the as needed albuterol (the blue inhaler) since that can worsen tremors and is not ideal for every day use. I have also sent in a nasal spray to help with your allergies called Flonase. I have referred you to home health so that some therapists can come to you to help with strength/balance/endurance training, with a goal to help reduce your risk for falls and injury. I would like for you to start a B complex vitamin, I have sent in a prescription although you can also buy this over-the-counter please assure that it has B1/thiamine, B9/folate, B12/cyanocobalamin. Please bring all of your medications to your next visit so that we can review them together.     Thank you,  Baldo Hall MD    We have collected a lot of labs today, we can discuss these in detail during her next visit.

## 2022-07-07 NOTE — PROGRESS NOTES
Paient here today to establish care, states she feels bad, states left side of her head feels like something wrong. She has been having frequent falls. SOB noticed with activity/ambulation. Patient does have COPD. Having tremors, noticed very shakey as well. PATIENT REFUSES BONE DENSITY, STATES SHE DOES NOT WISH TO HAVE ONE DONE    Maine Cough presents today for   Chief Complaint   Patient presents with   [address]       Is someone accompanying this pt? no  Is the patient using any DME equipment during OV? Yes,cane    Depression Screening:  3 most recent PHQ Screens 7/7/2022   Little interest or pleasure in doing things Not at all   Feeling down, depressed, irritable, or hopeless Nearly every day   Total Score PHQ 2 3   Trouble falling or staying asleep, or sleeping too much More than half the days   Feeling tired or having little energy More than half the days   Poor appetite, weight loss, or overeating Not at all   Feeling bad about yourself - or that you are a failure or have let yourself or your family down Not at all   Trouble concentrating on things such as school, work, reading, or watching TV Several days   Moving or speaking so slowly that other people could have noticed; or the opposite being so fidgety that others notice Not at all   Thoughts of being better off dead, or hurting yourself in some way Not at all   PHQ 9 Score 8   How difficult have these problems made it for you to do your work, take care of your home and get along with others Very difficult       Learning Assessment:  Learning Assessment 12/3/2020   PRIMARY LEARNER Patient   HIGHEST LEVEL OF EDUCATION - PRIMARY LEARNER  DID NOT GRADUATE HIGH SCHOOL   BARRIERS PRIMARY LEARNER NONE   PRIMARY LANGUAGE ENGLISH   LEARNER PREFERENCE PRIMARY READING   ANSWERED BY patient   RELATIONSHIP SELF       Fall Risk  Fall Risk Assessment, last 12 mths 7/7/2022   Able to walk? Yes   Fall in past 12 months? 0   Do you feel unsteady?  0   Are you worried about falling 0   Is the gait abnormal? -   Number of falls in past 12 months -   Fall with injury? -       ADL  ADL Assessment 7/7/2022   Feeding yourself No Help Needed   Getting from bed to chair No Help Needed   Getting dressed No Help Needed   Bathing or showering No Help Needed   Walk across the room (includes cane/walker) No Help Needed   Using the telphone No Help Needed   Taking your medications No Help Needed   Preparing meals No Help Needed   Managing money (expenses/bills) No Help Needed   Moderately strenuous housework (laundry) No Help Needed   Shopping for personal items (toiletries/medicines) No Help Needed   Shopping for groceries No Help Needed   Driving No Help Needed   Climbing a flight of stairs No Help Needed   Getting to places beyond walking distances No Help Needed       Health Maintenance reviewed and discussed and ordered per Provider. Health Maintenance Due   Topic Date Due    Pneumococcal 65+ years (1 - PCV) Never done    DTaP/Tdap/Td series (1 - Tdap) Never done    Shingrix Vaccine Age 50> (1 of 2) Never done    Bone Densitometry (Dexa) Screening  Never done    COVID-19 Vaccine (3 - Booster for Moderna series) 07/23/2021    Lipid Screen  12/11/2021   . Coordination of Care:  1. \"Have you been to the ER, urgent care clinic since your last visit? Hospitalized since your last visit? \" Yes When: couple mths ago [facility] fall [facility]    2. \"Have you seen or consulted any other health care providers outside of the [address] [place] [address] [place] since your last visit? \" No     3. For patients aged 39-70: Has the patient had a colonoscopy? NA - based on age     If the patient is female:    4. For patients aged 41-77: Has the patient had a mammogram within the past 2 years? NA - based on age    11. For patients aged 21-65: Has the patient had a pap smear?  NA - based on age

## 2022-07-07 NOTE — PROGRESS NOTES
Gail Marquez (: 1940) is a 78 y.o. female here for evaluation of the following chief concerns(s):  Establish Care previously under the care of Dr. Cindi Harris [facility]). Chronic condition management    ASSESSMENT/PLAN:  1. Essential hypertension  Blood pressure is well to slightly overcontrolled, in the setting of recent likely syncopal events and low range blood pressures will reduce losartan from 50 mg to 25 mg daily with close monitoring, goal <140/90.  -     COLLECTION VENOUS BLOOD,VENIPUNCTURE  -     METABOLIC PANEL, COMPREHENSIVE  -     CBC WITH AUTOMATED DIFF  -     TSH 3RD GENERATION  -     MAGNESIUM    2. Essential tremor  Small up titration to primidone to help with tremor, will avoid beta-blocker until patient is seen by Cardiology for possible underlying arrhythmia, at risk for bradycardia/syncope. -     ANTINUCLEAR ANTIBODIES, IFA  -     REFERRAL TO NEUROLOGY  -     primidone (MYSOLINE) 50 mg tablet; Take 1.5 Tablets by mouth daily. , Normal, Disp-45 Tablet, R-0    3. Anxiety  Continue current dosing of sertraline, we will continue to work on her symptoms of anxiety, she may benefit from transition to another medication such as citalopram, will avoid benzodiazepines given her history of recurrent falls and frailty. -     sertraline (ZOLOFT) 100 mg tablet; Take 1 Tablet by mouth daily. , Normal, Disp-90 Tablet, R-0    4. Chronic obstructive pulmonary disease, unspecified COPD type ([address]  Stable respiratory status at this time, continue Symbicort and we discussed dose administration with the importance of rinsing mouth after.  -     budesonide-formoteroL (Symbicort) 80-4.5 mcg/actuation HFAA; Take 2 Puffs by inhalation two (2) times a day. Rinse mouth after use., Normal, Disp-10.2 g, R-1    5.  Middle cerebral artery aneurysm  Refer to neurology for further evaluation of aneurysm, it sounds asymptomatic at this time however family history of ruptured cerebral aneurysm.  -     REFERRAL TO NEUROLOGY    6. Headache, temporal  Will evaluate for polymyalgia rheumatica, temporal arteritis. -     C REACTIVE PROTEIN, QT  -     SED RATE (ESR)  -     ANTINUCLEAR ANTIBODIES, IFA    7. LBBB (left bundle branch block)  Patient was unaware of history of conduction abnormality, possibility of \"silent heart attack\" as well as paroxysmal A. fib; refer to cardiology for further evaluation and treatment.  -     REFERRAL TO CARDIOLOGY    8. History of atrial fibrillation  -     REFERRAL TO CARDIOLOGY    9. Carotid stenosis, bilateral  -     REFERRAL TO NEUROLOGY    10. Diastolic dysfunction  -     REFERRAL TO CARDIOLOGY    11. Mixed hyperlipidemia  -     LIPID PANEL  -     COLLECTION VENOUS BLOOD,VENIPUNCTURE    12. Non-seasonal allergic rhinitis due to pollen    13. Vitamin B deficiency  -     VITAMIN B12 & FOLATE  -     vitamin B complex capsule; Take 1 Capsule by mouth daily. , Normal, Disp-90 Capsule, R-1TO INCLUDE B12 1,000MCG AS WELL AS THIAMINE AND FOLATE. 14. Post-menopausal    15. Imbalance  -     VITAMIN B12 & FOLATE  -     ANTINUCLEAR ANTIBODIES, IFA    16. Vitamin D deficiency  -     VITAMIN D, 25 HYDROXY    17. Frailty syndrome in geriatric patient  Patient would benefit from and agrees to referral to home health for PT/OT to evaluate and treat for strength/balance/endurance training.  -     REFERRAL TO [facility]    18. Physical deconditioning  -     [address]    19. Impaired mobility and ADLs  -     REFERRAL TO HOME HEALTH    20. At high risk for falls  -     [address]    21. Recurrent falls  -     REFERRAL TO CARDIOLOGY  -     REFERRAL TO NEUROLOGY  -     REFERRAL TO [facility]    Return in about 2 weeks (around 7/21/2022) for follow-up chronic conditions. Emmanuel Denise agrees with plan as above and has no additional questions at this time.        SUBJECTIVE/OBJECTIVE:  Ms. Mariah Brewster is an 78-year of age female past medical history significant for hypertension, hyperlipidemia, COPD, anemia and [place] acid deficiency, anxiety, former smoker with approximately 50-pack-year history. She states that she has been feeling poorly for the past 3-4 days, \"a bad feeling, like I don't have any use of anything\", pain in temples- lasts seconds and resolves, chronic tremors- worsened in the past few days, history of frequent falls. She has not had a fever, abd pain, nausea, diarrhea, changes to smell/tasts, cognitive changes. She uses a cane for mobility. She lives w/ her son who helps her w/ iADLs. Patient was last seen in the ER on 5/29/2022 for fall. 5/23/22 CT head:   FINDINGS: Posterior scalp hematoma. Moderate intracranial atrophy. No mass effect, midline shift or hemorrhage. Ventricles are normal in size, position and configuration. Scattered and confluent foci of decreased attenuation noted of the periventricular white matter, nonspecific, but most likely sequelae of  chronic microvascular disease. No abnormal extra-axial fluid collections are seen. No territorial signs of infarct. The bony calvarium appears intact without acute displaced fracture. The visualized paranasal sinuses and mastoid air cells are aerated. IMPRESSION 1. No acute intracranial pathology. 1/17/2022 ECHO:Left Ventricle: Left ventricle size is normal. Mildly increased wall thickness. Findings consistent with mild concentric hypertrophy. Normal wall motion. Normal left ventricular systolic function with a visually estimated EF of 50 - 65%. EF by 2D Simpsons Biplane is 58%. Grade I diastolic dysfunction with normal LAP. Aortic Valve: Valve structure is tricuspid. Mild sclerosis of the aortic valve cusps. 1/12/22 CTA head:  IMPRESSION  1. No definite large vessel occlusion. 2.  Moderate proximal bilateral ICA stenosis estimated 54% on the left and 45% on the right, based on NASCET criteria. 3. No high-grade vertebral artery stenosis.   4. No high-grade intracranial stenosis. Mild bilateral carotid siphon atherosclerotic narrowing. 5. Small right MCA bifurcation aneurysm. 1/16/2022   CBC within normal limits  CMP within normal limits, blood glucose 112  UA with trace ketones, small leuk esterase, 5-10 WBCs; urine culture with no growth  UDS positive for benzodiazepine    6/20/2021 heme positive stools    4/22/2021 TSH 0.91    1/17/2021 B12 331    Previous EKG was read with Afib, left bundle branch block. Tremor, essential:   She takes Primidone 50mg daily. COPD:  She uses her Albuterol once per day. She also uses Symbicort. Occasional SOB and cough- none recently. HTN:  She takes Losartan 50mg every day. Anxiety:  Sertraline 100mg every day, she does not feel like this is helping. She is no longer taking hydroxizine. Hx she took Benzodiazapine. ROS: No CP, palp.       Past Medical History:   Diagnosis Date    Allergic rhinitis     Anemia     Anxiety disorder     Chronic obstructive pulmonary disease ([facility] [address]     Dyslipidemia     Folic acid deficiency     Hypertension     Neurologic disorder     resting tremor     Past Surgical History:   Procedure Laterality Date    COLONOSCOPY N/A 10/26/2021    COLONOSCOPY w/ polypectomy performed by Natanael Torres MD at [facility] ENDOSCOPY     Family History   Problem Relation Age of Onset    Cancer Father         stomach    Heart Disease Sister     Alcohol abuse Brother     Cancer Brother         lung       Social History     Socioeconomic History    Marital status:      Spouse name: Not on file    Number of children: Not on file    Years of education: Not on file    Highest education level: Not on file   Occupational History    Not on file   Tobacco Use    Smoking status: Former Smoker     Packs/day: 1.00     Years: 50.00     Pack years: 50.00    Smokeless tobacco: Never Used   Vaping Use    Vaping Use: Never used   Substance and Sexual Activity    Alcohol use: Not Currently   Zenobia Mancia Drug use: Never    Sexual activity: Not Currently   Other Topics Concern    Not on file   Social History Narrative    Not on file     Social Determinants of Health     Financial Resource Strain:     Difficulty of Paying Living Expenses: Not on file   Food Insecurity:     Worried About Running Out of Food in the Last Year: Not on file    Sunshine of Food in the Last Year: Not on file   Transportation Needs:     Lack of Transportation (Medical): Not on file    Lack of Transportation (Non-Medical): Not on file   Physical Activity:     Days of Exercise per Week: Not on file    Minutes of Exercise per Session: Not on file   Stress:     Feeling of Stress : Not on file   Social Connections:     Frequency of Communication with Friends and Family: Not on file    Frequency of Social Gatherings with Friends and Family: Not on file    Attends Cheondoism Services: Not on file    Active Member of [address] [place] [address] [employer] or Organizations: Not on file    Attends Club or Organization Meetings: Not on file    Marital Status: Not on file   Intimate Partner Violence:     Fear of Current or Ex-Partner: Not on file    Emotionally Abused: Not on file    Physically Abused: Not on file    Sexually Abused: Not on file   Housing Stability:     Unable to Pay for Housing in the Last Year: Not on file    Number of [place] in the Last Year: Not on file    Unstable Housing in the Last Year: Not on file     Social History     Tobacco Use   Smoking Status Former Smoker    Packs/day: 1.00    Years: 50.00    Pack years: 50.00   Smokeless Tobacco Never Used       Current Outpatient Medications   Medication Sig Dispense Refill    sertraline (ZOLOFT) 100 mg tablet Take 1 Tablet by mouth daily. 90 Tablet 0    budesonide-formoteroL (Symbicort) 80-4.5 mcg/actuation HFAA Take 2 Puffs by inhalation two (2) times a day. Rinse mouth after use. 10.2 g 1    vitamin B complex capsule Take 1 Capsule by mouth daily.  90 Capsule 1    primidone (MYSOLINE) 50 mg tablet Take 1.5 Tablets by mouth daily. 45 Tablet 0    albuterol (PROVENTIL HFA, VENTOLIN HFA, PROAIR HFA) 90 mcg/actuation inhaler Take 2 Puffs by inhalation every four (4) hours as needed for Wheezing. 1 Each 1    atorvastatin (LIPITOR) 40 mg tablet Take 1 Tablet by mouth daily. 90 Tablet 1    pantoprazole (PROTONIX) 40 mg tablet Take 1 Tablet by mouth daily for 360 days. Indications: gastroesophageal reflux disease 90 Tablet 3    aspirin delayed-release 81 mg tablet Take 81 mg by mouth daily.  fluticasone propionate (FLONASE) 50 mcg/actuation nasal spray SHAKE LIQUID AND USE 2 SPRAYS IN EACH NOSTRIL DAILY 48 g 1    losartan (COZAAR) 25 mg tablet TAKE 1 TABLET BY MOUTH DAILY LOWER DOSE. TAKE IN PLACE OF LOSARTAN 50MG 30 Tablet 0     Allergies   Allergen Reactions    Isopropyl Alcohol Other (comments)     Weakness all over - pt states she has out grown this    [employer]     Pt. States she avoids penicillin due to allergy as a child. /85   Pulse 63   Temp 97.6 °F (36.4 °C)   Resp 22   Ht 5' 4\" (1.626 m)   Wt 157 lb 3.2 oz (71.3 kg)   SpO2 96%   BMI 26.98 kg/m²     Physical Exam  Constitutional:       General: She is not in acute distress. Appearance: She is not ill-appearing. Comments: Frail appearing elderly lady. HENT:      Head: Normocephalic and atraumatic. Nose: Nose normal.      Mouth/Throat:      Mouth: Mucous membranes are moist.      Pharynx: Oropharynx is clear. Eyes:      General: No scleral icterus. Conjunctiva/sclera: Conjunctivae normal.      Pupils: Pupils are equal, round, and reactive to light. Cardiovascular:      Rate and Rhythm: Normal rate and regular rhythm. Pulses: Normal pulses. Heart sounds: Normal heart sounds. Pulmonary:      Effort: Pulmonary effort is normal.      Breath sounds: Normal breath sounds. Abdominal:      General: Bowel sounds are normal.      Palpations: Abdomen is soft. Tenderness: There is no abdominal tenderness. Musculoskeletal:      Cervical back: Neck supple. Lymphadenopathy:      Cervical: No cervical adenopathy. Skin:     General: Skin is warm and dry. Neurological:      Mental Status: She is alert and oriented to person, place, and time. Cranial Nerves: No cranial nerve deficit. Comments: Fine tremor to outstretched hands. Psychiatric:         Mood and Affect: Mood normal.         Lab Results   Component Value Date/Time    WBC 5.1 01/16/2022 04:30 AM    HGB 12.9 01/16/2022 04:30 AM    HCT 40.6 01/16/2022 04:30 AM    PLATELET 430 12/83/2988 04:30 AM    MCV 93.5 01/16/2022 04:30 AM     Lab Results   Component Value Date/Time    Sodium 138 01/16/2022 04:30 AM    Potassium 4.2 01/16/2022 04:30 AM    Chloride 104 01/16/2022 04:30 AM    CO2 26 01/16/2022 04:30 AM    Anion gap 8 01/16/2022 04:30 AM    Glucose 112 (H) 01/16/2022 04:30 AM    BUN 20 01/16/2022 04:30 AM    Creatinine 1.00 01/16/2022 04:30 AM    BUN/Creatinine ratio 20 01/16/2022 04:30 AM    GFR est AA >60 01/16/2022 04:30 AM    GFR est non-AA 53 01/16/2022 04:30 AM    Calcium 9.3 01/16/2022 04:30 AM    Bilirubin, total 0.7 01/16/2022 04:30 AM    Alk.  phosphatase 66 01/16/2022 04:30 AM    Protein, total 6.8 01/16/2022 04:30 AM    Albumin 3.7 01/16/2022 04:30 AM    Globulin 3.1 01/16/2022 04:30 AM    A-G Ratio 1.2 01/16/2022 04:30 AM    ALT (SGPT) 18 01/16/2022 04:30 AM    AST (SGOT) 23 01/16/2022 04:30 AM     Lab Results   Component Value Date/Time    Cholesterol, total 153 12/11/2020 08:23 AM    HDL Cholesterol 53 12/11/2020 08:23 AM    LDL-C, External 86 01/06/2020 12:00 AM    LDL, calculated 75.6 12/11/2020 08:23 AM    VLDL, calculated 24.4 12/11/2020 08:23 AM    Triglyceride 122 12/11/2020 08:23 AM    CHOL/HDL Ratio 2.9 12/11/2020 08:23 AM       On this date 07/17/22 I have spent >45 minutes reviewing previous notes, test results and face to face with the patient for interview/exam, discussing working diagnosis and treatment plan, AVS.    Medical decision making complexity: moderate-high.     Jose Manuel Mcclure MD   Family & Geriatric Medicine

## 2022-07-08 RX ORDER — SERTRALINE HYDROCHLORIDE 100 MG/1
100 TABLET, FILM COATED ORAL DAILY
Qty: 90 TABLET | Refills: 0 | Status: SHIPPED | OUTPATIENT
Start: 2022-07-08 | End: 2022-07-25 | Stop reason: SINTOL

## 2022-07-20 LAB
25(OH)D3+25(OH)D2 SERPL-MCNC: 11 NG/ML (ref 30–100)
ALBUMIN SERPL-MCNC: 4.8 G/DL (ref 3.6–4.6)
ALBUMIN/GLOB SERPL: 1.9 {RATIO} (ref 1.2–2.2)
ALP SERPL-CCNC: 101 IU/L (ref 44–121)
ALT SERPL-CCNC: 16 IU/L (ref 0–32)
ANA SER QL IF: NEGATIVE
AST SERPL-CCNC: 22 IU/L (ref 0–40)
BASOPHILS # BLD AUTO: 0.1 X10E3/UL (ref 0–0.2)
BASOPHILS NFR BLD AUTO: 1 %
BILIRUB SERPL-MCNC: 0.3 MG/DL (ref 0–1.2)
BUN SERPL-MCNC: 22 MG/DL (ref 8–27)
BUN/CREAT SERPL: 26 (ref 12–28)
CALCIUM SERPL-MCNC: 9.5 MG/DL (ref 8.7–10.3)
CHLORIDE SERPL-SCNC: 104 MMOL/L (ref 96–106)
CHOLEST SERPL-MCNC: 176 MG/DL (ref 100–199)
CO2 SERPL-SCNC: 21 MMOL/L (ref 20–29)
CREAT SERPL-MCNC: 0.85 MG/DL (ref 0.57–1)
CRP SERPL-MCNC: 3 MG/L (ref 0–10)
EGFR: 68 ML/MIN/1.73
EOSINOPHIL # BLD AUTO: 0.1 X10E3/UL (ref 0–0.4)
EOSINOPHIL NFR BLD AUTO: 1 %
ERYTHROCYTE [DISTWIDTH] IN BLOOD BY AUTOMATED COUNT: 13.6 % (ref 11.7–15.4)
ERYTHROCYTE [SEDIMENTATION RATE] IN BLOOD BY WESTERGREN METHOD: 15 MM/HR (ref 0–40)
FOLATE SERPL-MCNC: 4.9 NG/ML
GLOBULIN SER CALC-MCNC: 2.5 G/DL (ref 1.5–4.5)
GLUCOSE SERPL-MCNC: 81 MG/DL (ref 65–99)
HCT VFR BLD AUTO: 42.9 % (ref 34–46.6)
HDLC SERPL-MCNC: 55 MG/DL
HGB BLD-MCNC: 13.7 G/DL (ref 11.1–15.9)
IMM GRANULOCYTES # BLD AUTO: 0 X10E3/UL (ref 0–0.1)
IMM GRANULOCYTES NFR BLD AUTO: 0 %
LDLC SERPL CALC-MCNC: 95 MG/DL (ref 0–99)
LYMPHOCYTES # BLD AUTO: 0.5 X10E3/UL (ref 0.7–3.1)
LYMPHOCYTES NFR BLD AUTO: 9 %
MAGNESIUM SERPL-MCNC: 2.1 MG/DL (ref 1.6–2.3)
MCH RBC QN AUTO: 29 PG (ref 26.6–33)
MCHC RBC AUTO-ENTMCNC: 31.9 G/DL (ref 31.5–35.7)
MCV RBC AUTO: 91 FL (ref 79–97)
MONOCYTES # BLD AUTO: 0.5 X10E3/UL (ref 0.1–0.9)
MONOCYTES NFR BLD AUTO: 9 %
NEUTROPHILS # BLD AUTO: 4.6 X10E3/UL (ref 1.4–7)
NEUTROPHILS NFR BLD AUTO: 80 %
PLATELET # BLD AUTO: 170 X10E3/UL (ref 150–450)
POTASSIUM SERPL-SCNC: 4.3 MMOL/L (ref 3.5–5.2)
PROT SERPL-MCNC: 7.3 G/DL (ref 6–8.5)
RBC # BLD AUTO: 4.73 X10E6/UL (ref 3.77–5.28)
SODIUM SERPL-SCNC: 141 MMOL/L (ref 134–144)
TRIGL SERPL-MCNC: 153 MG/DL (ref 0–149)
TSH SERPL DL<=0.005 MIU/L-ACNC: 0.61 UIU/ML (ref 0.45–4.5)
VIT B12 SERPL-MCNC: 521 PG/ML (ref 232–1245)
VLDLC SERPL CALC-MCNC: 26 MG/DL (ref 5–40)
WBC # BLD AUTO: 5.8 X10E3/UL (ref 3.4–10.8)

## 2022-07-25 ENCOUNTER — OFFICE VISIT (OUTPATIENT)
Dept: INTERNAL MEDICINE CLINIC | Age: 82
End: 2022-07-25
Payer: MEDICARE

## 2022-07-25 VITALS
OXYGEN SATURATION: 95 % | SYSTOLIC BLOOD PRESSURE: 135 MMHG | DIASTOLIC BLOOD PRESSURE: 74 MMHG | BODY MASS INDEX: 26.85 KG/M2 | TEMPERATURE: 97.9 F | WEIGHT: 157.3 LBS | RESPIRATION RATE: 22 BRPM | HEART RATE: 76 BPM | HEIGHT: 64 IN

## 2022-07-25 DIAGNOSIS — I10 ESSENTIAL HYPERTENSION: Primary | ICD-10-CM

## 2022-07-25 DIAGNOSIS — G25.0 ESSENTIAL TREMOR: ICD-10-CM

## 2022-07-25 DIAGNOSIS — F41.9 ANXIETY: ICD-10-CM

## 2022-07-25 DIAGNOSIS — R79.89 LOW TSH LEVEL: ICD-10-CM

## 2022-07-25 PROCEDURE — 1123F ACP DISCUSS/DSCN MKR DOCD: CPT | Performed by: FAMILY MEDICINE

## 2022-07-25 PROCEDURE — 99214 OFFICE O/P EST MOD 30 MIN: CPT | Performed by: FAMILY MEDICINE

## 2022-07-25 RX ORDER — SERTRALINE HYDROCHLORIDE 50 MG/1
TABLET, FILM COATED ORAL
Qty: 45 TABLET | Refills: 0 | Status: SHIPPED | OUTPATIENT
Start: 2022-07-25 | End: 2022-08-29

## 2022-07-25 RX ORDER — PRIMIDONE 50 MG/1
75 TABLET ORAL DAILY
Qty: 45 TABLET | Refills: 0 | Status: CANCELLED | OUTPATIENT
Start: 2022-07-25

## 2022-07-25 RX ORDER — LOSARTAN POTASSIUM 25 MG/1
25 TABLET ORAL 2 TIMES DAILY
Qty: 30 TABLET | Refills: 0 | Status: SHIPPED | OUTPATIENT
Start: 2022-07-25 | End: 2022-08-08 | Stop reason: SDUPTHER

## 2022-07-25 RX ORDER — PRIMIDONE 50 MG/1
50 TABLET ORAL DAILY
Qty: 45 TABLET | Refills: 0 | Status: SHIPPED | OUTPATIENT
Start: 2022-07-25 | End: 2022-09-14 | Stop reason: SDUPTHER

## 2022-07-25 NOTE — PROGRESS NOTES
Zion Villarreal (: 1940) is a 78 y.o. female here for evaluation of the following chief concerns(s):  Follow-up; Chronic condition management    ASSESSMENT/PLAN:  1. Essential hypertension  Blood pressure uncontrolled, borderline orthostatic hypotension. Will increase losartan back to 50 mg daily; however, separate doses to losartan 25 mg twice daily. 2. Essential tremor  Reduce primidone back to 50 mg, given increased dose does not seem to have had much effect on improving her tremors and in the setting of polypharmacy/malaise which may be secondary to medication side effect, goal to de-escalate any unnecessary medications. 3. Anxiety  Mood is suboptimally controlled on relatively high-dose Sertraline in an elderly patient, goal to gently wean the sertraline and eventually cross taper to a different antianxiety medication. Hopefully, this may also help with her malaise and tremor as well as recurrent falls. We will continue to avoid benzodiazepine given her recurrent falls. 4. Low TSH level  No obvious thyroid mass on exam, will check individual thyroid hormones and thyroid antibody profile for further evaluation. Patient declines lab draw today, but agrees we will have this done during her next visit. In addition, Ms. Jo Oropeza declined ER evaluation today for malaise. Her grandson was able to pick her up, and family aware of need for close monitoring and fall risk reduction. I have encouraged pt to continue [facility] for therapy services as tolerated. Return in about 2 weeks (around 2022) for follow-up chronic conditions. Zion Villarreal agrees with plan as above and has no additional questions at this time.        SUBJECTIVE/OBJECTIVE:  Ms. Jo Oropeza is an 78-year of age female past medical history significant for hypertension, LBBB, paroxysmal Afib, hyperlipidemia, COPD, anemia and [place] acid deficiency, anxiety, former smoker with approximately 50-pack-year history, impaired mobility and frailty with recurrent falls. Pt states that she feels poorly today. Last visit 7/7/22; She was referred to Cardiology for LBBB, PAF, diastolic dysfunction; upcoming appt with [facility] 8/24/22. She has previously followed / [facility] Cardiology for ~4 years and was not able to complete an event monitor as she was having trouble reporting events such as falls, so she removed the device early. HTN:  Reduced losartan from 50 mg to 25 mg daily given recurrent ?syncopal events of unclear etiology. Essential tremor:  Small up titration to Primidone to help with tremor does not seem to have helped improve her tremors. Anxiety: We continued Sertraline 100mg every day; she feels that her anxiety is poorly controlled. She is no longer taking hydroxizine. Hx she took Benzodiazapine. Cerebral aneurysm (incidental): She was referred to Neurology for further evaluation of movement disorder and small incidental cerebral aneurysm; she has an upcoming appointment next month. Chronic headaches:  Not much improved w/ Flonase. Labs for temporal headaches not suggestive of GCA. Recurrent falls:  Per daughter, no prodrome, seems to occur at times when pt is rising from seated position. Referral to [facility] for PT/OT; she says this is doing alright but is hoping for less providers, feels she only needs a nurse aid. 7/7/22 results review;  CBC and CMP WNL. T. Choles 176, LDL 95, HDL 55,   TSH 0.61  Vitamin D 11  B9/B12 WNL. ESR and CRP EWL. BOBBY negative. Interval hx; She states that she has been feeling poorly for the past 3-4 days, \"a bad feeling, like I don't have any use of anything\", pain in temples- lasts seconds and resolves, chronic tremors- worsened in the past few days, history of frequent falls. She has not had a fever, abd pain, nausea, diarrhea, changes to smell/tasts, cognitive changes. She uses a cane for mobility.     She lives w/ her son who helps her w/ iADLs. Patient was last seen in the ER on 5/29/2022 for fall. 5/23/22 CT head:   FINDINGS: Posterior scalp hematoma. Moderate intracranial atrophy. No mass effect, midline shift or hemorrhage. Ventricles are normal in size, position and configuration. Scattered and confluent foci of decreased attenuation noted of the periventricular white matter, nonspecific, but most likely sequelae of  chronic microvascular disease. No abnormal extra-axial fluid collections are seen. No territorial signs of infarct. The bony calvarium appears intact without acute displaced fracture. The visualized paranasal sinuses and mastoid air cells are aerated. IMPRESSION 1. No acute intracranial pathology. 1/17/2022 ECHO:Left Ventricle: Left ventricle size is normal. Mildly increased wall thickness. Findings consistent with mild concentric hypertrophy. Normal wall motion. Normal left ventricular systolic function with a visually estimated EF of 50 - 65%. EF by 2D Simpsons Biplane is 58%. Grade I diastolic dysfunction with normal LAP. Aortic Valve: Valve structure is tricuspid. Mild sclerosis of the aortic valve cusps. 1/12/22 CTA head:  IMPRESSION  1. No definite large vessel occlusion. 2.  Moderate proximal bilateral ICA stenosis estimated 54% on the left and 45% on the right, based on NASCET criteria. 3. No high-grade vertebral artery stenosis. 4. No high-grade intracranial stenosis. Mild bilateral carotid siphon atherosclerotic narrowing. 5. Small right MCA bifurcation aneurysm. 1/16/2022   CBC within normal limits  CMP within normal limits, blood glucose 112  UA with trace ketones, small leuk esterase, 5-10 WBCs; urine culture with no growth  UDS positive for benzodiazepine    6/20/2021 heme positive stools    4/22/2021 TSH 0.91    1/17/2021 B12 331    Previous EKG was read with Afib, left bundle branch block.         Past Medical History:   Diagnosis Date    Allergic rhinitis     Anemia Anxiety disorder     Chronic obstructive pulmonary disease (HCC)     Dyslipidemia     Folic acid deficiency     Hypertension     Neurologic disorder     resting tremor     Past Surgical History:   Procedure Laterality Date    COLONOSCOPY N/A 10/26/2021    COLONOSCOPY w/ polypectomy performed by Rod Nichols MD at [facility] ENDOSCOPY     Family History   Problem Relation Age of Onset    Cancer Father         stomach    Heart Disease Sister     Alcohol abuse Brother     Cancer Brother         lung       Social History     Socioeconomic History    Marital status:      Spouse name: Not on file    Number of children: Not on file    Years of education: Not on file    Highest education level: Not on file   Occupational History    Not on file   Tobacco Use    Smoking status: Former     Packs/day: 1.00     Years: 50.00     Pack years: 50.00     Types: Cigarettes    Smokeless tobacco: Never   Vaping Use    Vaping Use: Never used   Substance and Sexual Activity    Alcohol use: Not Currently    Drug use: Never    Sexual activity: Not Currently   Other Topics Concern    Not on file   Social History Narrative    Not on file     Social Determinants of Health     Financial Resource Strain: Not on file   Food Insecurity: Not on file   Transportation Needs: Not on file   Physical Activity: Not on file   Stress: Not on file   Social Connections: Not on file   Intimate Partner Violence: Not on file   Housing Stability: Not on file     Social History     Tobacco Use   Smoking Status Former    Packs/day: 1.00    Years: 50.00    Pack years: 50.00    Types: Cigarettes   Smokeless Tobacco Never       Current Outpatient Medications   Medication Sig Dispense Refill    sertraline (ZOLOFT) 50 mg tablet Take one and a half tablets daily (total daily dose: 75mg, in place of 100mg daily). 45 Tablet 0    losartan (COZAAR) 25 mg tablet Take 1 Tablet by mouth two (2) times a day.  30 Tablet 0    primidone (MYSOLINE) 50 mg tablet Take 1 Tablet by mouth in the morning. 45 Tablet 0    budesonide-formoteroL (Symbicort) 80-4.5 mcg/actuation HFAA Take 2 Puffs by inhalation two (2) times a day. Rinse mouth after use. 10.2 g 1    vitamin B complex capsule Take 1 Capsule by mouth daily. 90 Capsule 1    fluticasone propionate (FLONASE) 50 mcg/actuation nasal spray SHAKE LIQUID AND USE 2 SPRAYS IN EACH NOSTRIL DAILY 48 g 1    albuterol (PROVENTIL HFA, VENTOLIN HFA, PROAIR HFA) 90 mcg/actuation inhaler Take 2 Puffs by inhalation every four (4) hours as needed for Wheezing. 1 Each 1    atorvastatin (LIPITOR) 40 mg tablet Take 1 Tablet by mouth daily. 90 Tablet 1    pantoprazole (PROTONIX) 40 mg tablet Take 1 Tablet by mouth daily for 360 days. Indications: gastroesophageal reflux disease 90 Tablet 3    aspirin delayed-release 81 mg tablet Take 81 mg by mouth daily. Allergies   Allergen Reactions    Isopropyl Alcohol Other (comments)     Weakness all over - pt states she has out grown this    Pen-Vee K Rash     Pt. States she avoids penicillin due to allergy as a child. /74 (BP 1 Location: Left upper arm, BP Patient Position: Standing, BP Cuff Size: Adult)   Pulse 76   Temp 97.9 °F (36.6 °C) (Temporal)   Resp 22   Ht 5' 4\" (1.626 m)   Wt 157 lb 4.8 oz (71.4 kg)   SpO2 95%   BMI 27.00 kg/m²     Physical Exam  Constitutional:       General: She is not in acute distress. Appearance: She is not ill-appearing. Comments: Frail appearing elderly lady. Mild [place]. Neck:      Thyroid: No thyroid mass, thyromegaly or thyroid tenderness. Cardiovascular:      Rate and Rhythm: Normal rate and regular rhythm. Heart sounds: Normal heart sounds. Pulmonary:      Effort: Pulmonary effort is normal.      Breath sounds: Normal breath sounds. Abdominal:      General: Bowel sounds are normal.      Palpations: Abdomen is soft. Tenderness: no abdominal tenderness   Skin:     General: Skin is warm and dry.    Neurological:      Mental Status: She is alert and oriented to person, place, and time. Comments: Fine tremor to outstretched hands. Psychiatric:      Comments: Mildly anxious affect. Lab Results   Component Value Date/Time    WBC 5.8 07/07/2022 10:00 AM    HGB 13.7 07/07/2022 10:00 AM    HCT 42.9 07/07/2022 10:00 AM    PLATELET 711 57/35/3229 10:00 AM    MCV 91 07/07/2022 10:00 AM     Lab Results   Component Value Date/Time    Sodium 141 07/07/2022 10:00 AM    Potassium 4.3 07/07/2022 10:00 AM    Chloride 104 07/07/2022 10:00 AM    CO2 21 07/07/2022 10:00 AM    Anion gap 8 01/16/2022 04:30 AM    Glucose 81 07/07/2022 10:00 AM    BUN 22 07/07/2022 10:00 AM    Creatinine 0.85 07/07/2022 10:00 AM    BUN/Creatinine ratio 26 07/07/2022 10:00 AM    GFR est AA >60 01/16/2022 04:30 AM    GFR est non-AA 53 01/16/2022 04:30 AM    Calcium 9.5 07/07/2022 10:00 AM    Bilirubin, total 0.3 07/07/2022 10:00 AM    Alk. phosphatase 101 07/07/2022 10:00 AM    Protein, total 7.3 07/07/2022 10:00 AM    Albumin 4.8 (H) 07/07/2022 10:00 AM    Globulin 3.1 01/16/2022 04:30 AM    A-G Ratio 1.9 07/07/2022 10:00 AM    ALT (SGPT) 16 07/07/2022 10:00 AM    AST (SGOT) 22 07/07/2022 10:00 AM     Lab Results   Component Value Date/Time    Cholesterol, total 176 07/07/2022 10:00 AM    HDL Cholesterol 55 07/07/2022 10:00 AM    LDL-C, External 86 01/06/2020 12:00 AM    LDL, calculated 95 07/07/2022 10:00 AM    LDL, calculated 75.6 12/11/2020 08:23 AM    VLDL, calculated 26 07/07/2022 10:00 AM    VLDL, calculated 24.4 12/11/2020 08:23 AM    Triglyceride 153 (H) 07/07/2022 10:00 AM    CHOL/HDL Ratio 2.9 12/11/2020 08:23 AM       On this date 07/25/22 I have spent >30 minutes reviewing previous notes, test results and face to face with the patient for interview/exam, discussing working diagnosis and treatment plan, AVS.    Medical decision making complexity: moderate-high.     Kane Camacho MD   Family & Geriatric Medicine

## 2022-07-25 NOTE — PATIENT INSTRUCTIONS
For today, let make some medication changes; Your blood pressure was up a bit; please take your Losartan 25mg TWICE a day (total 50mg daily). Since your nerves are bad and also given your tremor and the falls, lets start to wean you off of the Sertraline so we can try something else that may help better. I have sent in for Sertraline 50mg tablets; please take one and a helps (total 75mg) instead of the 100mg tablet. Since the higher dose of Primidone did not seem to improve your tremors, lets put your Primidone back to 50mg daily. I have spoke to my staff about helping to expedite the Neurology referral.      Lets follow-up in 2 weeks or sooner.

## 2022-07-25 NOTE — PROGRESS NOTES
Patient has had more falls since last visit. Needs Primadone refill. Palak Mariscal presents today for   Chief Complaint   Patient presents with    Follow-up     2 week follow up       Is someone accompanying this pt? no    Is the patient using any DME equipment during [address]? Yes, cane, wheelchair    Depression Screening:  3 most recent PHQ Screens 7/25/2022   Little interest or pleasure in doing things Not at all   Feeling down, depressed, irritable, or hopeless More than half the days   Total Score PHQ 2 2   Trouble falling or staying asleep, or sleeping too much -   Feeling tired or having little energy -   Poor appetite, weight loss, or overeating -   Feeling bad about yourself - or that you are a failure or have let yourself or your family down -   Trouble concentrating on things such as school, work, reading, or watching TV -   Moving or speaking so slowly that other people could have noticed; or the opposite being so fidgety that others notice -   Thoughts of being better off dead, or hurting yourself in some way -   PHQ 9 Score -   How difficult have these problems made it for you to do your work, take care of your home and get along with others -       Learning Assessment:  Learning Assessment 12/3/2020   PRIMARY LEARNER Patient   HIGHEST LEVEL OF EDUCATION - PRIMARY LEARNER  DID NOT GRADUATE HIGH SCHOOL   BARRIERS PRIMARY LEARNER NONE   PRIMARY LANGUAGE ENGLISH   LEARNER PREFERENCE PRIMARY READING   ANSWERED BY patient   RELATIONSHIP SELF       Fall Risk  Fall Risk Assessment, last 12 mths 7/25/2022   Able to walk? Yes   Fall in past 12 months? 1   Do you feel unsteady? 1   Are you worried about falling 1   Is TUG test greater than 12 seconds? 1   Is the gait abnormal? 1   Number of falls in past 12 months 2   Fall with injury?  1       ADL  ADL Assessment 7/25/2022   Feeding yourself No Help Needed   Getting from bed to chair Help Needed   Getting dressed Help Needed   Bathing or showering Help Needed Walk across the room (includes cane/walker) Help Needed   Using the telphone No Help Needed   Taking your medications Help Needed   Preparing meals No Help Needed   Managing money (expenses/bills) No Help Needed   Moderately strenuous housework (laundry) Help Needed   Shopping for personal items (toiletries/medicines) No Help Needed   Shopping for groceries No Help Needed   Driving No Help Needed   Climbing a flight of stairs Help Needed   Getting to places beyond walking distances Help Needed       Health Maintenance reviewed and discussed and ordered per Provider. Health Maintenance Due   Topic Date Due    Pneumococcal 65+ years (1 - PCV) Never done    DTaP/Tdap/Td series (1 - Tdap) Never done    Shingrix Vaccine Age 50> (1 of 2) Never done    Bone Densitometry (Dexa) Screening  Never done    COVID-19 Vaccine (3 - Booster for Moderna series) 07/23/2021   . Coordination of Care:  1. \"Have you been to the ER, urgent care clinic since your last visit? Hospitalized since your last visit? \" No    2. \"Have you seen or consulted any other health care providers outside of the [address] [place] [address] since your last visit? \" No     3. For patients aged 39-70: Has the patient had a colonoscopy? NA - based on age     If the patient is female:    4. For patients aged 41-77: Has the patient had a mammogram within the past 2 years? NA - based on age    11. For patients aged 21-65: Has the patient had a pap smear?  NA - based on age

## 2022-08-04 ENCOUNTER — TELEPHONE (OUTPATIENT)
Dept: INTERNAL MEDICINE CLINIC | Age: 82
End: 2022-08-04

## 2022-08-04 DIAGNOSIS — R26.89 IMBALANCE: Primary | ICD-10-CM

## 2022-08-04 DIAGNOSIS — Z74.09 IMPAIRED MOBILITY AND ADLS: ICD-10-CM

## 2022-08-04 DIAGNOSIS — Z78.9 IMPAIRED MOBILITY AND ADLS: ICD-10-CM

## 2022-08-08 ENCOUNTER — OFFICE VISIT (OUTPATIENT)
Dept: INTERNAL MEDICINE CLINIC | Age: 82
End: 2022-08-08
Payer: MEDICARE

## 2022-08-08 VITALS
HEIGHT: 64 IN | BODY MASS INDEX: 26.46 KG/M2 | RESPIRATION RATE: 22 BRPM | OXYGEN SATURATION: 94 % | SYSTOLIC BLOOD PRESSURE: 128 MMHG | DIASTOLIC BLOOD PRESSURE: 77 MMHG | HEART RATE: 59 BPM | WEIGHT: 155 LBS | TEMPERATURE: 98.3 F

## 2022-08-08 DIAGNOSIS — I44.7 LBBB (LEFT BUNDLE BRANCH BLOCK): ICD-10-CM

## 2022-08-08 DIAGNOSIS — I67.1 MIDDLE CEREBRAL ARTERY ANEURYSM: ICD-10-CM

## 2022-08-08 DIAGNOSIS — G25.0 ESSENTIAL TREMOR: Primary | ICD-10-CM

## 2022-08-08 DIAGNOSIS — Z86.79 HISTORY OF ATRIAL FIBRILLATION: ICD-10-CM

## 2022-08-08 DIAGNOSIS — I51.89 DIASTOLIC DYSFUNCTION: ICD-10-CM

## 2022-08-08 DIAGNOSIS — I10 ESSENTIAL HYPERTENSION: ICD-10-CM

## 2022-08-08 PROCEDURE — 1123F ACP DISCUSS/DSCN MKR DOCD: CPT | Performed by: FAMILY MEDICINE

## 2022-08-08 PROCEDURE — 99214 OFFICE O/P EST MOD 30 MIN: CPT | Performed by: FAMILY MEDICINE

## 2022-08-08 PROCEDURE — 36415 COLL VENOUS BLD VENIPUNCTURE: CPT | Performed by: FAMILY MEDICINE

## 2022-08-08 RX ORDER — LOSARTAN POTASSIUM 25 MG/1
25 TABLET ORAL 2 TIMES DAILY
Qty: 90 TABLET | Refills: 0 | Status: SHIPPED | OUTPATIENT
Start: 2022-08-08 | End: 2022-09-14

## 2022-08-08 NOTE — PATIENT INSTRUCTIONS
For today, let make a medication change; To see if the Sertraline is a cause/contributor of your tremor and to see if we can help more with your nerves, please reduce your dose of Sertraline 50mg tablets from one and a half tablets (total 75mg) to ONE TABLET DAILY (for a total of 50mg daily). Next visit, we can talk about other medication that may work better for your nerves. Please bring all of your current medications (and pill liner to [facility] next visit). I will talk w/ Kanu Garcia about the \"clinical questions\" to ask. Lets plan to follow-up in about 1 month.     Sally Mcbride MD

## 2022-08-08 NOTE — PROGRESS NOTES
Barbara New (: 1940) is a 78 y.o. female here for evaluation of the following chief concern(s):  Follow-up; Chronic condition management    ASSESSMENT/PLAN:  1. Essential hypertension  Blood pressure is well controlled, hx borderline orthostatic hypotension. Continue losartan 25mg twice daily. 2. Essential tremor  Upcoming appointment w/ Dr. Zoraida Helton (Neurology movement specialist) for further evaluation and treatment. I have discussed w/ pt's mPOA plan to avoid betablockers for the time being w/ hx of recurrent falls- ?syncope. And no increased benefit from higher dose Primidone in the setting of advance age and frailty. Working to down titrate Sertraline to see if this helps reduce tremor- today her symptoms are a bit better. ? Benefit of Ropinirole- will defer additional medication management for this condition to Neurology a this time. 3.  Middle cerebral artery aneurysm  Low suspicion that this is contributing to her current symptoms, but noted family hx of ruptured aneurysm- so pt/family will further discuss w/ Neurology. 4. LBBB  Pt is maintained on ASA and statin therapy, upcoming appt w/ Cardiology for further evaluation. She would likely benefit from EP studies given hx of Afib and likely syncope of unclear etiology. 5. Diastolic dysfunction  As above. 6. Hx of Afib  Pt has previously followed w/ [facility] for ~4 years and was not able to complete an event monitor as she was having trouble reporting events such as falls, so she removed the device early. [facility]. Standing labs from last visit were drawn today. Return in about 4 weeks (around 2022) for follow-up chronic conditions. Barbara New agrees with plan as above and has no additional questions at this time.        SUBJECTIVE/OBJECTIVE:  Ms. Edgar Jones is an 78-year of age female past medical history significant for hypertension, LBBB, paroxysmal Afib, hyperlipidemia, COPD, anemia and [place] acid deficiency, anxiety, former smoker with approximately 50-pack-year history, impaired mobility and frailty with recurrent falls. Pt states that she feels fair. HTN:  7/25/22 Losartan 25mg increased to BID.   7/7/22 Losartan reduced from 50 mg to 25 mg daily given recurrent ?syncopal events of unclear etiology. Essential tremor:  Seems to be doing better today. Pt has an upcoming appointment w/ Dr. Taina Elizondo. Hx small up titration to Primidone to help with tremor does not seem to have helped improve her tremors. Anxiety:  7/25/22 Sertraline was reduced to 75mg every day. Hx Sertraline 100mg every day; she feels that her anxiety is poorly controlled. She is no longer taking hydroxizine. Hx she took Benzodiazapine. Cerebral aneurysm (incidental): She was referred to Neurology for further evaluation of movement disorder and small incidental cerebral aneurysm; she has an upcoming appointment next month. Chronic headaches:  Not much improved w/ Flonase. Labs for temporal headaches not suggestive of GCA. Recurrent falls:  No falls since last visit. Hx;  Per daughter, no prodrome, seems to occur at times when pt is rising from seated position. Referral to [facility] for PT/OT; she says this is doing alright but is hoping for less providers, feels she only needs a nurse aid. 7/7/22 results review;  CBC and CMP WNL. T. Choles 176, LDL 95, HDL 55,   TSH 0.61  Vitamin D 11  B9/B12 WNL. ESR and CRP EWL. BOBBY negative. Interval hx; She states that she has been feeling poorly for the past 3-4 days, \"a bad feeling, like I don't have any use of anything\", pain in temples- lasts seconds and resolves, chronic tremors- worsened in the past few days, history of frequent falls. She has not had a fever, abd pain, nausea, diarrhea, changes to smell/tasts, cognitive changes. She uses a cane for mobility.     She lives w/ her son who helps her w/ iADLs. Patient was last seen in the ER on 5/29/2022 for fall. 5/23/22 CT head:   FINDINGS: Posterior scalp hematoma. Moderate intracranial atrophy. No mass effect, midline shift or hemorrhage. Ventricles are normal in size, position and configuration. Scattered and confluent foci of decreased attenuation noted of the periventricular white matter, nonspecific, but most likely sequelae of  chronic microvascular disease. No abnormal extra-axial fluid collections are seen. No territorial signs of infarct. The bony calvarium appears intact without acute displaced fracture. The visualized paranasal sinuses and mastoid air cells are aerated. IMPRESSION 1. No acute intracranial pathology. 1/17/2022 ECHO:Left Ventricle: Left ventricle size is normal. Mildly increased wall thickness. Findings consistent with mild concentric hypertrophy. Normal wall motion. Normal left ventricular systolic function with a visually estimated EF of 50 - 65%. EF by 2D Simpsons Biplane is 58%. Grade I diastolic dysfunction with normal LAP. Aortic Valve: Valve structure is tricuspid. Mild sclerosis of the aortic valve cusps. 1/12/22 CTA head:  IMPRESSION  1. No definite large vessel occlusion. 2.  Moderate proximal bilateral ICA stenosis estimated 54% on the left and 45% on the right, based on NASCET criteria. 3. No high-grade vertebral artery stenosis. 4. No high-grade intracranial stenosis. Mild bilateral carotid siphon atherosclerotic narrowing. 5. Small right MCA bifurcation aneurysm. 1/16/2022   CBC within normal limits  CMP within normal limits, blood glucose 112  UA with trace ketones, small leuk esterase, 5-10 WBCs; urine culture with no growth  UDS positive for benzodiazepine    6/20/2021 heme positive stools    4/22/2021 TSH 0.91    1/17/2021 B12 331    Previous EKG was read with Afib, left bundle branch block.         Past Medical History:   Diagnosis Date    Allergic rhinitis     Anemia Anxiety disorder     Chronic obstructive pulmonary disease (HCC)     Dyslipidemia     Folic acid deficiency     Hypertension     Neurologic disorder     resting tremor     Past Surgical History:   Procedure Laterality Date    COLONOSCOPY N/A 10/26/2021    COLONOSCOPY w/ polypectomy performed by Isabela King MD at [facility] ENDOSCOPY     Family History   Problem Relation Age of Onset    Cancer Father         stomach    Heart Disease Sister     Alcohol abuse Brother     Cancer Brother         lung       Social History     Socioeconomic History    Marital status:      Spouse name: Not on file    Number of children: Not on file    Years of education: Not on file    Highest education level: Not on file   Occupational History    Not on file   Tobacco Use    Smoking status: Former     Packs/day: 1.00     Years: 50.00     Pack years: 50.00     Types: Cigarettes    Smokeless tobacco: Never   Vaping Use    Vaping Use: Never used   Substance and Sexual Activity    Alcohol use: Not Currently    Drug use: Never    Sexual activity: Not Currently   Other Topics Concern    Not on file   Social History Narrative    Not on file     Social Determinants of Health     Financial Resource Strain: Not on file   Food Insecurity: Not on file   Transportation Needs: Not on file   Physical Activity: Not on file   Stress: Not on file   Social Connections: Not on file   Intimate Partner Violence: Not on file   Housing Stability: Not on file     Social History     Tobacco Use   Smoking Status Former    Packs/day: 1.00    Years: 50.00    Pack years: 50.00    Types: Cigarettes   Smokeless Tobacco Never       Current Outpatient Medications   Medication Sig Dispense Refill    losartan (COZAAR) 25 mg tablet Take 1 Tablet by mouth two (2) times a day. 90 Tablet 0    sertraline (ZOLOFT) 50 mg tablet Take one and a half tablets daily (total daily dose: 75mg, in place of 100mg daily).  45 Tablet 0    primidone (MYSOLINE) 50 mg tablet Take 1 Tablet by mouth in the morning. 45 Tablet 0    budesonide-formoteroL (Symbicort) 80-4.5 mcg/actuation HFAA Take 2 Puffs by inhalation two (2) times a day. Rinse mouth after use. 10.2 g 1    vitamin B complex capsule Take 1 Capsule by mouth daily. 90 Capsule 1    fluticasone propionate (FLONASE) 50 mcg/actuation nasal spray SHAKE LIQUID AND USE 2 SPRAYS IN EACH NOSTRIL DAILY 48 g 1    albuterol (PROVENTIL HFA, VENTOLIN HFA, PROAIR HFA) 90 mcg/actuation inhaler Take 2 Puffs by inhalation every four (4) hours as needed for Wheezing. 1 Each 1    atorvastatin (LIPITOR) 40 mg tablet Take 1 Tablet by mouth daily. 90 Tablet 1    pantoprazole (PROTONIX) 40 mg tablet Take 1 Tablet by mouth daily for 360 days. Indications: gastroesophageal reflux disease 90 Tablet 3    aspirin delayed-release 81 mg tablet Take 81 mg by mouth daily. Allergies   Allergen Reactions    Isopropyl Alcohol Other (comments)     Weakness all over - pt states she has out grown this    Pen-Vee K Rash     Pt. States she avoids penicillin due to allergy as a child. /77 (BP 1 Location: Right upper arm, BP Patient Position: Sitting, BP Cuff Size: Adult)   Pulse (!) 59   Temp 98.3 °F (36.8 °C) (Temporal)   Resp 22   Ht 5' 4\" (1.626 m)   Wt 155 lb (70.3 kg)   SpO2 94%   BMI 26.61 kg/m²     Physical Exam  Constitutional:       General: She is not in acute distress. Appearance: She is not ill-appearing. Comments: Frail appearing elderly lady. Mild [place]. Neck:      Thyroid: No thyroid mass, thyromegaly or thyroid tenderness. Cardiovascular:      Rate and Rhythm: Normal rate and regular rhythm. Heart sounds: Normal heart sounds. Pulmonary:      Effort: Pulmonary effort is normal.      Breath sounds: Normal breath sounds. Abdominal:      General: Bowel sounds are normal.      Palpations: Abdomen is soft. Tenderness: no abdominal tenderness   Skin:     General: Skin is warm and dry.    Neurological:      Mental Status: She is alert and oriented to person, place, and time. Comments: Fine tremor to outstretched hands. Psychiatric:      Comments: Mildly anxious affect. Lab Results   Component Value Date/Time    WBC 5.8 07/07/2022 10:00 AM    HGB 13.7 07/07/2022 10:00 AM    HCT 42.9 07/07/2022 10:00 AM    PLATELET 406 93/74/3925 10:00 AM    MCV 91 07/07/2022 10:00 AM     Lab Results   Component Value Date/Time    Sodium 141 07/07/2022 10:00 AM    Potassium 4.3 07/07/2022 10:00 AM    Chloride 104 07/07/2022 10:00 AM    CO2 21 07/07/2022 10:00 AM    Anion gap 8 01/16/2022 04:30 AM    Glucose 81 07/07/2022 10:00 AM    BUN 22 07/07/2022 10:00 AM    Creatinine 0.85 07/07/2022 10:00 AM    BUN/Creatinine ratio 26 07/07/2022 10:00 AM    GFR est AA >60 01/16/2022 04:30 AM    GFR est non-AA 53 01/16/2022 04:30 AM    Calcium 9.5 07/07/2022 10:00 AM    Bilirubin, total 0.3 07/07/2022 10:00 AM    Alk. phosphatase 101 07/07/2022 10:00 AM    Protein, total 7.3 07/07/2022 10:00 AM    Albumin 4.8 (H) 07/07/2022 10:00 AM    Globulin 3.1 01/16/2022 04:30 AM    A-G Ratio 1.9 07/07/2022 10:00 AM    ALT (SGPT) 16 07/07/2022 10:00 AM    AST (SGOT) 22 07/07/2022 10:00 AM     Lab Results   Component Value Date/Time    Cholesterol, total 176 07/07/2022 10:00 AM    HDL Cholesterol 55 07/07/2022 10:00 AM    LDL-C, External 86 01/06/2020 12:00 AM    LDL, calculated 95 07/07/2022 10:00 AM    LDL, calculated 75.6 12/11/2020 08:23 AM    VLDL, calculated 26 07/07/2022 10:00 AM    VLDL, calculated 24.4 12/11/2020 08:23 AM    Triglyceride 153 (H) 07/07/2022 10:00 AM    CHOL/HDL Ratio 2.9 12/11/2020 08:23 AM       On this date 08/08/22 I have spent >30 minutes reviewing previous notes, test results and face to face with the patient for interview/exam, discussing working diagnosis and treatment plan, AVS.    Medical decision making complexity: moderate-high.     Alley Esposito MD   Family & Geriatric Medicine

## 2022-08-08 NOTE — PROGRESS NOTES
Verbal order from Lisy Sheppard MD to order labs/sign and draw them in office    Labs were drawn and sent to [facility] by Chey Posada LPN:    The following tubes were sent:    0 Lavendar, 0 Red, 2 SST, 0 Urine    Draw site right antecubital.  Patient tolerated draw with no distress. Priti Granger presents today for   Chief Complaint   Patient presents with    Follow Up Chronic Condition       Is someone accompanying this pt? no    Is the patient using any DME equipment during [address]? Wheelchair, cane    Depression Screening:  3 most recent PHQ Screens 8/8/2022   Little interest or pleasure in doing things Not at all   Feeling down, depressed, irritable, or hopeless Not at all   Total Score PHQ 2 0   Trouble falling or staying asleep, or sleeping too much -   Feeling tired or having little energy -   Poor appetite, weight loss, or overeating -   Feeling bad about yourself - or that you are a failure or have let yourself or your family down -   Trouble concentrating on things such as school, work, reading, or watching TV -   Moving or speaking so slowly that other people could have noticed; or the opposite being so fidgety that others notice -   Thoughts of being better off dead, or hurting yourself in some way -   PHQ 9 Score -   How difficult have these problems made it for you to do your work, take care of your home and get along with others -       Learning Assessment:  Learning Assessment 12/3/2020   PRIMARY LEARNER Patient   HIGHEST LEVEL OF EDUCATION - PRIMARY LEARNER  DID NOT GRADUATE HIGH SCHOOL   BARRIERS PRIMARY LEARNER NONE   PRIMARY LANGUAGE ENGLISH   LEARNER PREFERENCE PRIMARY READING   ANSWERED BY patient   RELATIONSHIP SELF       Fall Risk  Fall Risk Assessment, last 12 mths 8/8/2022   Able to walk? Yes   Fall in past 12 months? 0   Do you feel unsteady? 0   Are you worried about falling 0   Is TUG test greater than 12 seconds?  -   Is the gait abnormal? -   Number of falls in past 12 months - Fall with injury? -       ADL  ADL Assessment 8/8/2022   Feeding yourself No Help Needed   Getting from bed to chair No Help Needed   Getting dressed No Help Needed   Bathing or showering No Help Needed   Walk across the room (includes cane/walker) No Help Needed   Using the telphone No Help Needed   Taking your medications No Help Needed   Preparing meals No Help Needed   Managing money (expenses/bills) No Help Needed   Moderately strenuous housework (laundry) No Help Needed   Shopping for personal items (toiletries/medicines) No Help Needed   Shopping for groceries No Help Needed   Driving No Help Needed   Climbing a flight of stairs No Help Needed   Getting to places beyond walking distances No Help Needed       Health Maintenance reviewed and discussed and ordered per Provider. Health Maintenance Due   Topic Date Due    Pneumococcal 65+ years (1 - PCV) Never done    DTaP/Tdap/Td series (1 - Tdap) Never done    Shingrix Vaccine Age 50> (1 of 2) Never done    Bone Densitometry (Dexa) Screening  Never done    COVID-19 Vaccine (3 - Booster for Moderna series) 07/23/2021   . Coordination of Care:  1. \"Have you been to the ER, urgent care clinic since your last visit? Hospitalized since your last visit? \" No    2. \"Have you seen or consulted any other health care providers outside of the [address] [place] [address] since your last visit? \" No     3. For patients aged 39-70: Has the patient had a colonoscopy? NA - based on age     If the patient is female:    4. For patients aged 41-77: Has the patient had a mammogram within the past 2 years? NA - based on age    11. For patients aged 21-65: Has the patient had a pap smear?  NA - based on age

## 2022-08-09 LAB
T3 SERPL-MCNC: 92 NG/DL (ref 71–180)
T4 FREE SERPL-MCNC: 1.03 NG/DL (ref 0.82–1.77)
THYROGLOB AB SERPL-ACNC: <1 IU/ML (ref 0–0.9)
THYROPEROXIDASE AB SERPL-ACNC: <8 IU/ML (ref 0–34)

## 2022-08-17 ENCOUNTER — PATIENT MESSAGE (OUTPATIENT)
Dept: NEUROLOGY | Age: 82
End: 2022-08-17

## 2022-08-19 ENCOUNTER — TELEPHONE (OUTPATIENT)
Dept: INTERNAL MEDICINE CLINIC | Age: 82
End: 2022-08-19

## 2022-08-19 NOTE — TELEPHONE ENCOUNTER
Ms. Arielle Bejarano had a fall while using her cane for mobility assistance. I am concerned that she remains very high risk for falls and associated injury. While she uses a cane, this is clearly not sufficient for fall risk reduction. Please note, my patient has a mobility limitation that significantly impairs her ability to participate in 1 or more mobility related activities of daily living in an outside of the home. She is able to safely use a walker if 1 is fit to her needs and she is taught how to use it. Her functional mobility deficit can be sufficiently helped by use of a walker, in my opinion.     Alley Esposito MD

## 2022-08-19 NOTE — TELEPHONE ENCOUNTER
Patient called to make PCP aware that she had a fall yesterday. States she was getting out of car and went to step up on curb with her cane and the cane slipped and she fell, the cane flew out of her hand and was not within reach. She attempted to get up and fell again and had to crawl to where her cane was laying and still could not get up using the cane. She had to crawl and drag cane to the front porch where she was able to pull herself up into a chair. She states that she has no injuries. This is the 3rd time this week she has fallen while using the cane. Patient also states that she was informed by [facility] she did not qualify for a rollator walker. Will notify PCP.

## 2022-08-24 ENCOUNTER — OFFICE VISIT (OUTPATIENT)
Dept: CARDIOLOGY CLINIC | Age: 82
End: 2022-08-24
Payer: MEDICARE

## 2022-08-24 VITALS
DIASTOLIC BLOOD PRESSURE: 60 MMHG | BODY MASS INDEX: 26.63 KG/M2 | HEIGHT: 64 IN | SYSTOLIC BLOOD PRESSURE: 150 MMHG | OXYGEN SATURATION: 97 % | WEIGHT: 156 LBS | HEART RATE: 64 BPM

## 2022-08-24 DIAGNOSIS — I63.89 CEREBROVASCULAR ACCIDENT (CVA) DUE TO OTHER MECHANISM (HCC): ICD-10-CM

## 2022-08-24 DIAGNOSIS — R29.6 FALLS FREQUENTLY: Primary | ICD-10-CM

## 2022-08-24 DIAGNOSIS — I10 PRIMARY HYPERTENSION: ICD-10-CM

## 2022-08-24 DIAGNOSIS — J44.9 CHRONIC OBSTRUCTIVE PULMONARY DISEASE, UNSPECIFIED COPD TYPE (HCC): ICD-10-CM

## 2022-08-24 DIAGNOSIS — I44.7 LBBB (LEFT BUNDLE BRANCH BLOCK): ICD-10-CM

## 2022-08-24 DIAGNOSIS — Z74.09 POOR MOBILITY: ICD-10-CM

## 2022-08-24 DIAGNOSIS — I48.0 PAROXYSMAL ATRIAL FIBRILLATION (HCC): ICD-10-CM

## 2022-08-24 DIAGNOSIS — R25.1 TREMOR: ICD-10-CM

## 2022-08-24 PROCEDURE — 99205 OFFICE O/P NEW HI 60 MIN: CPT | Performed by: INTERNAL MEDICINE

## 2022-08-24 PROCEDURE — G8417 CALC BMI ABV UP PARAM F/U: HCPCS | Performed by: INTERNAL MEDICINE

## 2022-08-24 PROCEDURE — G8432 DEP SCR NOT DOC, RNG: HCPCS | Performed by: INTERNAL MEDICINE

## 2022-08-24 PROCEDURE — 1123F ACP DISCUSS/DSCN MKR DOCD: CPT | Performed by: INTERNAL MEDICINE

## 2022-08-24 PROCEDURE — G8536 NO DOC ELDER MAL SCRN: HCPCS | Performed by: INTERNAL MEDICINE

## 2022-08-24 PROCEDURE — 93000 ELECTROCARDIOGRAM COMPLETE: CPT | Performed by: INTERNAL MEDICINE

## 2022-08-24 PROCEDURE — 1101F PT FALLS ASSESS-DOCD LE1/YR: CPT | Performed by: INTERNAL MEDICINE

## 2022-08-24 PROCEDURE — G8753 SYS BP > OR = 140: HCPCS | Performed by: INTERNAL MEDICINE

## 2022-08-24 PROCEDURE — G8754 DIAS BP LESS 90: HCPCS | Performed by: INTERNAL MEDICINE

## 2022-08-24 PROCEDURE — G8427 DOCREV CUR MEDS BY ELIG CLIN: HCPCS | Performed by: INTERNAL MEDICINE

## 2022-08-24 PROCEDURE — G8400 PT W/DXA NO RESULTS DOC: HCPCS | Performed by: INTERNAL MEDICINE

## 2022-08-24 PROCEDURE — 1090F PRES/ABSN URINE INCON ASSESS: CPT | Performed by: INTERNAL MEDICINE

## 2022-08-24 RX ORDER — SODIUM CHLORIDE 0.9 % (FLUSH) 0.9 %
5-40 SYRINGE (ML) INJECTION EVERY 8 HOURS
Status: CANCELLED | OUTPATIENT
Start: 2022-08-24

## 2022-08-24 RX ORDER — SODIUM CHLORIDE 0.9 % (FLUSH) 0.9 %
5-40 SYRINGE (ML) INJECTION AS NEEDED
Status: CANCELLED | OUTPATIENT
Start: 2022-08-24

## 2022-08-24 NOTE — PROGRESS NOTES
History of Present Illness:  78 YOF referred for history of atrial fibrillation and left bundle branch block, as well as falls. There was concern for presyncope versus syncope, but she has never lost consciousness. She is accompanied by one of her daughters today. She lives with her son and has another daughter who is a nurse. She has had very frequent falls and at least one episode where she fell backwards. She also has significant tremor and dyspnea. She relates weakness on her left side ever since her stroke a few years ago. Impression:  Frequent falls, unclear if related to arrhythmia or mobility and balance. Chronic LBBB. History of paroxysmal a-fib. History of mild bradycardia, limiting medication. COPD. History of remote stroke a few years ago, on aspirin, some residual left sided weakness. Chronic diastolic heart failure with echo January 2022 with normal function. HTN. Dyslipidemia. Plan:  She has a history of paroxysmal atrial fibrillation, as well as left bundle branch block and mild bradycardia, but no documented episodes correlated with her symptoms. Unfortunately she could not tolerate event monitor and absolutely does not want to wear one. I am very concerned about underlying arrhythmias either exacerbating or causing some of her falls and I have recommended subcutaneous loop recorder. I am hesitant to proceed to EP study and this was discussed. I will make arrangements. In regards to her stroke risk as well, this is clearly elevated, but with her falls and unclear etiology, I am hesitant to recommend any more aggressive anticoagulation beyond aspirin. It may be reasonable to consider Watchman device, but again this would require anticoagulation for six months. I will defer at this point.         Past Medical History:   Diagnosis Date    Allergic rhinitis     Anemia     Anxiety disorder     Chronic obstructive pulmonary disease (HCC)     Dyslipidemia     Folic acid deficiency     Hypertension     Neurologic disorder     resting tremor       Current Outpatient Medications   Medication Sig Dispense Refill    losartan (COZAAR) 25 mg tablet Take 1 Tablet by mouth two (2) times a day. 90 Tablet 0    sertraline (ZOLOFT) 50 mg tablet Take one and a half tablets daily (total daily dose: 75mg, in place of 100mg daily). 45 Tablet 0    primidone (MYSOLINE) 50 mg tablet Take 1 Tablet by mouth in the morning. 45 Tablet 0    budesonide-formoteroL (Symbicort) 80-4.5 mcg/actuation HFAA Take 2 Puffs by inhalation two (2) times a day. Rinse mouth after use. 10.2 g 1    vitamin B complex capsule Take 1 Capsule by mouth daily. 90 Capsule 1    fluticasone propionate (FLONASE) 50 mcg/actuation nasal spray SHAKE LIQUID AND USE 2 SPRAYS IN EACH NOSTRIL DAILY 48 g 1    albuterol (PROVENTIL HFA, VENTOLIN HFA, PROAIR HFA) 90 mcg/actuation inhaler Take 2 Puffs by inhalation every four (4) hours as needed for Wheezing. 1 Each 1    atorvastatin (LIPITOR) 40 mg tablet Take 1 Tablet by mouth daily. 90 Tablet 1    pantoprazole (PROTONIX) 40 mg tablet Take 1 Tablet by mouth daily for 360 days. Indications: gastroesophageal reflux disease 90 Tablet 3    aspirin delayed-release 81 mg tablet Take 81 mg by mouth daily. Social History   reports that she has quit smoking. Her smoking use included cigarettes. She has a 50.00 pack-year smoking history. She has never used smokeless tobacco.   reports that she does not currently use alcohol. Family History  family history includes Alcohol abuse in her brother; Cancer in her brother and father; Heart Disease in her sister. Review of Systems  Except as stated above include:  Constitutional: Negative for fever, chills and malaise/fatigue. HEENT: No congestion or recent URI. Gastrointestinal: No nausea, vomiting, abdominal pain, bloody stools. Pulmonary:  Negative except as stated above. Cardiac:  Negative except as stated above.   Musculoskeletal: Negative except as stated above. Neurological:  No localized symptoms. Skin:  Negative except as stated above. Psych:  Negative except as stated above. Endocrine:  Negative except as stated above. PHYSICAL EXAM  BP Readings from Last 3 Encounters:   08/08/22 128/77   07/25/22 135/74   07/07/22 127/85     Pulse Readings from Last 3 Encounters:   08/08/22 (!) 59   07/25/22 76   07/07/22 63     Wt Readings from Last 3 Encounters:   08/08/22 70.3 kg (155 lb)   07/25/22 71.4 kg (157 lb 4.8 oz)   07/07/22 71.3 kg (157 lb 3.2 oz)     General:   Well developed, well groomed. Head/Neck:   No obvious jugular venous distention     No obvious carotid pulsations. No evidence of xanthelasma. Lungs:   No respiratory distress. Clear bilaterally. Heart:  Regular rate and rhythm. Normal S1/S2. Palpation grossly normal.    No significant murmurs, rubs or gallops. Abdomen:   Non-acute abdomen. No obvious pulsations. Extremities:   Intact peripheral pulses. No significant edema. Neurological:   Alert and oriented to person, place, time. No focal neurological deficit visually. Skin:   No obvious rash    Blood Pressure Metric:  Monitor recommended and adjustments stated if needed.

## 2022-08-24 NOTE — LETTER
8/24/2022 1:47 PM    Henry Kidney  [id number]  1940        Insurance: Medicare                  Auth # _____________________      Proc Date: Fri 9/9                Proc Time:  10:30      Performing MD : Dr. Hay Moser                      Procedure:Loop Implant                                         Scheduled with:  Lily                PCP:Dillon                                               Date:8/24/2022          HP: 8/24      EKG: ______    Labs:______  CXR: _______  Orders:  8/24          Special Instructions:  _____________________________________________________  ______________________________________________________________________  ______________________________________________________________________          The materials enclosed with this facsimile transmission are private and confidential and are the property of the sender. If you are not the intended recipient, be advised that any unauthorized use, disclosure, copying, distribution, or the taking of any action in reliance on the contents of this telecopied information is strictly prohibited. If you have received this in error, please immediately notify the sender via telephone to arrange for return of the forwarded documentation.

## 2022-08-24 NOTE — PATIENT INSTRUCTIONS
[facility]          Patient  EP Instructions    Please get Lab Work and Chest X-ray completed about 7 days prior to procedure. You are scheduled to have a SQ Loop Recorder Implant on  September 9, 2022 , at 10:30 a.m. Please check in at 09:00 a.m. Please go to [facility] and park in the outpatient parking lot that is located around to the back of the hospital and enter through the [facility]. Once you enter through the [facility], [address] [place] [address], [id number], check in with the  there. The  will either give you directions or assist you in getting to the cath holding area. 3.  You are not to eat or drink anything after midnight the night before your procedure. Please continue to take your medications with a small sip of water on the morning of the procedure with the following exceptions: Take medications as scheduled. If you are diabetic, do not take your insulin/sugar pill the morning of the procedure. We encourage families to wait in the waiting room on the first floor while the procedure is being done. The Doctor will come out and talk with you as soon as the procedure is over. There is the possibility that you may spend the night in the hospital, depending on the results of the procedure. This will be determined after the procedure is done. 8.   If you or your family have any questions, please call our office Monday-Friday 9:00am         -4:30 pm , at [phone], and ask to speak to one of the nurses.

## 2022-08-24 NOTE — PROGRESS NOTES
Palak Mariscal presents today for   Chief Complaint   Patient presents with    New Patient     Referred by PCP for LBB, AFIB and diastolic dysfunction        Palak Mairscal preferred language for health care discussion is english/other. Is someone accompanying this pt? Daughter     Is the patient using any DME equipment during [address]? no    Depression Screening:  3 most recent PHQ Screens 8/8/2022   Little interest or pleasure in doing things Not at all   Feeling down, depressed, irritable, or hopeless Not at all   Total Score PHQ 2 0   Trouble falling or staying asleep, or sleeping too much -   Feeling tired or having little energy -   Poor appetite, weight loss, or overeating -   Feeling bad about yourself - or that you are a failure or have let yourself or your family down -   Trouble concentrating on things such as school, work, reading, or watching TV -   Moving or speaking so slowly that other people could have noticed; or the opposite being so fidgety that others notice -   Thoughts of being better off dead, or hurting yourself in some way -   PHQ 9 Score -   How difficult have these problems made it for you to do your work, take care of your home and get along with others -       Learning Assessment:  Learning Assessment 12/3/2020   PRIMARY LEARNER Patient   HIGHEST LEVEL OF EDUCATION - PRIMARY LEARNER  DID NOT GRADUATE HIGH SCHOOL   BARRIERS PRIMARY LEARNER NONE   PRIMARY LANGUAGE ENGLISH   LEARNER PREFERENCE PRIMARY READING   ANSWERED BY patient   RELATIONSHIP SELF       Abuse Screening:  Abuse Screening Questionnaire 8/8/2022   Do you ever feel afraid of your partner? N   Are you in a relationship with someone who physically or mentally threatens you? N   Is it safe for you to go home? Y       Fall Risk  Fall Risk Assessment, last 12 mths 8/8/2022   Able to walk? Yes   Fall in past 12 months? 0   Do you feel unsteady? 0   Are you worried about falling 0   Is TUG test greater than 12 seconds?  -   Is the gait abnormal? -   Number of falls in past 12 months -   Fall with injury? -       Pt currently taking Anticoagulant therapy? ASA 81mg every day     Coordination of Care:  1. Have you been to the ER, urgent care clinic since your last visit? Hospitalized since your last visit? no    2. Have you seen or consulted any other health care providers outside of the [address] [place] [address] since your last visit? Include any pap smears or colon screening.  no

## 2022-09-06 NOTE — H&P
Plan subcutaneous loop recorder as discussed in the office. All questions answered. History of Present Illness:  78 YOF referred for history of atrial fibrillation and left bundle branch block, as well as falls. There was concern for presyncope versus syncope, but she has never lost consciousness. She is accompanied by one of her daughters today. She lives with her son and has another daughter who is a nurse. She has had very frequent falls and at least one episode where she fell backwards. She also has significant tremor and dyspnea. She relates weakness on her left side ever since her stroke a few years ago. Impression:  Frequent falls, unclear if related to arrhythmia or mobility and balance. Chronic LBBB. History of paroxysmal a-fib. History of mild bradycardia, limiting medication. COPD. History of remote stroke a few years ago, on aspirin, some residual left sided weakness. Chronic diastolic heart failure with echo January 2022 with normal function. HTN. Dyslipidemia. Plan:  She has a history of paroxysmal atrial fibrillation, as well as left bundle branch block and mild bradycardia, but no documented episodes correlated with her symptoms. Unfortunately she could not tolerate event monitor and absolutely does not want to wear one. I am very concerned about underlying arrhythmias either exacerbating or causing some of her falls and I have recommended subcutaneous loop recorder. I am hesitant to proceed to EP study and this was discussed. I will make arrangements. In regards to her stroke risk as well, this is clearly elevated, but with her falls and unclear etiology, I am hesitant to recommend any more aggressive anticoagulation beyond aspirin. It may be reasonable to consider Watchman device, but again this would require anticoagulation for six months. I will defer at this point.                 Past Medical History:   Diagnosis Date    Allergic rhinitis      Anemia Anxiety disorder      Chronic obstructive pulmonary disease (HCC)      Dyslipidemia      Folic acid deficiency      Hypertension      Neurologic disorder       resting tremor                Current Outpatient Medications   Medication Sig Dispense Refill    losartan (COZAAR) 25 mg tablet Take 1 Tablet by mouth two (2) times a day. 90 Tablet 0    sertraline (ZOLOFT) 50 mg tablet Take one and a half tablets daily (total daily dose: 75mg, in place of 100mg daily). 45 Tablet 0    primidone (MYSOLINE) 50 mg tablet Take 1 Tablet by mouth in the morning. 45 Tablet 0    budesonide-formoteroL (Symbicort) 80-4.5 mcg/actuation HFAA Take 2 Puffs by inhalation two (2) times a day. Rinse mouth after use. 10.2 g 1    vitamin B complex capsule Take 1 Capsule by mouth daily. 90 Capsule 1    fluticasone propionate (FLONASE) 50 mcg/actuation nasal spray SHAKE LIQUID AND USE 2 SPRAYS IN EACH NOSTRIL DAILY 48 g 1    albuterol (PROVENTIL HFA, VENTOLIN HFA, PROAIR HFA) 90 mcg/actuation inhaler Take 2 Puffs by inhalation every four (4) hours as needed for Wheezing. 1 Each 1    atorvastatin (LIPITOR) 40 mg tablet Take 1 Tablet by mouth daily. 90 Tablet 1    pantoprazole (PROTONIX) 40 mg tablet Take 1 Tablet by mouth daily for 360 days. Indications: gastroesophageal reflux disease 90 Tablet 3    aspirin delayed-release 81 mg tablet Take 81 mg by mouth daily. Social History   reports that she has quit smoking. Her smoking use included cigarettes. She has a 50.00 pack-year smoking history. She has never used smokeless tobacco.   reports that she does not currently use alcohol. Family History  family history includes Alcohol abuse in her brother; Cancer in her brother and father; Heart Disease in her sister. Review of Systems  Except as stated above include:  Constitutional: Negative for fever, chills and malaise/fatigue. HEENT: No congestion or recent URI.   Gastrointestinal: No nausea, vomiting, abdominal pain, bloody stools. Pulmonary:  Negative except as stated above. Cardiac:  Negative except as stated above. Musculoskeletal: Negative except as stated above. Neurological:  No localized symptoms. Skin:  Negative except as stated above. Psych:  Negative except as stated above. Endocrine:  Negative except as stated above. PHYSICAL EXAM      BP Readings from Last 3 Encounters:   08/08/22 128/77   07/25/22 135/74   07/07/22 127/85          Pulse Readings from Last 3 Encounters:   08/08/22 (!) 59   07/25/22 76   07/07/22 63          Wt Readings from Last 3 Encounters:   08/08/22 70.3 kg (155 lb)   07/25/22 71.4 kg (157 lb 4.8 oz)   07/07/22 71.3 kg (157 lb 3.2 oz)      General:          Well developed, well groomed. Head/Neck:     No obvious jugular venous distention                           No obvious carotid pulsations. No evidence of xanthelasma. Lungs:             No respiratory distress. Clear bilaterally. Heart:              Regular rate and rhythm. Normal S1/S2. Palpation grossly normal.                          No significant murmurs, rubs or gallops. Abdomen:        Non-acute abdomen. No obvious pulsations. Extremities:     Intact peripheral pulses. No significant edema. Neurological:   Alert and oriented to person, place, time. No focal neurological deficit visually. Skin:                No obvious rash     Blood Pressure Metric:  Monitor recommended and adjustments stated if needed.          Electronically signed by Preethi Huang MD at 08/24/22 [id number]

## 2022-09-08 ENCOUNTER — HOSPITAL ENCOUNTER (OUTPATIENT)
Dept: GENERAL RADIOLOGY | Age: 82
Discharge: HOME | End: 2022-09-08
Payer: MEDICARE

## 2022-09-08 ENCOUNTER — HOSPITAL ENCOUNTER (OUTPATIENT)
Dept: LAB | Age: 82
Discharge: HOME | End: 2022-09-08
Payer: MEDICARE

## 2022-09-08 DIAGNOSIS — I48.0 PAROXYSMAL ATRIAL FIBRILLATION (HCC): ICD-10-CM

## 2022-09-08 DIAGNOSIS — I44.7 LBBB (LEFT BUNDLE BRANCH BLOCK): ICD-10-CM

## 2022-09-08 LAB
ALBUMIN SERPL-MCNC: 3.9 G/DL (ref 3.4–5)
ALBUMIN/GLOB SERPL: 1.1 {RATIO} (ref 0.8–1.7)
ALP SERPL-CCNC: 112 U/L (ref 45–117)
ALT SERPL-CCNC: 28 U/L (ref 13–56)
ANION GAP SERPL CALC-SCNC: 10 MMOL/L (ref 3–18)
AST SERPL W P-5'-P-CCNC: 25 U/L (ref 10–38)
BASOPHILS # BLD: 0.1 K/UL (ref 0–0.1)
BASOPHILS NFR BLD: 1 % (ref 0–2)
BILIRUB SERPL-MCNC: 0.2 MG/DL (ref 0.2–1)
BUN SERPL-MCNC: 26 MG/DL (ref 7–18)
BUN/CREAT SERPL: 19 (ref 12–20)
CA-I BLD-MCNC: 8.8 MG/DL (ref 8.5–10.1)
CHLORIDE SERPL-SCNC: 105 MMOL/L (ref 100–111)
CO2 SERPL-SCNC: 26 MMOL/L (ref 21–32)
CREAT SERPL-MCNC: 1.38 MG/DL (ref 0.6–1.3)
DIFFERENTIAL METHOD BLD: ABNORMAL
EOSINOPHIL # BLD: 0.1 K/UL (ref 0–0.4)
EOSINOPHIL NFR BLD: 2 % (ref 0–5)
ERYTHROCYTE [DISTWIDTH] IN BLOOD BY AUTOMATED COUNT: 13.5 % (ref 11.6–14.5)
GLOBULIN SER CALC-MCNC: 3.5 G/DL (ref 2–4)
GLUCOSE SERPL-MCNC: 126 MG/DL (ref 74–99)
HCT VFR BLD AUTO: 41.6 % (ref 35–45)
HGB BLD-MCNC: 13.6 G/DL (ref 12–16)
IMM GRANULOCYTES # BLD AUTO: 0 K/UL (ref 0–0.04)
IMM GRANULOCYTES NFR BLD AUTO: 0 % (ref 0–0.5)
INR PPP: 1 (ref 0.8–1.2)
LYMPHOCYTES # BLD: 0.9 K/UL (ref 0.9–3.6)
LYMPHOCYTES NFR BLD: 14 % (ref 21–52)
MCH RBC QN AUTO: 30.8 PG (ref 24–34)
MCHC RBC AUTO-ENTMCNC: 32.7 G/DL (ref 31–37)
MCV RBC AUTO: 94.1 FL (ref 78–100)
MONOCYTES # BLD: 0.7 K/UL (ref 0.05–1.2)
MONOCYTES NFR BLD: 11 % (ref 3–10)
NEUTS SEG # BLD: 4.7 K/UL (ref 1.8–8)
NEUTS SEG NFR BLD: 72 % (ref 40–73)
NRBC # BLD: 0 K/UL (ref 0–0.01)
NRBC BLD-RTO: 0 PER 100 WBC
PLATELET # BLD AUTO: 175 K/UL (ref 135–420)
PMV BLD AUTO: 10.9 FL (ref 9.2–11.8)
POTASSIUM SERPL-SCNC: 4.6 MMOL/L (ref 3.5–5.5)
PROT SERPL-MCNC: 7.4 G/DL (ref 6.4–8.2)
PROTHROMBIN TIME: 13.7 SEC (ref 11.5–15.2)
RBC # BLD AUTO: 4.42 M/UL (ref 4.2–5.3)
SODIUM SERPL-SCNC: 141 MMOL/L (ref 136–145)
WBC # BLD AUTO: 6.5 K/UL (ref 4.6–13.2)

## 2022-09-08 PROCEDURE — 71046 X-RAY EXAM CHEST 2 VIEWS: CPT

## 2022-09-08 PROCEDURE — 85610 PROTHROMBIN TIME: CPT

## 2022-09-08 PROCEDURE — 80053 COMPREHEN METABOLIC PANEL: CPT

## 2022-09-08 PROCEDURE — 85025 COMPLETE CBC W/AUTO DIFF WBC: CPT

## 2022-09-08 PROCEDURE — 36415 COLL VENOUS BLD VENIPUNCTURE: CPT

## 2022-09-09 ENCOUNTER — HOSPITAL ENCOUNTER (OUTPATIENT)
Age: 82
Setting detail: OUTPATIENT SURGERY
Discharge: HOME | End: 2022-09-09
Attending: INTERNAL MEDICINE | Admitting: INTERNAL MEDICINE
Payer: MEDICARE

## 2022-09-09 VITALS
OXYGEN SATURATION: 96 % | DIASTOLIC BLOOD PRESSURE: 69 MMHG | RESPIRATION RATE: 25 BRPM | HEART RATE: 62 BPM | SYSTOLIC BLOOD PRESSURE: 163 MMHG

## 2022-09-09 DIAGNOSIS — I48.91 ATRIAL FIBRILLATION, UNSPECIFIED TYPE (HCC): ICD-10-CM

## 2022-09-09 DIAGNOSIS — Z95.818 STATUS POST PLACEMENT OF IMPLANTABLE LOOP RECORDER: Primary | ICD-10-CM

## 2022-09-09 DIAGNOSIS — I44.7 BUNDLE, BRANCH BLOCK, LEFT: ICD-10-CM

## 2022-09-09 PROCEDURE — 33285 INSJ SUBQ CAR RHYTHM MNTR: CPT | Performed by: INTERNAL MEDICINE

## 2022-09-09 PROCEDURE — 74011250636 HC RX REV CODE- 250/636: Performed by: INTERNAL MEDICINE

## 2022-09-09 PROCEDURE — 74011000250 HC RX REV CODE- 250: Performed by: INTERNAL MEDICINE

## 2022-09-09 PROCEDURE — C1764 EVENT RECORDER, CARDIAC: HCPCS | Performed by: INTERNAL MEDICINE

## 2022-09-09 PROCEDURE — 77030031139 HC SUT VCRL2 J&J -A: Performed by: INTERNAL MEDICINE

## 2022-09-09 DEVICE — MONITOR CRD 1.4 CC 3.4 GM INSERTABLE LINQ: Type: IMPLANTABLE DEVICE | Status: FUNCTIONAL

## 2022-09-09 RX ORDER — CEFAZOLIN SODIUM 1 G/3ML
INJECTION, POWDER, FOR SOLUTION INTRAMUSCULAR; INTRAVENOUS AS NEEDED
Status: DISCONTINUED | OUTPATIENT
Start: 2022-09-09 | End: 2022-09-09 | Stop reason: HOSPADM

## 2022-09-09 RX ORDER — LIDOCAINE HYDROCHLORIDE AND EPINEPHRINE 20; 10 MG/ML; UG/ML
INJECTION, SOLUTION INFILTRATION; PERINEURAL AS NEEDED
Status: DISCONTINUED | OUTPATIENT
Start: 2022-09-09 | End: 2022-09-09 | Stop reason: HOSPADM

## 2022-09-09 RX ORDER — LIDOCAINE HYDROCHLORIDE AND EPINEPHRINE 10; 10 MG/ML; UG/ML
INJECTION, SOLUTION INFILTRATION; PERINEURAL
Status: DISCONTINUED
Start: 2022-09-09 | End: 2022-09-09 | Stop reason: HOSPADM

## 2022-09-09 RX ORDER — OXYCODONE AND ACETAMINOPHEN 5; 325 MG/1; MG/1
1 TABLET ORAL
Qty: 12 TABLET | Refills: 0 | Status: SHIPPED | OUTPATIENT
Start: 2022-09-09 | End: 2022-09-12

## 2022-09-09 NOTE — Clinical Note
TRANSFER - OUT REPORT:     Verbal report given to: vincent aly. Report consisted of patient's Situation, Background, Assessment and   Recommendations(SBAR). Opportunity for questions and clarification was provided. Patient transported with a Cardiac Cath Tech / Patient Care Tech. Patient transported to: [address]

## 2022-09-09 NOTE — DISCHARGE INSTRUCTIONS
Remove dressing in 48 hours. Implantable Heart Monitor Placement: What to Expect at [address] [place] [address] heart monitor placement is surgery to put a small heart monitor under the skin of your chest. The doctor put the monitor there to record electrical signals from your heart. The monitor looks for an irregular heartbeat. It also looks for other heart rhythm problems. It can help your doctor find out what's causing your fainting, lightheadedness, or other symptoms. Your chest may be sore where the doctor made the cut. You should be able to go home soon after surgery. And you should be able to get back to your usual activities. You'll need to take steps to safely use electronic devices. Some of these devices can stop your heart monitor from working right for a short time. Talk with your doctor about this. Learn what to avoid and what to keep a short distance away from your heart monitor. This care sheet gives you a general idea about how long it will take for you to recover. But each person recovers at a different pace. Follow the steps below to get better as quickly as possible. How can you care for yourself at home? Activity    Rest when you feel tired. You can do your normal activities when it feels okay to do so. Medicines    Ask your doctor if you can take an over-the-counter pain medicine, such as acetaminophen (Tylenol), ibuprofen (Advil, Motrin), or naproxen (Aleve). Be safe with medicines. Read and follow all instructions on the label. Incision care    If you have strips of tape on the incision, leave the tape on for a week or until it falls off. Wash the area daily with warm water, and pat it dry. Don't use hydrogen peroxide or alcohol. They can slow healing. You may cover the area with a gauze bandage if it oozes fluid or rubs against clothing. You may shower 24 to 48 hours after surgery. Pat the incision dry.  Don't swim or take a bath for the first 2 weeks, or until your doctor tells you it is okay. Other instructions    Keep a medical ID card with you at all times that says you have a heart monitor. The card should include the  and model information. Your monitor may start recording on its own when it detects an abnormal heartbeat. Or you might use a handheld device to start the monitor when you have symptoms. Your doctor will explain which type of monitor you have and what you need to do. Follow-up care is a key part of your treatment and safety. Be sure to make and go to all appointments, and call your doctor if you are having problems. It's also a good idea to know your test results and keep a list of the medicines you take. When should you call for help? Call 911 anytime you think you may need emergency care. For example, call if:  You passed out (lost consciousness). Call your doctor now or seek immediate medical care if:  You have pain that does not get better after you take pain medicine. You are bleeding from the incision. You have symptoms of infection, such as: Increased pain, swelling, warmth, or redness. Red streaks leading from the area. Pus draining from the area. A fever. Watch closely for changes in your health, and be sure to contact your doctor if:  You have any problems with your heart monitor. Current as of: January 10, 2022               Content Version: 13.2  © 2006-2022 [employer]. Care instructions adapted under license by [employer] (which disclaims liability or warranty for this information). If you have questions about a medical condition or this instruction, always ask your healthcare professional. [place] [address] any warranty or liability for your use of this information.

## 2022-09-09 NOTE — Clinical Note
TRANSFER - IN REPORT:     Verbal report received from: Dustin Umaña. Report consisted of patient's Situation, Background, Assessment and   Recommendations(SBAR). Opportunity for questions and clarification was provided. Assessment completed upon patient's arrival to unit and care assumed. Patient transported with a Cardiac Cath Tech / Patient Care Tech.

## 2022-09-12 ENCOUNTER — OFFICE VISIT (OUTPATIENT)
Dept: INTERNAL MEDICINE CLINIC | Age: 82
End: 2022-09-12
Payer: MEDICARE

## 2022-09-12 VITALS
WEIGHT: 159.2 LBS | HEIGHT: 64 IN | OXYGEN SATURATION: 97 % | RESPIRATION RATE: 22 BRPM | DIASTOLIC BLOOD PRESSURE: 55 MMHG | TEMPERATURE: 97.3 F | BODY MASS INDEX: 27.18 KG/M2 | HEART RATE: 56 BPM | SYSTOLIC BLOOD PRESSURE: 139 MMHG

## 2022-09-12 DIAGNOSIS — G25.0 ESSENTIAL TREMOR: ICD-10-CM

## 2022-09-12 DIAGNOSIS — Z86.79 HISTORY OF ATRIAL FIBRILLATION: ICD-10-CM

## 2022-09-12 DIAGNOSIS — I44.7 LBBB (LEFT BUNDLE BRANCH BLOCK): ICD-10-CM

## 2022-09-12 DIAGNOSIS — I10 ESSENTIAL HYPERTENSION: Primary | ICD-10-CM

## 2022-09-12 DIAGNOSIS — I51.89 DIASTOLIC DYSFUNCTION: ICD-10-CM

## 2022-09-12 DIAGNOSIS — I67.1 MIDDLE CEREBRAL ARTERY ANEURYSM: ICD-10-CM

## 2022-09-12 DIAGNOSIS — I10 ESSENTIAL HYPERTENSION: ICD-10-CM

## 2022-09-12 PROCEDURE — 99214 OFFICE O/P EST MOD 30 MIN: CPT | Performed by: FAMILY MEDICINE

## 2022-09-12 PROCEDURE — 1123F ACP DISCUSS/DSCN MKR DOCD: CPT | Performed by: FAMILY MEDICINE

## 2022-09-12 NOTE — PROGRESS NOTES
Patient had internal EKG monitor placed on this past Friday. Ranjit Garcia presents today for No chief complaint on file. Is someone accompanying this pt?no    Is the patient using any DME equipment during OV? Yes cane    Depression Screening:  3 most recent PHQ Screens 9/12/2022   Little interest or pleasure in doing things Not at all   Feeling down, depressed, irritable, or hopeless Several days   Total Score PHQ 2 1   Trouble falling or staying asleep, or sleeping too much -   Feeling tired or having little energy -   Poor appetite, weight loss, or overeating -   Feeling bad about yourself - or that you are a failure or have let yourself or your family down -   Trouble concentrating on things such as school, work, reading, or watching TV -   Moving or speaking so slowly that other people could have noticed; or the opposite being so fidgety that others notice -   Thoughts of being better off dead, or hurting yourself in some way -   PHQ 9 Score -   How difficult have these problems made it for you to do your work, take care of your home and get along with others -       Learning Assessment:  Learning Assessment 12/3/2020   PRIMARY LEARNER Patient   HIGHEST LEVEL OF EDUCATION - PRIMARY LEARNER  DID NOT GRADUATE HIGH SCHOOL   BARRIERS PRIMARY LEARNER NONE   PRIMARY LANGUAGE ENGLISH   LEARNER PREFERENCE PRIMARY READING   ANSWERED BY patient   RELATIONSHIP SELF       Fall Risk  Fall Risk Assessment, last 12 mths 9/12/2022   Able to walk? Yes   Fall in past 12 months? 1   Do you feel unsteady? 1   Are you worried about falling 1   Is TUG test greater than 12 seconds? 0   Is the gait abnormal? 1   Number of falls in past 12 months 2   Fall with injury?  0       ADL  ADL Assessment 9/12/2022   Feeding yourself No Help Needed   Getting from bed to chair No Help Needed   Getting dressed No Help Needed   Bathing or showering No Help Needed   Walk across the room (includes cane/walker) No Help Needed   Using the telphone No Help Needed   Taking your medications No Help Needed   Preparing meals No Help Needed   Managing money (expenses/bills) No Help Needed   Moderately strenuous housework (laundry) No Help Needed   Shopping for personal items (toiletries/medicines) No Help Needed   Shopping for groceries No Help Needed   Driving No Help Needed   Climbing a flight of stairs No Help Needed   Getting to places beyond walking distances No Help Needed       Health Maintenance reviewed and discussed and ordered per Provider. Health Maintenance Due   Topic Date Due    Pneumococcal 65+ years (1 - PCV) Never done    DTaP/Tdap/Td series (1 - Tdap) Never done    Shingrix Vaccine Age 50> (1 of 2) Never done    Bone Densitometry (Dexa) Screening  Never done    COVID-19 Vaccine (3 - Booster for Moderna series) 07/23/2021    Flu Vaccine (1) 09/01/2022   . Coordination of Care:  1. \"Have you been to the ER, urgent care clinic since your last visit? Hospitalized since your last visit? \" No    2. \"Have you seen or consulted any other health care providers outside of the [address] [place] [address] since your last visit? \" No     3. For patients aged 39-70: Has the patient had a colonoscopy? NA - based on age     If the patient is female:    4. For patients aged 41-77: Has the patient had a mammogram within the past 2 years? NA - based on age    11. For patients aged 21-65: Has the patient had a pap smear?  NA - based on age

## 2022-09-12 NOTE — PROGRESS NOTES
Tommy Overton (: 1940) is a 78 y.o. female here for evaluation of the following chief concern(s):  Follow-up; Chronic condition management    ASSESSMENT/PLAN:  Diagnoses and all orders for this visit:    1. Essential hypertension    2. Essential tremor    3. LBBB (left bundle branch block)    4. Middle cerebral artery aneurysm    5. Diastolic dysfunction    6. History of atrial fibrillation      Ms. Estrella Monterroso is medically stable at this time. Blood pressure is not controlled- though she did not take her antihypertensive medication this morning, hx borderline orthostatic hypotension. Continue losartan 25mg twice daily and follow-up in 2-4 weeks. We had planned for consultation w/ Dr. Macarena Miller (Neurology movement specialist) for further evaluation of essential tremor, noting barriers to recommending standard therapy w/ bradycardia/conduction abnormality/arrhythmia, and hx no increased benefit from higher dose Primidone. Working to down titrate Sertraline to see if this helps reduce tremor. ? Benefit of Ropinirole- will defer additional medication management for this condition to Neurology a this time. Low suspicion that incidental middle cerebral artery aneurysm is contributing to her chronic headaches, but noted family hx of ruptured cerebral aneurysm- so pt/family will further discuss w/ Neurology. Pt is maintained on ASA and statin therapy, noted Dr. Mario Alberto Hill hesitation for full anticoagulation (even potentially shorterm as a bridge to Watchman procedure) given her frailty and recurrent falls. Pt had loop recorder placed by Dr. Matheus Harrison, upcoming device check. Visit summary reviewed w/ pt's daughter, Lydia Garza, at pt request.      Return in about 4 weeks (around 10/10/2022) for uncontrolled hypertension. Tommy Overton agrees with plan as above and has no additional questions at this time.        SUBJECTIVE/OBJECTIVE:  Ms. Estrella Monterroso is an 78-year of age female past medical history significant for hypertension, LBBB, paroxysmal Afib, hyperlipidemia, COPD, anemia and Corby acid deficiency, anxiety, former smoker with approximately 50-pack-year history, impaired mobility and frailty with recurrent falls. Pt states that today she feels \"OK\". Since last visit, she had loop recorder placed by Dr. Ethan Wagner, follow-up scheduled for Thursday. HTN:  Pt did not take her BP meds this morning waiting on her refill. 7/25/22 Losartan 25mg increased to BID.   7/7/22 Losartan reduced from 50 mg to 25 mg daily given recurrent ?syncopal events of unclear etiology. Essential tremor:  Seems to be worse in the afternoon. Upcoming appt w/ Dr. Red Lizama on 11/2/22. Hx was referred to w/ Dr. Alyssa Whittaker. Hx small up titration to Primidone to help with tremor does not seem to have helped improve her tremors. Anxiety:  8/8/22 Sertraline reduced to 50mg every day. 7/25/22 Sertraline was reduced to 75mg every day. Hx Sertraline 100mg every day; she feels that her anxiety is poorly controlled. She is no longer taking hydroxizine. Hx she took Benzodiazapine. Cerebral aneurysm (incidental): She was referred to Neurology for further evaluation of movement disorder and small incidental cerebral aneurysm; as above, she has an upcoming appointment in November. Chronic headaches:  Not much improved w/ Flonase. Labs for temporal headaches not suggestive of GCA. Recurrent falls:  No falls since last visit (daughter confirms this). Hx;  Per daughter, no prodrome, seems to occur at times when pt is rising from seated position. Referral to [facility] for [facility]; she says this is doing alright but is hoping for less providers, feels she only needs a nurse aid. 7/7/22 results review;  CBC and CMP WNL. T. Choles 176, LDL 95, HDL 55,   TSH 0.61  Vitamin D 11  B9/B12 WNL. ESR and CRP EWL. BOBBY negative. Interval hx;   She states that she has been feeling poorly for the past 3-4 days, \"a bad feeling, like I don't have any use of anything\", pain in temples- lasts seconds and resolves, chronic tremors- worsened in the past few days, history of frequent falls. She has not had a fever, abd pain, nausea, diarrhea, changes to smell/tasts, cognitive changes. She uses a cane for mobility. She lives w/ her son who helps her w/ iADLs. Patient was last seen in the ER on 5/29/2022 for fall. 5/23/22 CT head:   FINDINGS: Posterior scalp hematoma. Moderate intracranial atrophy. No mass effect, midline shift or hemorrhage. Ventricles are normal in size, position and configuration. Scattered and confluent foci of decreased attenuation noted of the periventricular white matter, nonspecific, but most likely sequelae of  chronic microvascular disease. No abnormal extra-axial fluid collections are seen. No territorial signs of infarct. The bony calvarium appears intact without acute displaced fracture. The visualized paranasal sinuses and mastoid air cells are aerated. IMPRESSION 1. No acute intracranial pathology. 1/17/2022 ECHO:Left Ventricle: Left ventricle size is normal. Mildly increased wall thickness. Findings consistent with mild concentric hypertrophy. Normal wall motion. Normal left ventricular systolic function with a visually estimated EF of 50 - 65%. EF by 2D Simpsons Biplane is 58%. Grade I diastolic dysfunction with normal LAP. Aortic Valve: Valve structure is tricuspid. Mild sclerosis of the aortic valve cusps. 1/12/22 CTA head:  IMPRESSION  1. No definite large vessel occlusion. 2.  Moderate proximal bilateral ICA stenosis estimated 54% on the left and 45% on the right, based on NASCET criteria. 3. No high-grade vertebral artery stenosis. 4. No high-grade intracranial stenosis. Mild bilateral carotid siphon atherosclerotic narrowing. 5. Small right MCA bifurcation aneurysm.     1/16/2022   CBC within normal limits  CMP within normal limits, blood glucose 112  UA with trace ketones, small leuk esterase, 5-10 WBCs; urine culture with no growth  UDS positive for benzodiazepine    6/20/2021 heme positive stools    4/22/2021 TSH 0.91    1/17/2021 B12 331    Previous EKG was read with Afib, left bundle branch block.         Past Medical History:   Diagnosis Date    Allergic rhinitis     Anemia     Anxiety disorder     Chronic obstructive pulmonary disease (HCC)     Dyslipidemia     Folic acid deficiency     Hypertension     Neurologic disorder     resting tremor     Past Surgical History:   Procedure Laterality Date    COLONOSCOPY N/A 10/26/2021    COLONOSCOPY w/ polypectomy performed by Alonzo Homans, MD at [facility] ENDOSCOPY     Family History   Problem Relation Age of Onset    Cancer Father         stomach    Heart Disease Sister     Alcohol abuse Brother     Cancer Brother         lung       Social History     Socioeconomic History    Marital status:      Spouse name: Not on file    Number of children: Not on file    Years of education: Not on file    Highest education level: Not on file   Occupational History    Not on file   Tobacco Use    Smoking status: Former     Packs/day: 1.00     Years: 50.00     Pack years: 50.00     Types: Cigarettes    Smokeless tobacco: Never   Vaping Use    Vaping Use: Never used   Substance and Sexual Activity    Alcohol use: Not Currently    Drug use: Never    Sexual activity: Not Currently   Other Topics Concern    Not on file   Social History Narrative    Not on file     Social Determinants of Health     Financial Resource Strain: Not on file   Food Insecurity: Not on file   Transportation Needs: Not on file   Physical Activity: Not on file   Stress: Not on file   Social Connections: Not on file   Intimate Partner Violence: Not on file   Housing Stability: Not on file     Social History     Tobacco Use   Smoking Status Former    Packs/day: 1.00    Years: 50.00    Pack years: 50.00    Types: Cigarettes   Smokeless Tobacco Never       Current Outpatient Medications   Medication Sig Dispense Refill    sertraline (ZOLOFT) 50 mg tablet Take one tablet daily (total daily dose: 50mg, in place of 75mg daily). 30 Tablet 0    budesonide-formoteroL (Symbicort) 80-4.5 mcg/actuation HFAA Take 2 Puffs by inhalation two (2) times a day. Rinse mouth after use. 10.2 g 1    vitamin B complex capsule Take 1 Capsule by mouth daily. 90 Capsule 1    fluticasone propionate (FLONASE) 50 mcg/actuation nasal spray SHAKE LIQUID AND USE 2 SPRAYS IN EACH NOSTRIL DAILY 48 g 1    albuterol (PROVENTIL HFA, VENTOLIN HFA, PROAIR HFA) 90 mcg/actuation inhaler Take 2 Puffs by inhalation every four (4) hours as needed for Wheezing. 1 Each 1    atorvastatin (LIPITOR) 40 mg tablet Take 1 Tablet by mouth daily. 90 Tablet 1    pantoprazole (PROTONIX) 40 mg tablet Take 1 Tablet by mouth daily for 360 days. Indications: gastroesophageal reflux disease 90 Tablet 3    aspirin delayed-release 81 mg tablet Take 81 mg by mouth daily. losartan (COZAAR) 25 mg tablet TAKE 1 TABLET BY MOUTH TWO (2) TIMES A DAY. 180 Tablet 1    primidone (MYSOLINE) 50 mg tablet Take 1 Tablet by mouth daily. 90 Tablet 1     Allergies   Allergen Reactions    Isopropyl Alcohol Other (comments)     Weakness all over - pt states she has out grown this    Pen-Vee K Rash     Pt. States she avoids penicillin due to allergy as a child. BP (!) 139/55   Pulse (!) 56   Temp 97.3 °F (36.3 °C) (Temporal)   Resp 22   Ht 5' 4\" (1.626 m)   Wt 159 lb 3.2 oz (72.2 kg)   SpO2 97%   BMI 27.33 kg/m²     Physical Exam  Constitutional:       General: She is not in acute distress. Appearance: She is not ill-appearing. Comments: Frail appearing elderly lady. Mild [place]. Neck:      Thyroid: No thyroid mass, thyromegaly or thyroid tenderness. Cardiovascular:      Rate and Rhythm: Normal rate and regular rhythm. Heart sounds: Normal heart sounds.    Pulmonary:      Effort: Pulmonary effort is normal.      Breath sounds: Normal breath sounds. Abdominal:      General: Bowel sounds are normal.      Palpations: Abdomen is soft. Tenderness: no abdominal tenderness   Skin:     General: Skin is warm and dry. Neurological:      Mental Status: She is alert and oriented to person, place, and time. Comments: Fine tremor to outstretched hands. Psychiatric:      Comments: Mildly anxious affect. Lab Results   Component Value Date/Time    WBC 6.5 09/08/2022 03:54 PM    HGB 13.6 09/08/2022 03:54 PM    HCT 41.6 09/08/2022 03:54 PM    PLATELET 726 50/01/1394 03:54 PM    MCV 94.1 09/08/2022 03:54 PM     Lab Results   Component Value Date/Time    Sodium 141 09/08/2022 03:54 PM    Potassium 4.6 09/08/2022 03:54 PM    Chloride 105 09/08/2022 03:54 PM    CO2 26 09/08/2022 03:54 PM    Anion gap 10 09/08/2022 03:54 PM    Glucose 126 (H) 09/08/2022 03:54 PM    BUN 26 (H) 09/08/2022 03:54 PM    Creatinine 1.38 (H) 09/08/2022 03:54 PM    BUN/Creatinine ratio 19 09/08/2022 03:54 PM    GFR est AA 44 (L) 09/08/2022 03:54 PM    GFR est non-AA 36 (L) 09/08/2022 03:54 PM    Calcium 8.8 09/08/2022 03:54 PM    Bilirubin, total 0.2 09/08/2022 03:54 PM    Alk.  phosphatase 112 09/08/2022 03:54 PM    Protein, total 7.4 09/08/2022 03:54 PM    Albumin 3.9 09/08/2022 03:54 PM    Globulin 3.5 09/08/2022 03:54 PM    A-G Ratio 1.1 09/08/2022 03:54 PM    ALT (SGPT) 28 09/08/2022 03:54 PM    AST (SGOT) 25 09/08/2022 03:54 PM     Lab Results   Component Value Date/Time    Cholesterol, total 176 07/07/2022 10:00 AM    HDL Cholesterol 55 07/07/2022 10:00 AM    LDL-C, External 86 01/06/2020 12:00 AM    LDL, calculated 95 07/07/2022 10:00 AM    LDL, calculated 75.6 12/11/2020 08:23 AM    VLDL, calculated 26 07/07/2022 10:00 AM    VLDL, calculated 24.4 12/11/2020 08:23 AM    Triglyceride 153 (H) 07/07/2022 10:00 AM    CHOL/HDL Ratio 2.9 12/11/2020 08:23 AM       On this date 09/12/22 I have spent >30 minutes reviewing previous notes, test results and face to face with the patient for interview/exam, discussing working diagnosis and treatment plan. Medical decision making complexity: moderate-high.     Rajiv Sinclair MD   Family & Geriatric Medicine

## 2022-09-14 RX ORDER — PRIMIDONE 50 MG/1
50 TABLET ORAL DAILY
Qty: 90 TABLET | Refills: 1 | Status: SHIPPED | OUTPATIENT
Start: 2022-09-14

## 2022-09-14 RX ORDER — LOSARTAN POTASSIUM 25 MG/1
25 TABLET ORAL 2 TIMES DAILY
Qty: 180 TABLET | Refills: 1 | Status: SHIPPED | OUTPATIENT
Start: 2022-09-14

## 2022-09-22 ENCOUNTER — CLINICAL SUPPORT (OUTPATIENT)
Dept: CARDIOLOGY CLINIC | Age: 82
End: 2022-09-22
Payer: MEDICARE

## 2022-09-22 DIAGNOSIS — I48.0 PAROXYSMAL ATRIAL FIBRILLATION (HCC): ICD-10-CM

## 2022-09-22 DIAGNOSIS — Z95.818 STATUS POST PLACEMENT OF IMPLANTABLE LOOP RECORDER: Primary | ICD-10-CM

## 2022-09-22 PROCEDURE — 93291 INTERROG DEV EVAL SCRMS IP: CPT | Performed by: INTERNAL MEDICINE

## 2022-09-26 DIAGNOSIS — F41.9 ANXIETY: ICD-10-CM

## 2022-09-26 RX ORDER — SERTRALINE HYDROCHLORIDE 50 MG/1
TABLET, FILM COATED ORAL
Qty: 90 TABLET | Refills: 0 | Status: SHIPPED | OUTPATIENT
Start: 2022-09-26

## 2022-09-27 NOTE — PROGRESS NOTES
I have personally seen and evaluated the device findings. Interrogation reviewed and I agree with assessment.     Kimberly Flores

## 2022-10-07 ENCOUNTER — OFFICE VISIT (OUTPATIENT)
Dept: INTERNAL MEDICINE CLINIC | Age: 82
End: 2022-10-07
Payer: MEDICARE

## 2022-10-07 VITALS
HEART RATE: 59 BPM | RESPIRATION RATE: 15 BRPM | TEMPERATURE: 96.5 F | OXYGEN SATURATION: 95 % | SYSTOLIC BLOOD PRESSURE: 116 MMHG | DIASTOLIC BLOOD PRESSURE: 62 MMHG

## 2022-10-07 DIAGNOSIS — Z23 ENCOUNTER FOR IMMUNIZATION: ICD-10-CM

## 2022-10-07 DIAGNOSIS — R39.11 URINARY HESITANCY: ICD-10-CM

## 2022-10-07 DIAGNOSIS — B37.31 VAGINAL YEAST INFECTION: Primary | ICD-10-CM

## 2022-10-07 DIAGNOSIS — N90.89 LESION OF LABIA: ICD-10-CM

## 2022-10-07 PROCEDURE — 99214 OFFICE O/P EST MOD 30 MIN: CPT | Performed by: FAMILY MEDICINE

## 2022-10-07 PROCEDURE — 1123F ACP DISCUSS/DSCN MKR DOCD: CPT | Performed by: FAMILY MEDICINE

## 2022-10-07 PROCEDURE — 90694 VACC AIIV4 NO PRSRV 0.5ML IM: CPT | Performed by: FAMILY MEDICINE

## 2022-10-07 PROCEDURE — G0008 ADMIN INFLUENZA VIRUS VAC: HCPCS | Performed by: FAMILY MEDICINE

## 2022-10-07 RX ORDER — MICONAZOLE NITRATE 4 %
CREAM WITH PREFILLED APPLICATOR VAGINAL
Qty: 25 G | Refills: 0 | Status: CANCELLED | OUTPATIENT
Start: 2022-10-07

## 2022-10-07 NOTE — PROGRESS NOTES
Johnny Morgan presents today for   Chief Complaint   Patient presents with    Blood in Urine     Says there is blood when she wipes after going to the bathroom and its itching very bad        Is someone accompanying this pt? No     Is the patient using any DME equipment during OV? Yes walker    Depression Screening:  3 most recent PHQ Screens 10/7/2022   Little interest or pleasure in doing things Several days   Feeling down, depressed, irritable, or hopeless Several days   Total Score PHQ 2 2   Trouble falling or staying asleep, or sleeping too much -   Feeling tired or having little energy -   Poor appetite, weight loss, or overeating -   Feeling bad about yourself - or that you are a failure or have let yourself or your family down -   Trouble concentrating on things such as school, work, reading, or watching TV -   Moving or speaking so slowly that other people could have noticed; or the opposite being so fidgety that others notice -   Thoughts of being better off dead, or hurting yourself in some way -   PHQ 9 Score -   How difficult have these problems made it for you to do your work, take care of your home and get along with others -       Learning Assessment:  Learning Assessment 12/3/2020   PRIMARY LEARNER Patient   HIGHEST LEVEL OF EDUCATION - PRIMARY LEARNER  DID NOT GRADUATE HIGH SCHOOL   BARRIERS PRIMARY LEARNER NONE   PRIMARY LANGUAGE ENGLISH   LEARNER PREFERENCE PRIMARY READING   ANSWERED BY patient   RELATIONSHIP SELF       Fall Risk  Fall Risk Assessment, last 12 mths 10/7/2022   Able to walk? Yes   Fall in past 12 months? 1   Do you feel unsteady? 1   Are you worried about falling 1   Is TUG test greater than 12 seconds? -   Is the gait abnormal? -   Number of falls in past 12 months 2   Fall with injury?  1       ADL  ADL Assessment 9/12/2022   Feeding yourself No Help Needed   Getting from bed to chair No Help Needed   Getting dressed No Help Needed   Bathing or showering No Help Needed Walk across the room (includes cane/walker) No Help Needed   Using the telphone No Help Needed   Taking your medications No Help Needed   Preparing meals No Help Needed   Managing money (expenses/bills) No Help Needed   Moderately strenuous housework (laundry) No Help Needed   Shopping for personal items (toiletries/medicines) No Help Needed   Shopping for groceries No Help Needed   Driving No Help Needed   Climbing a flight of stairs No Help Needed   Getting to places beyond walking distances No Help Needed       Travel Screening:    Travel Screening       Question Response    In the last 10 days, have you been in contact with someone who was confirmed or suspected to have Coronavirus/COVID-19? No / Unsure    Have you had a COVID-19 viral test in the last 10 days? No    Do you have any of the following new or worsening symptoms? None of these    Have you traveled internationally or domestically in the last month? No          Travel History   Travel since 09/07/22    No documented travel since 09/07/22         Health Maintenance reviewed and discussed and ordered per Provider. Health Maintenance Due   Topic Date Due    Pneumococcal 65+ years (1 - PCV) Never done    DTaP/Tdap/Td series (1 - Tdap) Never done    Shingrix Vaccine Age 50> (1 of 2) Never done    Bone Densitometry (Dexa) Screening  Never done    COVID-19 Vaccine (3 - Booster for Moderna series) 07/23/2021    Flu Vaccine (1) 08/01/2022    Medicare Yearly Exam  11/02/2022   . Coordination of Care:  1. \"Have you been to the ER, urgent care clinic since your last visit? Hospitalized since your last visit? \" No    2. \"Have you seen or consulted any other health care providers outside of the [address] [place] [address] since your last visit? \" No     3. For patients aged 39-70: Has the patient had a colonoscopy? NA - based on age     If the patient is female:    4. For patients aged 41-77: Has the patient had a mammogram within the past 2 years?  NA - based on age    11. For patients aged 21-65: Has the patient had a pap smear?  NA - based on age

## 2022-10-07 NOTE — PROGRESS NOTES
John Guardado (: 1940) is a 78 y.o. female here for evaluation of the following chief concern(s):  Acute concern, same day visit; bleeding      ASSESSMENT/PLAN:  1. Vaginal yeast infection  -     miconazole nitrate 2 % kit; 1 Each by Apply Externally route nightly., Normal, Disp-1 Kit, R-0GENERIC FOR MONISTAT 3-DAY KTI (Patient not taking: Reported on 10/12/2022)  -     triamcinolone acetonide (KENALOG) 0.1 % topical cream; Apply  to affected area daily. use thin layer to vaginal rash, Normal, Disp-15 g, R-0  -     AMB POC SMEAR, STAIN & INTERPRET, WET MOUNT  2. Lesion of labia  -     AMB POC SMEAR, STAIN & INTERPRET, WET MOUNT  3. Urinary hesitancy  -     CBC WITH AUTOMATED DIFF  -     METABOLIC PANEL, BASIC  -     AMB POC URINALYSIS DIP STICK AUTO W/O MICRO  -     CULTURE, URINE  4. Encounter for immunization  -     INFLUENZA, FLUAD, (AGE 72 Y+), IM, PF, 0.5 ML    Ms. Flaco Gleason appears medically stable, normal hemodynamics. Bleeding appears to be minimal from abrasions to labia. Pt not able to give urine sample. Sub-optimal sample for wet mount; saline w/ some clue cells, KIRBY with mild burden fungal components; will treat for yeast and inflammation/atrophic changes using topical steroid. Close follow-up to follow symptoms. We discussed strict ER precautions for new/worsening symptoms, especially inability to void urine. John Guardado agrees with plan as above and has no additional questions at this time. +ADDENDUM; 10/8/22 after-hours discussion w/ pt's daughter, pt is  able to pass some urine, labs show CBC and BMP WNL. SUBJECTIVE/OBJECTIVE:    Two falls since this week, last was the night before last.  He hit her head with both falls. ROS: No LOC, new headache/dizziness/vision change/focal weakness or paresthesias, nausea. LUTS onset ~1 month ago; stable.   She has noticed inability to fully empty bladder, perineal irritation, blood on the toilet paper w/ wiping. She states this does not feel like a yeast infection. ROS: No constipation, abd pain, f/c.       Past Medical History:   Diagnosis Date    Allergic rhinitis     Anemia     Anxiety disorder     Chronic obstructive pulmonary disease (HCC)     Dyslipidemia     Folic acid deficiency     Hypertension     Neurologic disorder     resting tremor     Past Surgical History:   Procedure Laterality Date    COLONOSCOPY N/A 10/26/2021    COLONOSCOPY w/ polypectomy performed by Judge Ranulfo MD at [facility] ENDOSCOPY    HX IMPLANTABLE LOOP RECORDER N/A 08/2022    had a loop mointor put in     Family History   Problem Relation Age of Onset    Cancer Father         stomach    Heart Disease Sister     Alcohol abuse Brother     Cancer Brother         lung       Social History     Socioeconomic History    Marital status:      Spouse name: Not on file    Number of children: Not on file    Years of education: Not on file    Highest education level: Not on file   Occupational History    Not on file   Tobacco Use    Smoking status: Former     Packs/day: 1.00     Years: 50.00     Pack years: 50.00     Types: Cigarettes    Smokeless tobacco: Never   Vaping Use    Vaping Use: Never used   Substance and Sexual Activity    Alcohol use: Not Currently    Drug use: Never    Sexual activity: Not Currently   Other Topics Concern    Not on file   Social History Narrative    Not on file     Social Determinants of Health     Financial Resource Strain: Not on file   Food Insecurity: Not on file   Transportation Needs: Not on file   Physical Activity: Not on file   Stress: Not on file   Social Connections: Not on file   Intimate Partner Violence: Not on file   Housing Stability: Not on file     Social History     Tobacco Use   Smoking Status Former    Packs/day: 1.00    Years: 50.00    Pack years: 50.00    Types: Cigarettes   Smokeless Tobacco Never       Current Outpatient Medications   Medication Sig Dispense Refill    miconazole nitrate 2 % kit 1 Each by Apply Externally route nightly. (Patient not taking: Reported on 10/12/2022) 1 Kit 0    triamcinolone acetonide (KENALOG) 0.1 % topical cream Apply  to affected area daily. use thin layer to vaginal rash 15 g 0    sertraline (ZOLOFT) 50 mg tablet TAKE ONE TABLET BY MOUTH DAILY 90 Tablet 0    losartan (COZAAR) 25 mg tablet TAKE 1 TABLET BY MOUTH TWO (2) TIMES A DAY. 180 Tablet 1    primidone (MYSOLINE) 50 mg tablet Take 1 Tablet by mouth daily. 90 Tablet 1    budesonide-formoteroL (Symbicort) 80-4.5 mcg/actuation HFAA Take 2 Puffs by inhalation two (2) times a day. Rinse mouth after use. 10.2 g 1    vitamin B complex capsule Take 1 Capsule by mouth daily. 90 Capsule 1    fluticasone propionate (FLONASE) 50 mcg/actuation nasal spray SHAKE LIQUID AND USE 2 SPRAYS IN EACH NOSTRIL DAILY 48 g 1    albuterol (PROVENTIL HFA, VENTOLIN HFA, PROAIR HFA) 90 mcg/actuation inhaler Take 2 Puffs by inhalation every four (4) hours as needed for Wheezing. 1 Each 1    atorvastatin (LIPITOR) 40 mg tablet Take 1 Tablet by mouth daily. 90 Tablet 1    pantoprazole (PROTONIX) 40 mg tablet Take 1 Tablet by mouth daily for 360 days. Indications: gastroesophageal reflux disease 90 Tablet 3    aspirin delayed-release 81 mg tablet Take 81 mg by mouth daily. trimethoprim-sulfamethoxazole (BACTRIM DS, SEPTRA DS) 160-800 mg per tablet Take 1 Tablet by mouth two (2) times a day for 10 days. 10 Tablet 0     Allergies   Allergen Reactions    Isopropyl Alcohol Other (comments)     Weakness all over - pt states she has out grown this    Pen-Vee K Rash     Pt. States she avoids penicillin due to allergy as a child. /62   Pulse (!) 59   Temp (!) 96.5 °F (35.8 °C)   Resp 15   SpO2 95%     Physical Exam  Exam conducted with a chaperone present. Constitutional:       General: She is not in acute distress. Appearance: Normal appearance. She is not ill-appearing.    HENT:      Head: Normocephalic and atraumatic. Cardiovascular:      Rate and Rhythm: Normal rate and regular rhythm. Heart sounds: Normal heart sounds. Pulmonary:      Effort: Pulmonary effort is normal.      Breath sounds: Normal breath sounds. Abdominal:      Palpations: Abdomen is soft. Tenderness: There is no abdominal tenderness. Genitourinary:     Comments: Normal female external genitalia. Atrophic changes. Excoriations, one near vaginal introitus w/ mild active oozing. White patches. Musculoskeletal:      Cervical back: Neck supple. No tenderness. Neurological:      General: No focal deficit present. Mental Status: She is alert and oriented to person, place, and time. Mental status is at baseline. Motor: Weakness (Generalized) present. Gait: Gait normal.   Psychiatric:         Mood and Affect: Mood normal.       Lab Results   Component Value Date/Time    WBC 6.5 09/08/2022 03:54 PM    HGB 13.6 09/08/2022 03:54 PM    HCT 41.6 09/08/2022 03:54 PM    PLATELET 182 38/63/6924 03:54 PM    MCV 94.1 09/08/2022 03:54 PM     Lab Results   Component Value Date/Time    Sodium 141 09/08/2022 03:54 PM    Potassium 4.6 09/08/2022 03:54 PM    Chloride 105 09/08/2022 03:54 PM    CO2 26 09/08/2022 03:54 PM    Anion gap 10 09/08/2022 03:54 PM    Glucose 126 (H) 09/08/2022 03:54 PM    BUN 26 (H) 09/08/2022 03:54 PM    Creatinine 1.38 (H) 09/08/2022 03:54 PM    BUN/Creatinine ratio 19 09/08/2022 03:54 PM    GFR est AA 44 (L) 09/08/2022 03:54 PM    GFR est non-AA 36 (L) 09/08/2022 03:54 PM    Calcium 8.8 09/08/2022 03:54 PM    Bilirubin, total 0.2 09/08/2022 03:54 PM    Alk.  phosphatase 112 09/08/2022 03:54 PM    Protein, total 7.4 09/08/2022 03:54 PM    Albumin 3.9 09/08/2022 03:54 PM    Globulin 3.5 09/08/2022 03:54 PM    A-G Ratio 1.1 09/08/2022 03:54 PM    ALT (SGPT) 28 09/08/2022 03:54 PM    AST (SGOT) 25 09/08/2022 03:54 PM     Lab Results   Component Value Date/Time    Cholesterol, total 176 07/07/2022 10:00 AM HDL Cholesterol 55 07/07/2022 10:00 AM    LDL-C, External 86 01/06/2020 12:00 AM    LDL, calculated 95 07/07/2022 10:00 AM    LDL, calculated 75.6 12/11/2020 08:23 AM    VLDL, calculated 26 07/07/2022 10:00 AM    VLDL, calculated 24.4 12/11/2020 08:23 AM    Triglyceride 153 (H) 07/07/2022 10:00 AM    CHOL/HDL Ratio 2.9 12/11/2020 08:23 AM       On this date 10/07/22 I have spent >30 minutes reviewing previous notes, test results and face to face with the patient for interview/exam, discussing working diagnosis and treatment plan. Medical decision making complexity: moderate-high.     Gallo Cristobal MD   Family & Geriatric Medicine

## 2022-10-08 LAB — CREATININE, EXTERNAL: 0.85

## 2022-10-08 RX ORDER — TRIAMCINOLONE ACETONIDE 1 MG/G
CREAM TOPICAL DAILY
Qty: 15 G | Refills: 0 | Status: SHIPPED | OUTPATIENT
Start: 2022-10-08

## 2022-10-12 ENCOUNTER — OFFICE VISIT (OUTPATIENT)
Dept: INTERNAL MEDICINE CLINIC | Age: 82
End: 2022-10-12
Payer: MEDICARE

## 2022-10-12 VITALS
SYSTOLIC BLOOD PRESSURE: 167 MMHG | HEART RATE: 57 BPM | HEIGHT: 64 IN | TEMPERATURE: 97.2 F | BODY MASS INDEX: 26.63 KG/M2 | OXYGEN SATURATION: 95 % | DIASTOLIC BLOOD PRESSURE: 72 MMHG | RESPIRATION RATE: 20 BRPM | WEIGHT: 156 LBS

## 2022-10-12 DIAGNOSIS — N30.01 ACUTE CYSTITIS WITH HEMATURIA: Primary | ICD-10-CM

## 2022-10-12 DIAGNOSIS — R39.11 URINARY HESITANCY: ICD-10-CM

## 2022-10-12 DIAGNOSIS — B37.31 VAGINAL YEAST INFECTION: ICD-10-CM

## 2022-10-12 LAB
BILIRUB UR QL STRIP: NEGATIVE
GLUCOSE UR-MCNC: NEGATIVE MG/DL
KETONES P FAST UR STRIP-MCNC: NEGATIVE MG/DL
PH UR STRIP: 5.5 [PH] (ref 4.6–8)
PROT UR QL STRIP: NORMAL
SP GR UR STRIP: 1.03 (ref 1–1.03)
UA UROBILINOGEN AMB POC: NORMAL (ref 0.2–1)
URINALYSIS CLARITY POC: NORMAL
URINALYSIS COLOR POC: YELLOW
URINE BLOOD POC: NORMAL
URINE LEUKOCYTES POC: NORMAL
URINE NITRITES POC: NEGATIVE

## 2022-10-12 PROCEDURE — 99214 OFFICE O/P EST MOD 30 MIN: CPT | Performed by: FAMILY MEDICINE

## 2022-10-12 PROCEDURE — 81003 URINALYSIS AUTO W/O SCOPE: CPT | Performed by: FAMILY MEDICINE

## 2022-10-12 PROCEDURE — 1123F ACP DISCUSS/DSCN MKR DOCD: CPT | Performed by: FAMILY MEDICINE

## 2022-10-12 RX ORDER — FLUCONAZOLE 150 MG/1
150 TABLET ORAL DAILY
Qty: 1 TABLET | Refills: 0 | Status: SHIPPED | OUTPATIENT
Start: 2022-10-12 | End: 2022-10-13

## 2022-10-12 RX ORDER — SULFAMETHOXAZOLE AND TRIMETHOPRIM 800; 160 MG/1; MG/1
1 TABLET ORAL 2 TIMES DAILY
Qty: 10 TABLET | Refills: 0 | Status: SHIPPED | OUTPATIENT
Start: 2022-10-12 | End: 2022-10-22

## 2022-10-12 NOTE — PROGRESS NOTES
Safia Gudino presents today for   Chief Complaint   Patient presents with    Vaginal Pain    Vaginal Itching       Is someone accompanying this pt? No    Is the patient using any DME equipment during OV? Cane    Depression Screening:  3 most recent PHQ Screens 10/12/2022   Little interest or pleasure in doing things Not at all   Feeling down, depressed, irritable, or hopeless Several days   Total Score PHQ 2 1   Trouble falling or staying asleep, or sleeping too much -   Feeling tired or having little energy -   Poor appetite, weight loss, or overeating -   Feeling bad about yourself - or that you are a failure or have let yourself or your family down -   Trouble concentrating on things such as school, work, reading, or watching TV -   Moving or speaking so slowly that other people could have noticed; or the opposite being so fidgety that others notice -   Thoughts of being better off dead, or hurting yourself in some way -   PHQ 9 Score -   How difficult have these problems made it for you to do your work, take care of your home and get along with others -       Learning Assessment:  Learning Assessment 12/3/2020   PRIMARY LEARNER Patient   HIGHEST LEVEL OF EDUCATION - PRIMARY LEARNER  DID NOT GRADUATE HIGH SCHOOL   BARRIERS PRIMARY LEARNER NONE   PRIMARY LANGUAGE ENGLISH   LEARNER PREFERENCE PRIMARY READING   ANSWERED BY patient   RELATIONSHIP SELF       Fall Risk  Fall Risk Assessment, last 12 mths 10/12/2022   Able to walk? Yes   Fall in past 12 months? 1   Do you feel unsteady? 1   Are you worried about falling 1   Is TUG test greater than 12 seconds? -   Is the gait abnormal? 1   Number of falls in past 12 months 2   Fall with injury? 1       Health Maintenance reviewed and discussed and ordered per Provider.     Health Maintenance Due   Topic Date Due    Pneumococcal 65+ years (1 - PCV) Never done    DTaP/Tdap/Td series (1 - Tdap) Never done    Shingrix Vaccine Age 50> (1 of 2) Never done    Bone Densitometry (Dexa) Screening  Never done    COVID-19 Vaccine (3 - Booster for Moderna series) 07/23/2021    Medicare Yearly Exam  11/02/2022   . Coordination of Care:    1. Have you been to the ER, urgent care clinic since your last visit? Hospitalized since your last visit? No    2. Have you seen or consulted any other health care providers outside of the [address] [place] [address] since your last visit? Include any pap smears or colon screening. No    3. For patients aged 39-70: Has the patient had a colonoscopy / FIT/ Cologuard? NA - based on age      If the patient is female:    4. For patients aged 41-77: Has the patient had a mammogram within the past 2 years? NA - based on age or sex      11. For patients aged 21-65: Has the patient had a pap smear?  NA - based on age or sex

## 2022-10-12 NOTE — PATIENT INSTRUCTIONS
Ms. Gonzalez Bon,  Based on your symptoms and the urine sample today, it looks like you may have a UTI; I have prescribed an antibiotic \"Bactrim\" one tablet twice a day for 5 days. Please be sure to stay extra hydrated while on this medicaiton. After you complete this antibiotic, you can take the \"fluconazole\" pill to treat any yeast infection. We will check a swab to help give more information about what is causing the irritation you continue to experience; in the meantime, please continue the Clotrimazole and Triamcinolone creams. I would like to see you back in the office in 1-2 weeks to re-check an external pelvic exam, follow-up on your labs and blood pressure. If you start feeling bad at any time (more than normal), please consider being seen in the ER for immediate care.     Thank you,  Dr. Nu Gamez

## 2022-10-17 NOTE — PROGRESS NOTES
Mehul Dean (: 1940) is a 78 y.o. female here for evaluation of the following chief concern(s):  Follow-up; vaginitis, urinary changes      ASSESSMENT/PLAN:  1. Acute cystitis with hematuria  -     trimethoprim-sulfamethoxazole (BACTRIM DS, SEPTRA DS) 160-800 mg per tablet; Take 1 Tablet by mouth two (2) times a day for 10 days. , Normal, Disp-10 Tablet, R-0  2. Urinary hesitancy  -     AMB POC URINALYSIS DIP STICK AUTO W/O MICRO  3. Vaginal yeast infection  -     fluconazole (DIFLUCAN) 150 mg tablet; Take 1 Tablet by mouth daily for 1 day. Take single dose after completing Bactrim antibiotic., Normal, Disp-1 Tablet, R-0  -     NUSWAB VG PLUS+MYCOPLASMAS,WHITLEY    Ms. Juwan Tanner appears medically stable, baseline hemodynamics except elevated blood pressure- possibly related to acute stress. For her persistent urogenital symptoms, we will empirically treat for UTI, continue steroid/antifungal topicals and use PO antifungal.  Send urine culture and NUSWAB collected by nursing. She declines a referral to GYN at this time. Repeat external pelvic exam next visit. In addition, see personalized AVS.      Return in about 1 week (around 10/19/2022) for close follow-up UTI. Mehul Dean agrees with plan as above and has no additional questions at this time. SUBJECTIVE/OBJECTIVE:    10/7/22 results review;  CBC WNL  BMP WNL    Symptoms from last visit stable to improved. Still w/ troubles emptying her bladder. ROS: No abd pain, fever/chills.     Past Medical History:   Diagnosis Date    Allergic rhinitis     Anemia     Anxiety disorder     Chronic obstructive pulmonary disease (HCC)     Dyslipidemia     Folic acid deficiency     Hypertension     Neurologic disorder     resting tremor     Past Surgical History:   Procedure Laterality Date    COLONOSCOPY N/A 10/26/2021    COLONOSCOPY w/ polypectomy performed by Jenae Sanchez MD at [facility] ENDOSCOPY    HX IMPLANTABLE LOOP RECORDER N/A 08/2022    had a loop mointor put in     Family History   Problem Relation Age of Onset    Cancer Father         stomach    Heart Disease Sister     Alcohol abuse Brother     Cancer Brother         lung       Social History     Socioeconomic History    Marital status:      Spouse name: Not on file    Number of children: Not on file    Years of education: Not on file    Highest education level: Not on file   Occupational History    Not on file   Tobacco Use    Smoking status: Former     Packs/day: 1.00     Years: 50.00     Pack years: 50.00     Types: Cigarettes    Smokeless tobacco: Never   Vaping Use    Vaping Use: Never used   Substance and Sexual Activity    Alcohol use: Not Currently    Drug use: Never    Sexual activity: Not Currently   Other Topics Concern    Not on file   Social History Narrative    Not on file     Social Determinants of Health     Financial Resource Strain: Not on file   Food Insecurity: Not on file   Transportation Needs: Not on file   Physical Activity: Not on file   Stress: Not on file   Social Connections: Not on file   Intimate Partner Violence: Not on file   Housing Stability: Not on file     Social History     Tobacco Use   Smoking Status Former    Packs/day: 1.00    Years: 50.00    Pack years: 50.00    Types: Cigarettes   Smokeless Tobacco Never       Current Outpatient Medications   Medication Sig Dispense Refill    trimethoprim-sulfamethoxazole (BACTRIM DS, SEPTRA DS) 160-800 mg per tablet Take 1 Tablet by mouth two (2) times a day for 10 days. 10 Tablet 0    triamcinolone acetonide (KENALOG) 0.1 % topical cream Apply  to affected area daily. use thin layer to vaginal rash 15 g 0    sertraline (ZOLOFT) 50 mg tablet TAKE ONE TABLET BY MOUTH DAILY 90 Tablet 0    losartan (COZAAR) 25 mg tablet TAKE 1 TABLET BY MOUTH TWO (2) TIMES A DAY. 180 Tablet 1    primidone (MYSOLINE) 50 mg tablet Take 1 Tablet by mouth daily.  90 Tablet 1    budesonide-formoteroL (Symbicort) 80-4.5 mcg/actuation HFAA Take 2 Puffs by inhalation two (2) times a day. Rinse mouth after use. 10.2 g 1    vitamin B complex capsule Take 1 Capsule by mouth daily. 90 Capsule 1    fluticasone propionate (FLONASE) 50 mcg/actuation nasal spray SHAKE LIQUID AND USE 2 SPRAYS IN EACH NOSTRIL DAILY 48 g 1    albuterol (PROVENTIL HFA, VENTOLIN HFA, PROAIR HFA) 90 mcg/actuation inhaler Take 2 Puffs by inhalation every four (4) hours as needed for Wheezing. 1 Each 1    atorvastatin (LIPITOR) 40 mg tablet Take 1 Tablet by mouth daily. 90 Tablet 1    pantoprazole (PROTONIX) 40 mg tablet Take 1 Tablet by mouth daily for 360 days. Indications: gastroesophageal reflux disease 90 Tablet 3    aspirin delayed-release 81 mg tablet Take 81 mg by mouth daily. miconazole nitrate 2 % kit 1 Each by Apply Externally route nightly. (Patient not taking: Reported on 10/12/2022) 1 Kit 0     Allergies   Allergen Reactions    Isopropyl Alcohol Other (comments)     Weakness all over - pt states she has out grown this    Pen-Vee K Rash     Pt. States she avoids penicillin due to allergy as a child. BP (!) 167/72 (BP 1 Location: Right upper arm, BP Patient Position: Sitting, BP Cuff Size: Adult)   Pulse (!) 57   Temp 97.2 °F (36.2 °C) (Temporal)   Resp 20   Ht 5' 4\" (1.626 m)   Wt 156 lb (70.8 kg)   SpO2 95%   BMI 26.78 kg/m²     Physical Exam  Constitutional:       Appearance: Normal appearance. Cardiovascular:      Rate and Rhythm: Normal rate and regular rhythm. Pulmonary:      Effort: No respiratory distress. Breath sounds: Normal breath sounds. Abdominal:      Palpations: Abdomen is soft. Tenderness: There is no abdominal tenderness. Neurological:      Mental Status: She is alert. Mental status is at baseline.    Psychiatric:         Mood and Affect: Mood normal.       Lab Results   Component Value Date/Time    WBC 6.5 09/08/2022 03:54 PM    HGB 13.6 09/08/2022 03:54 PM    HCT 41.6 09/08/2022 03:54 PM    PLATELET 175 09/08/2022 03:54 PM    MCV 94.1 09/08/2022 03:54 PM     Lab Results   Component Value Date/Time    Sodium 141 09/08/2022 03:54 PM    Potassium 4.6 09/08/2022 03:54 PM    Chloride 105 09/08/2022 03:54 PM    CO2 26 09/08/2022 03:54 PM    Anion gap 10 09/08/2022 03:54 PM    Glucose 126 (H) 09/08/2022 03:54 PM    BUN 26 (H) 09/08/2022 03:54 PM    Creatinine 1.38 (H) 09/08/2022 03:54 PM    BUN/Creatinine ratio 19 09/08/2022 03:54 PM    GFR est AA 44 (L) 09/08/2022 03:54 PM    GFR est non-AA 36 (L) 09/08/2022 03:54 PM    Calcium 8.8 09/08/2022 03:54 PM    Bilirubin, total 0.2 09/08/2022 03:54 PM    Alk. phosphatase 112 09/08/2022 03:54 PM    Protein, total 7.4 09/08/2022 03:54 PM    Albumin 3.9 09/08/2022 03:54 PM    Globulin 3.5 09/08/2022 03:54 PM    A-G Ratio 1.1 09/08/2022 03:54 PM    ALT (SGPT) 28 09/08/2022 03:54 PM    AST (SGOT) 25 09/08/2022 03:54 PM     Lab Results   Component Value Date/Time    Cholesterol, total 176 07/07/2022 10:00 AM    HDL Cholesterol 55 07/07/2022 10:00 AM    LDL-C, External 86 01/06/2020 12:00 AM    LDL, calculated 95 07/07/2022 10:00 AM    LDL, calculated 75.6 12/11/2020 08:23 AM    VLDL, calculated 26 07/07/2022 10:00 AM    VLDL, calculated 24.4 12/11/2020 08:23 AM    Triglyceride 153 (H) 07/07/2022 10:00 AM    CHOL/HDL Ratio 2.9 12/11/2020 08:23 AM       On this date 10/12/22 I have spent >30  minutes reviewing previous notes, test results and face to face with the patient for interview/exam, discussing working diagnosis and treatment plan. Medical decision making complexity: moderate-high.     Bere Bsiwas MD   Family & Geriatric Medicine

## 2022-10-20 LAB
A VAGINAE DNA VAG QL NAA+PROBE: ABNORMAL SCORE
BVAB2 DNA VAG QL NAA+PROBE: ABNORMAL SCORE
C ALBICANS DNA VAG QL NAA+PROBE: NEGATIVE
C GLABRATA DNA VAG QL NAA+PROBE: NEGATIVE
C TRACH DNA VAG QL NAA+PROBE: NEGATIVE
M GENITALIUM DNA SPEC QL NAA+PROBE: NEGATIVE
M HOMINIS DNA SPEC QL NAA+PROBE: NEGATIVE
MEGA1 DNA VAG QL NAA+PROBE: ABNORMAL SCORE
N GONORRHOEA DNA VAG QL NAA+PROBE: NEGATIVE
T VAGINALIS DNA VAG QL NAA+PROBE: NEGATIVE
UREAPLASMA DNA SPEC QL NAA+PROBE: POSITIVE

## 2022-10-21 ENCOUNTER — OFFICE VISIT (OUTPATIENT)
Dept: INTERNAL MEDICINE CLINIC | Age: 82
End: 2022-10-21
Payer: MEDICARE

## 2022-10-21 VITALS
WEIGHT: 156.6 LBS | BODY MASS INDEX: 26.88 KG/M2 | HEART RATE: 60 BPM | SYSTOLIC BLOOD PRESSURE: 139 MMHG | TEMPERATURE: 97.1 F | RESPIRATION RATE: 16 BRPM | DIASTOLIC BLOOD PRESSURE: 69 MMHG | OXYGEN SATURATION: 97 %

## 2022-10-21 DIAGNOSIS — R39.11 URINARY HESITANCY: ICD-10-CM

## 2022-10-21 DIAGNOSIS — B37.31 VAGINAL YEAST INFECTION: ICD-10-CM

## 2022-10-21 DIAGNOSIS — N30.01 ACUTE CYSTITIS WITH HEMATURIA: Primary | ICD-10-CM

## 2022-10-21 DIAGNOSIS — N90.89 LESION OF LABIA: ICD-10-CM

## 2022-10-21 PROBLEM — I63.9 CVA (CEREBRAL VASCULAR ACCIDENT) (HCC): Status: RESOLVED | Noted: 2022-01-16 | Resolved: 2022-10-21

## 2022-10-21 LAB
BACTERIA UR CULT: ABNORMAL

## 2022-10-21 PROCEDURE — 99214 OFFICE O/P EST MOD 30 MIN: CPT | Performed by: FAMILY MEDICINE

## 2022-10-21 PROCEDURE — 1123F ACP DISCUSS/DSCN MKR DOCD: CPT | Performed by: FAMILY MEDICINE

## 2022-10-21 RX ORDER — FLUCONAZOLE 150 MG/1
150 TABLET ORAL DAILY
Qty: 1 TABLET | Refills: 0 | Status: SHIPPED | OUTPATIENT
Start: 2022-10-21 | End: 2022-10-22

## 2022-10-21 RX ORDER — DOXYCYCLINE 100 MG/1
100 CAPSULE ORAL 2 TIMES DAILY
Qty: 14 CAPSULE | Refills: 0 | Status: SHIPPED | OUTPATIENT
Start: 2022-10-21 | End: 2022-10-28

## 2022-10-21 NOTE — PROGRESS NOTES
Dayana Simental presents today for   Chief Complaint   Patient presents with    Follow-up     1 week follow up on UTI        Is someone accompanying this pt? No     Is the patient using any DME equipment during OV? Yes cane     Depression Screening:  3 most recent PHQ Screens 10/21/2022   Little interest or pleasure in doing things Several days   Feeling down, depressed, irritable, or hopeless Several days   Total Score PHQ 2 2   Trouble falling or staying asleep, or sleeping too much -   Feeling tired or having little energy -   Poor appetite, weight loss, or overeating -   Feeling bad about yourself - or that you are a failure or have let yourself or your family down -   Trouble concentrating on things such as school, work, reading, or watching TV -   Moving or speaking so slowly that other people could have noticed; or the opposite being so fidgety that others notice -   Thoughts of being better off dead, or hurting yourself in some way -   PHQ 9 Score -   How difficult have these problems made it for you to do your work, take care of your home and get along with others -       Learning Assessment:  Learning Assessment 12/3/2020   PRIMARY LEARNER Patient   HIGHEST LEVEL OF EDUCATION - PRIMARY LEARNER  DID NOT GRADUATE HIGH SCHOOL   BARRIERS PRIMARY LEARNER NONE   PRIMARY LANGUAGE ENGLISH   LEARNER PREFERENCE PRIMARY READING   ANSWERED BY patient   RELATIONSHIP SELF       Fall Risk  Fall Risk Assessment, last 12 mths 10/21/2022   Able to walk? Yes   Fall in past 12 months? 0   Do you feel unsteady? 0   Are you worried about falling 0   Is TUG test greater than 12 seconds?  -   Is the gait abnormal? -   Number of falls in past 12 months -   Fall with injury? -       ADL  ADL Assessment 9/12/2022   Feeding yourself No Help Needed   Getting from bed to chair No Help Needed   Getting dressed No Help Needed   Bathing or showering No Help Needed   Walk across the room (includes cane/walker) No Help Needed   Using the telphone No Help Needed   Taking your medications No Help Needed   Preparing meals No Help Needed   Managing money (expenses/bills) No Help Needed   Moderately strenuous housework (laundry) No Help Needed   Shopping for personal items (toiletries/medicines) No Help Needed   Shopping for groceries No Help Needed   Driving No Help Needed   Climbing a flight of stairs No Help Needed   Getting to places beyond walking distances No Help Needed       Travel Screening:    Travel Screening       Question Response    In the last 10 days, have you been in contact with someone who was confirmed or suspected to have Coronavirus/COVID-19? No / Unsure    Have you had a COVID-19 viral test in the last 10 days? No    Do you have any of the following new or worsening symptoms? None of these    Have you traveled internationally or domestically in the last month? No          Travel History   Travel since 09/21/22    No documented travel since 09/21/22         Health Maintenance reviewed and discussed and ordered per Provider. Health Maintenance Due   Topic Date Due    Pneumococcal 65+ years (1 - PCV) Never done    DTaP/Tdap/Td series (1 - Tdap) Never done    Shingrix Vaccine Age 50> (1 of 2) Never done    Bone Densitometry (Dexa) Screening  Never done    COVID-19 Vaccine (3 - Booster for Moderna series) 07/23/2021    Medicare Yearly Exam  11/02/2022   . Coordination of Care:  1. \"Have you been to the ER, urgent care clinic since your last visit? Hospitalized since your last visit? \" No    2. \"Have you seen or consulted any other health care providers outside of the [address] [place] [address] since your last visit? \" No     3. For patients aged 39-70: Has the patient had a colonoscopy? NA      If the patient is female:    4. For patients aged 41-77: Has the patient had a mammogram within the past 2 years? NA - based on age    11. For patients aged 21-65: Has the patient had a pap smear?  NA - based on age

## 2022-10-21 NOTE — PROGRESS NOTES
Annamaria Freeman (: 1940) is a 78 y.o. female here for evaluation of the following chief concern(s):  Follow-up; vaginitis, urinary changes      ASSESSMENT/PLAN:  1. Acute cystitis with hematuria  -     METABOLIC PANEL, BASIC  2. Urinary hesitancy  3. Vaginal yeast infection  -     fluconazole (DIFLUCAN) 150 mg tablet; Take 1 Tablet by mouth daily for 1 day. Take after completing Doxycycline antibiotic., Normal, Disp-1 Tablet, R-0  4. Lesion of labia  -     doxycycline (VIBRAMYCIN) 100 mg capsule; Take 1 Capsule by mouth two (2) times a day for 7 days. Please also take an over-the-counter \"probiotic\" of your choice while on this antibiotic., Normal, Disp-14 Capsule, R-0  -     fluconazole (DIFLUCAN) 150 mg tablet; Take 1 Tablet by mouth daily for 1 day. Take after completing Doxycycline antibiotic., Normal, Disp-1 Tablet, R-0  -     PAP IG, APTIMA HPV AND RFX 16/18,45 ([id number])    Ms. Caty Snyder appears medically stable, baseline hemodynamics with improved blood pressure (below goal <140/90). Her urine culture was not strongly suggestive of a UTI- mixed and low colony count, however her symptoms are improving. Nuswab grew Ureaplasma. She has a persistent hyperkeratotic plaque at the superior aspect of the vaginal introitus of unclear etiology- I collected a pap from this lesion for further evaluation. Will treat w/ a course of Doxycycline in the case her symptoms are being driven by the Ureaplasma, plan for repeat dose of PO Fluconazole and continue topical antifungal/steroid. Based on pap and follow-up exam next visit, will re-consider referral to GYN which pt declines at this time. Return in about 2 weeks (around 2022). Annamaria Freeman agrees with plan as above and has no additional questions at this time.        SUBJECTIVE/OBJECTIVE:  Last visit, 10/16/22 empiric treatment for UTI w/ Bactrim x5 days, PO Fluconazole x1 and recommendations to continue steroid/antifungal topicals. 10/12/22 results review;  Ucx 25-50k mixed paula w/ pan sensitive E. Coli. NUSWAB not available for review. Overall, symptoms started to improved about 2 days after starting the Bactrim. She completed her Bactrim on 10/18/22, she took the Fluconazole on 10/19/22. Persistent perineal irritation. She is able to empty her bladder w/ voiding, but is going less frequently later in the day despite staying hydrated; this is not her baseline. ROS: No abd pain, diarrhea or constipation, fever/chills, new rashes. +Low grade malaise, fatigue.      10/7/22 results review;  CBC WNL  BMP WNL      Past Medical History:   Diagnosis Date    Allergic rhinitis     Anemia     Anxiety disorder     Chronic obstructive pulmonary disease (HCC)     CVA (cerebral vascular accident) ([facility] [address] 1/16/2022    Dyslipidemia     Folic acid deficiency     Hypertension     Neurologic disorder     resting tremor     Past Surgical History:   Procedure Laterality Date    COLONOSCOPY N/A 10/26/2021    COLONOSCOPY w/ polypectomy performed by Keisha Sims MD at [facility] ENDOSCOPY    HX IMPLANTABLE LOOP RECORDER N/A 08/2022    had a loop mointor put in     Family History   Problem Relation Age of Onset    Cancer Father         stomach    Heart Disease Sister     Alcohol abuse Brother     Cancer Brother         lung       Social History     Socioeconomic History    Marital status:      Spouse name: Not on file    Number of children: Not on file    Years of education: Not on file    Highest education level: Not on file   Occupational History    Not on file   Tobacco Use    Smoking status: Former     Packs/day: 1.00     Years: 50.00     Pack years: 50.00     Types: Cigarettes    Smokeless tobacco: Never   Vaping Use    Vaping Use: Never used   Substance and Sexual Activity    Alcohol use: Not Currently    Drug use: Never    Sexual activity: Not Currently   Other Topics Concern    Not on file   Social History Narrative    Not on file Social Determinants of Health     Financial Resource Strain: Not on file   Food Insecurity: Not on file   Transportation Needs: Not on file   Physical Activity: Not on file   Stress: Not on file   Social Connections: Not on file   Intimate Partner Violence: Not on file   Housing Stability: Not on file     Social History     Tobacco Use   Smoking Status Former    Packs/day: 1.00    Years: 50.00    Pack years: 50.00    Types: Cigarettes   Smokeless Tobacco Never       Current Outpatient Medications   Medication Sig Dispense Refill    doxycycline (VIBRAMYCIN) 100 mg capsule Take 1 Capsule by mouth two (2) times a day for 7 days. Please also take an over-the-counter \"probiotic\" of your choice while on this antibiotic. 14 Capsule 0    fluconazole (DIFLUCAN) 150 mg tablet Take 1 Tablet by mouth daily for 1 day. Take after completing Doxycycline antibiotic. 1 Tablet 0    trimethoprim-sulfamethoxazole (BACTRIM DS, SEPTRA DS) 160-800 mg per tablet Take 1 Tablet by mouth two (2) times a day for 10 days. 10 Tablet 0    triamcinolone acetonide (KENALOG) 0.1 % topical cream Apply  to affected area daily. use thin layer to vaginal rash 15 g 0    sertraline (ZOLOFT) 50 mg tablet TAKE ONE TABLET BY MOUTH DAILY 90 Tablet 0    losartan (COZAAR) 25 mg tablet TAKE 1 TABLET BY MOUTH TWO (2) TIMES A DAY. 180 Tablet 1    primidone (MYSOLINE) 50 mg tablet Take 1 Tablet by mouth daily. 90 Tablet 1    budesonide-formoteroL (Symbicort) 80-4.5 mcg/actuation HFAA Take 2 Puffs by inhalation two (2) times a day. Rinse mouth after use. 10.2 g 1    vitamin B complex capsule Take 1 Capsule by mouth daily. 90 Capsule 1    fluticasone propionate (FLONASE) 50 mcg/actuation nasal spray SHAKE LIQUID AND USE 2 SPRAYS IN EACH NOSTRIL DAILY 48 g 1    albuterol (PROVENTIL HFA, VENTOLIN HFA, PROAIR HFA) 90 mcg/actuation inhaler Take 2 Puffs by inhalation every four (4) hours as needed for Wheezing.  1 Each 1    atorvastatin (LIPITOR) 40 mg tablet Take 1 Tablet by mouth daily. 90 Tablet 1    pantoprazole (PROTONIX) 40 mg tablet Take 1 Tablet by mouth daily for 360 days. Indications: gastroesophageal reflux disease 90 Tablet 3    aspirin delayed-release 81 mg tablet Take 81 mg by mouth daily. miconazole nitrate 2 % kit 1 Each by Apply Externally route nightly. (Patient not taking: No sig reported) 1 Kit 0     Allergies   Allergen Reactions    Isopropyl Alcohol Other (comments)     Weakness all over - pt states she has out grown this    Pen-Vee K Rash     Pt. States she avoids penicillin due to allergy as a child. /69   Pulse 60   Temp 97.1 °F (36.2 °C)   Resp 16   Wt 156 lb 9.6 oz (71 kg)   SpO2 97%   BMI 26.88 kg/m²     Physical Exam  Exam conducted with a chaperone present. Constitutional:       Appearance: Normal appearance. Cardiovascular:      Rate and Rhythm: Normal rate and regular rhythm. Pulmonary:      Effort: No respiratory distress. Breath sounds: Normal breath sounds. Abdominal:      Palpations: Abdomen is soft. Tenderness: There is no abdominal tenderness. Genitourinary:     Comments: Normal female external genitalia. Atrophic changes. Excoriations improved. Superior aspect of vaginal introitus w/ moderate white plaque ~1x2cm- improving. Neurological:      Mental Status: She is alert. Mental status is at baseline.    Psychiatric:         Mood and Affect: Mood normal.       Lab Results   Component Value Date/Time    WBC 6.5 09/08/2022 03:54 PM    HGB 13.6 09/08/2022 03:54 PM    HCT 41.6 09/08/2022 03:54 PM    PLATELET 148 87/12/5807 03:54 PM    MCV 94.1 09/08/2022 03:54 PM     Lab Results   Component Value Date/Time    Sodium 141 09/08/2022 03:54 PM    Potassium 4.6 09/08/2022 03:54 PM    Chloride 105 09/08/2022 03:54 PM    CO2 26 09/08/2022 03:54 PM    Anion gap 10 09/08/2022 03:54 PM    Glucose 126 (H) 09/08/2022 03:54 PM    BUN 26 (H) 09/08/2022 03:54 PM    Creatinine 1.38 (H) 09/08/2022 03:54 PM BUN/Creatinine ratio 19 09/08/2022 03:54 PM    GFR est AA 44 (L) 09/08/2022 03:54 PM    GFR est non-AA 36 (L) 09/08/2022 03:54 PM    Calcium 8.8 09/08/2022 03:54 PM    Bilirubin, total 0.2 09/08/2022 03:54 PM    Alk. phosphatase 112 09/08/2022 03:54 PM    Protein, total 7.4 09/08/2022 03:54 PM    Albumin 3.9 09/08/2022 03:54 PM    Globulin 3.5 09/08/2022 03:54 PM    A-G Ratio 1.1 09/08/2022 03:54 PM    ALT (SGPT) 28 09/08/2022 03:54 PM    AST (SGOT) 25 09/08/2022 03:54 PM     Lab Results   Component Value Date/Time    Cholesterol, total 176 07/07/2022 10:00 AM    HDL Cholesterol 55 07/07/2022 10:00 AM    LDL-C, External 86 01/06/2020 12:00 AM    LDL, calculated 95 07/07/2022 10:00 AM    LDL, calculated 75.6 12/11/2020 08:23 AM    VLDL, calculated 26 07/07/2022 10:00 AM    VLDL, calculated 24.4 12/11/2020 08:23 AM    Triglyceride 153 (H) 07/07/2022 10:00 AM    CHOL/HDL Ratio 2.9 12/11/2020 08:23 AM       On this date 10/21/22 I have spent >30 minutes reviewing previous notes, test results and face to face with the patient for interview/exam, discussing working diagnosis and treatment plan as well as same day documentation. Medical decision making complexity: moderate-high.     Madison Valdez MD   Family & Geriatric Medicine

## 2022-10-22 LAB
BUN SERPL-MCNC: 24 MG/DL (ref 8–27)
BUN/CREAT SERPL: 23 (ref 12–28)
CALCIUM SERPL-MCNC: 9.6 MG/DL (ref 8.7–10.3)
CHLORIDE SERPL-SCNC: 104 MMOL/L (ref 96–106)
CO2 SERPL-SCNC: 19 MMOL/L (ref 20–29)
CREAT SERPL-MCNC: 1.03 MG/DL (ref 0.57–1)
EGFR: 54 ML/MIN/1.73
GLUCOSE SERPL-MCNC: 101 MG/DL (ref 70–99)
POTASSIUM SERPL-SCNC: 5 MMOL/L (ref 3.5–5.2)
SODIUM SERPL-SCNC: 139 MMOL/L (ref 134–144)

## 2022-11-07 ENCOUNTER — OFFICE VISIT (OUTPATIENT)
Dept: INTERNAL MEDICINE CLINIC | Age: 82
End: 2022-11-07
Payer: MEDICARE

## 2022-11-07 VITALS
SYSTOLIC BLOOD PRESSURE: 133 MMHG | HEIGHT: 64 IN | HEART RATE: 57 BPM | DIASTOLIC BLOOD PRESSURE: 70 MMHG | RESPIRATION RATE: 22 BRPM | BODY MASS INDEX: 26.98 KG/M2 | WEIGHT: 158 LBS | OXYGEN SATURATION: 96 %

## 2022-11-07 DIAGNOSIS — N90.89 LESION OF LABIA: ICD-10-CM

## 2022-11-07 DIAGNOSIS — N30.01 ACUTE CYSTITIS WITH HEMATURIA: ICD-10-CM

## 2022-11-07 DIAGNOSIS — E55.9 VITAMIN D DEFICIENCY: ICD-10-CM

## 2022-11-07 DIAGNOSIS — I10 ESSENTIAL HYPERTENSION: ICD-10-CM

## 2022-11-07 DIAGNOSIS — F41.9 ANXIETY: Primary | ICD-10-CM

## 2022-11-07 PROCEDURE — 1123F ACP DISCUSS/DSCN MKR DOCD: CPT | Performed by: FAMILY MEDICINE

## 2022-11-07 PROCEDURE — 36415 COLL VENOUS BLD VENIPUNCTURE: CPT | Performed by: FAMILY MEDICINE

## 2022-11-07 PROCEDURE — 3074F SYST BP LT 130 MM HG: CPT | Performed by: FAMILY MEDICINE

## 2022-11-07 PROCEDURE — 3078F DIAST BP <80 MM HG: CPT | Performed by: FAMILY MEDICINE

## 2022-11-07 PROCEDURE — 99214 OFFICE O/P EST MOD 30 MIN: CPT | Performed by: FAMILY MEDICINE

## 2022-11-07 RX ORDER — DULOXETIN HYDROCHLORIDE 20 MG/1
20 CAPSULE, DELAYED RELEASE ORAL DAILY
Qty: 30 CAPSULE | Refills: 1 | Status: SHIPPED | OUTPATIENT
Start: 2022-11-07 | End: 2022-11-30

## 2022-11-07 NOTE — PATIENT INSTRUCTIONS
Ms. Janis Lory,  It was nice to see you today. I am glad to hear that the rash resolved; please let me know if the recurs. The pap test result was normal.  Your blood draw showed borderline elevate potassium- so we will re-check this today. To try and better help with your nerves, please:  DISCONTINUE Sertraline (Zoloft)  START Duloxetine (Cymbalta)    Please be sure that you are taking a Vitamin D3 supplement approximately 1,000 units daily. Lets plan to follow-up once more before I take my maternity leave.     Thank you,  Dr. Akilah Camara

## 2022-11-07 NOTE — PROGRESS NOTES
Neal Pendleton (: 1940) is a 78 y.o. female here for evaluation of the following chief concern(s):  Follow-up; vaginitis, urinary changes      ASSESSMENT/PLAN:  1. Anxiety  -     DULoxetine (CYMBALTA) 20 mg capsule; Take 1 Capsule by mouth daily. Take in place of Sertraline (Zoloft). , Normal, Disp-30 Capsule, R-1  2. Essential hypertension  -     METABOLIC PANEL, BASIC  -     COLLECTION VENOUS BLOOD,VENIPUNCTURE  3. Vitamin D deficiency  4. Lesion of labia  5. Acute cystitis with hematuria    Ms. Ayden Liang appears medically stable, baseline hemodynamics with improved blood pressure (below goal <140/90). Cross taper off Sertraline onto Duloxetine to help better w/ anxiety, close monitoring for side effects. Pt advised to start vitamin D supplement. Urinary symptoms and perineal rash have resolved. In addition, see personalized AVS and summary reviewed w/ pt's daughter/mPOA. Return in about 6 weeks (around 2022) for follow-up chronic conditions or sooner if needed. Neal Pendleton agrees with plan as above and has no additional questions at this time. 22 results review;  BMP WNL; Cr 0.91 (From 1.03), k+ 4.5 (From 5)      SUBJECTIVE/OBJECTIVE:    10/21/22 results review;  BMP stable; Cr 1.03, k+ 5  Pap NIL, HPV negative    Persistent perineal irritation, labial plaque:  Resolved completely. ROS: No abd pain, diarrhea or constipation, fever/chills, new rashes. +Low grade malaise, fatigue. Pertinent hx;;;  Hx incomplete emptying of her bladder. Last visit on 10/21/22; Her urine culture was not strongly suggestive of a UTI- mixed and low colony count, however her symptoms are improving. Nuswab grew Ureaplasma. She has a persistent hyperkeratotic plaque at the superior aspect of the vaginal introitus of unclear etiology- I collected a pap from this lesion for further evaluation.   Will treat w/ a course of Doxycycline in the case her symptoms are being driven by the Ureaplasma, plan for repeat dose of PO Fluconazole and continue topical antifungal/steroid. Based on pap and follow-up exam next visit, will re-consider referral to GYN which pt declines at this time. 10/16/22 empiric treatment for UTI w/ Bactrim x5 days (completed 10/18/22), PO Fluconazole x1 (taken on 10/19/22) and recommendations to continue steroid/antifungal topicals. 10/12/22 results review;  Ucx 25-50k mixed paula w/ pan sensitive E. Coli. NUSWAB not available for review. HTN:  Goal <140/90  Hx;   7/25/22 Losartan 25mg increased to BID.   7/7/22 Losartan reduced from 50 mg to 25 mg daily given recurrent ?syncopal events of unclear etiology.        10/7/22 results review;  CBC WNL  BMP WNL    Past Medical History:   Diagnosis Date    Allergic rhinitis     Anemia     Anxiety disorder     Chronic obstructive pulmonary disease (HCC)     CVA (cerebral vascular accident) ([facility] [address] 1/16/2022    Dyslipidemia     Folic acid deficiency     Hypertension     Neurologic disorder     resting tremor     Past Surgical History:   Procedure Laterality Date    COLONOSCOPY N/A 10/26/2021    COLONOSCOPY w/ polypectomy performed by Aquilino Harris MD at [facility] ENDOSCOPY    HX IMPLANTABLE LOOP RECORDER N/A 08/2022    had a loop mointor put in     Family History   Problem Relation Age of Onset    Cancer Father         stomach    Heart Disease Sister     Alcohol abuse Brother     Cancer Brother         lung       Social History     Socioeconomic History    Marital status:      Spouse name: Not on file    Number of children: Not on file    Years of education: Not on file    Highest education level: Not on file   Occupational History    Not on file   Tobacco Use    Smoking status: Former     Packs/day: 1.00     Years: 50.00     Pack years: 50.00     Types: Cigarettes    Smokeless tobacco: Never   Vaping Use    Vaping Use: Never used   Substance and Sexual Activity    Alcohol use: Not Currently    Drug use: Never Sexual activity: Not Currently   Other Topics Concern    Not on file   Social History Narrative    Not on file     Social Determinants of Health     Financial Resource Strain: Not on file   Food Insecurity: Not on file   Transportation Needs: Not on file   Physical Activity: Not on file   Stress: Not on file   Social Connections: Not on file   Intimate Partner Violence: Not on file   Housing Stability: Not on file     Social History     Tobacco Use   Smoking Status Former    Packs/day: 1.00    Years: 50.00    Pack years: 50.00    Types: Cigarettes   Smokeless Tobacco Never       Current Outpatient Medications   Medication Sig Dispense Refill    DULoxetine (CYMBALTA) 20 mg capsule Take 1 Capsule by mouth daily. Take in place of Sertraline (Zoloft). 30 Capsule 1    miconazole nitrate 2 % kit 1 Each by Apply Externally route nightly. (Patient not taking: No sig reported) 1 Kit 0    triamcinolone acetonide (KENALOG) 0.1 % topical cream Apply  to affected area daily. use thin layer to vaginal rash 15 g 0    losartan (COZAAR) 25 mg tablet TAKE 1 TABLET BY MOUTH TWO (2) TIMES A DAY. 180 Tablet 1    primidone (MYSOLINE) 50 mg tablet Take 1 Tablet by mouth daily. 90 Tablet 1    budesonide-formoteroL (Symbicort) 80-4.5 mcg/actuation HFAA Take 2 Puffs by inhalation two (2) times a day. Rinse mouth after use. 10.2 g 1    vitamin B complex capsule Take 1 Capsule by mouth daily. 90 Capsule 1    fluticasone propionate (FLONASE) 50 mcg/actuation nasal spray SHAKE LIQUID AND USE 2 SPRAYS IN EACH NOSTRIL DAILY 48 g 1    albuterol (PROVENTIL HFA, VENTOLIN HFA, PROAIR HFA) 90 mcg/actuation inhaler Take 2 Puffs by inhalation every four (4) hours as needed for Wheezing. 1 Each 1    atorvastatin (LIPITOR) 40 mg tablet Take 1 Tablet by mouth daily. 90 Tablet 1    pantoprazole (PROTONIX) 40 mg tablet Take 1 Tablet by mouth daily for 360 days.  Indications: gastroesophageal reflux disease 90 Tablet 3    aspirin delayed-release 81 mg tablet Take 81 mg by mouth daily. Allergies   Allergen Reactions    Isopropyl Alcohol Other (comments)     Weakness all over - pt states she has out grown this    Pen-Vee K Rash     Pt. States she avoids penicillin due to allergy as a child. /70   Pulse (!) 57   Resp 22   Ht 5' 4\" (1.626 m)   Wt 158 lb (71.7 kg)   SpO2 96%   BMI 27.12 kg/m²     Physical Exam  Exam conducted with a chaperone present. Constitutional:       General: She is not in acute distress. Appearance: Normal appearance. She is not ill-appearing. Cardiovascular:      Rate and Rhythm: Normal rate and regular rhythm. Pulmonary:      Effort: No respiratory distress. Breath sounds: Normal breath sounds. Abdominal:      Palpations: Abdomen is soft. Tenderness: There is no abdominal tenderness. Neurological:      Mental Status: She is alert. Mental status is at baseline. Psychiatric:         Mood and Affect: Mood normal.       Lab Results   Component Value Date/Time    WBC 6.5 09/08/2022 03:54 PM    HGB 13.6 09/08/2022 03:54 PM    HCT 41.6 09/08/2022 03:54 PM    PLATELET 547 63/31/0860 03:54 PM    MCV 94.1 09/08/2022 03:54 PM     Lab Results   Component Value Date/Time    Sodium 140 11/07/2022 11:07 AM    Potassium 4.5 11/07/2022 11:07 AM    Chloride 102 11/07/2022 11:07 AM    CO2 18 (L) 11/07/2022 11:07 AM    Anion gap 10 09/08/2022 03:54 PM    Glucose 94 11/07/2022 11:07 AM    BUN 17 11/07/2022 11:07 AM    Creatinine 0.91 11/07/2022 11:07 AM    BUN/Creatinine ratio 19 11/07/2022 11:07 AM    GFR est AA 44 (L) 09/08/2022 03:54 PM    GFR est non-AA 36 (L) 09/08/2022 03:54 PM    Calcium 9.6 11/07/2022 11:07 AM    Bilirubin, total 0.2 09/08/2022 03:54 PM    Alk.  phosphatase 112 09/08/2022 03:54 PM    Protein, total 7.4 09/08/2022 03:54 PM    Albumin 3.9 09/08/2022 03:54 PM    Globulin 3.5 09/08/2022 03:54 PM    A-G Ratio 1.1 09/08/2022 03:54 PM    ALT (SGPT) 28 09/08/2022 03:54 PM    AST (SGOT) 25 09/08/2022 03:54 PM     Lab Results   Component Value Date/Time    Cholesterol, total 176 07/07/2022 10:00 AM    HDL Cholesterol 55 07/07/2022 10:00 AM    LDL-C, External 86 01/06/2020 12:00 AM    LDL, calculated 95 07/07/2022 10:00 AM    LDL, calculated 75.6 12/11/2020 08:23 AM    VLDL, calculated 26 07/07/2022 10:00 AM    VLDL, calculated 24.4 12/11/2020 08:23 AM    Triglyceride 153 (H) 07/07/2022 10:00 AM    CHOL/HDL Ratio 2.9 12/11/2020 08:23 AM     On this date 11/07/22 I have spent >30 minutes reviewing previous notes, test results and face to face with the patient for interview/exam, discussing working diagnosis and treatment plan. Medical decision making complexity: moderate-high.     Willy Tian MD   Family & Geriatric Medicine

## 2022-11-07 NOTE — PROGRESS NOTES
Verbal order from María Elena Molina MD to order labs/sign and draw them in office    Labs were drawn and sent to [facility] by Domenica King LPN:    The following tubes were sent:    0 Lavendar, 0 Red, 1 SST, 0 Urine    Draw site right antecubital.  Patient tolerated draw with no distress. Haroon Whitlock presents today for   Chief Complaint   Patient presents with    Follow-up     2 week follow up       Is someone accompanying this pt?no    Is the patient using any DME equipment during [address]? Yes, cane    Depression Screening:  3 most recent PHQ Screens 11/7/2022   Little interest or pleasure in doing things -   Feeling down, depressed, irritable, or hopeless -   Total Score PHQ 2 -   Trouble falling or staying asleep, or sleeping too much Nearly every day   Feeling tired or having little energy Nearly every day   Poor appetite, weight loss, or overeating Not at all   Feeling bad about yourself - or that you are a failure or have let yourself or your family down Not at all   Trouble concentrating on things such as school, work, reading, or watching TV Not at all   Moving or speaking so slowly that other people could have noticed; or the opposite being so fidgety that others notice Not at all   Thoughts of being better off dead, or hurting yourself in some way Not at all   PHQ 9 Score -   How difficult have these problems made it for you to do your work, take care of your home and get along with others Somewhat difficult       Learning Assessment:  Learning Assessment 12/3/2020   PRIMARY LEARNER Patient   HIGHEST LEVEL OF EDUCATION - PRIMARY LEARNER  DID NOT GRADUATE HIGH SCHOOL   BARRIERS PRIMARY LEARNER NONE   PRIMARY LANGUAGE ENGLISH   LEARNER PREFERENCE PRIMARY READING   ANSWERED BY patient   RELATIONSHIP SELF       Fall Risk  Fall Risk Assessment, last 12 mths 11/7/2022   Able to walk? Yes   Fall in past 12 months? 1   Do you feel unsteady? 1   Are you worried about falling 1   Is TUG test greater than 12 seconds? 0   Is the gait abnormal? 1   Number of falls in past 12 months 2   Fall with injury? 0       ADL  ADL Assessment 11/7/2022   Feeding yourself No Help Needed   Getting from bed to chair No Help Needed   Getting dressed No Help Needed   Bathing or showering No Help Needed   Walk across the room (includes cane/walker) No Help Needed   Using the telphone No Help Needed   Taking your medications No Help Needed   Preparing meals No Help Needed   Managing money (expenses/bills) No Help Needed   Moderately strenuous housework (laundry) No Help Needed   Shopping for personal items (toiletries/medicines) No Help Needed   Shopping for groceries No Help Needed   Driving No Help Needed   Climbing a flight of stairs No Help Needed   Getting to places beyond walking distances No Help Needed       Health Maintenance reviewed and discussed and ordered per Provider. Health Maintenance Due   Topic Date Due    Pneumococcal 65+ years (1 - PCV) Never done    DTaP/Tdap/Td series (1 - Tdap) Never done    Shingrix Vaccine Age 50> (1 of 2) Never done    Bone Densitometry (Dexa) Screening  Never done    COVID-19 Vaccine (3 - Booster for Moderna series) 04/20/2021    Medicare Yearly Exam  11/02/2022   . Coordination of Care:  1. \"Have you been to the ER, urgent care clinic since your last visit? Hospitalized since your last visit? \" No    2. \"Have you seen or consulted any other health care providers outside of the [address] [place] [address] since your last visit? \" No     3. For patients aged 39-70: Has the patient had a colonoscopy? NA - based on age     If the patient is female:    4. For patients aged 41-77: Has the patient had a mammogram within the past 2 years? NA - based on age    11. For patients aged 21-65: Has the patient had a pap smear?  NA - based on age

## 2022-11-09 LAB
BUN SERPL-MCNC: 17 MG/DL (ref 8–27)
BUN/CREAT SERPL: 19 (ref 12–28)
CALCIUM SERPL-MCNC: 9.6 MG/DL (ref 8.7–10.3)
CHLORIDE SERPL-SCNC: 102 MMOL/L (ref 96–106)
CO2 SERPL-SCNC: 18 MMOL/L (ref 20–29)
CREAT SERPL-MCNC: 0.91 MG/DL (ref 0.57–1)
EGFR: 62 ML/MIN/1.73
GLUCOSE SERPL-MCNC: 94 MG/DL (ref 70–99)
POTASSIUM SERPL-SCNC: 4.5 MMOL/L (ref 3.5–5.2)
SODIUM SERPL-SCNC: 140 MMOL/L (ref 134–144)

## 2022-11-09 NOTE — PROGRESS NOTES
I have personally seen and evaluated the device findings. Interrogation reviewed and I agree with assessment.     Saira Mendez

## 2022-11-28 DIAGNOSIS — B37.31 VAGINAL YEAST INFECTION: ICD-10-CM

## 2022-11-28 RX ORDER — TRIAMCINOLONE ACETONIDE 1 MG/G
CREAM TOPICAL DAILY
Qty: 15 G | Refills: 0 | Status: SHIPPED | OUTPATIENT
Start: 2022-11-28

## 2022-11-28 NOTE — TELEPHONE ENCOUNTER
Pt called and asked if the 2 creams could be refilled. She asked that we call her to let her know please.

## 2022-11-30 ENCOUNTER — VIRTUAL VISIT (OUTPATIENT)
Dept: INTERNAL MEDICINE CLINIC | Age: 82
End: 2022-11-30
Payer: MEDICARE

## 2022-11-30 DIAGNOSIS — F41.9 ANXIETY: ICD-10-CM

## 2022-11-30 DIAGNOSIS — Z00.00 MEDICARE ANNUAL WELLNESS VISIT, SUBSEQUENT: Primary | ICD-10-CM

## 2022-11-30 DIAGNOSIS — M79.605 ACUTE LEG PAIN, LEFT: ICD-10-CM
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
## 2022-11-30 DIAGNOSIS — M79.605 LEFT LEG PAIN: ICD-10-CM

## 2022-11-30 DIAGNOSIS — N90.89 LESION OF LABIA: ICD-10-CM

## 2022-11-30 DIAGNOSIS — Z13.31 SCREENING FOR DEPRESSION: ICD-10-CM

## 2022-11-30 DIAGNOSIS — Z13.39 SCREENING FOR ALCOHOLISM: ICD-10-CM

## 2022-11-30 DIAGNOSIS — M79.89 LEFT LEG SWELLING: ICD-10-CM
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
## 2022-11-30 DIAGNOSIS — Z13.820 ENCOUNTER FOR OSTEOPOROSIS SCREENING IN ASYMPTOMATIC POSTMENOPAUSAL PATIENT: ICD-10-CM

## 2022-11-30 DIAGNOSIS — Z78.0 ENCOUNTER FOR OSTEOPOROSIS SCREENING IN ASYMPTOMATIC POSTMENOPAUSAL PATIENT: ICD-10-CM

## 2022-11-30 PROCEDURE — G0439 PPPS, SUBSEQ VISIT: HCPCS | Performed by: FAMILY MEDICINE

## 2022-11-30 PROCEDURE — 1123F ACP DISCUSS/DSCN MKR DOCD: CPT | Performed by: FAMILY MEDICINE

## 2022-11-30 RX ORDER — DOXYCYCLINE 100 MG/1
100 CAPSULE ORAL 2 TIMES DAILY
Qty: 20 CAPSULE | Refills: 0 | Status: SHIPPED | OUTPATIENT
Start: 2022-11-30 | End: 2022-12-10

## 2022-11-30 RX ORDER — FLUCONAZOLE 150 MG/1
TABLET ORAL
Qty: 1 TABLET | Refills: 0 | Status: SHIPPED | OUTPATIENT
Start: 2022-11-30

## 2022-11-30 RX ORDER — DULOXETIN HYDROCHLORIDE 30 MG/1
30 CAPSULE, DELAYED RELEASE ORAL DAILY
Qty: 90 CAPSULE | Refills: 1 | Status: SHIPPED | OUTPATIENT
Start: 2022-11-30

## 2022-11-30 NOTE — PATIENT INSTRUCTIONS

## 2022-11-30 NOTE — PROGRESS NOTES
Please see collaborative Medicare Wellness Visit note drafted by Alessio Hernandez LPN.     Carrington Gonzalez MD

## 2022-11-30 NOTE — PROGRESS NOTES
This is the Subsequent Medicare Annual Wellness Exam, performed 12 months or more after the Initial AWV or the last Subsequent AWV    I have reviewed the patient's medical history in detail and updated the computerized patient record. The services today are being conducted using telemedicine which [facility] [employer] has consented to at the time of scheduling. Assessment/Plan   Education and counseling provided:  Are appropriate based on today's review and evaluation    1. Medicare annual wellness visit, subsequent  2. Screening for depression  -     DEPRESSION SCREEN ANNUAL  3. Screening for alcoholism  -     AK ANNUAL ALCOHOL SCREEN 15 MIN  4. Anxiety  -     DULoxetine (CYMBALTA) 30 mg capsule; Take 1 Capsule by mouth daily. Take in place of Duloxetine (Cymbalta) 20mg for mood/nerves. , Normal, Disp-90 Capsule, R-1  5. Fall at home, initial encounter  -     XR TIB/FIB LT; Future  6. Left leg pain  -     XR TIB/FIB LT; Future  7. Acute leg pain, left  -     XR TIB/FIB LT; Future  -     DUPLEX LOWER EXT VENOUS LEFT; Future  8. Left leg swelling  -     XR TIB/FIB LT; Future  -     DUPLEX LOWER EXT VENOUS LEFT; Future  9. Encounter for osteoporosis screening in asymptomatic postmenopausal patient  -     DEXA BONE DENSITY STUDY AXIAL; Future  10. Lesion of labia  -     doxycycline (VIBRAMYCIN) 100 mg capsule; Take 1 Capsule by mouth two (2) times a day for 10 days. Take a \"probiotic\" of your choice while on this antibiotic., Normal, Disp-20 Capsule, R-0  -     fluconazole (DIFLUCAN) 150 mg tablet; Take one tablet by mouth after completing Doxycycline antibiotic., Normal, Disp-1 Tablet, R-0     In addition:  Screening for alcoholism, abuse, hearing, cognition: Normal.  Screening for depression, falls: ABNORMAL. Imaging studies for fall w/ minor injury. She declines another course of physical therapy. Gentle up-titration to Duloxetine 20mg to 30mg every day w/ monitoring for side effects.      Vaccines are not up to date. Plan for GFXTENT-87, TDAP next office visit. Pt aware she can elect for shingles vaccine at her pharmacy. Medicare recommended screening and prevention reviewed. Labial lesion w/ irritation has recurred, pt adamantly requesting repeat course of oral anti-microbials as this had significantly improved her symptoms and no side-effects; she agrees if this does not definitively resolve this issue she will accept a referral to GYN for further evaluation. Advanced directives reviewed. Patient understands our medical plan. Patient has provided input and agrees with goals. All questions answered. Depression Risk Factor Screening     3 most recent PHQ Screens 11/7/2022   Little interest or pleasure in doing things -   Feeling down, depressed, irritable, or hopeless -   Total Score PHQ 2 -   Trouble falling or staying asleep, or sleeping too much Nearly every day   Feeling tired or having little energy Nearly every day   Poor appetite, weight loss, or overeating Not at all   Feeling bad about yourself - or that you are a failure or have let yourself or your family down Not at all   Trouble concentrating on things such as school, work, reading, or watching TV Not at all   Moving or speaking so slowly that other people could have noticed; or the opposite being so fidgety that others notice Not at all   Thoughts of being better off dead, or hurting yourself in some way Not at all   PHQ 9 Score -   How difficult have these problems made it for you to do your work, take care of your home and get along with others Somewhat difficult     Pt recently transitioned off of Sertraline onto Duloxetine; per pt \"I stay down all the time\", tremor has been stable.       Alcohol & Drug Abuse Risk Screen    Do you average more than 1 drink per night or more than 7 drinks a week:  No    On any one occasion in the past three months have you have had more than 3 drinks containing alcohol:  No        Functional Ability and Level of Safety    Hearing: The patient needs further evaluation. Activities of Daily Living: The home contains: handrails  Patient does total self care      Ambulation: with difficulty, uses a walker     Fall Risk:  Fall Risk Assessment, last 12 mths 2022   Able to walk? Yes   Fall in past 12 months? 1   Do you feel unsteady? 1   Are you worried about falling 1   Is TUG test greater than 12 seconds? 0   Is the gait abnormal? 1   Number of falls in past 12 months 2   Fall with injury? 0     Patient fell day before  and states she has a large knot area on her left leg, Painful, swollen to left shin area. Patient may need a xray. Pt had minor head trauma- no LOC, headaches, vision changes, new focal paresthesias/paresthesias, dizziness the past couple of days. She reports persistent pain, focal swelling to front of Left shin and edema to LLE>RLE, ambulatory. Abuse Screen:  Patient is not abused       Cognitive Screening    Has your family/caregiver stated any concerns about your memory: no     Cognitive Screening: Normal - Mini Cog Test    Health Maintenance Due     Mammography (Q2 yrs; 54-69 yr): Aged out, pt has never had a mammogram- no lumps/bumps/skin changes. Cervical cancer screening (until 72 yrs): Aged out. She has recurrent labial irritation. Hepatitis C screen (born between years 80-46): WNL. Colorectal cancer screening (Q1 yr FIT test, Q3yr Cologuard, Q5yr flex sig, Q10 yr colonoscopy): Aged out. Hx normal colonoscopy. No BRBPR, melena. DEXA bone density screening (Q2 yrs, starting at 72 yr): Not completed. Blood pressure screening: WNL last visit. Diabetes screening (Q3yrs until 79 yr): 22 BG WNL. Lipid screenin22 LDL 95 on atorvastatin and ASA. Lung cancer screening: Aged out.  ~50 pack year history, she quit smoking about 4 years ago. Advance Care Planning:   mPOA: Pt confirms daughters listed as correct. Code status: FULL CODE      Health Maintenance   Topic Date Due    Pneumococcal 65+ years (1 - PCV) Never done    DTaP/Tdap/Td series (1 - Tdap) Never done    Shingrix Vaccine Age 50> (1 of 2) Never done    Bone Densitometry (Dexa) Screening  Never done    COVID-19 Vaccine (3 - Booster for Moderna series) 04/20/2021    Lipid Screen  07/07/2023    Depression Screen  11/07/2023    Medicare Yearly Exam  12/01/2023    Flu Vaccine  Completed       BARB Results (most recent):  No results found for this or any previous visit. DEXA Results (most recent):  No results found for this or any previous visit.       Lab Results   Component Value Date/Time    Glucose 94 11/07/2022 11:07 AM    Glucose (POC) 90 01/15/2022 01:05 PM       Lab Results   Component Value Date/Time    Cholesterol, total 176 07/07/2022 10:00 AM    HDL Cholesterol 55 07/07/2022 10:00 AM    LDL, calculated 95 07/07/2022 10:00 AM    LDL, calculated 75.6 12/11/2020 08:23 AM    VLDL, calculated 26 07/07/2022 10:00 AM    VLDL, calculated 24.4 12/11/2020 08:23 AM    Triglyceride 153 (H) 07/07/2022 10:00 AM    CHOL/HDL Ratio 2.9 12/11/2020 08:23 AM       Immunization History   Administered Date(s) Administered    COVID-19, MODERNA BLUE border, Primary or Immunocompromised, (age 18y+), IM, 100 mcg/0.5mL 01/26/2021, 02/23/2021    COVID-19, MODERNA PURPLE border, (age 18y+ booster, 6y-11y series), IM, 50 mcg/0.5 mL 12/01/2021    Influenza, FLUAD, (age 72 y+), Adjuvanted 10/09/2020, 10/07/2022         Patient Care Team   Patient Care Team:  Jannette Salgado MD as PCP - General (Family Medicine)  Jannette Salgado MD as PCP - [facility] Empaneled Provider    History     Patient Active Problem List   Diagnosis Code    Chronic obstructive lung disease [facility]) J44.9    Essential hypertension I10    Hyperlipidemia E78.5    Acute anemia D64.9    Severe anemia D64.9    Abnormal laboratory test R89.9    Herpes zoster without complication O76.0    Fatigue R53.83    Post herpetic neuralgia B02.29    GI bleed K92.2    Anxiety F41.9    Atrial fibrillation (HCC) I48.91    Stroke ([facility]) I63.9     Past Medical History:   Diagnosis Date    Allergic rhinitis     Anemia     Anxiety disorder     Chronic obstructive pulmonary disease (HCC)     CVA (cerebral vascular accident) ([facility] [address] 1/16/2022    Dyslipidemia     Folic acid deficiency     Hypertension     Neurologic disorder     resting tremor      Past Surgical History:   Procedure Laterality Date    COLONOSCOPY N/A 10/26/2021    COLONOSCOPY w/ polypectomy performed by Curt Krueger MD at [facility] ENDOSCOPY    HX IMPLANTABLE LOOP RECORDER N/A 08/2022    had a loop mointor put in     Current Outpatient Medications   Medication Sig Dispense Refill    DULoxetine (CYMBALTA) 30 mg capsule Take 1 Capsule by mouth daily. Take in place of Duloxetine (Cymbalta) 20mg for mood/nerves. 90 Capsule 1    doxycycline (VIBRAMYCIN) 100 mg capsule Take 1 Capsule by mouth two (2) times a day for 10 days. Take a \"probiotic\" of your choice while on this antibiotic. 20 Capsule 0    fluconazole (DIFLUCAN) 150 mg tablet Take one tablet by mouth after completing Doxycycline antibiotic. 1 Tablet 0    miconazole nitrate 2 % kit 1 Each by Apply Externally route nightly. 1 Kit 0    triamcinolone acetonide (KENALOG) 0.1 % topical cream Apply  to affected area daily. use thin layer to vaginal rash 15 g 0    losartan (COZAAR) 25 mg tablet TAKE 1 TABLET BY MOUTH TWO (2) TIMES A DAY. 180 Tablet 1    primidone (MYSOLINE) 50 mg tablet Take 1 Tablet by mouth daily. 90 Tablet 1    budesonide-formoteroL (Symbicort) 80-4.5 mcg/actuation HFAA Take 2 Puffs by inhalation two (2) times a day. Rinse mouth after use. 10.2 g 1    vitamin B complex capsule Take 1 Capsule by mouth daily.  90 Capsule 1    fluticasone propionate (FLONASE) 50 mcg/actuation nasal spray SHAKE LIQUID AND USE 2 SPRAYS IN EACH NOSTRIL DAILY 48 g 1    albuterol (PROVENTIL HFA, VENTOLIN HFA, PROAIR HFA) 90 mcg/actuation inhaler Take 2 Puffs by inhalation every four (4) hours as needed for Wheezing. 1 Each 1    atorvastatin (LIPITOR) 40 mg tablet Take 1 Tablet by mouth daily. 90 Tablet 1    pantoprazole (PROTONIX) 40 mg tablet Take 1 Tablet by mouth daily for 360 days. Indications: gastroesophageal reflux disease 90 Tablet 3    aspirin delayed-release 81 mg tablet Take 81 mg by mouth daily. Allergies   Allergen Reactions    Isopropyl Alcohol Other (comments)     Weakness all over - pt states she has out grown this    Pen-Vee K Rash     Pt. States she avoids penicillin due to allergy as a child. Family History   Problem Relation Age of Onset    Cancer Father         stomach    Heart Disease Sister     Alcohol abuse Brother     Cancer Brother         lung     Social History     Tobacco Use    Smoking status: Former     Packs/day: 1.00     Years: 50.00     Pack years: 50.00     Types: Cigarettes    Smokeless tobacco: Never   Substance Use Topics    Alcohol use: Not Currently     Michael Castorena LPN       I have interviewed and examined patient myself, as well as reviewed and agree with the portions of documentation provided by nursing above.       Azra Woodson MD

## 2022-12-02 ENCOUNTER — HOSPITAL ENCOUNTER (OUTPATIENT)
Dept: GENERAL RADIOLOGY | Age: 82
End: 2022-12-02
Attending: FAMILY MEDICINE
Payer: MEDICARE
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
## 2022-12-02 DIAGNOSIS — M79.89 LEFT LEG SWELLING: ICD-10-CM
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
## 2022-12-02 DIAGNOSIS — M79.605 LEFT LEG PAIN: ICD-10-CM

## 2022-12-02 DIAGNOSIS — M79.605 ACUTE LEG PAIN, LEFT: ICD-10-CM

## 2022-12-02 PROCEDURE — 73590 X-RAY EXAM OF LOWER LEG: CPT

## 2022-12-12 ENCOUNTER — HOSPITAL ENCOUNTER (OUTPATIENT)
Dept: VASCULAR SURGERY | Age: 82
Discharge: HOME | End: 2022-12-12
Attending: FAMILY MEDICINE
Payer: MEDICARE

## 2022-12-12 ENCOUNTER — OFFICE VISIT (OUTPATIENT)
Dept: INTERNAL MEDICINE CLINIC | Age: 82
End: 2022-12-12
Payer: MEDICARE

## 2022-12-12 VITALS
HEART RATE: 73 BPM | BODY MASS INDEX: 26.8 KG/M2 | SYSTOLIC BLOOD PRESSURE: 132 MMHG | DIASTOLIC BLOOD PRESSURE: 64 MMHG | HEIGHT: 64 IN | TEMPERATURE: 97.5 F | RESPIRATION RATE: 22 BRPM | OXYGEN SATURATION: 99 % | WEIGHT: 157 LBS

## 2022-12-12 DIAGNOSIS — S80.12XS LEG HEMATOMA, LEFT, SEQUELA: ICD-10-CM

## 2022-12-12 DIAGNOSIS — I10 ESSENTIAL HYPERTENSION: ICD-10-CM

## 2022-12-12 DIAGNOSIS — N90.89 LESION OF LABIA: ICD-10-CM

## 2022-12-12 DIAGNOSIS — R60.0 LEG EDEMA, LEFT: ICD-10-CM

## 2022-12-12 DIAGNOSIS — R60.0 LEG EDEMA, LEFT: Primary | ICD-10-CM

## 2022-12-12 PROCEDURE — G8417 CALC BMI ABV UP PARAM F/U: HCPCS | Performed by: FAMILY MEDICINE

## 2022-12-12 PROCEDURE — 1123F ACP DISCUSS/DSCN MKR DOCD: CPT | Performed by: FAMILY MEDICINE

## 2022-12-12 PROCEDURE — G8511 SCR DEP POS, NO PLAN DOC RNG: HCPCS | Performed by: FAMILY MEDICINE

## 2022-12-12 PROCEDURE — 93971 EXTREMITY STUDY: CPT

## 2022-12-12 PROCEDURE — G8427 DOCREV CUR MEDS BY ELIG CLIN: HCPCS | Performed by: FAMILY MEDICINE

## 2022-12-12 PROCEDURE — G8536 NO DOC ELDER MAL SCRN: HCPCS | Performed by: FAMILY MEDICINE

## 2022-12-12 PROCEDURE — 3074F SYST BP LT 130 MM HG: CPT | Performed by: FAMILY MEDICINE

## 2022-12-12 PROCEDURE — 3078F DIAST BP <80 MM HG: CPT | Performed by: FAMILY MEDICINE

## 2022-12-12 PROCEDURE — 1090F PRES/ABSN URINE INCON ASSESS: CPT | Performed by: FAMILY MEDICINE

## 2022-12-12 PROCEDURE — G8400 PT W/DXA NO RESULTS DOC: HCPCS | Performed by: FAMILY MEDICINE

## 2022-12-12 PROCEDURE — 1101F PT FALLS ASSESS-DOCD LE1/YR: CPT | Performed by: FAMILY MEDICINE

## 2022-12-12 PROCEDURE — 99214 OFFICE O/P EST MOD 30 MIN: CPT | Performed by: FAMILY MEDICINE

## 2022-12-12 PROCEDURE — G8752 SYS BP LESS 140: HCPCS | Performed by: FAMILY MEDICINE

## 2022-12-12 PROCEDURE — G8754 DIAS BP LESS 90: HCPCS | Performed by: FAMILY MEDICINE

## 2022-12-12 NOTE — PROGRESS NOTES
Neal Pendleton presents today for   Chief Complaint   Patient presents with    Follow Up Chronic Condition     6 week       Is someone accompanying this pt? no    Is the patient using any DME equipment during OV? no    Depression Screening:  3 most recent PHQ Screens 12/12/2022   Little interest or pleasure in doing things Several days   Feeling down, depressed, irritable, or hopeless Nearly every day   Total Score PHQ 2 4   Trouble falling or staying asleep, or sleeping too much Nearly every day   Feeling tired or having little energy Nearly every day   Poor appetite, weight loss, or overeating Not at all   Feeling bad about yourself - or that you are a failure or have let yourself or your family down Not at all   Trouble concentrating on things such as school, work, reading, or watching TV Not at all   Moving or speaking so slowly that other people could have noticed; or the opposite being so fidgety that others notice Not at all   Thoughts of being better off dead, or hurting yourself in some way Not at all   PHQ 9 Score 10   How difficult have these problems made it for you to do your work, take care of your home and get along with others Somewhat difficult       Learning Assessment:  Learning Assessment 12/3/2020   PRIMARY LEARNER Patient   HIGHEST LEVEL OF EDUCATION - PRIMARY LEARNER  DID NOT GRADUATE HIGH SCHOOL   BARRIERS PRIMARY LEARNER NONE   PRIMARY LANGUAGE ENGLISH   LEARNER PREFERENCE PRIMARY READING   ANSWERED BY patient   RELATIONSHIP SELF       Fall Risk  Fall Risk Assessment, last 12 mths 12/12/2022   Able to walk? Yes   Fall in past 12 months? 0   Do you feel unsteady? 0   Are you worried about falling 0   Is TUG test greater than 12 seconds?  -   Is the gait abnormal? -   Number of falls in past 12 months -   Fall with injury? -       ADL  ADL Assessment 12/12/2022   Feeding yourself No Help Needed   Getting from bed to chair No Help Needed   Getting dressed No Help Needed   Bathing or showering No Help Needed   Walk across the room (includes cane/walker) No Help Needed   Using the telphone No Help Needed   Taking your medications No Help Needed   Preparing meals No Help Needed   Managing money (expenses/bills) No Help Needed   Moderately strenuous housework (laundry) No Help Needed   Shopping for personal items (toiletries/medicines) No Help Needed   Shopping for groceries No Help Needed   Driving No Help Needed   Climbing a flight of stairs No Help Needed   Getting to places beyond walking distances No Help Needed       Health Maintenance reviewed and discussed and ordered per Provider. Health Maintenance Due   Topic Date Due    Pneumococcal 65+ years (1 - PCV) Never done    DTaP/Tdap/Td series (1 - Tdap) Never done    Shingrix Vaccine Age 50> (1 of 2) Never done    Bone Densitometry (Dexa) Screening  Never done    COVID-19 Vaccine (3 - Booster for Moderna series) 04/20/2021   . Coordination of Care:  1. \"Have you been to the ER, urgent care clinic since your last visit? Hospitalized since your last visit? \" No    2. \"Have you seen or consulted any other health care providers outside of the [address] [place] [address] since your last visit? \" No     3. For patients aged 39-70: Has the patient had a colonoscopy? NA - based on age     If the patient is female:    4. For patients aged 41-77: Has the patient had a mammogram within the past 2 years? NA - based on age    11. For patients aged 21-65: Has the patient had a pap smear?  NA - based on age

## 2022-12-12 NOTE — PROGRESS NOTES
Johnny Morgan (: 1940) is a 78 y.o. female here for evaluation of the following chief concern(s):  Follow-up; chronic condition management    ASSESSMENT/PLAN:  1. Leg edema, left  -     DUPLEX LOWER EXT VENOUS LEFT; Future  2. Leg hematoma, left, sequela  3. Lesion of labia  4. Essential hypertension    Ms. Aneita Bamberger appears medically stable, baseline hemodynamics including blood pressure. Left leg w/ acute on chronic edema and sloe to heal hematoma; STAT venous duplex today was read as negative for DVT. Urinary symptoms and perineal rash have resolved. Return in about 5 months (around 2023) for follow-up chronic conditions or sooner if needed. Johnny Morgan agrees with plan as above and has no additional questions at this time. SUBJECTIVE/OBJECTIVE:    22 results reviewed w/ pt;  BMP improved; Cr 0.91, k+ 4.5    No recurrent falls since last visit. 22 [address] pt reported fall w/ injury to shins, LLE hematoma. 22 XR LLE: No evidence of acute fracture or dislocation. Soft tissue swelling about the mid anterior tibia. PT states persistent pain, edema. Persistent perineal irritation, labial plaque:  Resolved completely s/p repeat abx and Fluconazole. ROS: No abd pain, diarrhea or constipation, fever/chills, new rashes. +Low grade malaise, fatigue. Pertinent hx;;;  Hx incomplete emptying of her bladder. 10/21/22; Her urine culture was not strongly suggestive of a UTI- mixed and low colony count, however her symptoms are improving. Nuswab grew Ureaplasma. She has a persistent hyperkeratotic plaque at the superior aspect of the vaginal introitus of unclear etiology- I collected a pap from this lesion for further evaluation. Will treat w/ a course of Doxycycline in the case her symptoms are being driven by the Ureaplasma, plan for repeat dose of PO Fluconazole and continue topical antifungal/steroid.   Based on pap and follow-up exam next visit, will re-consider referral to GYN which pt declines at this time. 10/16/22 empiric treatment for UTI w/ Bactrim x5 days (completed 10/18/22), PO Fluconazole x1 (taken on 10/19/22) and recommendations to continue steroid/antifungal topicals. 10/12/22 results review;  Ucx 25-50k mixed paula w/ pan sensitive E. Coli. NUSWAB not available for review. HTN:  Goal <140/90  Hx;   7/25/22 Losartan 25mg increased to BID.   7/7/22 Losartan reduced from 50 mg to 25 mg daily given recurrent ?syncopal events of unclear etiology.      Hx;  10/7/22 results review;  CBC WNL  BMP WNL    10/21/22 results review;  BMP stable; Cr 1.03, k+ 5  Pap NIL, HPV negative    Past Medical History:   Diagnosis Date    Allergic rhinitis     Anemia     Anxiety disorder     Chronic obstructive pulmonary disease (HCC)     CVA (cerebral vascular accident) ([facility] [address] 1/16/2022    Dyslipidemia     Folic acid deficiency     Hypertension     Neurologic disorder     resting tremor     Past Surgical History:   Procedure Laterality Date    COLONOSCOPY N/A 10/26/2021    COLONOSCOPY w/ polypectomy performed by Claudia Rivera MD at [facility] ENDOSCOPY    HX IMPLANTABLE LOOP RECORDER N/A 08/2022    had a loop mointor put in     Family History   Problem Relation Age of Onset    Cancer Father         stomach    Heart Disease Sister     Alcohol abuse Brother     Cancer Brother         lung       Social History     Socioeconomic History    Marital status:      Spouse name: Not on file    Number of children: Not on file    Years of education: Not on file    Highest education level: Not on file   Occupational History    Not on file   Tobacco Use    Smoking status: Former     Packs/day: 1.00     Years: 50.00     Pack years: 50.00     Types: Cigarettes    Smokeless tobacco: Never   Vaping Use    Vaping Use: Never used   Substance and Sexual Activity    Alcohol use: Not Currently    Drug use: Never    Sexual activity: Not Currently   Other Topics Concern    Not on file Social History Narrative    Not on file     Social Determinants of Health     Financial Resource Strain: Not on file   Food Insecurity: Not on file   Transportation Needs: Not on file   Physical Activity: Not on file   Stress: Not on file   Social Connections: Not on file   Intimate Partner Violence: Not on file   Housing Stability: Not on file     Social History     Tobacco Use   Smoking Status Former    Packs/day: 1.00    Years: 50.00    Pack years: 50.00    Types: Cigarettes   Smokeless Tobacco Never       Current Outpatient Medications   Medication Sig Dispense Refill    DULoxetine (CYMBALTA) 30 mg capsule Take 1 Capsule by mouth daily. Take in place of Duloxetine (Cymbalta) 20mg for mood/nerves. 90 Capsule 1    triamcinolone acetonide (KENALOG) 0.1 % topical cream Apply  to affected area daily. use thin layer to vaginal rash 15 g 0    losartan (COZAAR) 25 mg tablet TAKE 1 TABLET BY MOUTH TWO (2) TIMES A DAY. 180 Tablet 1    primidone (MYSOLINE) 50 mg tablet Take 1 Tablet by mouth daily. 90 Tablet 1    budesonide-formoteroL (Symbicort) 80-4.5 mcg/actuation HFAA Take 2 Puffs by inhalation two (2) times a day. Rinse mouth after use. 10.2 g 1    vitamin B complex capsule Take 1 Capsule by mouth daily. 90 Capsule 1    fluticasone propionate (FLONASE) 50 mcg/actuation nasal spray SHAKE LIQUID AND USE 2 SPRAYS IN EACH NOSTRIL DAILY 48 g 1    albuterol (PROVENTIL HFA, VENTOLIN HFA, PROAIR HFA) 90 mcg/actuation inhaler Take 2 Puffs by inhalation every four (4) hours as needed for Wheezing. 1 Each 1    atorvastatin (LIPITOR) 40 mg tablet Take 1 Tablet by mouth daily. 90 Tablet 1    pantoprazole (PROTONIX) 40 mg tablet Take 1 Tablet by mouth daily for 360 days. Indications: gastroesophageal reflux disease 90 Tablet 3    aspirin delayed-release 81 mg tablet Take 81 mg by mouth daily.        Allergies   Allergen Reactions    Isopropyl Alcohol Other (comments)     Weakness all over - pt states she has out grown this Toan Blankenship     Pt. States she avoids penicillin due to allergy as a child. /64 (BP 1 Location: Left upper arm, BP Patient Position: Sitting, BP Cuff Size: Adult)   Pulse 73   Temp 97.5 °F (36.4 °C) (Temporal)   Resp 22   Ht 5' 4\" (1.626 m)   Wt 157 lb (71.2 kg)   SpO2 99%   BMI 26.95 kg/m²     Physical Exam  Exam conducted with a chaperone present. Constitutional:       General: She is not in acute distress. Appearance: Normal appearance. She is not ill-appearing. Cardiovascular:      Rate and Rhythm: Normal rate and regular rhythm. Pulmonary:      Effort: No respiratory distress. Breath sounds: Normal breath sounds. Abdominal:      Palpations: Abdomen is soft. Tenderness: There is no abdominal tenderness. Neurological:      Mental Status: She is alert. Mental status is at baseline. Psychiatric:         Mood and Affect: Mood normal.       Lab Results   Component Value Date/Time    WBC 6.5 09/08/2022 03:54 PM    HGB 13.6 09/08/2022 03:54 PM    HCT 41.6 09/08/2022 03:54 PM    PLATELET 173 70/44/3575 03:54 PM    MCV 94.1 09/08/2022 03:54 PM     Lab Results   Component Value Date/Time    Sodium 140 11/07/2022 11:07 AM    Potassium 4.5 11/07/2022 11:07 AM    Chloride 102 11/07/2022 11:07 AM    CO2 18 (L) 11/07/2022 11:07 AM    Anion gap 10 09/08/2022 03:54 PM    Glucose 94 11/07/2022 11:07 AM    BUN 17 11/07/2022 11:07 AM    Creatinine 0.91 11/07/2022 11:07 AM    BUN/Creatinine ratio 19 11/07/2022 11:07 AM    GFR est AA 44 (L) 09/08/2022 03:54 PM    GFR est non-AA 36 (L) 09/08/2022 03:54 PM    Calcium 9.6 11/07/2022 11:07 AM    Bilirubin, total 0.2 09/08/2022 03:54 PM    Alk.  phosphatase 112 09/08/2022 03:54 PM    Protein, total 7.4 09/08/2022 03:54 PM    Albumin 3.9 09/08/2022 03:54 PM    Globulin 3.5 09/08/2022 03:54 PM    A-G Ratio 1.1 09/08/2022 03:54 PM    ALT (SGPT) 28 09/08/2022 03:54 PM    AST (SGOT) 25 09/08/2022 03:54 PM     Lab Results   Component Value Date/Time    Cholesterol, total 176 07/07/2022 10:00 AM    HDL Cholesterol 55 07/07/2022 10:00 AM    LDL-C, External 86 01/06/2020 12:00 AM    LDL, calculated 95 07/07/2022 10:00 AM    LDL, calculated 75.6 12/11/2020 08:23 AM    VLDL, calculated 26 07/07/2022 10:00 AM    VLDL, calculated 24.4 12/11/2020 08:23 AM    Triglyceride 153 (H) 07/07/2022 10:00 AM    CHOL/HDL Ratio 2.9 12/11/2020 08:23 AM     On this date 12/12/22 I have spent >30 minutes reviewing previous notes, test results and face to face with the patient for interview/exam, discussing working diagnosis and treatment plan as well as same day documentation. Medical decision making complexity: moderate-high.     Fredy Bolaños MD   Family & Geriatric Medicine

## 2023-01-12 ENCOUNTER — CLINICAL SUPPORT (OUTPATIENT)
Dept: CARDIOLOGY CLINIC | Age: 83
End: 2023-01-12

## 2023-01-12 ENCOUNTER — OFFICE VISIT (OUTPATIENT)
Dept: CARDIOLOGY CLINIC | Age: 83
End: 2023-01-12
Payer: MEDICARE

## 2023-01-12 VITALS
HEIGHT: 64 IN | BODY MASS INDEX: 27.14 KG/M2 | HEART RATE: 66 BPM | SYSTOLIC BLOOD PRESSURE: 146 MMHG | DIASTOLIC BLOOD PRESSURE: 82 MMHG | OXYGEN SATURATION: 97 % | WEIGHT: 159 LBS

## 2023-01-12 DIAGNOSIS — J44.9 CHRONIC OBSTRUCTIVE PULMONARY DISEASE, UNSPECIFIED COPD TYPE (HCC): ICD-10-CM

## 2023-01-12 DIAGNOSIS — I48.0 PAROXYSMAL ATRIAL FIBRILLATION (HCC): ICD-10-CM

## 2023-01-12 DIAGNOSIS — Z95.818 STATUS POST PLACEMENT OF IMPLANTABLE LOOP RECORDER: Primary | ICD-10-CM

## 2023-01-12 DIAGNOSIS — I63.89 CEREBROVASCULAR ACCIDENT (CVA) DUE TO OTHER MECHANISM (HCC): ICD-10-CM

## 2023-01-12 DIAGNOSIS — I44.7 LBBB (LEFT BUNDLE BRANCH BLOCK): ICD-10-CM

## 2023-01-12 DIAGNOSIS — R29.6 FALLS FREQUENTLY: ICD-10-CM

## 2023-01-12 DIAGNOSIS — Z74.09 POOR MOBILITY: ICD-10-CM

## 2023-01-12 PROCEDURE — 1090F PRES/ABSN URINE INCON ASSESS: CPT | Performed by: INTERNAL MEDICINE

## 2023-01-12 PROCEDURE — 1101F PT FALLS ASSESS-DOCD LE1/YR: CPT | Performed by: INTERNAL MEDICINE

## 2023-01-12 PROCEDURE — G8417 CALC BMI ABV UP PARAM F/U: HCPCS | Performed by: INTERNAL MEDICINE

## 2023-01-12 PROCEDURE — 99214 OFFICE O/P EST MOD 30 MIN: CPT | Performed by: INTERNAL MEDICINE

## 2023-01-12 PROCEDURE — G8400 PT W/DXA NO RESULTS DOC: HCPCS | Performed by: INTERNAL MEDICINE

## 2023-01-12 PROCEDURE — 3078F DIAST BP <80 MM HG: CPT | Performed by: INTERNAL MEDICINE

## 2023-01-12 PROCEDURE — G8427 DOCREV CUR MEDS BY ELIG CLIN: HCPCS | Performed by: INTERNAL MEDICINE

## 2023-01-12 PROCEDURE — 93291 INTERROG DEV EVAL SCRMS IP: CPT | Performed by: INTERNAL MEDICINE

## 2023-01-12 PROCEDURE — 3077F SYST BP >= 140 MM HG: CPT | Performed by: INTERNAL MEDICINE

## 2023-01-12 PROCEDURE — G8510 SCR DEP NEG, NO PLAN REQD: HCPCS | Performed by: INTERNAL MEDICINE

## 2023-01-12 PROCEDURE — 1123F ACP DISCUSS/DSCN MKR DOCD: CPT | Performed by: INTERNAL MEDICINE

## 2023-01-12 PROCEDURE — G8536 NO DOC ELDER MAL SCRN: HCPCS | Performed by: INTERNAL MEDICINE

## 2023-01-12 NOTE — PROGRESS NOTES
History of Present Illness:  78 YOF here for follow up. She has a history of atrial fibrillation remotely and left bundle branch block and falls. There was concern for previous presyncope versus syncope. I placed a subcutaneous loop recorder early September to monitor for any arrhythmias. She still has some intermittent dizzy spells and had a bad one this morning, but did not take her blood pressure. No new chest pain. Some intermittent dyspnea. Impression:  Subcutaneous loop recorder September 9, 2022 without any events today. History of frequent falls with limited mobility and balance issues. History of remote atrial fibrillation with no documented recurrence. No aggressive anticoagulation at this point since there has been no recurrence, and given her fall risk. Chronic LBBB. History of mild bradycardia, limiting medication. COPD. Remote stroke a few years ago, on aspirin only due to fall risk. Chronic diastolic heart failure with echocardiogram January 2022 with normal function. HTN, increased today. Dyslipidemia. Plan:  Her monitor does not show any pauses or atrial fibrillation. No tachycardia. She did feel dizzy this morning, but again nothing triggered on her device. I asked her to keep an eye on her blood pressure. I will see back in six months.       Wt Readings from Last 3 Encounters:   01/12/23 72.1 kg (159 lb)   12/12/22 71.2 kg (157 lb)   11/07/22 71.7 kg (158 lb)     Past Medical History:   Diagnosis Date    Allergic rhinitis     Anemia     Anxiety disorder     Chronic obstructive pulmonary disease (HCC)     CVA (cerebral vascular accident) ([facility] [address] 1/16/2022    Dyslipidemia     Folic acid deficiency     Hypertension     Neurologic disorder     resting tremor       Current Outpatient Medications   Medication Sig Dispense Refill    atorvastatin (LIPITOR) 40 mg tablet TAKE 1 TABLET BY MOUTH NIGHTLY AT BEDTIME 90 Tablet 1    DULoxetine (CYMBALTA) 30 mg capsule Take 1 Capsule by mouth daily. Take in place of Duloxetine (Cymbalta) 20mg for mood/nerves. 90 Capsule 1    triamcinolone acetonide (KENALOG) 0.1 % topical cream Apply  to affected area daily. use thin layer to vaginal rash 15 g 0    losartan (COZAAR) 25 mg tablet TAKE 1 TABLET BY MOUTH TWO (2) TIMES A DAY. 180 Tablet 1    primidone (MYSOLINE) 50 mg tablet Take 1 Tablet by mouth daily. 90 Tablet 1    budesonide-formoteroL (Symbicort) 80-4.5 mcg/actuation HFAA Take 2 Puffs by inhalation two (2) times a day. Rinse mouth after use. 10.2 g 1    vitamin B complex capsule Take 1 Capsule by mouth daily. 90 Capsule 1    fluticasone propionate (FLONASE) 50 mcg/actuation nasal spray SHAKE LIQUID AND USE 2 SPRAYS IN EACH NOSTRIL DAILY 48 g 1    albuterol (PROVENTIL HFA, VENTOLIN HFA, PROAIR HFA) 90 mcg/actuation inhaler Take 2 Puffs by inhalation every four (4) hours as needed for Wheezing. 1 Each 1    pantoprazole (PROTONIX) 40 mg tablet Take 1 Tablet by mouth daily for 360 days. Indications: gastroesophageal reflux disease 90 Tablet 3    aspirin delayed-release 81 mg tablet Take 81 mg by mouth daily. Social History   reports that she has quit smoking. Her smoking use included cigarettes. She has a 50.00 pack-year smoking history. She has never used smokeless tobacco.   reports that she does not currently use alcohol. Family History  family history includes Alcohol abuse in her brother; Cancer in her brother and father; Heart Disease in her sister. Review of Systems  Except as stated above include:  Constitutional: Negative for fever, chills and malaise/fatigue. HEENT: No congestion or recent URI. Gastrointestinal: No nausea, vomiting, abdominal pain, bloody stools. Pulmonary:  Negative except as stated above. Cardiac:  Negative except as stated above. Musculoskeletal: Negative except as stated above. Neurological:  No localized symptoms. Skin:  Negative except as stated above.   Psych:  Negative except as stated above.  Endocrine:  Negative except as stated above. PHYSICAL EXAM  BP Readings from Last 3 Encounters:   01/12/23 (!) 146/82   12/12/22 132/64   11/07/22 133/70     Pulse Readings from Last 3 Encounters:   01/12/23 66   12/12/22 73   11/07/22 (!) 57       General:   Well developed, well groomed. Head/Neck:   No obvious jugular venous distention     No obvious carotid pulsations. No evidence of xanthelasma. Lungs:   No respiratory distress. Clear bilaterally. Heart:  Regular rate and rhythm. Normal S1/S2. Palpation grossly normal.    No significant murmurs, rubs or gallops. ILR intact. Abdomen:   Non-acute abdomen. No obvious pulsations. Extremities:   Intact peripheral pulses. No significant edema. Neurological:   Alert and oriented to person, place, time. No focal neurological deficit visually. Skin:   No obvious rash    Blood Pressure Metric:  Monitor recommended and adjustments stated if needed.

## 2023-01-12 NOTE — PROGRESS NOTES
Korey Seen presents today for   Chief Complaint   Patient presents with    Follow-up     2-3 month    Dizziness       [place] Seen preferred language for health care discussion is english/other. Is someone accompanying this pt? daughter    Is the patient using any DME equipment during OV? cane    Depression Screening:  3 most recent PHQ Screens 1/12/2023   Little interest or pleasure in doing things Not at all   Feeling down, depressed, irritable, or hopeless Not at all   Total Score PHQ 2 0   Trouble falling or staying asleep, or sleeping too much -   Feeling tired or having little energy -   Poor appetite, weight loss, or overeating -   Feeling bad about yourself - or that you are a failure or have let yourself or your family down -   Trouble concentrating on things such as school, work, reading, or watching TV -   Moving or speaking so slowly that other people could have noticed; or the opposite being so fidgety that others notice -   Thoughts of being better off dead, or hurting yourself in some way -   PHQ 9 Score -   How difficult have these problems made it for you to do your work, take care of your home and get along with others -       Learning Assessment:  Learning Assessment 12/3/2020   PRIMARY LEARNER Patient   HIGHEST LEVEL OF EDUCATION - PRIMARY LEARNER  DID NOT GRADUATE HIGH SCHOOL   BARRIERS PRIMARY LEARNER NONE   PRIMARY LANGUAGE ENGLISH   LEARNER PREFERENCE PRIMARY READING   ANSWERED BY patient   RELATIONSHIP SELF       Abuse Screening:  Abuse Screening Questionnaire 12/12/2022   Do you ever feel afraid of your partner? N   Are you in a relationship with someone who physically or mentally threatens you? N   Is it safe for you to go home? Y       Fall Risk  Fall Risk Assessment, last 12 mths 12/12/2022   Able to walk? Yes   Fall in past 12 months? 0   Do you feel unsteady? 0   Are you worried about falling 0   Is TUG test greater than 12 seconds?  -   Is the gait abnormal? -   Number of falls in past 12 months -   Fall with injury? -       Pt currently taking Anticoagulant therapy? Asa 81mg    Coordination of Care:  1. Have you been to the ER, urgent care clinic since your last visit? Hospitalized since your last visit? [facility] on 9/9 for afib    2. Have you seen or consulted any other health care providers outside of the [address] [place] [address] since your last visit? Include any pap smears or colon screening.  no

## 2023-01-17 NOTE — PROGRESS NOTES
I have personally seen and evaluated the device findings. Interrogation reviewed and I agree with assessment.     Raffy Ferro

## 2023-02-27 RX ORDER — PRIMIDONE 50 MG/1
TABLET ORAL
Qty: 90 TABLET | Refills: 0 | Status: SHIPPED | OUTPATIENT
Start: 2023-02-27

## 2023-02-27 NOTE — TELEPHONE ENCOUNTER
Kevan Shimpan called for their medication refill.   Last refill: 9/14/2022  Last Office visit:  12/12/2022  Last labs:  11/7/2022  Follow up visit:  5/12/2023

## 2023-03-01 ENCOUNTER — TELEPHONE (OUTPATIENT)
Facility: CLINIC | Age: 83
End: 2023-03-01

## 2023-03-01 DIAGNOSIS — J44.9 CHRONIC OBSTRUCTIVE PULMONARY DISEASE, UNSPECIFIED COPD TYPE (HCC): Primary | ICD-10-CM

## 2023-03-01 RX ORDER — BUDESONIDE AND FORMOTEROL FUMARATE DIHYDRATE 80; 4.5 UG/1; UG/1
2 AEROSOL RESPIRATORY (INHALATION) 2 TIMES DAILY
Qty: 10.2 G | Refills: 2 | Status: SHIPPED | OUTPATIENT
Start: 2023-03-01

## 2023-03-06 DIAGNOSIS — I10 ESSENTIAL HYPERTENSION: Primary | ICD-10-CM

## 2023-03-06 RX ORDER — LOSARTAN POTASSIUM 25 MG/1
25 TABLET ORAL 2 TIMES DAILY
Qty: 180 TABLET | Refills: 0 | Status: SHIPPED | OUTPATIENT
Start: 2023-03-06

## 2023-03-06 NOTE — TELEPHONE ENCOUNTER
Patient called for refill on her BP medication Losartan. Lesli Parkinson called for their medication refill.   Last refill: 9/14/2022 for 90 days with 1 refill  Last Office visit:  12/12/2022  Last labs:  10/21/2022  Follow up visit:  5/12/2023    Refill sent per verbal order from Santos Carter NP.

## 2023-03-29 ENCOUNTER — PROCEDURE VISIT (OUTPATIENT)
Age: 83
End: 2023-03-29

## 2023-03-29 DIAGNOSIS — I63.89 OTHER CEREBRAL INFARCTION (HCC): ICD-10-CM

## 2023-03-29 DIAGNOSIS — Z95.818 PRESENCE OF OTHER CARDIAC IMPLANTS AND GRAFTS: Primary | ICD-10-CM

## 2023-03-29 DIAGNOSIS — I48.0 PAROXYSMAL ATRIAL FIBRILLATION (HCC): ICD-10-CM

## 2023-03-31 ENCOUNTER — CARE COORDINATION (OUTPATIENT)
Facility: CLINIC | Age: 83
End: 2023-03-31

## 2023-03-31 ASSESSMENT — PATIENT HEALTH QUESTIONNAIRE - PHQ9
SUM OF ALL RESPONSES TO PHQ QUESTIONS 1-9: 0

## 2023-03-31 NOTE — CARE COORDINATION
Attempted to contact patient for Complex Case Management enrollment. No answer, left message with ACM contact information provided. Will attempt to contact at a later time.
5/12/2023  2:30 PM Maggy Lerner MD [facility] BS AMB   6/21/2023  2:00 PM Raven Vyas MD [facility] BS AMB

## 2023-04-07 ENCOUNTER — CARE COORDINATION (OUTPATIENT)
Facility: CLINIC | Age: 83
End: 2023-04-07

## 2023-04-07 ASSESSMENT — PATIENT HEALTH QUESTIONNAIRE - PHQ9
SUM OF ALL RESPONSES TO PHQ QUESTIONS 1-9: 0

## 2023-04-07 NOTE — CARE COORDINATION
Ambulatory Care Coordination Note  2023    Patient Current Location:  [place]    ACM contacted the patient by telephone. Verified name and  with patient as identifiers. Provided introduction to self, and explanation of the ACM role. ACM: Geovanna Michael RN    Challenges to be reviewed by the provider   Additional needs identified to be addressed with provider: No  none               Method of communication with provider: phone. Spoke with patient - Patient states doing well at present   Further / follow up appointments listed below - reviewed upcoming appointments  Further questions answered as needed and patient has ACM contact information  Medication reconciliation done at this encounter with patient. Shows understanding of medication therapy  Depression screen done - PHQ2 score = 0  Respiratory and cardiac status shows no issues at present   CHF Protocol:   Daily weights, BP checks, monitor edema in lower legs and SOB. Limit sodium intake, avoid foods high in sodium. Monitor fluid intake as per PCP directions. Notify PCP office for wt gain 3 lbs in 2 days or 5 lbs In 1 week      Offered patient enrollment in the Remote Patient Monitoring (RPM) program for in-home monitoring: NA. Ambulatory Care Coordination Assessment    Care Coordination Protocol  Referral from Primary Care Provider: No  Week 1 - Initial Assessment     Do you have all of your prescriptions and are they filled?: Yes  Barriers to medication adherence: None  Are you able to afford your medications?: Yes  How often do you have trouble taking your medications the way you have been told to take them?: I always take them as prescribed.            Current Housing: Private Residence                 Suggested Interventions and Community Resources                  Future Appointments   Date Time Provider Kenyatta Pappas   2023  2:30 PM Alyssa Lam MD [facility] BS AMB   2023  2:00 PM Doreen Christiansen MD [facility] BS AMB

## 2023-04-19 ENCOUNTER — PROCEDURE VISIT (OUTPATIENT)
Age: 83
End: 2023-04-19
Payer: MEDICARE

## 2023-04-19 DIAGNOSIS — Z95.818 PRESENCE OF OTHER CARDIAC IMPLANTS AND GRAFTS: Primary | ICD-10-CM

## 2023-04-19 DIAGNOSIS — R29.6 REPEATED FALLS: ICD-10-CM

## 2023-04-19 DIAGNOSIS — I48.0 PAROXYSMAL ATRIAL FIBRILLATION (HCC): ICD-10-CM

## 2023-04-19 PROCEDURE — 93298 REM INTERROG DEV EVAL SCRMS: CPT | Performed by: INTERNAL MEDICINE

## 2023-04-24 ENCOUNTER — CARE COORDINATION (OUTPATIENT)
Facility: CLINIC | Age: 83
End: 2023-04-24

## 2023-04-24 ASSESSMENT — PATIENT HEALTH QUESTIONNAIRE - PHQ9
SUM OF ALL RESPONSES TO PHQ QUESTIONS 1-9: 0

## 2023-04-24 NOTE — CARE COORDINATION
Ambulatory Care Coordination Note  2023    Patient Current Location:  [place]    ACM contacted the patient by telephone. Verified name and  with patient as identifiers. Provided introduction to self, and explanation of the ACM role. ACM: Candelaria Sykes RN    Challenges to be reviewed by the provider   Additional needs identified to be addressed with provider: No  none               Method of communication with provider: phone. Spoke with patient - Patient states doing well at present   Further / follow up appointments listed below - reviewed upcoming appointments  Further questions answered as needed and patient has ACM contact information  Any changes in med therapy noted in med list - no complications or side effects   Instructed patient on proper use of inhaler and to rinse mouth after each use to avoid complications. Depression screen done - PHQ2 score = 0  Respiratory and cardiac status shows no issues at present   CHF Protocol:   Daily weights, BP checks, monitor edema in lower legs and SOB. Limit sodium intake, avoid foods high in sodium. Monitor fluid intake as per PCP directions. Notify PCP office for wt gain 3 lbs in 2 days or 5 lbs In 1 week      Offered patient enrollment in the Remote Patient Monitoring (RPM) program for in-home monitoring: NA. Ambulatory Care Coordination Assessment    Care Coordination Protocol  Referral from Primary Care Provider: No  Week 1 - Initial Assessment     Do you have all of your prescriptions and are they filled?: Yes  Barriers to medication adherence: None  Are you able to afford your medications?: Yes  How often do you have trouble taking your medications the way you have been told to take them?: I always take them as prescribed.            Current Housing: Private Residence                 Thinking about your patient's physical health needs, are there any symptoms or problems (risk indicators) you are unsure about that require further

## 2023-04-28 NOTE — ED PROVIDER NOTES
EMERGENCY DEPARTMENT HISTORY AND PHYSICAL EXAM      Date: 8/26/2021  Patient Name: Frank Arambula    History of Presenting Illness     Chief Complaint   Patient presents with    Melena       History Provided By: Patient    HPI: Frank Arambula, 78 y.o. female with a past medical history significant Multiple medical problems presents to the ED with cc of bright red blood per rectum. Patient states she has had 2 episodes of that today. She says she also vomited once today and had some blood in it. She denies any abdominal pain. She denies any rectal pain. She does not feel lightheaded or dizzy. She has had this happen before but they were unable to give her a diagnosis. There are no other complaints, changes, or physical findings at this time. PCP: Elina Newton MD    No current facility-administered medications on file prior to encounter. Current Outpatient Medications on File Prior to Encounter   Medication Sig Dispense Refill    ALPRAZolam (XANAX) 0.5 mg tablet Take  by mouth.  ALPRAZolam (XANAX) 0.5 mg tablet Take 1 Tablet by mouth two (2) times a day. Max Daily Amount: 1 mg. 60 Tablet 2    atorvastatin (LIPITOR) 40 mg tablet Take 1 Tablet by mouth daily. 90 Tablet 1    gabapentin (NEURONTIN) 100 mg capsule Take 2 Capsules by mouth three (3) times daily. Max Daily Amount: 600 mg. 84 Capsule 0    metoprolol tartrate (LOPRESSOR) 25 mg tablet TAKE 1/2 TABLET BY MOUTH TWICE DAILY 90 Tab 1    albuterol (PROVENTIL HFA, VENTOLIN HFA, PROAIR HFA) 90 mcg/actuation inhaler Take 2 Puffs by inhalation every four (4) hours as needed for Wheezing. 1 Inhaler 1    valACYclovir (VALTREX) 1 gram tablet Take 1 Tab by mouth two (2) times a day. 10 Tab 0    losartan (COZAAR) 100 mg tablet Take 1 Tab by mouth daily.  90 Tab 1    FeroSul 325 mg (65 mg iron) tablet TAKE 1 TABLET BY MOUTH DAILY BEFORE BREAKFAST 30 Tab 0    ferrous sulfate (Iron) 325 mg (65 mg iron) tablet Take 1 Tab by mouth Daily (before breakfast). 30 Tab 0    budesonide-formoteroL (Symbicort) 80-4.5 mcg/actuation HFAA Take 2 Puffs by inhalation.  ALPRAZolam (XANAX) 0.25 mg tablet Take 1 Tab by mouth two (2) times daily as needed for Anxiety. Max Daily Amount: 0.5 mg. 60 Tab 2       Past History     Past Medical History:  Past Medical History:   Diagnosis Date    Allergic rhinitis     Anemia     Anxiety disorder     Chronic obstructive pulmonary disease ([address]     Dyslipidemia     Folic acid deficiency     Hypertension     Neurologic disorder     resting tremor       Past Surgical History:  History reviewed. No pertinent surgical history. Family History:  Family History   Problem Relation Age of Onset    Cancer Father         stomach    Heart Disease Sister     Alcohol abuse Brother     Cancer Brother         lung       Social History:  Social History     Tobacco Use    Smoking status: Former Smoker     Packs/day: 1.00     Years: 50.00     Pack years: 50.00    Smokeless tobacco: Never Used   Vaping Use    Vaping Use: Never used   Substance Use Topics    Alcohol use: Not Currently    Drug use: Never       Allergies: Allergies   Allergen Reactions    Isopropyl Alcohol Other (comments)     Weakness all over - pt states she has out grown this    Pen-Vee K Rash     Pt. States she avoids penicillin due to allergy as a child. Review of Systems     Review of Systems   Constitutional: Negative for fatigue and fever. HENT: Negative for rhinorrhea and sore throat. Respiratory: Negative for cough and shortness of breath. Cardiovascular: Negative for chest pain and palpitations. Gastrointestinal: Positive for anal bleeding and blood in stool. Negative for abdominal pain, diarrhea, nausea, rectal pain and vomiting. Genitourinary: Negative for difficulty urinating and dysuria. Musculoskeletal: Negative for arthralgias and myalgias. Skin: Negative for color change and rash.    Neurological: Negative for light-headedness and headaches. Physical Exam     Physical Exam  Vitals and nursing note reviewed. Constitutional:       General: She is awake. She is not in acute distress. Appearance: Normal appearance. She is well-developed. She is obese. She is not ill-appearing, toxic-appearing or diaphoretic. Interventions: Face mask in place. Comments: Elderly     HENT:      Head: Normocephalic and atraumatic. Eyes:      Conjunctiva/sclera: Conjunctivae normal.      Pupils: Pupils are equal, round, and reactive to light. Cardiovascular:      Rate and Rhythm: Normal rate and regular rhythm. Pulses: Normal pulses. Heart sounds: Normal heart sounds. Pulmonary:      Effort: Pulmonary effort is normal.      Breath sounds: Normal breath sounds. Abdominal:      General: Abdomen is flat. Palpations: Abdomen is soft. Tenderness: There is no abdominal tenderness. Genitourinary:     Comments: There is a hemorrhoid in the rectum. There is no stool in the vault. There is blood on the glove after doing rectal exam.  Musculoskeletal:      Cervical back: Normal range of motion and neck supple. Skin:     General: Skin is warm and dry. Neurological:      Mental Status: She is alert and oriented to person, place, and time. GCS: GCS eye subscore is 4. GCS verbal subscore is 5. GCS motor subscore is 6. Psychiatric:         Mood and Affect: Mood and affect normal.         Behavior: Behavior normal. Behavior is cooperative. Thought Content:  Thought content normal.         Lab and Diagnostic Study Results     Labs -     Recent Results (from the past 12 hour(s))   CBC WITH AUTOMATED DIFF    Collection Time: 08/26/21  9:05 AM   Result Value Ref Range    WBC 6.5 4.6 - 13.2 K/uL    RBC 3.43 (L) 4.20 - 5.30 M/uL    HGB 10.8 (L) 12.0 - 16.0 g/dL    HCT 32.6 (L) 35.0 - 45.0 %    MCV 95.0 78.0 - 100.0 FL    MCH 31.5 24.0 - 34.0 PG    MCHC 33.1 31.0 - 37.0 g/dL    RDW 12.0 11.6 - 14.5 % PLATELET 719 467 - 896 K/uL    MPV 11.3 9.2 - 11.8 FL    NRBC 0.0 0.0  WBC    ABSOLUTE NRBC 0.00 0.00 - 0.01 K/uL    NEUTROPHILS 81 (H) 40 - 73 %    LYMPHOCYTES 12 (L) 21 - 52 %    MONOCYTES 4 3 - 10 %    EOSINOPHILS 2 0 - 5 %    BASOPHILS 1 0 - 2 %    IMMATURE GRANULOCYTES 0 0 - 0.5 %    ABS. NEUTROPHILS 5.2 1.8 - 8.0 K/UL    ABS. LYMPHOCYTES 0.8 (L) 0.9 - 3.6 K/UL    ABS. MONOCYTES 0.2 0.05 - 1.2 K/UL    ABS. EOSINOPHILS 0.1 0.0 - 0.4 K/UL    ABS. BASOPHILS 0.1 0.0 - 0.1 K/UL    ABS. IMM. GRANS. 0.0 0.00 - 0.04 K/UL    DF AUTOMATED     METABOLIC PANEL, COMPREHENSIVE    Collection Time: 08/26/21  9:05 AM   Result Value Ref Range    Sodium 140 135 - 145 mmol/L    Potassium 4.1 3.2 - 5.1 mmol/L    Chloride 104 94 - 111 mmol/L    CO2 25 21 - 33 mmol/L    Anion gap 11 mmol/L    Glucose 184 (H) 70 - 110 mg/dL    BUN 31 (H) 9 - 21 mg/dL    Creatinine 1.00 0.70 - 1.20 mg/dL    BUN/Creatinine ratio 31      GFR est AA >60 ml/min/1.73m2    GFR est non-AA 53 ml/min/1.73m2    Calcium 9.2 8.5 - 10.5 mg/dL    Bilirubin, total 0.7 0.2 - 1.0 mg/dL    AST (SGOT) 25 14 - 74 U/L    ALT (SGPT) 20 3 - 52 U/L    Alk. phosphatase 59 38 - 126 U/L    Protein, total 6.4 6.1 - 8.4 g/dL    Albumin 3.6 3.5 - 4.7 g/dL    Globulin 2.8 g/dL    A-G Ratio 1.3     PROTHROMBIN TIME + INR    Collection Time: 08/26/21  9:05 AM   Result Value Ref Range    Prothrombin time 14.3 11.5 - 15.2 sec    INR 1.2 0.8 - 1.2     OCCULT BLOOD, STOOL    Collection Time: 08/26/21  9:06 AM   Result Value Ref Range    Occult Blood,day 1 Positive      Day 1 date: 8,262,021         Radiologic Studies -   @[email or web address]@  CT Results  (Last 48 hours)    None        CXR Results  (Last 48 hours)    None            Medical Decision Making   - I am the first provider for this patient. - I reviewed the vital signs, available nursing notes, past medical history, past surgical history, family history and social history. - Initial assessment performed.  The patients presenting problems have been discussed, and they are in agreement with the care plan formulated and outlined with them. I have encouraged them to ask questions as they arise throughout their visit. Vital Signs-Reviewed the patient's vital signs. Patient Vitals for the past 12 hrs:   Temp Pulse Resp BP SpO2   08/26/21 1225  63 16 (!) 112/53 98 %   08/26/21 1155  70 16 116/64 100 %   08/26/21 1125  64 18 (!) 113/42 97 %   08/26/21 1055  69 18 (!) 92/45 99 %   08/26/21 1025  68 16 (!) 98/54 97 %   08/26/21 1000  68 16 (!) 97/39 97 %   08/26/21 0925  68 16 109/62 97 %   08/26/21 0906  73 18 (!) 110/50 97 %   08/26/21 0859 99.1 °F (37.3 °C)       08/26/21 0855  72 16 129/65 98 %       Records Reviewed: Nursing Notes and Old Medical Records    The patient presents with rectal bleeding with a differential diagnosis of anemia, bleeding disorder, GI bleed, peptic ulcer disease, rectal mass, colon mass. ED Course:          Provider Notes (Medical Decision Making):     Elderly female presented with rectal bleeding and hematemesis. She is hemodynamically stable. She had a rectal exam that showed bright red blood and positive occult blood on test.  Her hemoglobin is 10.5. I consulted with the hospitalist and he is going admit the patient in the ICU. He is already contacted GI and they are going to do a scope this afternoon. Patient's other labs are stable. MDM     12:45 PM  I have spent 30 minutes of critical care time involved in lab review, consultations with specialist, family decision-making, and documentation. During this entire length of time I was immediately available to the patient. Critical Care: The reason for providing this level of medical care for this critically ill patient was due a critical illness that impaired one or more vital organ systems such that there was a high probability of imminent or life threatening deterioration in the patients condition.  This care involved high complexity decision making to assess, manipulate, and support vital system functions, to treat this degreee vital organ system failure and to prevent further life threatening deterioration of the patients condition. Procedures   Medical Decision Makingedical Decision Making  Performed by: Ty Segovia MD  PROCEDURES:  Procedures       Disposition   Disposition:     Admitted          Diagnosis     Clinical Impression:   1. Hematemesis without nausea    2. Rectal bleeding        Attestations:    Ty Segovia MD    Please note that this dictation was completed with [employer], the computer voice recognition software. Quite often unanticipated grammatical, syntax, homophones, and other interpretive errors are inadvertently transcribed by the computer software. Please disregard these errors. Please excuse any errors that have escaped final proofreading. Thank you. Depressed mood/Anhedonia

## 2023-05-04 ENCOUNTER — OFFICE VISIT (OUTPATIENT)
Facility: CLINIC | Age: 83
End: 2023-05-04
Payer: MEDICARE

## 2023-05-04 ENCOUNTER — HOSPITAL ENCOUNTER (OUTPATIENT)
Age: 83
Discharge: HOME | End: 2023-05-04
Payer: MEDICARE

## 2023-05-04 VITALS
SYSTOLIC BLOOD PRESSURE: 147 MMHG | OXYGEN SATURATION: 97 % | DIASTOLIC BLOOD PRESSURE: 55 MMHG | TEMPERATURE: 97.1 F | HEART RATE: 62 BPM | WEIGHT: 159.8 LBS | RESPIRATION RATE: 24 BRPM | BODY MASS INDEX: 27.28 KG/M2 | HEIGHT: 64 IN

## 2023-05-04 DIAGNOSIS — R53.81 MALAISE AND FATIGUE: Primary | ICD-10-CM

## 2023-05-04 DIAGNOSIS — E78.5 HYPERLIPIDEMIA, UNSPECIFIED HYPERLIPIDEMIA TYPE: ICD-10-CM

## 2023-05-04 DIAGNOSIS — I10 ESSENTIAL HYPERTENSION: ICD-10-CM

## 2023-05-04 DIAGNOSIS — E55.9 VITAMIN D DEFICIENCY: ICD-10-CM

## 2023-05-04 DIAGNOSIS — R79.89 LOW TSH LEVEL: ICD-10-CM

## 2023-05-04 DIAGNOSIS — R53.83 MALAISE AND FATIGUE: Primary | ICD-10-CM

## 2023-05-04 LAB
ALBUMIN SERPL-MCNC: 3.8 G/DL (ref 3.4–5)
ALBUMIN/GLOB SERPL: 1.3 (ref 0.8–1.7)
ALP SERPL-CCNC: 73 U/L (ref 45–117)
ALT SERPL-CCNC: 22 U/L (ref 13–56)
ANION GAP SERPL CALC-SCNC: 4 MMOL/L (ref 3–18)
AST SERPL W P-5'-P-CCNC: 23 U/L (ref 10–38)
BASOPHILS # BLD: 0.1 K/UL (ref 0–0.1)
BASOPHILS NFR BLD: 1 % (ref 0–2)
BILIRUB SERPL-MCNC: 0.4 MG/DL (ref 0.2–1)
BUN SERPL-MCNC: 19 MG/DL (ref 7–18)
BUN/CREAT SERPL: 21 (ref 12–20)
CA-I BLD-MCNC: 9.3 MG/DL (ref 8.5–10.1)
CHLORIDE SERPL-SCNC: 105 MMOL/L (ref 100–111)
CO2 SERPL-SCNC: 28 MMOL/L (ref 21–32)
CREAT SERPL-MCNC: 0.9 MG/DL (ref 0.6–1.3)
DIFFERENTIAL METHOD BLD: ABNORMAL
EOSINOPHIL # BLD: 0.1 K/UL (ref 0–0.4)
EOSINOPHIL NFR BLD: 1 % (ref 0–5)
ERYTHROCYTE [DISTWIDTH] IN BLOOD BY AUTOMATED COUNT: 12.5 % (ref 11.6–14.5)
GLOBULIN SER CALC-MCNC: 3 G/DL (ref 2–4)
GLUCOSE SERPL-MCNC: 86 MG/DL (ref 74–99)
HCT VFR BLD AUTO: 41.1 % (ref 35–45)
HGB BLD-MCNC: 13.1 G/DL (ref 12–16)
IMM GRANULOCYTES # BLD AUTO: 0 K/UL (ref 0–0.04)
IMM GRANULOCYTES NFR BLD AUTO: 0 % (ref 0–0.5)
LYMPHOCYTES # BLD: 0.7 K/UL (ref 0.9–3.6)
LYMPHOCYTES NFR BLD: 13 % (ref 21–52)
MCH RBC QN AUTO: 30.8 PG (ref 24–34)
MCHC RBC AUTO-ENTMCNC: 31.9 G/DL (ref 31–37)
MCV RBC AUTO: 96.5 FL (ref 78–100)
MONOCYTES # BLD: 0.5 K/UL (ref 0.05–1.2)
MONOCYTES NFR BLD: 10 % (ref 3–10)
NEUTS SEG # BLD: 3.8 K/UL (ref 1.8–8)
NEUTS SEG NFR BLD: 75 % (ref 40–73)
NRBC # BLD: 0 K/UL (ref 0–0.01)
NRBC BLD-RTO: 0 PER 100 WBC
PLATELET # BLD AUTO: 166 K/UL (ref 135–420)
PMV BLD AUTO: 11.8 FL (ref 9.2–11.8)
POTASSIUM SERPL-SCNC: 4.4 MMOL/L (ref 3.5–5.5)
PROT SERPL-MCNC: 6.8 G/DL (ref 6.4–8.2)
RBC # BLD AUTO: 4.26 M/UL (ref 4.2–5.3)
SODIUM SERPL-SCNC: 137 MMOL/L (ref 136–145)
WBC # BLD AUTO: 5.2 K/UL (ref 4.6–13.2)

## 2023-05-04 PROCEDURE — 3078F DIAST BP <80 MM HG: CPT | Performed by: FAMILY MEDICINE

## 2023-05-04 PROCEDURE — 99213 OFFICE O/P EST LOW 20 MIN: CPT | Performed by: FAMILY MEDICINE

## 2023-05-04 PROCEDURE — 84481 FREE ASSAY (FT-3): CPT

## 2023-05-04 PROCEDURE — G8419 CALC BMI OUT NRM PARAM NOF/U: HCPCS | Performed by: FAMILY MEDICINE

## 2023-05-04 PROCEDURE — 84439 ASSAY OF FREE THYROXINE: CPT

## 2023-05-04 PROCEDURE — 82306 VITAMIN D 25 HYDROXY: CPT

## 2023-05-04 PROCEDURE — 1036F TOBACCO NON-USER: CPT | Performed by: FAMILY MEDICINE

## 2023-05-04 PROCEDURE — 80061 LIPID PANEL: CPT

## 2023-05-04 PROCEDURE — 80053 COMPREHEN METABOLIC PANEL: CPT

## 2023-05-04 PROCEDURE — 1123F ACP DISCUSS/DSCN MKR DOCD: CPT | Performed by: FAMILY MEDICINE

## 2023-05-04 PROCEDURE — 3074F SYST BP LT 130 MM HG: CPT | Performed by: FAMILY MEDICINE

## 2023-05-04 PROCEDURE — G8400 PT W/DXA NO RESULTS DOC: HCPCS | Performed by: FAMILY MEDICINE

## 2023-05-04 PROCEDURE — G8427 DOCREV CUR MEDS BY ELIG CLIN: HCPCS | Performed by: FAMILY MEDICINE

## 2023-05-04 PROCEDURE — 84443 ASSAY THYROID STIM HORMONE: CPT

## 2023-05-04 PROCEDURE — 1090F PRES/ABSN URINE INCON ASSESS: CPT | Performed by: FAMILY MEDICINE

## 2023-05-04 PROCEDURE — 85025 COMPLETE CBC W/AUTO DIFF WBC: CPT

## 2023-05-04 SDOH — ECONOMIC STABILITY: FOOD INSECURITY: WITHIN THE PAST 12 MONTHS, YOU WORRIED THAT YOUR FOOD WOULD RUN OUT BEFORE YOU GOT MONEY TO BUY MORE.: NEVER TRUE

## 2023-05-04 SDOH — ECONOMIC STABILITY: FOOD INSECURITY: WITHIN THE PAST 12 MONTHS, THE FOOD YOU BOUGHT JUST DIDN'T LAST AND YOU DIDN'T HAVE MONEY TO GET MORE.: NEVER TRUE

## 2023-05-04 SDOH — ECONOMIC STABILITY: INCOME INSECURITY: HOW HARD IS IT FOR YOU TO PAY FOR THE VERY BASICS LIKE FOOD, HOUSING, MEDICAL CARE, AND HEATING?: VERY HARD

## 2023-05-04 SDOH — ECONOMIC STABILITY: HOUSING INSECURITY
IN THE LAST 12 MONTHS, WAS THERE A TIME WHEN YOU DID NOT HAVE A STEADY PLACE TO SLEEP OR SLEPT IN A SHELTER (INCLUDING NOW)?: NO

## 2023-05-04 ASSESSMENT — PATIENT HEALTH QUESTIONNAIRE - PHQ9
2. FEELING DOWN, DEPRESSED OR HOPELESS: 2
SUM OF ALL RESPONSES TO PHQ9 QUESTIONS 1 & 2: 2
1. LITTLE INTEREST OR PLEASURE IN DOING THINGS: 0
SUM OF ALL RESPONSES TO PHQ QUESTIONS 1-9: 2

## 2023-05-05 ENCOUNTER — NURSE ONLY (OUTPATIENT)
Facility: CLINIC | Age: 83
End: 2023-05-05
Payer: MEDICARE

## 2023-05-05 ENCOUNTER — HOSPITAL ENCOUNTER (OUTPATIENT)
Age: 83
Discharge: HOME | End: 2023-05-08

## 2023-05-05 DIAGNOSIS — R30.0 DYSURIA: Primary | ICD-10-CM

## 2023-05-05 LAB
25(OH)D3 SERPL-MCNC: 12.8 NG/ML (ref 30–100)
BILIRUBIN, URINE, POC: NEGATIVE
BLOOD URINE, POC: NORMAL
CHOLEST SERPL-MCNC: 136 MG/DL
GLUCOSE URINE, POC: NEGATIVE
HDLC SERPL-MCNC: 57 MG/DL (ref 40–60)
HDLC SERPL: 2.4 (ref 0–5)
KETONES, URINE, POC: NEGATIVE
LDLC SERPL CALC-MCNC: 56.6 MG/DL (ref 0–100)
LEUKOCYTE ESTERASE, URINE, POC: NORMAL
LIPID PANEL: NORMAL
NITRITE, URINE, POC: NEGATIVE
PH, URINE, POC: 6 (ref 4.6–8)
PROTEIN,URINE, POC: NEGATIVE
SPECIFIC GRAVITY, URINE, POC: 1.02 (ref 1–1.03)
TRIGL SERPL-MCNC: 112 MG/DL
URINALYSIS CLARITY, POC: CLEAR
URINALYSIS COLOR, POC: YELLOW
UROBILINOGEN, POC: NORMAL
VLDLC SERPL CALC-MCNC: 22.4 MG/DL

## 2023-05-05 PROCEDURE — 81003 URINALYSIS AUTO W/O SCOPE: CPT | Performed by: FAMILY MEDICINE

## 2023-05-05 PROCEDURE — 87086 URINE CULTURE/COLONY COUNT: CPT

## 2023-05-05 PROCEDURE — 87147 CULTURE TYPE IMMUNOLOGIC: CPT

## 2023-05-06 LAB
BACTERIA SPEC CULT: ABNORMAL
BACTERIA SPEC CULT: ABNORMAL
COLONY COUNT, CNT: ABNORMAL
Lab: ABNORMAL
Lab: NORMAL
T3 FREE: 2.9 PG/ML (ref 2–4.4)
T4 FREE SERPL-MCNC: 1.03 NG/DL (ref 0.82–1.77)
TSH SERPL DL<=0.05 MIU/L-ACNC: 0.32 UIU/ML (ref 0.45–4.5)

## 2023-05-08 ENCOUNTER — CARE COORDINATION (OUTPATIENT)
Facility: CLINIC | Age: 83
End: 2023-05-08

## 2023-05-08 NOTE — CARE COORDINATION
Patient attending appointment with PCP or Specialists at time of scheduled ACM follow up. Follow up call deferred at this time and ACM will contact Patient at the next scheduled encounter unless earlier encounter is necessary.

## 2023-05-09 RX ORDER — MELATONIN
1000 DAILY
Qty: 90 TABLET | Refills: 1 | Status: SHIPPED | OUTPATIENT
Start: 2023-05-09

## 2023-05-12 ENCOUNTER — OFFICE VISIT (OUTPATIENT)
Facility: CLINIC | Age: 83
End: 2023-05-12
Payer: MEDICARE

## 2023-05-12 VITALS
HEIGHT: 64 IN | DIASTOLIC BLOOD PRESSURE: 67 MMHG | OXYGEN SATURATION: 93 % | HEART RATE: 57 BPM | TEMPERATURE: 97 F | BODY MASS INDEX: 27.21 KG/M2 | SYSTOLIC BLOOD PRESSURE: 133 MMHG | WEIGHT: 159.4 LBS | RESPIRATION RATE: 22 BRPM

## 2023-05-12 DIAGNOSIS — N30.00 ACUTE CYSTITIS WITHOUT HEMATURIA: ICD-10-CM

## 2023-05-12 DIAGNOSIS — E27.8 ADRENAL INCIDENTALOMA (HCC): ICD-10-CM

## 2023-05-12 DIAGNOSIS — R10.13 DYSPEPSIA: Primary | ICD-10-CM

## 2023-05-12 DIAGNOSIS — K59.00 CONSTIPATION, UNSPECIFIED CONSTIPATION TYPE: ICD-10-CM

## 2023-05-12 DIAGNOSIS — I48.0 PAROXYSMAL ATRIAL FIBRILLATION (HCC): ICD-10-CM

## 2023-05-12 DIAGNOSIS — F41.9 ANXIETY: ICD-10-CM

## 2023-05-12 DIAGNOSIS — R79.89 LOW TSH LEVEL: ICD-10-CM

## 2023-05-12 PROCEDURE — 99214 OFFICE O/P EST MOD 30 MIN: CPT | Performed by: FAMILY MEDICINE

## 2023-05-12 PROCEDURE — 1036F TOBACCO NON-USER: CPT | Performed by: FAMILY MEDICINE

## 2023-05-12 PROCEDURE — 3078F DIAST BP <80 MM HG: CPT | Performed by: FAMILY MEDICINE

## 2023-05-12 PROCEDURE — G8419 CALC BMI OUT NRM PARAM NOF/U: HCPCS | Performed by: FAMILY MEDICINE

## 2023-05-12 PROCEDURE — G8400 PT W/DXA NO RESULTS DOC: HCPCS | Performed by: FAMILY MEDICINE

## 2023-05-12 PROCEDURE — G8427 DOCREV CUR MEDS BY ELIG CLIN: HCPCS | Performed by: FAMILY MEDICINE

## 2023-05-12 PROCEDURE — 3074F SYST BP LT 130 MM HG: CPT | Performed by: FAMILY MEDICINE

## 2023-05-12 PROCEDURE — 1090F PRES/ABSN URINE INCON ASSESS: CPT | Performed by: FAMILY MEDICINE

## 2023-05-12 PROCEDURE — 1123F ACP DISCUSS/DSCN MKR DOCD: CPT | Performed by: FAMILY MEDICINE

## 2023-05-12 RX ORDER — PANTOPRAZOLE SODIUM 20 MG/1
20 TABLET, DELAYED RELEASE ORAL
Qty: 90 TABLET | Refills: 0 | Status: SHIPPED | OUTPATIENT
Start: 2023-05-12

## 2023-05-12 RX ORDER — CLINDAMYCIN HYDROCHLORIDE 300 MG/1
300 CAPSULE ORAL 2 TIMES DAILY
Qty: 14 CAPSULE | Refills: 0 | Status: SHIPPED | OUTPATIENT
Start: 2023-05-12 | End: 2023-05-19

## 2023-05-12 RX ORDER — SENNA AND DOCUSATE SODIUM 50; 8.6 MG/1; MG/1
TABLET, FILM COATED ORAL
Qty: 45 TABLET | Refills: 0 | Status: SHIPPED | OUTPATIENT
Start: 2023-05-12

## 2023-05-12 RX ORDER — PAROXETINE 10 MG/1
10 TABLET, FILM COATED ORAL DAILY
Qty: 30 TABLET | Refills: 0 | Status: SHIPPED | OUTPATIENT
Start: 2023-05-12

## 2023-05-12 RX ORDER — FLUCONAZOLE 150 MG/1
150 TABLET ORAL ONCE
Qty: 1 TABLET | Refills: 0 | Status: SHIPPED | OUTPATIENT
Start: 2023-05-12 | End: 2023-05-12

## 2023-05-12 ASSESSMENT — PATIENT HEALTH QUESTIONNAIRE - PHQ9
2. FEELING DOWN, DEPRESSED OR HOPELESS: 2
1. LITTLE INTEREST OR PLEASURE IN DOING THINGS: 0
SUM OF ALL RESPONSES TO PHQ9 QUESTIONS 1 & 2: 2
SUM OF ALL RESPONSES TO PHQ QUESTIONS 1-9: 2

## 2023-05-12 NOTE — PROGRESS NOTES
Darrell Piña (: 1940) is a 78 y.o. female here for evaluation of the following chief concern(s):  Chronic condition management    ASSESSMENT/PLAN:  1. Dyspepsia  -     pantoprazole (PROTONIX) 20 MG tablet; Take 1 tablet by mouth every morning (before breakfast), Disp-90 tablet, R-0Normal  2. Constipation, unspecified constipation type  -     sennosides-docusate sodium (SENOKOT-S) 8.6-50 MG tablet; Take one pill every other night for constipation. Hold dose for loose stools. , Disp-45 tablet, R-0Normal  3. Acute cystitis without hematuria  -     fluconazole (DIFLUCAN) 150 MG tablet; Take 1 tablet by mouth once for 1 dose, Disp-1 tablet, R-0Normal  -     clindamycin (CLEOCIN) 300 MG capsule; Take 1 capsule by mouth 2 times daily for 7 days, Disp-14 capsule, R-0Normal  4. Anxiety  -     PARoxetine (PAXIL) 10 MG tablet; Take 1 tablet by mouth daily For nerves in place of Duloxetine., Disp-30 tablet, R-0Normal  -     External Referral To Endocrinology  5. Low TSH level  -     External Referral To Endocrinology  6. Paroxysmal atrial fibrillation [facility])  -     External Referral To Endocrinology  7. Adrenal incidentaloma [facility])  -     External Referral To Endocrinology    Ms. Mario Alberto Caruso appears medically stable, mild elevated blood pressure, otherwise normal hemodynamics. We reviewed her labs; pt will re-start vitamin D supplement. Pt opts to transition from Duloxetine to a trial of low dose Paroxetine for anxiety. Avoid benzodiazapine w/ recurrent falls, frailty. She agrees w/ referral to Endocrinology regarding her low TSH and symptoms of anxiety/tremor not responsive to Primidone and SSRI/SNRI therapy- possibly a symptom of hyperthyroid state. Treat for UTI w/ urinary symptoms, Ucx w. >100k strep, PCN allergy. Re-start PPI at lower dose for dyspepsia which may be contributing to chronic malaise. Bowel regimen for chronic constipation. Close follow-up.     No follow-ups on

## 2023-05-12 NOTE — PROGRESS NOTES
Patient stopped her Cymbalta. Complaining of shaking and felling \"really bad\" today. States it was not helping. Patient would like to know about her urine culture and labs. Chief Complaint   Patient presents with    Follow-up         1. \"Have you been to the ER, urgent care clinic since your last visit? Hospitalized since your last visit? \" No    2. \"Have you seen or consulted any other health care providers outside of the [address] [place] [address] since your last visit? \" No     3. For patients aged 39-70: Has the patient had a colonoscopy / FIT/ Cologuard? NA - based on age      If the patient is female:    4. For patients aged 41-77: Has the patient had a mammogram within the past 2 years? NA - based on age or sex      11. For patients aged 21-65: Has the patient had a pap smear?  NA - based on age or sex

## 2023-05-26 ENCOUNTER — CARE COORDINATION (OUTPATIENT)
Facility: CLINIC | Age: 83
End: 2023-05-26

## 2023-05-26 ASSESSMENT — PATIENT HEALTH QUESTIONNAIRE - PHQ9
SUM OF ALL RESPONSES TO PHQ QUESTIONS 1-9: 1

## 2023-05-26 NOTE — CARE COORDINATION
Ambulatory Care Coordination Note  2023    Patient Current Location:  [place]    ACM contacted the patient by telephone. Verified name and  with patient as identifiers. Provided introduction to self, and explanation of the ACM role. ACM: Rito Schaumann, RN    Challenges to be reviewed by the provider   Additional needs identified to be addressed with provider: No  none               Method of communication with provider: phone. Spoke with patient - Patient states doing well at present  No real change in Patient health status. No ER visit or IP admission since last encounter. Follow up appointments listed below and questions from Patient / Care Giver answered. Any changes in med therapy noted in med list - no complications or side effects   Instructed patient on proper use of inhaler and to rinse mouth after each use to avoid complications. Depression screen done - PHQ2 score = 1  Respiratory and cardiac status shows no issues at present   CHF Protocol:   Daily weights, BP checks, monitor edema in lower legs and SOB. Limit sodium intake, avoid foods high in sodium. Monitor fluid intake as per PCP directions. Notify PCP office for wt gain 3 lbs in 2 days or 5 lbs In 1 week      Offered patient enrollment in the Remote Patient Monitoring (RPM) program for in-home monitoring: NA. Ambulatory Care Coordination Assessment    Care Coordination Protocol  Referral from Primary Care Provider: No  Week 1 - Initial Assessment     Do you have all of your prescriptions and are they filled?: Yes  Barriers to medication adherence: None  Are you able to afford your medications?: Yes  How often do you have trouble taking your medications the way you have been told to take them?: I always take them as prescribed.            Current Housing: Private Residence                 Thinking about your patient's physical health needs, are there any symptoms or problems (risk indicators) you are unsure about that

## 2023-06-01 DIAGNOSIS — I10 ESSENTIAL HYPERTENSION: ICD-10-CM

## 2023-06-01 RX ORDER — LOSARTAN POTASSIUM 25 MG/1
25 TABLET ORAL 2 TIMES DAILY
Qty: 180 TABLET | Refills: 0 | Status: SHIPPED | OUTPATIENT
Start: 2023-06-01

## 2023-06-06 DIAGNOSIS — F41.9 ANXIETY: ICD-10-CM

## 2023-06-06 RX ORDER — PAROXETINE 10 MG/1
10 TABLET, FILM COATED ORAL DAILY
Qty: 90 TABLET | Refills: 0 | Status: SHIPPED | OUTPATIENT
Start: 2023-06-06

## 2023-06-09 ENCOUNTER — CARE COORDINATION (OUTPATIENT)
Facility: CLINIC | Age: 83
End: 2023-06-09

## 2023-06-09 ASSESSMENT — PATIENT HEALTH QUESTIONNAIRE - PHQ9
SUM OF ALL RESPONSES TO PHQ QUESTIONS 1-9: 0

## 2023-06-09 NOTE — CARE COORDINATION
Time Provider Kenyatta Pappas   6/21/2023  2:00 PM Rajat Teixeira MD [facility] BS AMB   8/15/2023  2:30 PM Niharika Sherman MD [facility] BS AMB

## 2023-06-21 ENCOUNTER — NURSE ONLY (OUTPATIENT)
Age: 83
End: 2023-06-21

## 2023-06-21 ENCOUNTER — OFFICE VISIT (OUTPATIENT)
Age: 83
End: 2023-06-21
Payer: MEDICARE

## 2023-06-21 VITALS
SYSTOLIC BLOOD PRESSURE: 106 MMHG | HEART RATE: 58 BPM | WEIGHT: 163 LBS | HEIGHT: 64 IN | BODY MASS INDEX: 27.83 KG/M2 | OXYGEN SATURATION: 96 % | DIASTOLIC BLOOD PRESSURE: 62 MMHG

## 2023-06-21 DIAGNOSIS — I48.0 PAROXYSMAL ATRIAL FIBRILLATION (HCC): ICD-10-CM

## 2023-06-21 DIAGNOSIS — Z95.818 STATUS POST PLACEMENT OF IMPLANTABLE LOOP RECORDER: Primary | ICD-10-CM

## 2023-06-21 DIAGNOSIS — Z95.818 PRESENCE OF OTHER CARDIAC IMPLANTS AND GRAFTS: Primary | ICD-10-CM

## 2023-06-21 DIAGNOSIS — I44.7 LEFT BUNDLE-BRANCH BLOCK, UNSPECIFIED: ICD-10-CM

## 2023-06-21 PROCEDURE — 1090F PRES/ABSN URINE INCON ASSESS: CPT | Performed by: INTERNAL MEDICINE

## 2023-06-21 PROCEDURE — 3074F SYST BP LT 130 MM HG: CPT | Performed by: INTERNAL MEDICINE

## 2023-06-21 PROCEDURE — 1036F TOBACCO NON-USER: CPT | Performed by: INTERNAL MEDICINE

## 2023-06-21 PROCEDURE — G8419 CALC BMI OUT NRM PARAM NOF/U: HCPCS | Performed by: INTERNAL MEDICINE

## 2023-06-21 PROCEDURE — 99214 OFFICE O/P EST MOD 30 MIN: CPT | Performed by: INTERNAL MEDICINE

## 2023-06-21 PROCEDURE — G8428 CUR MEDS NOT DOCUMENT: HCPCS | Performed by: INTERNAL MEDICINE

## 2023-06-21 PROCEDURE — 3078F DIAST BP <80 MM HG: CPT | Performed by: INTERNAL MEDICINE

## 2023-06-21 PROCEDURE — 1123F ACP DISCUSS/DSCN MKR DOCD: CPT | Performed by: INTERNAL MEDICINE

## 2023-06-21 PROCEDURE — G8400 PT W/DXA NO RESULTS DOC: HCPCS | Performed by: INTERNAL MEDICINE

## 2023-06-21 RX ORDER — ATORVASTATIN CALCIUM 40 MG/1
TABLET, FILM COATED ORAL
Qty: 90 TABLET | Refills: 3 | Status: SHIPPED | OUTPATIENT
Start: 2023-06-21

## 2023-06-21 NOTE — PROGRESS NOTES
HPI:  78-year-old female here for followup. She is accompanied by her family. Overall, she is doing well without any new falls or unsteadiness. She continues to use a cane or walker regularly without issues. No bleeding issues. Blood pressure is stable. Impression:  Subcutaneous loop recorder on 09/09/2022 without any events. History of frequent falls, mobility and balance issues, but currently stable. Remote episode of atrial fibrillation by report, but no documented recurrence. No aggressive anticoagulation at this point since there has been no recurrence and fall risk. Chronic left bundle branch block. History of mild bradycardia limiting medication. History of COPD. Remote stroke few years ago, on aspirin only due to fall risk. No documented atrial fibrillation. Chronic diastolic heart failure. An echocardiogram in January 2022 with normal function. Hypertension, stable. Dyslipidemia. Plan:  Her device does not show any pauses or events, specifically, no atrial fibrillation. She, however, has been doing quite well, managing her balance issues quite well. All questions were answered. I will see her back with the device check in six months. Wt Readings from Last 3 Encounters:   06/21/23 163 lb (73.9 kg)   05/12/23 159 lb 6.4 oz (72.3 kg)   05/04/23 159 lb 12.8 oz (72.5 kg)     Past Medical History:   Diagnosis Date    Allergic rhinitis     Anemia     Anxiety disorder     Chronic obstructive pulmonary disease (HCC)     [facility]16[facility]2022    Dyslipidemia     Folic acid deficiency     Hypertension     Neurologic disorder     resting tremor       Current Outpatient Medications   Medication Sig Dispense Refill    atorvastatin (LIPITOR) 40 MG tablet TAKE 1 TABLET BY MOUTH NIGHTLY AT BEDTIME 90 tablet 3    PARoxetine (PAXIL) 10 MG tablet TAKE 1 TABLET BY MOUTH DAILY FOR NERVES IN PLACE OF DULOXETINE.  90 tablet 0    losartan (COZAAR) 25 MG tablet Take 1 tablet

## 2023-06-23 ENCOUNTER — CARE COORDINATION (OUTPATIENT)
Facility: CLINIC | Age: 83
End: 2023-06-23

## 2023-06-30 ENCOUNTER — CARE COORDINATION (OUTPATIENT)
Facility: CLINIC | Age: 83
End: 2023-06-30

## 2023-06-30 ASSESSMENT — PATIENT HEALTH QUESTIONNAIRE - PHQ9
SUM OF ALL RESPONSES TO PHQ QUESTIONS 1-9: 0

## 2023-08-07 DIAGNOSIS — R10.13 DYSPEPSIA: ICD-10-CM

## 2023-08-08 RX ORDER — PANTOPRAZOLE SODIUM 20 MG/1
20 TABLET, DELAYED RELEASE ORAL
Qty: 90 TABLET | Refills: 0 | Status: SHIPPED | OUTPATIENT
Start: 2023-08-08

## 2023-08-17 ENCOUNTER — OFFICE VISIT (OUTPATIENT)
Facility: CLINIC | Age: 83
End: 2023-08-17
Payer: MEDICARE

## 2023-08-17 VITALS
DIASTOLIC BLOOD PRESSURE: 66 MMHG | BODY MASS INDEX: 27.42 KG/M2 | HEIGHT: 64 IN | OXYGEN SATURATION: 95 % | WEIGHT: 160.6 LBS | TEMPERATURE: 96.8 F | SYSTOLIC BLOOD PRESSURE: 137 MMHG | HEART RATE: 60 BPM | RESPIRATION RATE: 20 BRPM

## 2023-08-17 DIAGNOSIS — N90.89 LESION OF LABIA: Primary | ICD-10-CM

## 2023-08-17 DIAGNOSIS — Z22.39 CARRIER OF UREAPLASMA UREALYTICUM: ICD-10-CM

## 2023-08-17 DIAGNOSIS — B37.31 YEAST INFECTION INVOLVING THE VAGINA AND SURROUNDING AREA: ICD-10-CM

## 2023-08-17 DIAGNOSIS — R30.9 PAINFUL URINATION: ICD-10-CM

## 2023-08-17 LAB
BILIRUBIN, URINE, POC: NEGATIVE
BLOOD URINE, POC: NEGATIVE
GLUCOSE URINE, POC: NEGATIVE
KETONES, URINE, POC: NEGATIVE
LEUKOCYTE ESTERASE, URINE, POC: NORMAL
NITRITE, URINE, POC: POSITIVE
PH, URINE, POC: 6 (ref 4.6–8)
PROTEIN,URINE, POC: NEGATIVE
SPECIFIC GRAVITY, URINE, POC: 1.02 (ref 1–1.03)
URINALYSIS CLARITY, POC: CLEAR
URINALYSIS COLOR, POC: YELLOW
UROBILINOGEN, POC: NORMAL

## 2023-08-17 PROCEDURE — 99214 OFFICE O/P EST MOD 30 MIN: CPT | Performed by: FAMILY MEDICINE

## 2023-08-17 PROCEDURE — 1036F TOBACCO NON-USER: CPT | Performed by: FAMILY MEDICINE

## 2023-08-17 PROCEDURE — 3075F SYST BP GE 130 - 139MM HG: CPT | Performed by: FAMILY MEDICINE

## 2023-08-17 PROCEDURE — 1123F ACP DISCUSS/DSCN MKR DOCD: CPT | Performed by: FAMILY MEDICINE

## 2023-08-17 PROCEDURE — 3078F DIAST BP <80 MM HG: CPT | Performed by: FAMILY MEDICINE

## 2023-08-17 PROCEDURE — G8419 CALC BMI OUT NRM PARAM NOF/U: HCPCS | Performed by: FAMILY MEDICINE

## 2023-08-17 PROCEDURE — 1090F PRES/ABSN URINE INCON ASSESS: CPT | Performed by: FAMILY MEDICINE

## 2023-08-17 PROCEDURE — 81003 URINALYSIS AUTO W/O SCOPE: CPT | Performed by: FAMILY MEDICINE

## 2023-08-17 PROCEDURE — G8427 DOCREV CUR MEDS BY ELIG CLIN: HCPCS | Performed by: FAMILY MEDICINE

## 2023-08-17 PROCEDURE — G8400 PT W/DXA NO RESULTS DOC: HCPCS | Performed by: FAMILY MEDICINE

## 2023-08-17 RX ORDER — FLUCONAZOLE 150 MG/1
TABLET ORAL
Qty: 2 TABLET | Refills: 0 | Status: SHIPPED | OUTPATIENT
Start: 2023-08-17

## 2023-08-17 RX ORDER — B-SURE TRAVELER 0.5 G/G
SOLUTION RECTAL; TOPICAL PRN
Qty: 1 EACH | Refills: 0 | Status: SHIPPED | OUTPATIENT
Start: 2023-08-17

## 2023-08-17 RX ORDER — DOXYCYCLINE HYCLATE 100 MG/1
100 CAPSULE ORAL 2 TIMES DAILY
Qty: 20 CAPSULE | Refills: 0 | Status: SHIPPED | OUTPATIENT
Start: 2023-08-17 | End: 2023-08-27

## 2023-08-17 RX ORDER — CLOTRIMAZOLE AND BETAMETHASONE DIPROPIONATE 10; .64 MG/G; MG/G
CREAM TOPICAL
Qty: 45 G | Refills: 0 | Status: SHIPPED | OUTPATIENT
Start: 2023-08-17

## 2023-08-17 ASSESSMENT — PATIENT HEALTH QUESTIONNAIRE - PHQ9
SUM OF ALL RESPONSES TO PHQ QUESTIONS 1-9: 1
SUM OF ALL RESPONSES TO PHQ QUESTIONS 1-9: 1
1. LITTLE INTEREST OR PLEASURE IN DOING THINGS: 0
SUM OF ALL RESPONSES TO PHQ9 QUESTIONS 1 & 2: 1
2. FEELING DOWN, DEPRESSED OR HOPELESS: 1
SUM OF ALL RESPONSES TO PHQ QUESTIONS 1-9: 1
SUM OF ALL RESPONSES TO PHQ QUESTIONS 1-9: 1

## 2023-08-17 NOTE — PROGRESS NOTES
Complaining of decreased amount of urine with voiding. Complaining of irritable rash to perineal area that comes and goes for months. States she is having incontinence of urine also. Patient request urine be sent to [address] [place] [address].    Chief Complaint   Patient presents with    Follow-up     3 month     1. \"Have you been to the ER, urgent care clinic since your last visit? Hospitalized since your last visit? \" No    2. \"Have you seen or consulted any other health care providers outside of the [address] [place] [address] since your last visit? \" No     3. For patients aged 43-73: Has the patient had a colonoscopy / FIT/ Cologuard? NA - based on age      If the patient is female:    4. For patients aged 43-66: Has the patient had a mammogram within the past 2 years? NA - based on age or sex      11. For patients aged 21-65: Has the patient had a pap smear?  NA - based on age or sex

## 2023-08-17 NOTE — PATIENT INSTRUCTIONS
Ms. Townsend Cassette,  It was very nice to see you today! I am sorry to hear you are having more trouble with this painful rash- I will refer you to a GYN specialist to help get a definitive diagnosis and best treatment. Based on past results, you may have recurrent yeast infection and recurrent ureaplasma infection, so I will treat you with the medications that worked in the past.    Tonight, please start the clotrimazole-betamethasone cream, and take one of the Fluconazole pills. You can also use the witch hazel \"Tucks\" pads and Dermoplast spray for comfort. Tomorrow, you can start the Doxycycline. It would be best if you can also take an over the counter \"probiotic\" which on this antibiotic. After you complete the Doxycycline antibiotic, you can take the second Fluconazole pill. Please let me know if any of the prescription medications are not affordable. The over-the-counter meds may not be covered well.        Lets plan to follow-up in 1-3 months,  Dr. Stephon Lovell

## 2023-08-17 NOTE — PROGRESS NOTES
Cristhian Ferguson (: 1940) is a 78 y.o. female here for evaluation of the following chief concern(s):  Acute on chronic condition management     ASSESSMENT/PLAN:  1. Lesion of labia  -     doxycycline hyclate (VIBRAMYCIN) 100 MG capsule; Take 1 capsule by mouth 2 times daily for 10 days, Disp-20 capsule, R-0Normal  -     fluconazole (DIFLUCAN) 150 MG tablet; Take one pill tonight, and the second pill the day after you complete your antibiotic., Disp-2 tablet, R-0Normal  -     clotrimazole-betamethasone (LOTRISONE) 1-0.05 % cream; Apply topically 2 times daily. , Disp-45 g, R-0, Normal  -     B-Sure witch hazel (TUCKS) 50 % PADS; Apply topically as needed for Irritation or Pain, Topical, PRN Starting Thu 2023, Disp-1 each, R-0, Normal  -     benzocaine-menthol (DERMOPLAST) 20-0.5 % AERO spray; Apply topically as needed for Pain, Topical, PRN Starting Thu 2023, Disp-56 g, R-0, Normal  -     External Referral To Ob-Gyn  2. Yeast infection involving the vagina and surrounding area  -     doxycycline hyclate (VIBRAMYCIN) 100 MG capsule; Take 1 capsule by mouth 2 times daily for 10 days, Disp-20 capsule, R-0Normal  -     fluconazole (DIFLUCAN) 150 MG tablet; Take one pill tonight, and the second pill the day after you complete your antibiotic., Disp-2 tablet, R-0Normal  -     clotrimazole-betamethasone (LOTRISONE) 1-0.05 % cream; Apply topically 2 times daily. 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

## 2023-09-05 DIAGNOSIS — I10 ESSENTIAL HYPERTENSION: ICD-10-CM

## 2023-09-05 RX ORDER — LOSARTAN POTASSIUM 25 MG/1
25 TABLET ORAL 2 TIMES DAILY
Qty: 180 TABLET | Refills: 0 | Status: SHIPPED | OUTPATIENT
Start: 2023-09-05

## 2023-09-09 DIAGNOSIS — F41.9 ANXIETY: ICD-10-CM

## 2023-09-12 RX ORDER — PAROXETINE 10 MG/1
10 TABLET, FILM COATED ORAL DAILY
Qty: 90 TABLET | Refills: 0 | Status: SHIPPED | OUTPATIENT
Start: 2023-09-12

## 2023-11-06 DIAGNOSIS — E55.9 VITAMIN D DEFICIENCY: ICD-10-CM

## 2023-11-06 DIAGNOSIS — R10.13 DYSPEPSIA: ICD-10-CM

## 2023-11-07 RX ORDER — PANTOPRAZOLE SODIUM 20 MG/1
20 TABLET, DELAYED RELEASE ORAL
Qty: 90 TABLET | Refills: 0 | Status: SHIPPED | OUTPATIENT
Start: 2023-11-07

## 2023-11-07 RX ORDER — MELATONIN
1000 DAILY
Qty: 90 TABLET | Refills: 0 | Status: SHIPPED | OUTPATIENT
Start: 2023-11-07

## 2023-12-01 ENCOUNTER — OFFICE VISIT (OUTPATIENT)
Facility: CLINIC | Age: 83
End: 2023-12-01

## 2023-12-01 VITALS
TEMPERATURE: 97.6 F | HEIGHT: 64 IN | OXYGEN SATURATION: 95 % | DIASTOLIC BLOOD PRESSURE: 67 MMHG | SYSTOLIC BLOOD PRESSURE: 104 MMHG | BODY MASS INDEX: 28 KG/M2 | RESPIRATION RATE: 22 BRPM | WEIGHT: 164 LBS | HEART RATE: 65 BPM

## 2023-12-01 DIAGNOSIS — R25.1 TREMOR: ICD-10-CM

## 2023-12-01 DIAGNOSIS — E78.5 HYPERLIPIDEMIA, UNSPECIFIED HYPERLIPIDEMIA TYPE: ICD-10-CM

## 2023-12-01 DIAGNOSIS — Z23 NEED FOR PNEUMOCOCCAL VACCINE: ICD-10-CM

## 2023-12-01 DIAGNOSIS — N90.89 LESION OF LABIA: ICD-10-CM

## 2023-12-01 DIAGNOSIS — R79.89 LOW TSH LEVEL: ICD-10-CM

## 2023-12-01 DIAGNOSIS — I10 ESSENTIAL HYPERTENSION: Primary | ICD-10-CM

## 2023-12-01 DIAGNOSIS — Z23 FLU VACCINE NEED: ICD-10-CM

## 2023-12-01 DIAGNOSIS — F41.9 ANXIETY: ICD-10-CM

## 2023-12-01 DIAGNOSIS — E55.9 VITAMIN D DEFICIENCY: ICD-10-CM

## 2023-12-01 RX ORDER — LOSARTAN POTASSIUM 25 MG/1
25 TABLET ORAL 2 TIMES DAILY
Qty: 180 TABLET | Refills: 0 | Status: SHIPPED | OUTPATIENT
Start: 2023-12-01

## 2023-12-01 RX ORDER — PAROXETINE 10 MG/1
15 TABLET, FILM COATED ORAL DAILY
Qty: 135 TABLET | Refills: 0 | Status: SHIPPED | OUTPATIENT
Start: 2023-12-01

## 2023-12-01 ASSESSMENT — PATIENT HEALTH QUESTIONNAIRE - PHQ9
SUM OF ALL RESPONSES TO PHQ9 QUESTIONS 1 & 2: 2
1. LITTLE INTEREST OR PLEASURE IN DOING THINGS: 1
SUM OF ALL RESPONSES TO PHQ QUESTIONS 1-9: 2
2. FEELING DOWN, DEPRESSED OR HOPELESS: 1
SUM OF ALL RESPONSES TO PHQ QUESTIONS 1-9: 2

## 2023-12-01 NOTE — PATIENT INSTRUCTIONS
Patient Education        pneumococcal 20-valent conjugate vaccine  Pronunciation:  JOSHUA flores ELY al 20-VAY lent SOULEYMANE huston VAX een  Brand:  Prevnar 20  What is the most important information I should know about pneumococcal 20-valent conjugate vaccine? You should not receive this vaccine if you ever had a severe allergic reaction to a pneumococcal or diphtheria toxoid vaccine. What is pneumococcal 20-valent conjugate vaccine? Pneumococcal disease is a serious infection caused by a bacteria that can infect the sinuses, inner ear, lungs, blood, and brain. These conditions can be fatal.  Pneumococcal 20-valent conjugate vaccine is used in adults to help prevent disease caused by pneumococcal bacteria. This vaccine contains 20 different types of pneumococcal bacteria. This vaccine helps your body develop immunity to the disease, but will not treat an active infection you already have. Like any vaccine, pneumococcal 20-valent conjugate vaccine may not provide protection from disease in every person. What should I discuss with my healthcare provider before taking pneumococcal 20-valent conjugate vaccine? You should not receive this vaccine if you ever had a severe allergic reaction to a pneumococcal or diphtheria toxoid vaccine. Tell the vaccination provider if you have:  a weak immune system (caused by disease or by using certain medicine); or  if you are receiving radiation or chemotherapy. You can still receive a vaccine if you have a minor cold. In the case of a more severe illness with a fever or any type of infection, wait until you get better before receiving this vaccine. Tell the vaccination provider if you are pregnant or breastfeeding. How should I take pneumococcal 20-valent conjugate vaccine? This vaccine is given as an injection (shot) into a muscle. Pneumococcal 20-valent conjugate vaccine is usually given as 1 shot. What happens if I miss a dose?   Pneumococcal 20-valent conjugate vaccine is

## 2023-12-01 NOTE — PROGRESS NOTES
Patient has been having some dizziness. Chief Complaint   Patient presents with    Follow-up     chronic   Roque Muhammad is a 78 y.o. female who presents for routine immunizations. She denies any symptoms , reactions or allergies that would exclude them from being immunized today. Risks and adverse reactions were discussed and the VIS was given to them. All questions were addressed. She was observed for 15 min post injection. There were no reactions observed. Matthew Porter LPN   1. \"Have you been to the ER, urgent care clinic since your last visit? Hospitalized since your last visit? \" No    2. \"Have you seen or consulted any other health care providers outside of the [address] [place] [address] since your last visit? \" No     3. For patients aged 43-73: Has the patient had a colonoscopy / FIT/ Cologuard? NA - based on age      If the patient is female:    4. For patients aged 43-66: Has the patient had a mammogram within the past 2 years? NA - based on age or sex      11. For patients aged 21-65: Has the patient had a pap smear?  NA - based on age or sex

## 2023-12-01 NOTE — PROGRESS NOTES
Dieter Blair (: 1940) is a 78 y.o. female here for evaluation of the following chief concern(s):  Chronic condition management     ASSESSMENT/PLAN:  1. Essential hypertension  -     losartan (COZAAR) 25 MG tablet; Take 1 tablet by mouth 2 times daily, Disp-180 tablet, R-0Normal  -     CBC with Auto Differential; Future  -     Comprehensive Metabolic Panel; Future  -     Thyroid Cascade Profile; Future  2. Anxiety  -     PARoxetine (PAXIL) 10 MG tablet; Take 1.5 tablets by mouth daily Dose increase for nerves. , Disp-135 tablet, R-0Normal  3. Lesion of labia  4. Tremor  -     Mukesh Camarena MD, Neurology, [place]  5. Low TSH level  -     Thyroid [place] Profile; Future  6. Vitamin D deficiency  -     Vitamin D 25 Hydroxy; Future  7. Hyperlipidemia, unspecified hyperlipidemia type  -     Lipid Panel; Future  8. Need for pneumococcal vaccine  -     Pneumococcal, PCV20, PREVNAR 20, (age 6w+), IM, PF  9. Flu vaccine need  -     Influenza, FLUAD, (age 72 y+), IM, Preservative Free, 0.5 mL    Ms. Carol Nye appears medically stable, she sounds to be in NSR, normal to lower-range blood pressure- asymptomatic. Noted upcoming visit w/ Cardiology; appreciate shared care. Visit update provided to pt's daughter at her request;  Follow ambulatory BP reads- and for persistent low range results will plan to de-escalate antihypertensive regimen. Continue losartan 25mg BID for the time being given it took some time to obtain good blood pressure control. MARTHA to obtain note from GYN Dr. Bello Marley to see dx from perineal biopsy- per pt benign process. If there is al alternative for topical recommended I may be able to send something in that is more cost effective. Pt requested up-titration to Paroxetine- change from 10mg to 15mg; I advised pt this is indicated for mood/anxiousness and would not be expect to see improvement in her tremor- which it sounds like she attributes to \"nerves\".       Refer to

## 2023-12-06 ENCOUNTER — OFFICE VISIT (OUTPATIENT)
Age: 83
End: 2023-12-06
Payer: MEDICARE

## 2023-12-06 ENCOUNTER — NURSE ONLY (OUTPATIENT)
Age: 83
End: 2023-12-06

## 2023-12-06 VITALS
SYSTOLIC BLOOD PRESSURE: 128 MMHG | OXYGEN SATURATION: 99 % | HEIGHT: 64 IN | BODY MASS INDEX: 27.83 KG/M2 | WEIGHT: 163 LBS | HEART RATE: 56 BPM | DIASTOLIC BLOOD PRESSURE: 66 MMHG

## 2023-12-06 DIAGNOSIS — R55 SYNCOPE AND COLLAPSE: ICD-10-CM

## 2023-12-06 DIAGNOSIS — Z95.818 STATUS POST PLACEMENT OF IMPLANTABLE LOOP RECORDER: Primary | ICD-10-CM

## 2023-12-06 DIAGNOSIS — I10 PRIMARY HYPERTENSION: ICD-10-CM

## 2023-12-06 PROCEDURE — G8419 CALC BMI OUT NRM PARAM NOF/U: HCPCS | Performed by: INTERNAL MEDICINE

## 2023-12-06 PROCEDURE — 1090F PRES/ABSN URINE INCON ASSESS: CPT | Performed by: INTERNAL MEDICINE

## 2023-12-06 PROCEDURE — 3074F SYST BP LT 130 MM HG: CPT | Performed by: INTERNAL MEDICINE

## 2023-12-06 PROCEDURE — G8400 PT W/DXA NO RESULTS DOC: HCPCS | Performed by: INTERNAL MEDICINE

## 2023-12-06 PROCEDURE — 1036F TOBACCO NON-USER: CPT | Performed by: INTERNAL MEDICINE

## 2023-12-06 PROCEDURE — 3078F DIAST BP <80 MM HG: CPT | Performed by: INTERNAL MEDICINE

## 2023-12-06 PROCEDURE — G8428 CUR MEDS NOT DOCUMENT: HCPCS | Performed by: INTERNAL MEDICINE

## 2023-12-06 PROCEDURE — 1123F ACP DISCUSS/DSCN MKR DOCD: CPT | Performed by: INTERNAL MEDICINE

## 2023-12-06 PROCEDURE — G8484 FLU IMMUNIZE NO ADMIN: HCPCS | Performed by: INTERNAL MEDICINE

## 2023-12-06 PROCEDURE — 99214 OFFICE O/P EST MOD 30 MIN: CPT | Performed by: INTERNAL MEDICINE

## 2023-12-06 NOTE — PROGRESS NOTES
History of Present Illness:  78 YOF here for follow up. She is accompanied by family. Overall she is doing well. She uses a cane for short distances and walker for longer distances. She takes extra caution to avoid falls and is unsteady at times. No new chest pain, dyspnea, PND, orthopnea or edema. No palpitations. Impression:  Subcutaneous loop recorder September 2022 without any events. History of frequent falls and mobility issues and balance issues, stable using a cane. Remote episode of atrial fibrillation by report, but not documented. None seen on event monitor and no aggressive anticoagulation at this point since there has been no recurrence. Chronic LBBB. History of mild bradycardia, limiting medication. History of COPD. Remote stroke, on aspirin without documented atrial fibrillation. Chronic diastolic heart failure with echo January 2022 with normal function. Hypertension. Dyslipidemia. Plan:  Her device does not show any pauses or sustained events. She has been doing well, being extra careful not to fall. Blood pressure is stable, currently on Losartan. She is on a statin for dyslipidemia. All questions answered and I will see back in six months with the device. Wt Readings from Last 3 Encounters:   12/06/23 73.9 kg (163 lb)   12/01/23 74.4 kg (164 lb)   08/17/23 72.8 kg (160 lb 9.6 oz)     Past Medical History:   Diagnosis Date    Allergic rhinitis     Anemia     Anxiety disorder     Chronic obstructive pulmonary disease (HCC)     CVA (cerebral vascular accident) ([facility]) 1/16/2022    Dyslipidemia     Folic acid deficiency     Hypertension     Neurologic disorder     resting tremor       Current Outpatient Medications   Medication Sig Dispense Refill    losartan (COZAAR) 25 MG tablet Take 1 tablet by mouth 2 times daily 180 tablet 0    PARoxetine (PAXIL) 10 MG tablet Take 1.5 tablets by mouth daily Dose increase for nerves.  135 tablet 0    pantoprazole (PROTONIX) 20 MG

## 2023-12-29 DIAGNOSIS — F41.9 ANXIETY: ICD-10-CM

## 2023-12-29 RX ORDER — PAROXETINE 10 MG/1
TABLET, FILM COATED ORAL
Qty: 90 TABLET | Refills: 0 | Status: SHIPPED | OUTPATIENT
Start: 2023-12-29

## 2024-02-03 DIAGNOSIS — R10.13 DYSPEPSIA: ICD-10-CM

## 2024-02-03 DIAGNOSIS — E55.9 VITAMIN D DEFICIENCY: ICD-10-CM

## 2024-02-05 RX ORDER — PANTOPRAZOLE SODIUM 20 MG/1
20 TABLET, DELAYED RELEASE ORAL
Qty: 90 TABLET | Refills: 0 | Status: SHIPPED | OUTPATIENT
Start: 2024-02-05

## 2024-02-13 ENCOUNTER — OFFICE VISIT (OUTPATIENT)
Facility: CLINIC | Age: 84
End: 2024-02-13

## 2024-02-13 VITALS
BODY MASS INDEX: 28.2 KG/M2 | HEART RATE: 57 BPM | TEMPERATURE: 98.2 F | OXYGEN SATURATION: 96 % | HEIGHT: 64 IN | WEIGHT: 165.2 LBS | RESPIRATION RATE: 20 BRPM | SYSTOLIC BLOOD PRESSURE: 139 MMHG | DIASTOLIC BLOOD PRESSURE: 57 MMHG

## 2024-02-13 DIAGNOSIS — N90.89 LESION OF LABIA: ICD-10-CM

## 2024-02-13 DIAGNOSIS — L40.9 PSORIASIS: Primary | ICD-10-CM

## 2024-02-13 DIAGNOSIS — R39.9 LOWER URINARY TRACT SYMPTOMS (LUTS): ICD-10-CM

## 2024-02-13 DIAGNOSIS — I48.0 PAROXYSMAL ATRIAL FIBRILLATION (HCC): ICD-10-CM

## 2024-02-13 DIAGNOSIS — E27.8 ADRENAL INCIDENTALOMA (HCC): ICD-10-CM

## 2024-02-13 DIAGNOSIS — J44.9 CHRONIC OBSTRUCTIVE PULMONARY DISEASE, UNSPECIFIED COPD TYPE (HCC): ICD-10-CM

## 2024-02-13 DIAGNOSIS — Z22.39 CARRIER OF UREAPLASMA UREALYTICUM: ICD-10-CM

## 2024-02-13 NOTE — PROGRESS NOTES
Selena Blankenship (: 1940) is a 83 y.o. female here for evaluation of the following chief concern(s):  Chronic condition management     ASSESSMENT/PLAN:  1. Psoriasis  -     External Referral To Dermatology  -     External Referral To Urogynecology  2. Lesion of labia  -     External Referral To Urogynecology  3. Lower urinary tract symptoms (LUTS)  -     External Referral To Urogynecology  -     AMB POC URINALYSIS DIP STICK AUTO W/O MICRO  4. Paroxysmal atrial fibrillation (HCC)  5. Chronic obstructive pulmonary disease, unspecified COPD type (HCC)  6. Adrenal incidentaloma (HCC)  Comments:  2021 CT abd: ADRENALS: Stable right thickening and left adrenal gland nodule.    Ms. Blankenship appears medically stable, she sounds to be in NSR.  Mildly elevated blood pressure.  Noted upcoming visit w/ Cardiology; appreciate shared care.      Refer to Derm and UroGYN to further eval/treat labial psoriasis, chronic LUTS- UA brought back to office day after visit, not suggestive of UTI, defer culture at this time.    Refill called to pharmacy for Betamethasone script per scanned note from Dr. Espinosa- at pt request.     Stable respiratory status.      We discussed previous refer to Neurology for further evaluation of her tremor- presumed essential, hx referred to movement specialist Dr. Barbara Klein, though she is no longer taking new pts.  I would defer use of betablocker to Cardiology, hx did not have improved symptoms on Primidone.  ?benefit of Ropinirole.  Pt/mPOA can let us know if an updated referral order is needed.      Noted low range TSH though hx individual thyroid hormones are WNL; we re-discussed may benefit from Endocrinology consultation.      Visit summary reviewed w/ pt's mPOA/daughter.    Return in about 3 months (around 2024) for follow-up w/ a partner for chronic condition management.  We reviewed plans for ongoing coverage for our office during my upcoming maternity leave starting ~2024.

## 2024-02-13 NOTE — PROGRESS NOTES
Continues to have irritation in her vaginal area, rash. Patient saw GYN and was told psoriasis and ordered some powder.     Chief Complaint   Patient presents with    Follow-up     Chronic disease       \"Have you been to the ER, urgent care clinic since your last visit?  Hospitalized since your last visit?\"    NO    “Have you seen or consulted any other health care providers outside of [facility] since your last visit?”    NO          Verbal order from Joselyn Rhodes MD to order labs/sign and draw them in office    Labs were drawn and sent to [facility] by Juju Resendiz LPN:    The following tubes were sent:    1 Lavendar, 0 Red, 2 SST, 0 Urine    Draw site right antecubital.  Patient tolerated draw with no distress.

## 2024-02-14 LAB
25(OH)D3+25(OH)D2 SERPL-MCNC: 24.4 NG/ML (ref 30–100)
ALBUMIN SERPL-MCNC: 4.4 G/DL (ref 3.7–4.7)
ALBUMIN/GLOB SERPL: 1.8 {RATIO} (ref 1.2–2.2)
ALP SERPL-CCNC: 72 IU/L (ref 44–121)
ALT SERPL-CCNC: 11 IU/L (ref 0–32)
AST SERPL-CCNC: 21 IU/L (ref 0–40)
BASOPHILS # BLD AUTO: 0.1 X10E3/UL (ref 0–0.2)
BASOPHILS NFR BLD AUTO: 1 %
BILIRUB SERPL-MCNC: 0.3 MG/DL (ref 0–1.2)
BILIRUBIN, URINE, POC: NEGATIVE
BLOOD URINE, POC: NEGATIVE
BUN SERPL-MCNC: 23 MG/DL (ref 8–27)
BUN/CREAT SERPL: 19 (ref 12–28)
CALCIUM SERPL-MCNC: 9.7 MG/DL (ref 8.7–10.3)
CHLORIDE SERPL-SCNC: 104 MMOL/L (ref 96–106)
CHOLEST SERPL-MCNC: 135 MG/DL (ref 100–199)
CO2 SERPL-SCNC: 20 MMOL/L (ref 20–29)
CREAT SERPL-MCNC: 1.22 MG/DL (ref 0.57–1)
EGFRCR SERPLBLD CKD-EPI 2021: 43 ML/MIN/1.73
EOSINOPHIL # BLD AUTO: 0 X10E3/UL (ref 0–0.4)
EOSINOPHIL NFR BLD AUTO: 1 %
ERYTHROCYTE [DISTWIDTH] IN BLOOD BY AUTOMATED COUNT: 12.7 % (ref 11.7–15.4)
GLOBULIN SER CALC-MCNC: 2.5 G/DL (ref 1.5–4.5)
GLUCOSE SERPL-MCNC: 88 MG/DL (ref 70–99)
GLUCOSE URINE, POC: NEGATIVE
HCT VFR BLD AUTO: 40.5 % (ref 34–46.6)
HDLC SERPL-MCNC: 46 MG/DL
HGB BLD-MCNC: 13.2 G/DL (ref 11.1–15.9)
IMM GRANULOCYTES # BLD AUTO: 0 X10E3/UL (ref 0–0.1)
IMM GRANULOCYTES NFR BLD AUTO: 0 %
KETONES, URINE, POC: NEGATIVE
LDLC SERPL CALC-MCNC: 58 MG/DL (ref 0–99)
LEUKOCYTE ESTERASE, URINE, POC: NORMAL
LYMPHOCYTES # BLD AUTO: 0.7 X10E3/UL (ref 0.7–3.1)
LYMPHOCYTES NFR BLD AUTO: 14 %
MCH RBC QN AUTO: 30 PG (ref 26.6–33)
MCHC RBC AUTO-ENTMCNC: 32.6 G/DL (ref 31.5–35.7)
MCV RBC AUTO: 92 FL (ref 79–97)
MONOCYTES # BLD AUTO: 0.5 X10E3/UL (ref 0.1–0.9)
MONOCYTES NFR BLD AUTO: 9 %
NEUTROPHILS # BLD AUTO: 4 X10E3/UL (ref 1.4–7)
NEUTROPHILS NFR BLD AUTO: 75 %
NITRITE, URINE, POC: NEGATIVE
PH, URINE, POC: 6 (ref 4.6–8)
PLATELET # BLD AUTO: 173 X10E3/UL (ref 150–450)
POTASSIUM SERPL-SCNC: 4.7 MMOL/L (ref 3.5–5.2)
PROT SERPL-MCNC: 6.9 G/DL (ref 6–8.5)
PROTEIN,URINE, POC: NEGATIVE
RBC # BLD AUTO: 4.4 X10E6/UL (ref 3.77–5.28)
SODIUM SERPL-SCNC: 141 MMOL/L (ref 134–144)
SPECIFIC GRAVITY, URINE, POC: 1.02 (ref 1–1.03)
SPECIMEN STATUS REPORT: NORMAL
TRIGL SERPL-MCNC: 188 MG/DL (ref 0–149)
TSH SERPL DL<=0.005 MIU/L-ACNC: 0.56 UIU/ML (ref 0.45–4.5)
URINALYSIS CLARITY, POC: CLEAR
URINALYSIS COLOR, POC: YELLOW
UROBILINOGEN, POC: NORMAL
VLDLC SERPL CALC-MCNC: 31 MG/DL (ref 5–40)
WBC # BLD AUTO: 5.3 X10E3/UL (ref 3.4–10.8)

## 2024-03-04 DIAGNOSIS — I10 ESSENTIAL HYPERTENSION: ICD-10-CM

## 2024-03-04 RX ORDER — LOSARTAN POTASSIUM 25 MG/1
25 TABLET ORAL 2 TIMES DAILY
Qty: 180 TABLET | Refills: 0 | Status: SHIPPED | OUTPATIENT
Start: 2024-03-04

## 2024-04-26 DIAGNOSIS — J30.89 SEASONAL ALLERGIC RHINITIS DUE TO OTHER ALLERGIC TRIGGER: Primary | ICD-10-CM

## 2024-04-26 RX ORDER — FLUTICASONE PROPIONATE 50 MCG
2 SPRAY, SUSPENSION (ML) NASAL DAILY
Qty: 16 G | Refills: 0 | Status: SHIPPED | OUTPATIENT
Start: 2024-04-26

## 2024-05-16 DIAGNOSIS — E55.9 VITAMIN D DEFICIENCY: ICD-10-CM

## 2024-05-16 DIAGNOSIS — I10 ESSENTIAL HYPERTENSION: ICD-10-CM

## 2024-05-16 DIAGNOSIS — R10.13 DYSPEPSIA: ICD-10-CM

## 2024-05-16 DIAGNOSIS — F41.9 ANXIETY: ICD-10-CM

## 2024-05-16 RX ORDER — PANTOPRAZOLE SODIUM 20 MG/1
20 TABLET, DELAYED RELEASE ORAL
Qty: 90 TABLET | Refills: 0 | Status: SHIPPED | OUTPATIENT
Start: 2024-05-16

## 2024-05-31 DIAGNOSIS — I10 ESSENTIAL HYPERTENSION: ICD-10-CM

## 2024-05-31 RX ORDER — LOSARTAN POTASSIUM 25 MG/1
25 TABLET ORAL 2 TIMES DAILY
Qty: 180 TABLET | Refills: 0 | Status: SHIPPED | OUTPATIENT
Start: 2024-05-31

## 2024-06-12 ENCOUNTER — NURSE ONLY (OUTPATIENT)
Age: 84
End: 2024-06-12

## 2024-06-12 ENCOUNTER — OFFICE VISIT (OUTPATIENT)
Age: 84
End: 2024-06-12

## 2024-06-12 VITALS
HEART RATE: 57 BPM | SYSTOLIC BLOOD PRESSURE: 170 MMHG | DIASTOLIC BLOOD PRESSURE: 80 MMHG | WEIGHT: 164 LBS | BODY MASS INDEX: 28.15 KG/M2 | OXYGEN SATURATION: 98 %

## 2024-06-12 DIAGNOSIS — Z86.73 HISTORY OF EMBOLIC STROKE: Primary | ICD-10-CM
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
## 2024-06-12 DIAGNOSIS — I44.7 LBBB (LEFT BUNDLE BRANCH BLOCK): ICD-10-CM

## 2024-06-12 DIAGNOSIS — I44.7 LEFT BUNDLE-BRANCH BLOCK, UNSPECIFIED: ICD-10-CM

## 2024-06-12 DIAGNOSIS — R55 SYNCOPE AND COLLAPSE: ICD-10-CM

## 2024-06-12 DIAGNOSIS — Z95.818 STATUS POST PLACEMENT OF IMPLANTABLE LOOP RECORDER: Primary | ICD-10-CM

## 2024-06-12 DIAGNOSIS — I10 PRIMARY HYPERTENSION: ICD-10-CM

## 2024-06-12 DIAGNOSIS — I48.0 PAROXYSMAL ATRIAL FIBRILLATION (HCC): ICD-10-CM

## 2024-06-24 RX ORDER — ATORVASTATIN CALCIUM 40 MG/1
TABLET, FILM COATED ORAL
Qty: 90 TABLET | Refills: 3 | Status: SHIPPED | OUTPATIENT
Start: 2024-06-24

## 2024-07-02 ENCOUNTER — OFFICE VISIT (OUTPATIENT)
Facility: CLINIC | Age: 84
End: 2024-07-02

## 2024-07-02 ENCOUNTER — HOSPITAL ENCOUNTER (OUTPATIENT)
Age: 84
Setting detail: SPECIMEN
Discharge: HOME | End: 2024-07-05
Payer: MEDICARE

## 2024-07-02 VITALS
BODY MASS INDEX: 28.17 KG/M2 | DIASTOLIC BLOOD PRESSURE: 75 MMHG | WEIGHT: 165 LBS | HEIGHT: 64 IN | HEART RATE: 55 BPM | RESPIRATION RATE: 20 BRPM | SYSTOLIC BLOOD PRESSURE: 157 MMHG | TEMPERATURE: 97.9 F | OXYGEN SATURATION: 95 %

## 2024-07-02 DIAGNOSIS — J44.9 CHRONIC OBSTRUCTIVE PULMONARY DISEASE, UNSPECIFIED COPD TYPE (HCC): ICD-10-CM

## 2024-07-02 DIAGNOSIS — R79.89 LOW TSH LEVEL: ICD-10-CM

## 2024-07-02 DIAGNOSIS — I10 ESSENTIAL HYPERTENSION: ICD-10-CM

## 2024-07-02 DIAGNOSIS — I48.0 PAROXYSMAL ATRIAL FIBRILLATION (HCC): ICD-10-CM

## 2024-07-02 DIAGNOSIS — E55.9 VITAMIN D DEFICIENCY: ICD-10-CM

## 2024-07-02 DIAGNOSIS — I10 ESSENTIAL HYPERTENSION: Primary | ICD-10-CM

## 2024-07-02 DIAGNOSIS — F41.9 ANXIETY: ICD-10-CM

## 2024-07-02 DIAGNOSIS — R39.9 LOWER URINARY TRACT SYMPTOMS (LUTS): ICD-10-CM

## 2024-07-02 PROBLEM — I63.9 STROKE (HCC): Status: RESOLVED | Noted: 2022-01-15 | Resolved: 2024-07-02

## 2024-07-02 PROBLEM — I48.91 ATRIAL FIBRILLATION (HCC): Status: RESOLVED | Noted: 2021-03-11 | Resolved: 2024-07-02

## 2024-07-02 LAB
ALBUMIN SERPL-MCNC: 3.8 G/DL (ref 3.4–5)
ALBUMIN/GLOB SERPL: 1.2 (ref 0.8–1.7)
ALP SERPL-CCNC: 74 U/L (ref 45–117)
ALT SERPL-CCNC: 21 U/L (ref 13–56)
ANION GAP SERPL CALC-SCNC: 6 MMOL/L (ref 3–18)
AST SERPL W P-5'-P-CCNC: 23 U/L (ref 10–38)
BASOPHILS # BLD: 0.1 K/UL (ref 0–0.1)
BASOPHILS NFR BLD: 1 % (ref 0–2)
BILIRUB SERPL-MCNC: 0.4 MG/DL (ref 0.2–1)
BUN SERPL-MCNC: 15 MG/DL (ref 7–18)
BUN/CREAT SERPL: 15 (ref 12–20)
CA-I BLD-MCNC: 9.4 MG/DL (ref 8.5–10.1)
CHLORIDE SERPL-SCNC: 104 MMOL/L (ref 100–111)
CO2 SERPL-SCNC: 29 MMOL/L (ref 21–32)
CREAT SERPL-MCNC: 1.01 MG/DL (ref 0.6–1.3)
DIFFERENTIAL METHOD BLD: ABNORMAL
EOSINOPHIL # BLD: 0 K/UL (ref 0–0.4)
EOSINOPHIL NFR BLD: 1 % (ref 0–5)
ERYTHROCYTE [DISTWIDTH] IN BLOOD BY AUTOMATED COUNT: 12.5 % (ref 11.6–14.5)
GLOBULIN SER CALC-MCNC: 3.1 G/DL (ref 2–4)
GLUCOSE SERPL-MCNC: 95 MG/DL (ref 74–99)
HCT VFR BLD AUTO: 40.3 % (ref 35–45)
HGB BLD-MCNC: 13.2 G/DL (ref 12–16)
IMM GRANULOCYTES # BLD AUTO: 0 K/UL (ref 0–0.04)
IMM GRANULOCYTES NFR BLD AUTO: 0 % (ref 0–0.5)
LYMPHOCYTES # BLD: 0.8 K/UL (ref 0.9–3.6)
LYMPHOCYTES NFR BLD: 14 % (ref 21–52)
MCH RBC QN AUTO: 30.4 PG (ref 24–34)
MCHC RBC AUTO-ENTMCNC: 32.8 G/DL (ref 31–37)
MCV RBC AUTO: 92.9 FL (ref 78–100)
MONOCYTES # BLD: 0.5 K/UL (ref 0.05–1.2)
MONOCYTES NFR BLD: 9 % (ref 3–10)
NEUTS SEG # BLD: 4.1 K/UL (ref 1.8–8)
NEUTS SEG NFR BLD: 75 % (ref 40–73)
NRBC # BLD: 0 K/UL (ref 0–0.01)
NRBC BLD-RTO: 0 PER 100 WBC
PLATELET # BLD AUTO: 185 K/UL (ref 135–420)
PMV BLD AUTO: 11.3 FL (ref 9.2–11.8)
POTASSIUM SERPL-SCNC: 4.5 MMOL/L (ref 3.5–5.5)
PROT SERPL-MCNC: 6.9 G/DL (ref 6.4–8.2)
RBC # BLD AUTO: 4.34 M/UL (ref 4.2–5.3)
SODIUM SERPL-SCNC: 139 MMOL/L (ref 136–145)
WBC # BLD AUTO: 5.5 K/UL (ref 4.6–13.2)

## 2024-07-02 PROCEDURE — 84443 ASSAY THYROID STIM HORMONE: CPT

## 2024-07-02 PROCEDURE — 85025 COMPLETE CBC W/AUTO DIFF WBC: CPT

## 2024-07-02 PROCEDURE — 82306 VITAMIN D 25 HYDROXY: CPT

## 2024-07-02 PROCEDURE — 36415 COLL VENOUS BLD VENIPUNCTURE: CPT

## 2024-07-02 PROCEDURE — 80053 COMPREHEN METABOLIC PANEL: CPT

## 2024-07-02 RX ORDER — AMLODIPINE BESYLATE 2.5 MG/1
2.5 TABLET ORAL
Qty: 30 TABLET | Refills: 0 | Status: SHIPPED | OUTPATIENT
Start: 2024-07-02

## 2024-07-02 ASSESSMENT — PATIENT HEALTH QUESTIONNAIRE - PHQ9
SUM OF ALL RESPONSES TO PHQ9 QUESTIONS 1 & 2: 1
SUM OF ALL RESPONSES TO PHQ QUESTIONS 1-9: 1
1. LITTLE INTEREST OR PLEASURE IN DOING THINGS: NOT AT ALL
SUM OF ALL RESPONSES TO PHQ QUESTIONS 1-9: 1
2. FEELING DOWN, DEPRESSED OR HOPELESS: SEVERAL DAYS

## 2024-07-02 NOTE — PROGRESS NOTES
Chief Complaint   Patient presents with    Follow-up     Chronic conditions       \"Have you been to the ER, urgent care clinic since your last visit?  Hospitalized since your last visit?\"    NO    “Have you seen or consulted any other health care providers outside of [facility] since your last visit?”    Cardiology     Verbal order from Joselyn Rhodes MD to order labs/sign and draw them in office    Labs were drawn and sent to [facility] by Juju Resendiz LPN:    The following tubes were sent:    1 Lavendar, 0 Red, 2 SST, 0 Urine    Draw site right antecubital.  Patient tolerated draw with no distress.

## 2024-07-02 NOTE — PROGRESS NOTES
Selena Blankenship (: 1940) is a 83 y.o. female here for evaluation of the following chief concern(s):  Chronic condition management     ASSESSMENT/PLAN:  1. Essential hypertension  -     CBC with Auto Differential; Future  -     Comprehensive Metabolic Panel; Future  -     Thyroid Cascade Profile; Future  -      - Venipuncture  -     amLODIPine (NORVASC) 2.5 MG tablet; Take 1 tablet by mouth nightly, Disp-30 tablet, R-0Normal  2. Lower urinary tract symptoms (LUTS)  3. Paroxysmal atrial fibrillation (HCC)  4. Chronic obstructive pulmonary disease, unspecified COPD type (HCC)  5. Low TSH level  -     Thyroid [place] Profile; Future  6. Anxiety  7. Vitamin D deficiency  -     vitamin D (QC VITAMIN D3) 25 MCG (1000 UT) TABS tablet; Take 1 tablet by mouth daily, Disp-90 tablet, R-1Normal  -     Vitamin D 25 Hydroxy; Future    Ms. Blankenship appears medically stable, she sounds to be in NSR.      Mildly persistent elevated blood pressure in the setting of history CVA; patient opts for trial of additional medication, low-dose Amlodipine which we can adjust if needed.      Stable resp status.      Pt declines UroGYN specialist or UA/urine studies at this time.    Continue current dose of SSRI.     Update routine labs.      Return in about 4 weeks (around 2024) for uncontrolled hypertension.      Selena Blankenship agrees with plan as above and has no additional questions at this time.       SUBJECTIVE/OBJECTIVE:    Overall, pt reports feeling \"not good today\"- baseline/variable, patient is confident that she will feel better later.    Since last visit pt saw Cardiology, device not showing any prolonged events if she continues to do well plan to remove device in ~6 months.  Continue Losartan, ASA, statin.    Persistent perineal irritation, labial plaque:  Per pt symptoms improved since last visit, pt believes she saw GYN since that time and was prescribed a tube of medicine that \"took it away\".  She does not

## 2024-07-03 LAB — 25(OH)D3 SERPL-MCNC: 32.8 NG/ML (ref 30–100)

## 2024-07-05 LAB — TSH SERPL DL<=0.05 MIU/L-ACNC: 0.79 UIU/ML (ref 0.45–4.5)

## 2024-07-24 DIAGNOSIS — F41.9 ANXIETY: ICD-10-CM

## 2024-07-24 RX ORDER — PAROXETINE 10 MG/1
TABLET, FILM COATED ORAL
Qty: 90 TABLET | Refills: 0 | Status: SHIPPED | OUTPATIENT
Start: 2024-07-24

## 2024-07-30 ENCOUNTER — OFFICE VISIT (OUTPATIENT)
Facility: CLINIC | Age: 84
End: 2024-07-30
Payer: MEDICARE

## 2024-07-30 VITALS
BODY MASS INDEX: 28 KG/M2 | DIASTOLIC BLOOD PRESSURE: 69 MMHG | HEART RATE: 57 BPM | HEIGHT: 64 IN | WEIGHT: 164 LBS | RESPIRATION RATE: 18 BRPM | OXYGEN SATURATION: 94 % | SYSTOLIC BLOOD PRESSURE: 148 MMHG | TEMPERATURE: 97.3 F

## 2024-07-30 DIAGNOSIS — R29.6 RECURRENT FALLS: ICD-10-CM

## 2024-07-30 DIAGNOSIS — R54 FRAILTY SYNDROME IN GERIATRIC PATIENT: ICD-10-CM

## 2024-07-30 DIAGNOSIS — I48.0 PAROXYSMAL ATRIAL FIBRILLATION (HCC): ICD-10-CM

## 2024-07-30 DIAGNOSIS — N39.46 URINARY INCONTINENCE, MIXED: ICD-10-CM

## 2024-07-30 DIAGNOSIS — I10 ESSENTIAL HYPERTENSION: Primary | ICD-10-CM

## 2024-07-30 DIAGNOSIS — F33.41 RECURRENT MAJOR DEPRESSIVE DISORDER, IN PARTIAL REMISSION (HCC): ICD-10-CM

## 2024-07-30 PROCEDURE — 99214 OFFICE O/P EST MOD 30 MIN: CPT | Performed by: FAMILY MEDICINE

## 2024-07-30 PROCEDURE — G8419 CALC BMI OUT NRM PARAM NOF/U: HCPCS | Performed by: FAMILY MEDICINE

## 2024-07-30 PROCEDURE — 1090F PRES/ABSN URINE INCON ASSESS: CPT | Performed by: FAMILY MEDICINE

## 2024-07-30 PROCEDURE — 3077F SYST BP >= 140 MM HG: CPT | Performed by: FAMILY MEDICINE

## 2024-07-30 PROCEDURE — 0509F URINE INCON PLAN DOCD: CPT | Performed by: FAMILY MEDICINE

## 2024-07-30 PROCEDURE — 3078F DIAST BP <80 MM HG: CPT | Performed by: FAMILY MEDICINE

## 2024-07-30 PROCEDURE — 1036F TOBACCO NON-USER: CPT | Performed by: FAMILY MEDICINE

## 2024-07-30 PROCEDURE — G8400 PT W/DXA NO RESULTS DOC: HCPCS | Performed by: FAMILY MEDICINE

## 2024-07-30 PROCEDURE — G8427 DOCREV CUR MEDS BY ELIG CLIN: HCPCS | Performed by: FAMILY MEDICINE

## 2024-07-30 PROCEDURE — 1123F ACP DISCUSS/DSCN MKR DOCD: CPT | Performed by: FAMILY MEDICINE

## 2024-07-30 RX ORDER — AMLODIPINE BESYLATE 5 MG/1
5 TABLET ORAL DAILY
Qty: 30 TABLET | Refills: 0 | Status: SHIPPED | OUTPATIENT
Start: 2024-07-30

## 2024-07-30 NOTE — PROGRESS NOTES
Chief Complaint   Patient presents with    Follow-up     Hypertension       \"Have you been to the ER, urgent care clinic since your last visit?  Hospitalized since your last visit?\"    NO    “Have you seen or consulted any other health care providers outside of [facility] since your last visit?”    NO

## 2024-07-30 NOTE — PATIENT INSTRUCTIONS
Ms. Blankenship,  Lets INCREASE your Amlodipine from 2.5mg to 5mg (I sent a new script).  I would like for you to follow-up with [facility] for a nurses check in 2-4 weeks.    Thank you,  Dr. Joselyn Rhodes

## 2024-07-30 NOTE — PROGRESS NOTES
Selena Blankenship (: 1940) is a 83 y.o. female here for evaluation of the following chief concern(s):  Chronic condition management     ASSESSMENT/PLAN:  1. Essential hypertension  -     amLODIPine (NORVASC) 5 MG tablet; Take 1 tablet by mouth daily .  Dose adjustment, take in place of Amlodipine 2.5mg, Disp-30 tablet, R-0Normal  2. Recurrent falls  3. Frailty syndrome in geriatric patient  4. Paroxysmal atrial fibrillation (HCC)  5. Recurrent major depressive disorder, in partial remission (HCC)  6. Urinary incontinence, mixed    Ms. Blankenship appears medically stable, she sounds to be in NSR, tolerating ASA.      Mildly persistent elevated blood pressure in the setting of history CVA; will up-titrate Amlodipine from 2.5mg to 5mg.    Continue SSRI; may consider up-titration in the future though could worsen tremor, increase fall risk.    Future visit, we can re-discuss a prescription for DME of depends if pt is ready.     Return in about 3 weeks (around 2024) for nurses BP check.      Selena Blankenship agrees with plan as above and has no additional questions at this time.       SUBJECTIVE/OBJECTIVE:    Overall, pt reports feeling \"a little slow and off balance today\".    Results review;  CBC WNL  CMP WNL   TSH WNL (0.79)  Vit D 32    HTN:  Goal <140/90  No changes to medications.   Meds: Losartan 25mg BID, Amlodipine 2.5 QD- no edema.  Hx;   22 Losartan 25mg increased to BID.   22 Losartan reduced from 50 mg to 25 mg daily given recurrent ?syncopal events of unclear etiology    Slip/trip/fall in the store ~2 weeks ago, Right elbow abrasion but otherwise OK.  Using her cane and walker on a regular basis.   Pt has been taking Vit D supplement.     Breathing is \"fine\".    Mood is \"I feel like I do not care about nothing\".     2024 Cardiology notes;  Impression:   Subcutaneous loop recorder 2022 without any events, some occasional undersense.    History of frequent falls, mobility issues

## 2024-08-12 ENCOUNTER — TELEMEDICINE (OUTPATIENT)
Facility: CLINIC | Age: 84
End: 2024-08-12
Payer: MEDICARE

## 2024-08-12 DIAGNOSIS — Z78.0 ENCOUNTER FOR OSTEOPOROSIS SCREENING IN ASYMPTOMATIC POSTMENOPAUSAL PATIENT: ICD-10-CM

## 2024-08-12 DIAGNOSIS — R10.13 DYSPEPSIA: ICD-10-CM

## 2024-08-12 DIAGNOSIS — Z13.820 ENCOUNTER FOR OSTEOPOROSIS SCREENING IN ASYMPTOMATIC POSTMENOPAUSAL PATIENT: ICD-10-CM

## 2024-08-12 DIAGNOSIS — Z00.00 MEDICARE ANNUAL WELLNESS VISIT, SUBSEQUENT: Primary | ICD-10-CM

## 2024-08-12 PROCEDURE — G0439 PPPS, SUBSEQ VISIT: HCPCS | Performed by: FAMILY MEDICINE

## 2024-08-12 PROCEDURE — 1123F ACP DISCUSS/DSCN MKR DOCD: CPT | Performed by: FAMILY MEDICINE

## 2024-08-12 RX ORDER — PANTOPRAZOLE SODIUM 20 MG/1
20 TABLET, DELAYED RELEASE ORAL
Qty: 90 TABLET | Refills: 0 | Status: SHIPPED | OUTPATIENT
Start: 2024-08-12

## 2024-08-12 ASSESSMENT — PATIENT HEALTH QUESTIONNAIRE - PHQ9
SUM OF ALL RESPONSES TO PHQ QUESTIONS 1-9: 0
SUM OF ALL RESPONSES TO PHQ QUESTIONS 1-9: 0
2. FEELING DOWN, DEPRESSED OR HOPELESS: NOT AT ALL
SUM OF ALL RESPONSES TO PHQ QUESTIONS 1-9: 0
SUM OF ALL RESPONSES TO PHQ9 QUESTIONS 1 & 2: 0
SUM OF ALL RESPONSES TO PHQ QUESTIONS 1-9: 0
1. LITTLE INTEREST OR PLEASURE IN DOING THINGS: NOT AT ALL

## 2024-08-12 ASSESSMENT — LIFESTYLE VARIABLES: HOW OFTEN DO YOU HAVE A DRINK CONTAINING ALCOHOL: NEVER

## 2024-08-12 NOTE — PROGRESS NOTES
everyday activities: Eating, dressing, grooming, bathing, toileting, or walking/balance?: No  In the past 7 days, did you need help from others to take care of any of the following: Laundry, housekeeping, banking/finances, shopping, telephone use, food preparation, transportation, or taking medications?: No    Health Maintenance reviewed and discussed and ordered per Provider.    Health Maintenance Due   Topic Date Due    DTaP/Tdap/Td vaccine (1 - Tdap) Never done    Shingles vaccine (1 of 2) Never done    DEXA (modify frequency per FRAX score)  Never done    Respiratory Syncytial Virus (RSV) Pregnant or age 60 yrs+ (1 - 1-dose 60+ series) Never done    COVID-19 Vaccine (4 - 2023-24 season) 09/01/2023    Annual Wellness Visit (Medicare)  12/01/2023    Flu vaccine (1) 08/01/2024        Coordination of Care:    1. \"Have you been to the ER, urgent care clinic since your last visit?  Hospitalized since your last visit?\" No    2. \"Have you seen or consulted any other health care providers outside of the [facility] since your last visit?\" No     
Folic acid deficiency     Hypertension     Neurologic disorder     resting tremor    Stroke (HCC) 01/15/2022     Social History     Socioeconomic History    Marital status:      Spouse name: Not on file    Number of children: Not on file    Years of education: Not on file    Highest education level: Not on file   Occupational History    Not on file   Tobacco Use    Smoking status: Former     Current packs/day: 1.00     Types: Cigarettes    Smokeless tobacco: Never   Substance and Sexual Activity    Alcohol use: Not Currently    Drug use: Never    Sexual activity: Not on file   Other Topics Concern    Not on file   Social History Narrative    Not on file     Social Determinants of Health     Financial Resource Strain: High Risk (5/4/2023)    Overall Financial Resource Strain (CARDIA)     Difficulty of Paying Living Expenses: Very hard   Food Insecurity: Not on file (5/14/2024)   Transportation Needs: Unknown (5/4/2023)    PRAPARE - Transportation     Lack of Transportation (Medical): Not on file     Lack of Transportation (Non-Medical): No   Physical Activity: Inactive (8/12/2024)    Exercise Vital Sign     Days of Exercise per Week: 0 days     Minutes of Exercise per Session: 0 min   Stress: Not on file   Social Connections: Not on file   Intimate Partner Violence: Not on file   Housing Stability: Unknown (5/4/2023)    Housing Stability Vital Sign     Unable to Pay for Housing in the Last Year: Not on file     Number of Places Lived in the Last Year: Not on file     Unstable Housing in the Last Year: No     Current Outpatient Medications   Medication Sig Dispense Refill    amLODIPine (NORVASC) 5 MG tablet Take 1 tablet by mouth daily .  Dose adjustment, take in place of Amlodipine 2.5mg 30 tablet 0    PARoxetine (PAXIL) 10 MG tablet TAKE 1 TABLET BY MOUTH DAILY FOR NERVES IN PLACE OF DULOXETINE. 90 tablet 0    vitamin D (QC VITAMIN D3) 25 MCG (1000 UT) TABS tablet Take 1 tablet by mouth daily 90 tablet 1

## 2024-08-14 ENCOUNTER — OFFICE VISIT (OUTPATIENT)
Facility: CLINIC | Age: 84
End: 2024-08-14
Payer: MEDICARE

## 2024-08-14 VITALS
BODY MASS INDEX: 28 KG/M2 | RESPIRATION RATE: 22 BRPM | OXYGEN SATURATION: 95 % | WEIGHT: 164 LBS | HEART RATE: 64 BPM | HEIGHT: 64 IN | TEMPERATURE: 97.6 F | SYSTOLIC BLOOD PRESSURE: 82 MMHG | DIASTOLIC BLOOD PRESSURE: 49 MMHG

## 2024-08-14 DIAGNOSIS — J06.9 VIRAL URI: Primary | ICD-10-CM

## 2024-08-14 PROCEDURE — G8427 DOCREV CUR MEDS BY ELIG CLIN: HCPCS | Performed by: FAMILY MEDICINE

## 2024-08-14 PROCEDURE — 99213 OFFICE O/P EST LOW 20 MIN: CPT | Performed by: FAMILY MEDICINE

## 2024-08-14 PROCEDURE — G8400 PT W/DXA NO RESULTS DOC: HCPCS | Performed by: FAMILY MEDICINE

## 2024-08-14 PROCEDURE — 1090F PRES/ABSN URINE INCON ASSESS: CPT | Performed by: FAMILY MEDICINE

## 2024-08-14 PROCEDURE — 3078F DIAST BP <80 MM HG: CPT | Performed by: FAMILY MEDICINE

## 2024-08-14 PROCEDURE — G8419 CALC BMI OUT NRM PARAM NOF/U: HCPCS | Performed by: FAMILY MEDICINE

## 2024-08-14 PROCEDURE — 1123F ACP DISCUSS/DSCN MKR DOCD: CPT | Performed by: FAMILY MEDICINE

## 2024-08-14 PROCEDURE — 3074F SYST BP LT 130 MM HG: CPT | Performed by: FAMILY MEDICINE

## 2024-08-14 PROCEDURE — 1036F TOBACCO NON-USER: CPT | Performed by: FAMILY MEDICINE

## 2024-08-14 RX ORDER — AZITHROMYCIN 250 MG/1
TABLET, FILM COATED ORAL
Qty: 6 TABLET | Refills: 0 | OUTPATIENT
Start: 2024-08-14 | End: 2024-08-14

## 2024-08-14 RX ORDER — AZITHROMYCIN 250 MG/1
TABLET, FILM COATED ORAL
Qty: 6 TABLET | Refills: 0 | Status: SHIPPED | OUTPATIENT
Start: 2024-08-14 | End: 2024-08-16 | Stop reason: SDUPTHER

## 2024-08-14 NOTE — PROGRESS NOTES
Selena Blankenship (: 1940) is a 83 y.o. female here for evaluation of the following chief concern(s):  Acute condition management       ASSESSMENT/PLAN:  1. Viral URI  -     XR CHEST (2 VIEWS); Future  -     azithromycin (ZITHROMAX) 250 MG tablet; 500mg on day 1 followed by 250mg on days 2 - 5, Disp-6 tablet, R-0Print    Ms. Blankenship appears medically stable.  Afebrile.  Low range blood pressure; likely secondary to poor p.o. intake, I advised on the importance of needing to push fluids over the next couple of days.  I advised that she may have some soup and diluted Gatorade, if she is not able to tolerate p.o. may need to consider ER eval for IV fluids.  Her symptoms sound to be viral, we discussed natural history of viral illness, I expect that her symptoms may have already peaked and that she would be feeling much better by the end of this week.  She did have mild wheezes to the right lower lobe, possibly suggestive of early consolidation versus atelectasis; I have provided pocket prescription for azithromycin to her daughter/MPOA which patient can start if she develops fevers or her malaise is not improving the next couple of days.  I advised that if she starts the antibiotic she should have a chest x-ray done that day to further evaluate.  We discussed fall risk precautions and strict ER precautions.    Selena Blankenship and family agree with plan as above and have no additional questions at this time.       SUBJECTIVE/OBJECTIVE:    Her daughter, Karissa, accompanies her to clinic and assists with HPI.    2 days ago, patient had acute onset ill symptoms.  She has possibly had some bodyaches, mild.  Mainly congestion in her sinuses, sore throat, postnasal drip with cough.  No trouble breathing.  No fevers.  Her p.o. intake is limited, she has not had anything to eat or drink yet today which is not normal for her.    She is requiring more assistance from her family the last couple days.  COVID test negative

## 2024-08-14 NOTE — PROGRESS NOTES
Covid test negative 8/13/24 and also negative today 8/14/24. Started night before last, cough, sinus congestion, no fever, Vomiting x 1 yesterday morning, no vomiting since that episode. Sore from coughing.    Chief Complaint   Patient presents with    Cough     congestion       \"Have you been to the ER, urgent care clinic since your last visit?  Hospitalized since your last visit?\"    NO    “Have you seen or consulted any other health care providers outside of [facility] since your last visit?”    NO

## 2024-08-16 ENCOUNTER — TELEPHONE (OUTPATIENT)
Facility: CLINIC | Age: 84
End: 2024-08-16

## 2024-08-16 DIAGNOSIS — J06.9 VIRAL URI: ICD-10-CM

## 2024-08-16 RX ORDER — AZITHROMYCIN 250 MG/1
TABLET, FILM COATED ORAL
Qty: 6 TABLET | Refills: 0 | Status: SHIPPED | OUTPATIENT
Start: 2024-08-16 | End: 2024-08-26

## 2024-08-21 ENCOUNTER — NURSE ONLY (OUTPATIENT)
Facility: CLINIC | Age: 84
End: 2024-08-21

## 2024-08-21 VITALS — SYSTOLIC BLOOD PRESSURE: 123 MMHG | DIASTOLIC BLOOD PRESSURE: 72 MMHG | HEART RATE: 56 BPM | OXYGEN SATURATION: 96 %

## 2024-08-21 NOTE — PROGRESS NOTES
Patient in office today for a blood pressure recheck. Notified patient to continue all medications as currently ordered.

## 2024-08-27 DIAGNOSIS — I10 ESSENTIAL HYPERTENSION: ICD-10-CM

## 2024-08-27 RX ORDER — AMLODIPINE BESYLATE 5 MG/1
5 TABLET ORAL DAILY
Qty: 90 TABLET | Refills: 0 | Status: SHIPPED | OUTPATIENT
Start: 2024-08-27

## 2024-08-28 DIAGNOSIS — I10 ESSENTIAL HYPERTENSION: ICD-10-CM

## 2024-08-28 RX ORDER — LOSARTAN POTASSIUM 25 MG/1
25 TABLET ORAL 2 TIMES DAILY
Qty: 180 TABLET | Refills: 0 | Status: SHIPPED | OUTPATIENT
Start: 2024-08-28

## 2024-09-04 ENCOUNTER — HOSPITAL ENCOUNTER (EMERGENCY)
Age: 84
Discharge: HOME | End: 2024-09-04
Attending: EMERGENCY MEDICINE
Payer: MEDICARE

## 2024-09-04 ENCOUNTER — APPOINTMENT (OUTPATIENT)
Age: 84
End: 2024-09-04
Payer: MEDICARE

## 2024-09-04 VITALS
TEMPERATURE: 98.9 F | DIASTOLIC BLOOD PRESSURE: 67 MMHG | SYSTOLIC BLOOD PRESSURE: 148 MMHG | WEIGHT: 168 LBS | OXYGEN SATURATION: 98 % | HEART RATE: 58 BPM | BODY MASS INDEX: 28.68 KG/M2 | RESPIRATION RATE: 18 BRPM | HEIGHT: 64 IN
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
## 2024-09-04 DIAGNOSIS — G89.29 CHRONIC LOW BACK PAIN WITHOUT SCIATICA, UNSPECIFIED BACK PAIN LATERALITY: ICD-10-CM

## 2024-09-04 DIAGNOSIS — T07.XXXA MULTIPLE ABRASIONS: ICD-10-CM

## 2024-09-04 DIAGNOSIS — M54.50 CHRONIC LOW BACK PAIN WITHOUT SCIATICA, UNSPECIFIED BACK PAIN LATERALITY: ICD-10-CM

## 2024-09-04 PROCEDURE — 6370000000 HC RX 637 (ALT 250 FOR IP): Performed by: EMERGENCY MEDICINE

## 2024-09-04 PROCEDURE — 99283 EMERGENCY DEPT VISIT LOW MDM: CPT

## 2024-09-04 PROCEDURE — 73080 X-RAY EXAM OF ELBOW: CPT

## 2024-09-04 PROCEDURE — 72100 X-RAY EXAM L-S SPINE 2/3 VWS: CPT

## 2024-09-04 PROCEDURE — 12002 RPR S/N/AX/GEN/TRNK2.6-7.5CM: CPT

## 2024-09-04 RX ORDER — DOXYCYCLINE HYCLATE 100 MG
100 TABLET ORAL 2 TIMES DAILY
Qty: 14 TABLET | Refills: 0 | Status: SHIPPED | OUTPATIENT
Start: 2024-09-04 | End: 2024-09-11

## 2024-09-04 RX ORDER — MUPIROCIN 20 MG/G
OINTMENT TOPICAL
Qty: 15 G | Refills: 0 | Status: SHIPPED | OUTPATIENT
Start: 2024-09-04 | End: 2024-09-04

## 2024-09-04 RX ORDER — MUPIROCIN 20 MG/G
OINTMENT TOPICAL
Qty: 15 G | Refills: 0 | Status: SHIPPED | OUTPATIENT
Start: 2024-09-04 | End: 2024-09-11

## 2024-09-04 RX ORDER — ACETAMINOPHEN 500 MG
1000 TABLET ORAL
Status: COMPLETED | OUTPATIENT
Start: 2024-09-04 | End: 2024-09-04

## 2024-09-04 RX ADMIN — ACETAMINOPHEN 1000 MG: 500 TABLET ORAL at 18:59

## 2024-09-04 ASSESSMENT — LIFESTYLE VARIABLES
HOW OFTEN DO YOU HAVE A DRINK CONTAINING ALCOHOL: NEVER
HOW MANY STANDARD DRINKS CONTAINING ALCOHOL DO YOU HAVE ON A TYPICAL DAY: PATIENT DOES NOT DRINK

## 2024-09-04 ASSESSMENT — PAIN DESCRIPTION - ORIENTATION: ORIENTATION: LEFT

## 2024-09-04 ASSESSMENT — PAIN SCALES - GENERAL
PAINLEVEL_OUTOF10: 7
PAINLEVEL_OUTOF10: 0

## 2024-09-04 ASSESSMENT — PAIN - FUNCTIONAL ASSESSMENT: PAIN_FUNCTIONAL_ASSESSMENT: 0-10

## 2024-09-04 NOTE — ED TRIAGE NOTES
Pt states she has had several recent falls, fell last week and hurt her lower back, about one hour ago she was at the kitchen sink and fell again, has increased lower back pain and she has a skin tear and abrasion on the left upper arm and elbow

## 2024-09-05 NOTE — ED PROVIDER NOTES
given by:  Patient    Risks, benefits, and alternatives were discussed: yes      Risks discussed:  Infection, need for additional repair, nerve damage, poor wound healing, poor cosmetic result, pain, vascular damage, tendon damage and retained foreign body  Universal protocol:     Procedure explained and questions answered to patient or proxy's satisfaction: yes      Relevant documents present and verified: yes      Test results available: yes      Imaging studies available: yes      Required blood products, implants, devices, and special equipment available: yes      Site/side marked: yes      Immediately prior to procedure, a time out was called: yes      Patient identity confirmed:  Verbally with patient and arm band  Anesthesia:     Anesthesia method:  None  Laceration details:     Location:  Shoulder/arm    Shoulder/arm location:  L elbow    Length (cm):  4    Depth (mm):  0.1  Pre-procedure details:     Preparation:  Patient was prepped and draped in usual sterile fashion and imaging obtained to evaluate for foreign bodies  Exploration:     Limited defect created (wound extended): no      Hemostasis achieved with:  Direct pressure    Wound extent: no areolar tissue violation noted, no fascia violation noted, no foreign bodies/material noted, no muscle damage noted, no nerve damage noted, no tendon damage noted, no underlying fracture noted and no vascular damage noted    Treatment:     Area cleansed with:  Amos-Salvador    Amount of cleaning:  Standard    Irrigation solution:  Tap water    Irrigation method:  Tap    Visualized foreign bodies/material removed: no      Debridement:  None    Undermining:  None    Scar revision: no    Skin repair:     Repair method:  Tissue adhesive  Approximation:     Approximation:  Close  Repair type:     Repair type:  Simple  Post-procedure details:     Dressing:  Open (no dressing)    Procedure completion:  Tolerated well, no immediate complications  Comments:      2 skin tears

## 2024-09-05 NOTE — ED NOTES
I have reviewed discharge instructions with the patient and caregiver.  The patient and caregiver verbalized understanding. Patient instructed to follow up with PCP and encouraged to return to ER with any other concerns or emergent care needed. Patient and daughter verbalized understanding.

## 2024-09-11 ENCOUNTER — OFFICE VISIT (OUTPATIENT)
Facility: CLINIC | Age: 84
End: 2024-09-11
Payer: MEDICARE

## 2024-09-11 VITALS
DIASTOLIC BLOOD PRESSURE: 70 MMHG | BODY MASS INDEX: 28.84 KG/M2 | HEIGHT: 64 IN | OXYGEN SATURATION: 96 % | TEMPERATURE: 97.9 F | RESPIRATION RATE: 20 BRPM | HEART RATE: 54 BPM | SYSTOLIC BLOOD PRESSURE: 133 MMHG

## 2024-09-11 DIAGNOSIS — S51.012A SKIN TEAR OF LEFT ELBOW WITHOUT COMPLICATION, INITIAL ENCOUNTER: ICD-10-CM

## 2024-09-11 DIAGNOSIS — I10 ESSENTIAL HYPERTENSION: ICD-10-CM

## 2024-09-11 DIAGNOSIS — R54 FRAILTY SYNDROME IN GERIATRIC PATIENT: ICD-10-CM

## 2024-09-11 DIAGNOSIS — Z09 HOSPITAL DISCHARGE FOLLOW-UP: Primary | ICD-10-CM
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
## 2024-09-11 DIAGNOSIS — T07.XXXA ABRASIONS OF MULTIPLE SITES: ICD-10-CM

## 2024-09-11 PROCEDURE — 1111F DSCHRG MED/CURRENT MED MERGE: CPT | Performed by: FAMILY MEDICINE

## 2024-09-11 PROCEDURE — 1036F TOBACCO NON-USER: CPT | Performed by: FAMILY MEDICINE

## 2024-09-11 PROCEDURE — 1090F PRES/ABSN URINE INCON ASSESS: CPT | Performed by: FAMILY MEDICINE

## 2024-09-11 PROCEDURE — 1123F ACP DISCUSS/DSCN MKR DOCD: CPT | Performed by: FAMILY MEDICINE

## 2024-09-11 PROCEDURE — G8419 CALC BMI OUT NRM PARAM NOF/U: HCPCS | Performed by: FAMILY MEDICINE

## 2024-09-11 PROCEDURE — G8427 DOCREV CUR MEDS BY ELIG CLIN: HCPCS | Performed by: FAMILY MEDICINE

## 2024-09-11 PROCEDURE — 99214 OFFICE O/P EST MOD 30 MIN: CPT | Performed by: FAMILY MEDICINE

## 2024-09-11 ASSESSMENT — PATIENT HEALTH QUESTIONNAIRE - PHQ9
2. FEELING DOWN, DEPRESSED OR HOPELESS: NOT AT ALL
SUM OF ALL RESPONSES TO PHQ QUESTIONS 1-9: 0
SUM OF ALL RESPONSES TO PHQ9 QUESTIONS 1 & 2: 0
SUM OF ALL RESPONSES TO PHQ QUESTIONS 1-9: 0
1. LITTLE INTEREST OR PLEASURE IN DOING THINGS: NOT AT ALL

## 2024-09-25 ENCOUNTER — HOSPITAL ENCOUNTER (OUTPATIENT)
Age: 84
Discharge: HOME | End: 2024-09-28
Payer: MEDICARE

## 2024-09-25 ENCOUNTER — APPOINTMENT (OUTPATIENT)
Age: 84
End: 2024-09-25
Payer: MEDICARE

## 2024-09-25 ENCOUNTER — TELEPHONE (OUTPATIENT)
Facility: CLINIC | Age: 84
End: 2024-09-25

## 2024-09-25 DIAGNOSIS — R29.6 FREQUENT FALLS: Primary | ICD-10-CM

## 2024-09-25 DIAGNOSIS — M25.551 BILATERAL HIP PAIN: ICD-10-CM

## 2024-09-25 DIAGNOSIS — M25.552 BILATERAL HIP PAIN: ICD-10-CM

## 2024-09-25 DIAGNOSIS — R29.6 FREQUENT FALLS: ICD-10-CM

## 2024-09-25 PROCEDURE — 73521 X-RAY EXAM HIPS BI 2 VIEWS: CPT

## 2024-10-21 DIAGNOSIS — F41.9 ANXIETY: ICD-10-CM

## 2024-10-21 RX ORDER — PAROXETINE 10 MG/1
TABLET, FILM COATED ORAL
Qty: 90 TABLET | Refills: 0 | Status: SHIPPED | OUTPATIENT
Start: 2024-10-21

## 2024-11-06 DIAGNOSIS — R10.13 DYSPEPSIA: ICD-10-CM

## 2024-11-06 RX ORDER — PANTOPRAZOLE SODIUM 20 MG/1
20 TABLET, DELAYED RELEASE ORAL
Qty: 90 TABLET | Refills: 0 | Status: SHIPPED | OUTPATIENT
Start: 2024-11-06

## 2024-11-18 DIAGNOSIS — I10 ESSENTIAL HYPERTENSION: ICD-10-CM

## 2024-11-18 RX ORDER — AMLODIPINE BESYLATE 5 MG/1
5 TABLET ORAL DAILY
Qty: 90 TABLET | Refills: 0 | Status: SHIPPED | OUTPATIENT
Start: 2024-11-18

## 2024-11-20 ENCOUNTER — NURSE ONLY (OUTPATIENT)
Facility: CLINIC | Age: 84
End: 2024-11-20
Payer: MEDICARE

## 2024-11-20 DIAGNOSIS — Z23 NEED FOR IMMUNIZATION AGAINST INFLUENZA: Primary | ICD-10-CM

## 2024-11-20 PROCEDURE — 90653 IIV ADJUVANT VACCINE IM: CPT | Performed by: FAMILY MEDICINE

## 2024-11-20 PROCEDURE — G0008 ADMIN INFLUENZA VIRUS VAC: HCPCS | Performed by: FAMILY MEDICINE

## 2024-11-20 NOTE — PROGRESS NOTES
Selena Blankenship is a 84 y.o. female who presents for routine immunizations.   She denies any symptoms , reactions or allergies that would exclude them from being immunized today.  Risks and adverse reactions were discussed and the VIS was given to them. All questions were addressed.  She refused to stay in the office for observation, was in no distress when they left.

## 2024-11-20 NOTE — PATIENT INSTRUCTIONS
Patient Education        Influenza (Flu) Vaccine: Care Instructions  Overview     Influenza (flu) is an infection in the lungs and breathing passages. It is caused by the influenza virus. There are different strains, or types, of the flu virus every year. The flu comes on quickly. It can cause a cough, stuffy nose, fever, chills, tiredness, and aches and pains. These symptoms may last for a few weeks. The flu can make you feel very sick, but most of the time it doesn't lead to other problems. But it can cause serious problems in people who are older or who have a long-term illness, such as heart disease or diabetes.  You can help prevent the flu by getting a flu vaccine every year, as soon as it is available. You cannot get the flu from the vaccine. The vaccine prevents most cases of the flu. But even when the vaccine doesn't prevent the flu, it can make symptoms less severe and reduce the chance of problems from the flu.  You may have a slight fever or muscle aches or pains after getting a flu vaccine.  Follow-up care is a key part of your treatment and safety. Be sure to make and go to all appointments, and call your doctor if you are having problems. It's also a good idea to know your test results and keep a list of the medicines you take.  Who should get the flu vaccine?  Everyone age 6 months or older should get a flu vaccine each year. It lowers the chance of getting and spreading the flu. The vaccine is very important for people who are at high risk for getting other health problems from the flu. This includes:  Anyone 50 years of age or older.  People who live in a long-term care center, such as a nursing home.  All young children.  Adults and children 6 months and older who have long-term heart or lung problems, such as asthma.  Adults and children 6 months and older who needed medical care or were in a hospital during the past year because of diabetes, chronic kidney disease, or a weak immune system

## 2024-11-27 ENCOUNTER — OFFICE VISIT (OUTPATIENT)
Facility: CLINIC | Age: 84
End: 2024-11-27

## 2024-11-27 VITALS
SYSTOLIC BLOOD PRESSURE: 135 MMHG | DIASTOLIC BLOOD PRESSURE: 65 MMHG | BODY MASS INDEX: 27.31 KG/M2 | WEIGHT: 160 LBS | HEIGHT: 64 IN | HEART RATE: 59 BPM | RESPIRATION RATE: 22 BRPM | TEMPERATURE: 97.3 F | OXYGEN SATURATION: 97 %

## 2024-11-27 DIAGNOSIS — R39.9 LOWER URINARY TRACT SYMPTOMS (LUTS): ICD-10-CM

## 2024-11-27 DIAGNOSIS — Z86.79 HISTORY OF ATRIAL FIBRILLATION: ICD-10-CM

## 2024-11-27 DIAGNOSIS — Z74.09 IMPAIRED MOBILITY AND ADLS: ICD-10-CM

## 2024-11-27 DIAGNOSIS — Z78.9 IMPAIRED MOBILITY AND ADLS: ICD-10-CM

## 2024-11-27 DIAGNOSIS — I10 ESSENTIAL HYPERTENSION: Primary | ICD-10-CM

## 2024-11-27 DIAGNOSIS — Z86.73 HISTORY OF STROKE: ICD-10-CM

## 2024-11-27 DIAGNOSIS — Z91.81 AT HIGH RISK FOR FALLS: ICD-10-CM

## 2024-11-27 DIAGNOSIS — R29.6 RECURRENT FALLS: ICD-10-CM

## 2024-11-27 DIAGNOSIS — F33.41 RECURRENT MAJOR DEPRESSIVE DISORDER, IN PARTIAL REMISSION (HCC): ICD-10-CM

## 2024-11-27 DIAGNOSIS — F41.9 ANXIETY: ICD-10-CM

## 2024-11-27 DIAGNOSIS — R54 FRAILTY SYNDROME IN GERIATRIC PATIENT: ICD-10-CM

## 2024-11-27 DIAGNOSIS — J44.9 CHRONIC OBSTRUCTIVE PULMONARY DISEASE, UNSPECIFIED COPD TYPE (HCC): ICD-10-CM

## 2024-11-27 RX ORDER — LOSARTAN POTASSIUM 25 MG/1
25 TABLET ORAL 2 TIMES DAILY
Qty: 180 TABLET | Refills: 1 | Status: SHIPPED | OUTPATIENT
Start: 2024-11-27

## 2024-11-27 RX ORDER — BUDESONIDE AND FORMOTEROL FUMARATE DIHYDRATE 80; 4.5 UG/1; UG/1
2 AEROSOL RESPIRATORY (INHALATION) 2 TIMES DAILY
Qty: 10.2 G | Refills: 2 | Status: SHIPPED | OUTPATIENT
Start: 2024-11-27

## 2024-11-27 ASSESSMENT — PATIENT HEALTH QUESTIONNAIRE - PHQ9
SUM OF ALL RESPONSES TO PHQ QUESTIONS 1-9: 0
SUM OF ALL RESPONSES TO PHQ QUESTIONS 1-9: 0
1. LITTLE INTEREST OR PLEASURE IN DOING THINGS: NOT AT ALL
SUM OF ALL RESPONSES TO PHQ QUESTIONS 1-9: 0
SUM OF ALL RESPONSES TO PHQ QUESTIONS 1-9: 0

## 2024-11-27 NOTE — PROGRESS NOTES
Patient is using her walker today. States she feels very fatigued and no energy.    Chief Complaint   Patient presents with    Follow-up     Chronic disease       \"Have you been to the ER, urgent care clinic since your last visit?  Hospitalized since your last visit?\"    NO    “Have you seen or consulted any other health care providers outside our system since your last visit?”    NO          
 Wt 72.6 kg (160 lb)   SpO2 97%   BMI 27.46 kg/m²       Physical Exam  Constitutional:       General: She is not in acute distress.     Appearance: Normal appearance. She is not ill-appearing or toxic-appearing.   Cardiovascular:      Rate and Rhythm: Normal rate and regular rhythm.      Heart sounds: Normal heart sounds.   Pulmonary:      Effort: Pulmonary effort is normal.      Breath sounds: Normal breath sounds.   Abdominal:      General: Bowel sounds are normal.      Palpations: Abdomen is soft.      Tenderness: There is no abdominal tenderness.   Neurological:      Mental Status: She is alert. Mental status is at baseline.   Psychiatric:         Mood and Affect: Mood normal.         Lab Results   Component Value Date/Time    WBC 5.5 07/02/2024 04:07 PM    HGB 13.2 07/02/2024 04:07 PM    HCT 40.3 07/02/2024 04:07 PM     07/02/2024 04:07 PM    MCV 92.9 07/02/2024 04:07 PM     Lab Results   Component Value Date/Time     07/02/2024 04:07 PM    K 4.5 07/02/2024 04:07 PM     07/02/2024 04:07 PM    CO2 29 07/02/2024 04:07 PM    BUN 15 07/02/2024 04:07 PM    GFRAA 44 09/08/2022 03:54 PM    GLOB 3.1 07/02/2024 04:07 PM    ALT 21 07/02/2024 04:07 PM    AST 23 07/02/2024 04:07 PM     Lab Results   Component Value Date/Time    CHOL 135 12/01/2023 12:00 AM    CHOL 136 05/04/2023 12:20 PM    HDL 46 12/01/2023 12:00 AM    LDL 58 12/01/2023 12:00 AM    LDLCEXT 86 01/06/2020 12:00 AM    VLDL 31 12/01/2023 12:00 AM    VLDL 26 07/07/2022 10:00 AM       On this date 11/27/24 I have spent >30 minutes for chart review, face to face encounter with the patient for interview/exam, discussing working diagnosis and treatment plan.    Medical decision making complexity: moderate-high.    Joselyn Rhodes MD   Family & Geriatric Medicine

## 2024-11-29 ENCOUNTER — TELEPHONE (OUTPATIENT)
Facility: CLINIC | Age: 84
End: 2024-11-29

## 2024-11-29 DIAGNOSIS — I10 ESSENTIAL HYPERTENSION: Primary | ICD-10-CM

## 2024-11-29 DIAGNOSIS — R53.83 FATIGUE, UNSPECIFIED TYPE: ICD-10-CM

## 2024-11-29 DIAGNOSIS — R79.89 LOW TSH LEVEL: ICD-10-CM

## 2024-11-29 DIAGNOSIS — E55.9 VITAMIN D DEFICIENCY: ICD-10-CM

## 2024-11-29 NOTE — TELEPHONE ENCOUNTER
Lab orders entered per verbal order from Dr. Rhodes. Patient also reminded to schedule her Bone Density Test.

## 2024-12-16 DIAGNOSIS — B37.31 YEAST INFECTION INVOLVING THE VAGINA AND SURROUNDING AREA: ICD-10-CM

## 2024-12-16 DIAGNOSIS — R30.9 PAINFUL URINATION: ICD-10-CM

## 2024-12-16 DIAGNOSIS — Z22.39 CARRIER OF UREAPLASMA UREALYTICUM: ICD-10-CM

## 2024-12-16 DIAGNOSIS — N90.89 LESION OF LABIA: ICD-10-CM

## 2024-12-16 RX ORDER — CLOTRIMAZOLE AND BETAMETHASONE DIPROPIONATE 10; .64 MG/G; MG/G
CREAM TOPICAL
Qty: 45 G | Refills: 0 | Status: SHIPPED | OUTPATIENT
Start: 2024-12-16

## 2024-12-16 NOTE — TELEPHONE ENCOUNTER
Patient called for refill on Clortrimazole cream. RX sent to [facility] per verbal order from Dr Rhodes.

## 2025-01-09 DIAGNOSIS — F41.9 ANXIETY: ICD-10-CM

## 2025-01-09 RX ORDER — PAROXETINE 10 MG/1
TABLET, FILM COATED ORAL
Qty: 90 TABLET | Refills: 0 | Status: SHIPPED | OUTPATIENT
Start: 2025-01-09

## 2025-01-29 DIAGNOSIS — I10 ESSENTIAL HYPERTENSION: ICD-10-CM

## 2025-01-29 RX ORDER — AMLODIPINE BESYLATE 5 MG/1
5 TABLET ORAL DAILY
Qty: 90 TABLET | Refills: 0 | Status: SHIPPED | OUTPATIENT
Start: 2025-01-29

## 2025-01-30 ENCOUNTER — OFFICE VISIT (OUTPATIENT)
Facility: CLINIC | Age: 85
End: 2025-01-30
Payer: MEDICARE

## 2025-01-30 VITALS
RESPIRATION RATE: 22 BRPM | DIASTOLIC BLOOD PRESSURE: 54 MMHG | TEMPERATURE: 97.7 F | HEIGHT: 64 IN | HEART RATE: 63 BPM | SYSTOLIC BLOOD PRESSURE: 126 MMHG | OXYGEN SATURATION: 98 % | BODY MASS INDEX: 27.46 KG/M2

## 2025-01-30 DIAGNOSIS — M54.50 ACUTE RIGHT-SIDED LOW BACK PAIN, UNSPECIFIED WHETHER SCIATICA PRESENT: Primary | ICD-10-CM

## 2025-01-30 DIAGNOSIS — N39.0 RECURRENT UTI: ICD-10-CM

## 2025-01-30 DIAGNOSIS — R33.9 UNABLE TO EMPTY BLADDER: ICD-10-CM

## 2025-01-30 LAB
BILIRUBIN, URINE, POC: NEGATIVE
BLOOD URINE, POC: NEGATIVE
GLUCOSE URINE, POC: NEGATIVE
KETONES, URINE, POC: NEGATIVE
LEUKOCYTE ESTERASE, URINE, POC: NORMAL
NITRITE, URINE, POC: NEGATIVE
PH, URINE, POC: 5.5 (ref 4.6–8)
PROTEIN,URINE, POC: NORMAL
SPECIFIC GRAVITY, URINE, POC: 1.03 (ref 1–1.03)
URINALYSIS CLARITY, POC: CLEAR
URINALYSIS COLOR, POC: YELLOW
UROBILINOGEN, POC: NORMAL

## 2025-01-30 PROCEDURE — 1159F MED LIST DOCD IN RCRD: CPT | Performed by: FAMILY MEDICINE

## 2025-01-30 PROCEDURE — 1160F RVW MEDS BY RX/DR IN RCRD: CPT | Performed by: FAMILY MEDICINE

## 2025-01-30 PROCEDURE — 1123F ACP DISCUSS/DSCN MKR DOCD: CPT | Performed by: FAMILY MEDICINE

## 2025-01-30 PROCEDURE — G8427 DOCREV CUR MEDS BY ELIG CLIN: HCPCS | Performed by: FAMILY MEDICINE

## 2025-01-30 PROCEDURE — 1036F TOBACCO NON-USER: CPT | Performed by: FAMILY MEDICINE

## 2025-01-30 PROCEDURE — 81003 URINALYSIS AUTO W/O SCOPE: CPT | Performed by: FAMILY MEDICINE

## 2025-01-30 PROCEDURE — 99213 OFFICE O/P EST LOW 20 MIN: CPT | Performed by: FAMILY MEDICINE

## 2025-01-30 PROCEDURE — 1090F PRES/ABSN URINE INCON ASSESS: CPT | Performed by: FAMILY MEDICINE

## 2025-01-30 PROCEDURE — G8419 CALC BMI OUT NRM PARAM NOF/U: HCPCS | Performed by: FAMILY MEDICINE

## 2025-01-30 PROCEDURE — 1126F AMNT PAIN NOTED NONE PRSNT: CPT | Performed by: FAMILY MEDICINE

## 2025-01-30 RX ORDER — DOXYCYCLINE HYCLATE 100 MG
100 TABLET ORAL 2 TIMES DAILY
Qty: 20 TABLET | Refills: 0 | Status: SHIPPED | OUTPATIENT
Start: 2025-01-30 | End: 2025-02-09

## 2025-01-30 SDOH — ECONOMIC STABILITY: FOOD INSECURITY: WITHIN THE PAST 12 MONTHS, YOU WORRIED THAT YOUR FOOD WOULD RUN OUT BEFORE YOU GOT MONEY TO BUY MORE.: NEVER TRUE

## 2025-01-30 SDOH — ECONOMIC STABILITY: FOOD INSECURITY: WITHIN THE PAST 12 MONTHS, THE FOOD YOU BOUGHT JUST DIDN'T LAST AND YOU DIDN'T HAVE MONEY TO GET MORE.: NEVER TRUE

## 2025-01-30 NOTE — PROGRESS NOTES
Chief Complaint   Patient presents with    Urinary Retention   Patient states she is having pain in right back, states at her kidney. States that if she moves wrong it hurts. Patient thinks it may be \"kidney problem\". States that she urinates but only a very little bit.     \"Have you been to the ER, urgent care clinic since your last visit?  Hospitalized since your last visit?\"    NO    “Have you seen or consulted any other health care providers outside our system since your last visit?”    NO

## 2025-01-30 NOTE — PROGRESS NOTES
Selena Blankenship (: 1940) is a 84 y.o. female here for evaluation of the following chief concern(s):  Chronic condition management     ASSESSMENT/PLAN:  1. Acute right-sided low back pain, unspecified whether sciatica present  -     AMB POC URINALYSIS DIP STICK AUTO W/O MICRO  -     CBC with Auto Differential; Future  -     Comprehensive Metabolic Panel; Future  -     Culture, Urine; Future  -     doxycycline hyclate (VIBRA-TABS) 100 MG tablet; Take 1 tablet by mouth 2 times daily for 10 days, Disp-20 tablet, R-0Normal  -     US RETROPERITONEAL COMPLETE; Future  -     US ABDOMEN LIMITED; Future  2. Unable to empty bladder  -     CBC with Auto Differential; Future  -     Comprehensive Metabolic Panel; Future  -     Culture, Urine; Future  -     doxycycline hyclate (VIBRA-TABS) 100 MG tablet; Take 1 tablet by mouth 2 times daily for 10 days, Disp-20 tablet, R-0Normal  -     US RETROPERITONEAL COMPLETE; Future  -     US ABDOMEN LIMITED; Future  3. Recurrent UTI  -     CBC with Auto Differential; Future  -     Comprehensive Metabolic Panel; Future  -     Culture, Urine; Future  -     doxycycline hyclate (VIBRA-TABS) 100 MG tablet; Take 1 tablet by mouth 2 times daily for 10 days, Disp-20 tablet, R-0Normal  -     US RETROPERITONEAL COMPLETE; Future  -     US ABDOMEN LIMITED; Future    Ms. Blankenship appears medically stable.    POC UA not strongly suggestive of UTI.  Will cover w/ Doxy for hx of ureaplasma.  RTO precautions reviewed.    +ADDENDUM; discussed results w/ daughter/mPOA;   Retroperitoneal and bladder U/S not suggestive of retention/hydronephrosis.  Labs not suggestive of renal failure.    We are scheduled for routine care on 3/26/25, or sooner if needed.    Selena Blankenship agrees with plan as above and has no additional questions at this time.       SUBJECTIVE/OBJECTIVE:    Recurrent urinary symptoms, similar to the past.  Seeming to have trouble passing urine, though incontinent at times.  No

## 2025-01-31 ENCOUNTER — HOSPITAL ENCOUNTER (OUTPATIENT)
Age: 85
End: 2025-01-31
Attending: FAMILY MEDICINE
Payer: MEDICARE

## 2025-01-31 ENCOUNTER — APPOINTMENT (OUTPATIENT)
Age: 85
End: 2025-01-31
Attending: FAMILY MEDICINE
Payer: MEDICARE

## 2025-01-31 ENCOUNTER — HOSPITAL ENCOUNTER (OUTPATIENT)
Age: 85
Discharge: HOME | End: 2025-01-31
Attending: FAMILY MEDICINE
Payer: MEDICARE

## 2025-01-31 DIAGNOSIS — M54.50 ACUTE RIGHT-SIDED LOW BACK PAIN, UNSPECIFIED WHETHER SCIATICA PRESENT: ICD-10-CM

## 2025-01-31 DIAGNOSIS — R33.9 UNABLE TO EMPTY BLADDER: ICD-10-CM

## 2025-01-31 DIAGNOSIS — N39.0 RECURRENT UTI: ICD-10-CM

## 2025-01-31 LAB
ALBUMIN SERPL-MCNC: 3.7 G/DL (ref 3.4–5)
ALBUMIN/GLOB SERPL: 1 (ref 0.8–1.7)
ALP SERPL-CCNC: 77 U/L (ref 45–117)
ALT SERPL-CCNC: 22 U/L (ref 13–56)
ANION GAP SERPL CALC-SCNC: 5 MMOL/L (ref 3–18)
AST SERPL W P-5'-P-CCNC: 20 U/L (ref 10–38)
BASOPHILS # BLD: 0.05 K/UL (ref 0–0.1)
BASOPHILS NFR BLD: 1 % (ref 0–2)
BILIRUB SERPL-MCNC: 0.6 MG/DL (ref 0.2–1)
BUN SERPL-MCNC: 18 MG/DL (ref 7–18)
BUN/CREAT SERPL: 18 (ref 12–20)
CA-I BLD-MCNC: 9.2 MG/DL (ref 8.5–10.1)
CHLORIDE SERPL-SCNC: 103 MMOL/L (ref 100–111)
CO2 SERPL-SCNC: 28 MMOL/L (ref 21–32)
CREAT SERPL-MCNC: 0.99 MG/DL (ref 0.6–1.3)
DIFFERENTIAL METHOD BLD: ABNORMAL
EOSINOPHIL # BLD: 0.04 K/UL (ref 0–0.4)
EOSINOPHIL NFR BLD: 0.8 % (ref 0–5)
ERYTHROCYTE [DISTWIDTH] IN BLOOD BY AUTOMATED COUNT: 12.9 % (ref 11.6–14.5)
GLOBULIN SER CALC-MCNC: 3.6 G/DL (ref 2–4)
GLUCOSE SERPL-MCNC: 93 MG/DL (ref 74–99)
HCT VFR BLD AUTO: 38.3 % (ref 35–45)
HGB BLD-MCNC: 12 G/DL (ref 12–16)
IMM GRANULOCYTES # BLD AUTO: 0.02 K/UL (ref 0–0.04)
IMM GRANULOCYTES NFR BLD AUTO: 0.4 % (ref 0–0.5)
LYMPHOCYTES # BLD: 0.91 K/UL (ref 0.9–3.6)
LYMPHOCYTES NFR BLD: 17.5 % (ref 21–52)
MCH RBC QN AUTO: 28.8 PG (ref 24–34)
MCHC RBC AUTO-ENTMCNC: 31.3 G/DL (ref 31–37)
MCV RBC AUTO: 91.8 FL (ref 78–100)
MONOCYTES # BLD: 0.63 K/UL (ref 0.05–1.2)
MONOCYTES NFR BLD: 12.1 % (ref 3–10)
NEUTS SEG # BLD: 3.55 K/UL (ref 1.8–8)
NEUTS SEG NFR BLD: 68.2 % (ref 40–73)
NRBC # BLD: 0 K/UL (ref 0–0.01)
NRBC BLD-RTO: 0 PER 100 WBC
PLATELET # BLD AUTO: 158 K/UL (ref 135–420)
PMV BLD AUTO: 11.3 FL (ref 9.2–11.8)
POTASSIUM SERPL-SCNC: 4 MMOL/L (ref 3.5–5.5)
PROT SERPL-MCNC: 7.3 G/DL (ref 6.4–8.2)
RBC # BLD AUTO: 4.17 M/UL (ref 4.2–5.3)
SODIUM SERPL-SCNC: 136 MMOL/L (ref 136–145)
WBC # BLD AUTO: 5.2 K/UL (ref 4.6–13.2)

## 2025-01-31 PROCEDURE — 36415 COLL VENOUS BLD VENIPUNCTURE: CPT

## 2025-01-31 PROCEDURE — 76770 US EXAM ABDO BACK WALL COMP: CPT

## 2025-01-31 PROCEDURE — 87088 URINE BACTERIA CULTURE: CPT

## 2025-01-31 PROCEDURE — 80053 COMPREHEN METABOLIC PANEL: CPT

## 2025-01-31 PROCEDURE — 87186 SC STD MICRODIL/AGAR DIL: CPT

## 2025-01-31 PROCEDURE — 87086 URINE CULTURE/COLONY COUNT: CPT

## 2025-01-31 PROCEDURE — 85025 COMPLETE CBC W/AUTO DIFF WBC: CPT

## 2025-02-02 LAB
BACTERIA SPEC CULT: ABNORMAL
COLONY COUNT, CNT: ABNORMAL
Lab: ABNORMAL

## 2025-02-18 DIAGNOSIS — I10 ESSENTIAL HYPERTENSION: ICD-10-CM

## 2025-02-21 RX ORDER — LOSARTAN POTASSIUM 25 MG/1
25 TABLET ORAL 2 TIMES DAILY
Qty: 180 TABLET | Refills: 1 | Status: SHIPPED | OUTPATIENT
Start: 2025-02-21

## 2025-03-25 DIAGNOSIS — E78.2 MIXED HYPERLIPIDEMIA: Primary | ICD-10-CM

## 2025-03-25 DIAGNOSIS — F41.9 ANXIETY: ICD-10-CM

## 2025-03-25 RX ORDER — ATORVASTATIN CALCIUM 40 MG/1
TABLET, FILM COATED ORAL
Qty: 90 TABLET | Refills: 1 | Status: SHIPPED | OUTPATIENT
Start: 2025-03-25

## 2025-03-25 RX ORDER — PAROXETINE 10 MG/1
TABLET, FILM COATED ORAL
Qty: 90 TABLET | Refills: 1 | Status: SHIPPED | OUTPATIENT
Start: 2025-03-25

## 2025-04-23 DIAGNOSIS — E55.9 VITAMIN D DEFICIENCY: ICD-10-CM

## 2025-04-23 DIAGNOSIS — I10 ESSENTIAL HYPERTENSION: ICD-10-CM

## 2025-04-23 RX ORDER — AMLODIPINE BESYLATE 5 MG/1
5 TABLET ORAL DAILY
Qty: 90 TABLET | Refills: 1 | Status: SHIPPED | OUTPATIENT
Start: 2025-04-23

## 2025-04-23 RX ORDER — CHOLECALCIFEROL (VITAMIN D3) 25 MCG
1 TABLET ORAL DAILY
Qty: 90 TABLET | Refills: 1 | Status: SHIPPED | OUTPATIENT
Start: 2025-04-23

## 2025-05-12 ENCOUNTER — OFFICE VISIT (OUTPATIENT)
Facility: CLINIC | Age: 85
End: 2025-05-12
Payer: MEDICARE

## 2025-05-12 VITALS
HEIGHT: 64 IN | OXYGEN SATURATION: 96 % | SYSTOLIC BLOOD PRESSURE: 120 MMHG | HEART RATE: 51 BPM | WEIGHT: 161.6 LBS | DIASTOLIC BLOOD PRESSURE: 66 MMHG | BODY MASS INDEX: 27.59 KG/M2 | TEMPERATURE: 98.2 F | RESPIRATION RATE: 22 BRPM

## 2025-05-12 DIAGNOSIS — Z22.39 CARRIER OF UREAPLASMA UREALYTICUM: ICD-10-CM

## 2025-05-12 DIAGNOSIS — J44.9 CHRONIC OBSTRUCTIVE PULMONARY DISEASE, UNSPECIFIED COPD TYPE (HCC): ICD-10-CM

## 2025-05-12 DIAGNOSIS — J30.89 SEASONAL ALLERGIC RHINITIS DUE TO OTHER ALLERGIC TRIGGER: Primary | ICD-10-CM

## 2025-05-12 DIAGNOSIS — F41.9 ANXIETY: ICD-10-CM

## 2025-05-12 DIAGNOSIS — B37.31 YEAST INFECTION INVOLVING THE VAGINA AND SURROUNDING AREA: ICD-10-CM

## 2025-05-12 DIAGNOSIS — F33.1 MODERATE EPISODE OF RECURRENT MAJOR DEPRESSIVE DISORDER (HCC): ICD-10-CM

## 2025-05-12 DIAGNOSIS — N90.89 LESION OF LABIA: ICD-10-CM

## 2025-05-12 DIAGNOSIS — I48.0 PAROXYSMAL ATRIAL FIBRILLATION (HCC): ICD-10-CM

## 2025-05-12 PROCEDURE — 1090F PRES/ABSN URINE INCON ASSESS: CPT | Performed by: FAMILY MEDICINE

## 2025-05-12 PROCEDURE — 1036F TOBACCO NON-USER: CPT | Performed by: FAMILY MEDICINE

## 2025-05-12 PROCEDURE — 1123F ACP DISCUSS/DSCN MKR DOCD: CPT | Performed by: FAMILY MEDICINE

## 2025-05-12 PROCEDURE — 1160F RVW MEDS BY RX/DR IN RCRD: CPT | Performed by: FAMILY MEDICINE

## 2025-05-12 PROCEDURE — 1126F AMNT PAIN NOTED NONE PRSNT: CPT | Performed by: FAMILY MEDICINE

## 2025-05-12 PROCEDURE — 1159F MED LIST DOCD IN RCRD: CPT | Performed by: FAMILY MEDICINE

## 2025-05-12 PROCEDURE — 3023F SPIROM DOC REV: CPT | Performed by: FAMILY MEDICINE

## 2025-05-12 PROCEDURE — G8419 CALC BMI OUT NRM PARAM NOF/U: HCPCS | Performed by: FAMILY MEDICINE

## 2025-05-12 PROCEDURE — G8427 DOCREV CUR MEDS BY ELIG CLIN: HCPCS | Performed by: FAMILY MEDICINE

## 2025-05-12 PROCEDURE — 99214 OFFICE O/P EST MOD 30 MIN: CPT | Performed by: FAMILY MEDICINE

## 2025-05-12 RX ORDER — FLUTICASONE PROPIONATE 50 MCG
1 SPRAY, SUSPENSION (ML) NASAL DAILY
Qty: 16 G | Refills: 1 | Status: SHIPPED | OUTPATIENT
Start: 2025-05-12

## 2025-05-12 RX ORDER — CLOTRIMAZOLE AND BETAMETHASONE DIPROPIONATE 10; .64 MG/G; MG/G
CREAM TOPICAL
Qty: 45 G | Refills: 0 | Status: SHIPPED | OUTPATIENT
Start: 2025-05-12

## 2025-05-12 RX ORDER — PAROXETINE 20 MG/1
20 TABLET, FILM COATED ORAL DAILY
Qty: 30 TABLET | Refills: 0 | Status: SHIPPED | OUTPATIENT
Start: 2025-05-12

## 2025-05-12 ASSESSMENT — PATIENT HEALTH QUESTIONNAIRE - PHQ9
SUM OF ALL RESPONSES TO PHQ QUESTIONS 1-9: 2
2. FEELING DOWN, DEPRESSED OR HOPELESS: MORE THAN HALF THE DAYS
SUM OF ALL RESPONSES TO PHQ QUESTIONS 1-9: 2
1. LITTLE INTEREST OR PLEASURE IN DOING THINGS: NOT AT ALL

## 2025-05-12 NOTE — PROGRESS NOTES
Selena Blankenship (: 1940) is a 84 y.o. female here for evaluation of the following chief concern(s):  Chronic condition management     ASSESSMENT/PLAN:  1. Seasonal allergic rhinitis due to other allergic trigger  -     fluticasone (FLONASE) 50 MCG/ACT nasal spray; 1 spray by Each Nostril route daily Shake liquid, Disp-16 g, R-1Normal  2. Yeast infection involving the vagina and surrounding area  -     clotrimazole-betamethasone (LOTRISONE) 1-0.05 % cream; Apply topically 2 times daily., Disp-45 g, R-0, Normal  3. Lesion of labia  -     clotrimazole-betamethasone (LOTRISONE) 1-0.05 % cream; Apply topically 2 times daily., Disp-45 g, R-0, Normal  4. Carrier of ureaplasma urealyticum  -     clotrimazole-betamethasone (LOTRISONE) 1-0.05 % cream; Apply topically 2 times daily., Disp-45 g, R-0, Normal  5. Anxiety  -     PARoxetine (PAXIL) 20 MG tablet; Take 1 tablet by mouth daily .  DOSE INCREASE MAY 2025.  Take in place of Paxil (paroxetine) 10mg., Disp-30 tablet, R-0Normal  6. Moderate episode of recurrent major depressive disorder (HCC)  -     PARoxetine (PAXIL) 20 MG tablet; Take 1 tablet by mouth daily .  DOSE INCREASE MAY 2025.  Take in place of Paxil (paroxetine) 10mg., Disp-30 tablet, R-0Normal  7. Chronic obstructive pulmonary disease, unspecified COPD type (HCC)  8. Paroxysmal atrial fibrillation (HCC)    Ms. Blankenship appears medically stable.  Normotensive.    She sounds to be in NSR, rate controlled.  COPD is compensated despite inhalers not being used at this time/cost prohibitive.      She opts for a nasal spray rather than pill for allergic symptoms.      She declines referral to Urology.    Pt opts for a dose increase of Paxil; we reviewed side effect profile (especially imbalance/fall risk), goals for administration, timing for efficacy.      I advised to continue fall precautions.    She declines physical therapy.    Return in about 4 weeks (around 2025).    Selena Blankenship agrees with

## 2025-06-10 DIAGNOSIS — F41.9 ANXIETY: ICD-10-CM

## 2025-06-10 DIAGNOSIS — F33.1 MODERATE EPISODE OF RECURRENT MAJOR DEPRESSIVE DISORDER (HCC): ICD-10-CM

## 2025-06-11 RX ORDER — PAROXETINE 20 MG/1
20 TABLET, FILM COATED ORAL DAILY
Qty: 90 TABLET | Refills: 0 | Status: SHIPPED | OUTPATIENT
Start: 2025-06-11

## 2025-08-13 ENCOUNTER — OFFICE VISIT (OUTPATIENT)
Facility: CLINIC | Age: 85
End: 2025-08-13

## 2025-08-13 ENCOUNTER — HOSPITAL ENCOUNTER (OUTPATIENT)
Age: 85
Setting detail: SPECIMEN
Discharge: HOME | End: 2025-08-16
Payer: MEDICARE

## 2025-08-13 VITALS
TEMPERATURE: 97.6 F | BODY MASS INDEX: 27.31 KG/M2 | SYSTOLIC BLOOD PRESSURE: 138 MMHG | HEART RATE: 56 BPM | OXYGEN SATURATION: 95 % | DIASTOLIC BLOOD PRESSURE: 68 MMHG | WEIGHT: 160 LBS | HEIGHT: 64 IN | RESPIRATION RATE: 18 BRPM

## 2025-08-13 DIAGNOSIS — J43.9 PULMONARY EMPHYSEMA, UNSPECIFIED EMPHYSEMA TYPE (HCC): ICD-10-CM

## 2025-08-13 DIAGNOSIS — Z13.21 ENCOUNTER FOR VITAMIN DEFICIENCY SCREENING: ICD-10-CM

## 2025-08-13 DIAGNOSIS — I10 ESSENTIAL HYPERTENSION: Primary | ICD-10-CM

## 2025-08-13 DIAGNOSIS — F33.1 MODERATE EPISODE OF RECURRENT MAJOR DEPRESSIVE DISORDER (HCC): ICD-10-CM

## 2025-08-13 DIAGNOSIS — F41.9 ANXIETY: ICD-10-CM

## 2025-08-13 DIAGNOSIS — I48.0 PAROXYSMAL ATRIAL FIBRILLATION (HCC): ICD-10-CM

## 2025-08-13 DIAGNOSIS — E78.2 MIXED HYPERLIPIDEMIA: ICD-10-CM

## 2025-08-13 DIAGNOSIS — Z13.29 THYROID DISORDER SCREENING: ICD-10-CM

## 2025-08-13 LAB
ALBUMIN SERPL-MCNC: 3.7 G/DL (ref 3.4–5)
ALBUMIN/GLOB SERPL: 1.3
ALP SERPL-CCNC: 77 U/L (ref 45–117)
ALT SERPL-CCNC: 19 U/L (ref 10–35)
ANION GAP SERPL CALC-SCNC: 11 MMOL/L (ref 3–18)
AST SERPL W P-5'-P-CCNC: 26 U/L (ref 10–38)
BASOPHILS # BLD: 0.03 K/UL (ref 0–0.1)
BASOPHILS NFR BLD: 0.6 % (ref 0–2)
BILIRUB SERPL-MCNC: 0.3 MG/DL (ref 0.2–1)
BUN SERPL-MCNC: 18 MG/DL (ref 6–23)
BUN/CREAT SERPL: 22
CA-I BLD-MCNC: 9.8 MG/DL (ref 8.5–10.1)
CHLORIDE SERPL-SCNC: 103 MMOL/L (ref 98–107)
CO2 SERPL-SCNC: 27 MMOL/L (ref 21–32)
CREAT SERPL-MCNC: 0.83 MG/DL (ref 0.6–1.3)
DIFFERENTIAL METHOD BLD: ABNORMAL
EOSINOPHIL # BLD: 0.06 K/UL (ref 0–0.4)
EOSINOPHIL NFR BLD: 1.2 % (ref 0–5)
ERYTHROCYTE [DISTWIDTH] IN BLOOD BY AUTOMATED COUNT: 13.2 % (ref 11.6–14.5)
GLOBULIN SER CALC-MCNC: 2.9 G/DL
GLUCOSE SERPL-MCNC: 135 MG/DL (ref 74–108)
HCT VFR BLD AUTO: 39.1 % (ref 35–45)
HGB BLD-MCNC: 12.7 G/DL (ref 12–16)
IMM GRANULOCYTES # BLD AUTO: 0.02 K/UL (ref 0–0.04)
IMM GRANULOCYTES NFR BLD AUTO: 0.4 % (ref 0–0.5)
LYMPHOCYTES # BLD: 0.77 K/UL (ref 0.9–3.6)
LYMPHOCYTES NFR BLD: 15.9 % (ref 21–52)
MCH RBC QN AUTO: 30.3 PG (ref 24–34)
MCHC RBC AUTO-ENTMCNC: 32.5 G/DL (ref 31–37)
MCV RBC AUTO: 93.3 FL (ref 78–100)
MONOCYTES # BLD: 0.55 K/UL (ref 0.05–1.2)
MONOCYTES NFR BLD: 11.3 % (ref 3–10)
NEUTS SEG # BLD: 3.42 K/UL (ref 1.8–8)
NEUTS SEG NFR BLD: 70.6 % (ref 40–73)
NRBC # BLD: 0 K/UL (ref 0–0.01)
NRBC BLD-RTO: 0 PER 100 WBC
PLATELET # BLD AUTO: 160 K/UL (ref 135–420)
PMV BLD AUTO: 11.9 FL (ref 9.2–11.8)
POTASSIUM SERPL-SCNC: 3.9 MMOL/L (ref 3.5–5.5)
PROT SERPL-MCNC: 6.6 G/DL (ref 6.4–8.2)
RBC # BLD AUTO: 4.19 M/UL (ref 4.2–5.3)
SODIUM SERPL-SCNC: 141 MMOL/L (ref 136–145)
TSH, 3RD GENERATION: 0.4 UIU/ML (ref 0.27–4.2)
WBC # BLD AUTO: 4.9 K/UL (ref 4.6–13.2)

## 2025-08-13 PROCEDURE — 80053 COMPREHEN METABOLIC PANEL: CPT

## 2025-08-13 PROCEDURE — 80061 LIPID PANEL: CPT

## 2025-08-13 PROCEDURE — 85025 COMPLETE CBC W/AUTO DIFF WBC: CPT

## 2025-08-13 PROCEDURE — 82607 VITAMIN B-12: CPT

## 2025-08-13 PROCEDURE — 84443 ASSAY THYROID STIM HORMONE: CPT

## 2025-08-13 RX ORDER — LOSARTAN POTASSIUM 25 MG/1
25 TABLET ORAL 2 TIMES DAILY
Qty: 180 TABLET | Refills: 1 | Status: SHIPPED | OUTPATIENT
Start: 2025-08-13

## 2025-08-13 RX ORDER — CITALOPRAM HYDROBROMIDE 10 MG/1
5 TABLET ORAL DAILY
Qty: 45 TABLET | Refills: 0 | Status: SHIPPED | OUTPATIENT
Start: 2025-08-13

## 2025-08-13 ASSESSMENT — PATIENT HEALTH QUESTIONNAIRE - PHQ9
4. FEELING TIRED OR HAVING LITTLE ENERGY: SEVERAL DAYS
SUM OF ALL RESPONSES TO PHQ QUESTIONS 1-9: 6
3. TROUBLE FALLING OR STAYING ASLEEP: SEVERAL DAYS
9. THOUGHTS THAT YOU WOULD BE BETTER OFF DEAD, OR OF HURTING YOURSELF: NOT AT ALL
8. MOVING OR SPEAKING SO SLOWLY THAT OTHER PEOPLE COULD HAVE NOTICED. OR THE OPPOSITE, BEING SO FIGETY OR RESTLESS THAT YOU HAVE BEEN MOVING AROUND A LOT MORE THAN USUAL: NOT AT ALL
10. IF YOU CHECKED OFF ANY PROBLEMS, HOW DIFFICULT HAVE THESE PROBLEMS MADE IT FOR YOU TO DO YOUR WORK, TAKE CARE OF THINGS AT HOME, OR GET ALONG WITH OTHER PEOPLE: NOT DIFFICULT AT ALL
1. LITTLE INTEREST OR PLEASURE IN DOING THINGS: MORE THAN HALF THE DAYS
5. POOR APPETITE OR OVEREATING: NOT AT ALL
SUM OF ALL RESPONSES TO PHQ QUESTIONS 1-9: 6
2. FEELING DOWN, DEPRESSED OR HOPELESS: MORE THAN HALF THE DAYS
SUM OF ALL RESPONSES TO PHQ QUESTIONS 1-9: 6
6. FEELING BAD ABOUT YOURSELF - OR THAT YOU ARE A FAILURE OR HAVE LET YOURSELF OR YOUR FAMILY DOWN: NOT AT ALL
SUM OF ALL RESPONSES TO PHQ QUESTIONS 1-9: 6
7. TROUBLE CONCENTRATING ON THINGS, SUCH AS READING THE NEWSPAPER OR WATCHING TELEVISION: NOT AT ALL

## 2025-08-14 LAB
CHOLEST SERPL-MCNC: 133 MG/DL
HDLC SERPL-MCNC: 50 MG/DL (ref 40–60)
HDLC SERPL: 2.6 (ref 0–5)
LDLC SERPL CALC-MCNC: 50 MG/DL (ref 0–100)
TRIGL SERPL-MCNC: 166 MG/DL (ref 0–150)
VIT B12 SERPL-MCNC: 393 PG/ML (ref 211–911)
VLDLC SERPL CALC-MCNC: 33 MG/DL

## (undated) DEVICE — NDL INJ SCLERO 25G 240CM -- INTERJECT M00518360 BX/5

## (undated) DEVICE — Device: Brand: DEFENDO VALVE AND CONNECTOR KIT

## (undated) DEVICE — Device: Brand: DISPOSABLE ELECTROSURGICAL SNARE

## (undated) DEVICE — KIT COLON W/ 1.1OZ LUB AND 2 END

## (undated) DEVICE — SOL IRR STRL H2O 500ML STRL --

## (undated) DEVICE — GOWN,AURORA,FABRIC-REINFORCED,X-LARGE: Brand: MEDLINE

## (undated) DEVICE — DRAPE SURG W25XL50IN E OPN CIR BND BG

## (undated) DEVICE — TUBING INSUFFLATION CAP W/ EXT CARBON DIOX ENDO SMARTCAP

## (undated) DEVICE — SOL IRR STRL H2O 1000ML BTL --

## (undated) DEVICE — TRAP SURG QUAD PARABOLA SLOT DSGN SFTY SCRN TRAPEASE

## (undated) DEVICE — SUTURE ABSORBABLE BRAIDED 2-0 CT-1 27 IN UD VICRYL J259H

## (undated) DEVICE — CONMED SCOPE SAVER BITE BLOCK, 20X27 MM: Brand: SCOPE SAVER

## (undated) DEVICE — ENDOGATOR TUBING FOR BOSTON SCIENTIFIC ENDOSTAT II PUMP, OLYMPUS OFP PUMP OR ENDO STRATUS PUMP: Brand: ENDOGATOR

## (undated) DEVICE — TUBING, SUCTION, 9/32" X 10', STRAIGHT: Brand: MEDLINE

## (undated) DEVICE — SINGLE-USE BIOPSY FORCEPS: Brand: RADIAL JAW 4

## (undated) DEVICE — RETRIEVER SPEC L230CM DIA2.5MM POLYPR FOR TISS RETRV ROTH

## (undated) DEVICE — REM POLYHESIVE ADULT PATIENT RETURN ELECTRODE: Brand: VALLEYLAB

## (undated) DEVICE — COVADERM: Brand: DEROYAL